# Patient Record
Sex: FEMALE | Race: WHITE | NOT HISPANIC OR LATINO | Employment: OTHER | URBAN - METROPOLITAN AREA
[De-identification: names, ages, dates, MRNs, and addresses within clinical notes are randomized per-mention and may not be internally consistent; named-entity substitution may affect disease eponyms.]

---

## 2017-01-10 ENCOUNTER — TELEPHONE (OUTPATIENT)
Dept: HEMATOLOGY/ONCOLOGY | Facility: CLINIC | Age: 72
End: 2017-01-10

## 2017-01-10 NOTE — TELEPHONE ENCOUNTER
----- Message from Salud Kruger sent at 1/9/2017 11:30 AM CST -----  Contact: self  Pt is calling to schedule an apt to be seen. She states that Dr. Aguilar is referring her to us. There is a referral in the system which states: closed fracture of sternum with routine healing, unspecified portion of sternum, subsequent encounter    Contact number is [phone]

## 2017-01-11 ENCOUNTER — TELEPHONE (OUTPATIENT)
Dept: HEMATOLOGY/ONCOLOGY | Facility: CLINIC | Age: 72
End: 2017-01-11

## 2017-01-17 ENCOUNTER — LAB VISIT (OUTPATIENT)
Dept: LAB | Facility: HOSPITAL | Age: 72
End: 2017-01-17
Attending: INTERNAL MEDICINE
Payer: MEDICARE

## 2017-01-17 ENCOUNTER — OFFICE VISIT (OUTPATIENT)
Dept: ENDOCRINOLOGY | Facility: CLINIC | Age: 72
End: 2017-01-17
Payer: MEDICARE

## 2017-01-17 VITALS
SYSTOLIC BLOOD PRESSURE: 110 MMHG | WEIGHT: 169 LBS | HEART RATE: 89 BPM | RESPIRATION RATE: 16 BRPM | BODY MASS INDEX: 26.53 KG/M2 | HEIGHT: 67 IN | DIASTOLIC BLOOD PRESSURE: 82 MMHG

## 2017-01-17 DIAGNOSIS — E78.5 HYPERLIPIDEMIA, UNSPECIFIED HYPERLIPIDEMIA TYPE: ICD-10-CM

## 2017-01-17 DIAGNOSIS — M81.0 OSTEOPOROSIS, POST-MENOPAUSAL: Primary | ICD-10-CM

## 2017-01-17 DIAGNOSIS — E03.9 HYPOTHYROIDISM (ACQUIRED): ICD-10-CM

## 2017-01-17 LAB
ALT SERPL W/O P-5'-P-CCNC: 20 U/L
AST SERPL-CCNC: 15 U/L
CHOLEST/HDLC SERPL: 2.7 {RATIO}
HDL/CHOLESTEROL RATIO: 37.2 %
HDLC SERPL-MCNC: 145 MG/DL
HDLC SERPL-MCNC: 54 MG/DL
LDLC SERPL CALC-MCNC: 73.8 MG/DL
NONHDLC SERPL-MCNC: 91 MG/DL
TRIGL SERPL-MCNC: 86 MG/DL

## 2017-01-17 PROCEDURE — 99999 PR PBB SHADOW E&M-EST. PATIENT-LVL III: CPT | Mod: PBBFAC,,, | Performed by: INTERNAL MEDICINE

## 2017-01-17 PROCEDURE — 99214 OFFICE O/P EST MOD 30 MIN: CPT | Mod: S$PBB,,, | Performed by: INTERNAL MEDICINE

## 2017-01-17 PROCEDURE — 99213 OFFICE O/P EST LOW 20 MIN: CPT | Mod: PBBFAC | Performed by: INTERNAL MEDICINE

## 2017-01-17 NOTE — PROGRESS NOTES
Subjective: Osteoporosis       Patient ID: Eugenia Diaz is a 71 y.o. female.    Chief Complaint:  Osteoporosis      History of Present Illness  Ms. Diaz presents to clinic today for further evaluation and management of Ostoeporosis   She is a prior patient of Dr. Vazquez with last documented office visit in 08/2015. This is the patient's initial visit with me today     Recently sustained a fall in 09/2016  Patient states she fell backward after losing her balance   She followed with primary care and initial X-ray of ribs did not show any acute fractures   CT of chest was ordered - and she was found to have a fracture at the superior aspect of the sternum     She did receive Prolia in 02/2016 and again in 08/2016     Noting prior use of Fosamax for 4-5 years and also Forteo x 2 years     BMD from 12/2015 showed a 7% decline in BMD at the lumbar spine   Total hip T score = -2.6   Femoral neck T score = -2.8   Lumbar spine T score = -1.9     Currently, taking calcium and vitamin D supplements daily     The patient has the following medical problems:   1. 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   3. Asthma requiring glucocorticoids intermittently.   4. Hypothyroidism, on treatment with Levothyroxine    5. High cholesterol, on a statin, which can improve the osteoporosis.   6. Depression, treated    7. Migraine headaches, infrequent   8 HTN on HCTZ which can be good for bone      Review of Dr. Vazquez's last clinic note:   Fosamax at 35 mg for 3  years; please see my detailed note and the thought   process. The patient is not having any major GI complaints from that. She  has not had any problem with her jaw. At one point, there had been a   question about avascular necrosis of the jaw a couple of years ago. We do   not think that was the diagnosis. The patient walks with a cane at home.   She uses a scooter  when going out. The reason for the scooter is the   avascular necrosis of the left hip. The patient takes care of her    who is disabled and getting a hip replacement at this time would be   impossible for her.   The patient in the past has been treated with Forteo and subsequently with   the bisphosphonates. She had an excellent response to the Forteo and it   seems in retrospect that the avascular necrosis stopped worsening with the   Forteo treatment and then has been maintained with the bisphosphonates.   The patient   has documented normal vitamin D levels. She was currently on steroids One month ago  because of a flare of the asthma.     Review of Systems   Constitutional: Positive for fatigue. Negative for unexpected weight change.   HENT: Negative for hearing loss.    Eyes: Negative for visual disturbance.   Respiratory: Negative for cough and shortness of breath.    Cardiovascular: Negative for chest pain and palpitations.   Gastrointestinal: Negative for blood in stool and constipation.   Musculoskeletal: Negative for arthralgias and back pain.   Neurological: Negative for headaches.       Objective:   Physical Exam   Constitutional: She is oriented to person, place, and time. She appears well-developed and well-nourished. No distress.   Neck: No thyromegaly present.   Cardiovascular: Normal rate, regular rhythm and normal heart sounds.    No murmur heard.  Musculoskeletal: She exhibits no edema.   Lymphadenopathy:     She has no cervical adenopathy.   Neurological: She is alert and oriented to person, place, and time.        Skin: Skin is warm and dry.   Psychiatric: She has a normal mood and affect. Her behavior is normal.   Nursing note and vitals reviewed.      Lab Review:   Results for orders placed or performed in visit on 01/17/17   Lipid panel   Result Value Ref Range    Cholesterol 145 120 - 199 mg/dL    Triglycerides 86 30 - 150 mg/dL    HDL 54 40 - 75 mg/dL    LDL Cholesterol 73.8 63.0 -  159.0 mg/dL    HDL/Chol Ratio 37.2 20.0 - 50.0 %    Total Cholesterol/HDL Ratio 2.7 2.0 - 5.0    Non-HDL Cholesterol 91 mg/dL   AST (SGOT)   Result Value Ref Range    AST 15 10 - 40 U/L   ALT (SGPT)   Result Value Ref Range    ALT 20 10 - 44 U/L       Chemistry        Component Value Date/Time     01/17/2017 1100    K 4.4 01/17/2017 1100     01/17/2017 1100    CO2 33 (H) 01/17/2017 1100    BUN 13 01/17/2017 1100    CREATININE 0.8 01/17/2017 1100    GLU 91 01/17/2017 1100        Component Value Date/Time    CALCIUM 9.3 01/17/2017 1100    ALKPHOS 73 06/20/2016 1518    AST 15 01/17/2017 1100    ALT 20 01/17/2017 1100    BILITOT 0.3 06/20/2016 1518            Assessment:     1. Osteoporosis and history of AVN of the hips and right hip replacement   - pt has multiple risk factors for osteoporosis with recent fracture of the superior aspect of the sternum   - recommendation is to continue Prolia   - discussed the importance of calcium and vitamin D on bone health   - calcium from food sources are preferred - yogurt, dark leafy vegetables , cheese , calcium fortified cereal   - fall safety and fall precautions   - repeat BMD due in 12/2017   - labs today     2. Hypothyroidism   - clinically euthyroid   - check TSH today   - avoid exogenous hyperthyroidism as this can accelerate bone loss and increase risk of CV complications     Plan:     Orders Placed This Encounter   Procedures    TSH     Standing Status:   Future     Number of Occurrences:   1     Standing Expiration Date:   1/12/2018    Vitamin D     Standing Status:   Future     Number of Occurrences:   1     Standing Expiration Date:   3/18/2018    Renal function panel     Standing Status:   Future     Number of Occurrences:   1     Standing Expiration Date:   3/18/2018    PTH, intact     Standing Status:   Future     Number of Occurrences:   1     Standing Expiration Date:   3/18/2018       Dr. Vazquez personally interviewed the patient and  discussed options in detail. His recommendations is to continue Prolia every 6 months   Patient will notify our office of her decision after she meets with Oncology

## 2017-01-17 NOTE — MR AVS SNAPSHOT
[facility] - Endo/Diab/Metab  [address]  [facility] [address]  Phone: [phone]  Fax: [phone]                  Eugenia Diaz   2017 9:30 AM   Office Visit    Description:  Female : 1945   Provider:  Salina Street NP   Department:  [facility] - Endo/Diab/Metab           Reason for Visit     Osteoporosis           Diagnoses this Visit        Comments    Osteoporosis, post-menopausal    -  Primary     Hypothyroidism (acquired)                To Do List           Future Appointments        Provider Department Dept Phone    2017 10:50 AM LAB, APPOINTMENT [place] [facility] [phone]    2017 2:00 PM Aparna Bernabe MD [facility] [phone]      Goals (5 Years of Data)     None      Follow-Up and Disposition     Return in about 10 months (around 2017).      [facility]     [facility] Nurse Care [facility] - 24/ Assistance  Registered nurses in the [facility] provide clinical advisement, health education, appointment booking, and other advisory services.  Call for this free service at [phone].             Medications           Message regarding Medications     Verify the changes and/or additions to your medication regime listed below are the same as discussed with your clinician today.  If any of these changes or additions are incorrect, please notify your healthcare provider.             Verify that the below list of medications is an accurate representation of the medications you are currently taking.  If none reported, the list may be blank. If incorrect, please contact your healthcare provider. Carry this list with you in case of emergency.           Current Medications     ADVAIR DISKUS 500-50 mcg/dose DsDv diskus inhaler USE ONE PUFF TWICE DAILY    albuterol 90 mcg/actuation inhaler Inhale 2 puffs into the lungs every 6 (six) hours as needed for Wheezing.    atorvastatin (LIPITOR) 80 MG  "tablet Take 1 tablet (80 mg total) by mouth once daily.    calcium-vitamin D 600 mg(1,500mg) -400 unit Tab Take 1 tablet by mouth Every morning.    cyclobenzaprine (FLEXERIL) 10 MG tablet TAKE ONE-HALF OR ONE TABLET BY MOUTH AT BEDTIME    diclofenac sodium 1 % Gel Apply 2 g topically 4 (four) times daily.    esomeprazole (NEXIUM) 40 MG capsule TAKE ONE CAPSULE BY MOUTH ONCE DAILY    fluticasone (FLONASE) 50 mcg/actuation nasal spray USE AS DIRECTED    hydrocodone-acetaminophen 5-325mg (NORCO) 5-325 mg per tablet Take 1 tablet by mouth every 6 (six) hours as needed for Pain.    levalbuterol (XOPENEX) 1.25 mg/3 mL nebulizer solution Inhale into the lungs. Up to 3 times daily prn    levothyroxine (SYNTHROID) 200 MCG tablet TAKE ONE TABLET BY MOUTH EVERY DAY    lidocaine (XYLOCAINE) 5 % Oint ointment Apply topically as needed.    loratadine (CLARITIN) 10 mg tablet Take 1 tablet by mouth Every PM.    montelukast (SINGULAIR) 10 mg tablet Take 1 tablet (10 mg total) by mouth nightly. at bedtime.    montelukast (SINGULAIR) 10 mg tablet TAKE ONE TABLET BY MOUTH EVERY NIGHT AT BEDTIME    OLIVE LEAF EXTRACT ORAL Take 150 mg by mouth Every PM.    predniSONE (DELTASONE) 20 MG tablet 3 tabs po q day for 3 days, then 2 tabs po for 3 days,then 1 tab po q day for 3 days    rizatriptan (MAXALT) 10 MG tablet Take 1 tablet by mouth Once daily as needed. If needed for migraines    sertraline (ZOLOFT) 100 MG tablet TAKE TWO TABLETS BY MOUTH EVERY DAY    triamterene-hydrochlorothiazide 37.5-25 mg (DYAZIDE) 37.5-25 mg per capsule TAKE ONE CAPSULE BY MOUTH EVERY MORNING           Clinical Reference Information           Vital Signs - Last Recorded  Most recent update: 1/17/2017  9:52 AM by Kezia Lange MA    BP Pulse Resp Ht Wt BMI    110/82 (BP Location: Right arm, Patient Position: Sitting, BP Method: Manual) 89 16 5' 7" (1.702 m) 76.7 kg (169 lb) 26.47 kg/m2      Blood Pressure          Most Recent Value    BP  110/82      Allergies " as of 1/17/2017     Sulfa (Sulfonamide Antibiotics)    Clindamycin    Erythromycin Base    Neomycin    Nitrofurantoin Macrocrystalline    Tetracycline    Azithromycin    Meperidine    Oxycodone Hcl-oxycodone-asa    Propoxyphene      Immunizations Administered on Date of Encounter - 1/17/2017     None      Orders Placed During Today's Visit     Future Labs/Procedures Expected by Expires    PTH, intact  1/17/2017 3/18/2018    Renal function panel  1/17/2017 3/18/2018    TSH  1/17/2017 (Approximate) 1/12/2018    Vitamin D  1/17/2017 3/18/2018

## 2017-01-18 ENCOUNTER — PATIENT MESSAGE (OUTPATIENT)
Dept: ENDOCRINOLOGY | Facility: CLINIC | Age: 72
End: 2017-01-18

## 2017-01-18 ENCOUNTER — PATIENT MESSAGE (OUTPATIENT)
Dept: INTERNAL MEDICINE | Facility: CLINIC | Age: 72
End: 2017-01-18

## 2017-01-18 DIAGNOSIS — E03.9 HYPOTHYROIDISM (ACQUIRED): Primary | ICD-10-CM

## 2017-01-25 ENCOUNTER — LAB VISIT (OUTPATIENT)
Dept: LAB | Facility: HOSPITAL | Age: 72
End: 2017-01-25
Attending: INTERNAL MEDICINE
Payer: MEDICARE

## 2017-01-25 ENCOUNTER — INITIAL CONSULT (OUTPATIENT)
Dept: HEMATOLOGY/ONCOLOGY | Facility: CLINIC | Age: 72
End: 2017-01-25
Payer: MEDICARE

## 2017-01-25 VITALS
DIASTOLIC BLOOD PRESSURE: 83 MMHG | WEIGHT: 167.13 LBS | HEIGHT: 67 IN | SYSTOLIC BLOOD PRESSURE: 146 MMHG | BODY MASS INDEX: 26.23 KG/M2 | HEART RATE: 73 BPM | TEMPERATURE: 97 F

## 2017-01-25 DIAGNOSIS — D47.2 MGUS (MONOCLONAL GAMMOPATHY OF UNKNOWN SIGNIFICANCE): Primary | ICD-10-CM

## 2017-01-25 DIAGNOSIS — D47.2 MGUS (MONOCLONAL GAMMOPATHY OF UNKNOWN SIGNIFICANCE): ICD-10-CM

## 2017-01-25 LAB
IGA SERPL-MCNC: 140 MG/DL
IGG SERPL-MCNC: 883 MG/DL
IGM SERPL-MCNC: 80 MG/DL

## 2017-01-25 PROCEDURE — 99999 PR PBB SHADOW E&M-EST. PATIENT-LVL IV: CPT | Mod: PBBFAC,,, | Performed by: INTERNAL MEDICINE

## 2017-01-25 PROCEDURE — 36415 COLL VENOUS BLD VENIPUNCTURE: CPT

## 2017-01-25 PROCEDURE — 86334 IMMUNOFIX E-PHORESIS SERUM: CPT | Mod: 26,,, | Performed by: PATHOLOGY

## 2017-01-25 PROCEDURE — 84165 PROTEIN E-PHORESIS SERUM: CPT

## 2017-01-25 PROCEDURE — 86334 IMMUNOFIX E-PHORESIS SERUM: CPT

## 2017-01-25 PROCEDURE — 84165 PROTEIN E-PHORESIS SERUM: CPT | Mod: 26,,, | Performed by: PATHOLOGY

## 2017-01-25 PROCEDURE — 83520 IMMUNOASSAY QUANT NOS NONAB: CPT

## 2017-01-25 PROCEDURE — 99203 OFFICE O/P NEW LOW 30 MIN: CPT | Mod: S$PBB,,, | Performed by: INTERNAL MEDICINE

## 2017-01-25 PROCEDURE — 82784 ASSAY IGA/IGD/IGG/IGM EACH: CPT | Mod: 59

## 2017-01-25 NOTE — MR AVS SNAPSHOT
[facility]  [address] [facility]  [facility] [address]  Phone: [phone]                  Eugenia Beob   2017 2:00 PM   Appointment    Description:  Female : 1945   Provider:  Aparna Bernabe MD   Department:  [facility]                To Do List           Future Appointments        Provider Department Dept Phone    3/6/2017 8:00 AM LAB, APPOINTMENT [place] [facility] [phone]      Goals (5 Years of Data)     None      [facility]     [facility] Nurse Care [facility] -  Assistance  Registered nurses in the [facility] provide clinical advisement, health education, appointment booking, and other advisory services.  Call for this free service at [phone].             Medications           Message regarding Medications     Verify the changes and/or additions to your medication regime listed below are the same as discussed with your clinician today.  If any of these changes or additions are incorrect, please notify your healthcare provider.             Verify that the below list of medications is an accurate representation of the medications you are currently taking.  If none reported, the list may be blank. If incorrect, please contact your healthcare provider. Carry this list with you in case of emergency.           Current Medications     ADVAIR DISKUS 500-50 mcg/dose DsDv diskus inhaler USE ONE PUFF TWICE DAILY    albuterol 90 mcg/actuation inhaler Inhale 2 puffs into the lungs every 6 (six) hours as needed for Wheezing.    atorvastatin (LIPITOR) 80 MG tablet Take 1 tablet (80 mg total) by mouth once daily.    calcium-vitamin D 600 mg(1,500mg) -400 unit Tab Take 1 tablet by mouth Every morning.    cyclobenzaprine (FLEXERIL) 10 MG tablet TAKE ONE-HALF OR ONE TABLET BY MOUTH AT BEDTIME    diclofenac sodium 1 % Gel Apply 2 g topically 4 (four) times daily.    esomeprazole (NEXIUM) 40 MG capsule TAKE ONE CAPSULE  BY MOUTH ONCE DAILY    fluticasone (FLONASE) 50 mcg/actuation nasal spray USE AS DIRECTED    hydrocodone-acetaminophen 5-325mg (NORCO) 5-325 mg per tablet Take 1 tablet by mouth every 6 (six) hours as needed for Pain.    levalbuterol (XOPENEX) 1.25 mg/3 mL nebulizer solution Inhale into the lungs. Up to 3 times daily prn    levothyroxine (SYNTHROID) 200 MCG tablet TAKE ONE TABLET BY MOUTH EVERY DAY    lidocaine (XYLOCAINE) 5 % Oint ointment Apply topically as needed.    loratadine (CLARITIN) 10 mg tablet Take 1 tablet by mouth Every PM.    montelukast (SINGULAIR) 10 mg tablet Take 1 tablet (10 mg total) by mouth nightly. at bedtime.    montelukast (SINGULAIR) 10 mg tablet TAKE ONE TABLET BY MOUTH EVERY NIGHT AT BEDTIME    OLIVE LEAF EXTRACT ORAL Take 150 mg by mouth Every PM.    predniSONE (DELTASONE) 20 MG tablet 3 tabs po q day for 3 days, then 2 tabs po for 3 days,then 1 tab po q day for 3 days    rizatriptan (MAXALT) 10 MG tablet Take 1 tablet by mouth Once daily as needed. If needed for migraines    sertraline (ZOLOFT) 100 MG tablet TAKE TWO TABLETS BY MOUTH EVERY DAY    triamterene-hydrochlorothiazide 37.5-25 mg (DYAZIDE) 37.5-25 mg per capsule TAKE ONE CAPSULE BY MOUTH EVERY MORNING           Clinical Reference Information           Allergies as of 1/25/2017     Sulfa (Sulfonamide Antibiotics)    Clindamycin    Erythromycin Base    Neomycin    Nitrofurantoin Macrocrystalline    Tetracycline    Azithromycin    Meperidine    Oxycodone Hcl-oxycodone-asa    Propoxyphene      Immunizations Administered on Date of Encounter - 1/25/2017     None

## 2017-01-25 NOTE — LETTER
January 26, 2017      Latia Aguilar MD  [address]  [place] [address]           [facility]  [address]  [place] [address]  Phone: [phone]          Patient: Eugenia Diaz   MR Number: [id number]   YOB: 1945   Date of Visit: 1/25/2017       Dear Dr. Latia Aguilar:    Thank you for referring Eugenia Diaz to me for evaluation. Attached you will find relevant portions of my assessment and plan of care.    If you have questions, please do not hesitate to call me. I look forward to following Eugenia Diaz along with you.    Sincerely,    Aparna Bernabe MD    Enclosure  CC:  No Recipients    If you would like to receive this communication electronically, please contact [email or web address] or [phone] to request more information on [facility] access.    For providers and/or their staff who would like to refer a patient to [facility], please contact us through our one-stop-shop provider referral line, [facility] , at [phone].    If you feel you have received this communication in error or would no longer like to receive these types of communications, please e-mail [email or web address]

## 2017-01-26 LAB
ALBUMIN SERPL ELPH-MCNC: 4.04 G/DL
ALPHA1 GLOB SERPL ELPH-MCNC: 0.29 G/DL
ALPHA2 GLOB SERPL ELPH-MCNC: 0.74 G/DL
B-GLOBULIN SERPL ELPH-MCNC: 0.75 G/DL
GAMMA GLOB SERPL ELPH-MCNC: 0.88 G/DL
INTERPRETATION SERPL IFE-IMP: NORMAL
KAPPA LC SER QL IA: 1.33 MG/DL
KAPPA LC/LAMBDA SER IA: 0.96
LAMBDA LC SER QL IA: 1.39 MG/DL
PATHOLOGIST INTERPRETATION IFE: NORMAL
PATHOLOGIST INTERPRETATION SPE: NORMAL
PROT SERPL-MCNC: 6.7 G/DL

## 2017-01-26 NOTE — PROGRESS NOTES
Subjective:       Patient ID: Eugenia Diaz is a 71 y.o. female.    Chief Complaint: No chief complaint on file.    Eugenia presents with her  for an evaluation of an abnormal CT scan after a fall at [facility] a few months ago. Her  who has MS was admitted to the hospital and she was staying as his guest. She was rising from a chair that was on rollers which slipped out from beneath her. As she fell she felt a painful tearing sensation in her chest. Chest x-ray soon after the fall was negative for fracture. She continued to have pain in the center of her chest, especially with coughing and follow-up CT was positive for a fracture of the sternum. Radiology report includes myeloma as a potential etiology leading to hematology referral.    The patient has a history of osteoporosis, avascular necrosis and chronic steroid therapy. She has a history of multiple bone fractures and tooth decay. She is currently on prolia, calcium, and vitamin D therapy. Of note she does not have any serologic CRAB criteria. She has not received a sternal biopsy due to the risk of the procedure.    HPI  Review of Systems   Constitutional: Negative.    HENT: Negative.    Eyes: Negative.    Respiratory: Negative.    Cardiovascular: Positive for chest pain (related to sternal fracture).   Gastrointestinal: Negative.    Endocrine: Negative.    Genitourinary: Negative.    Musculoskeletal: Negative.    Skin: Negative.    Allergic/Immunologic: Negative for environmental allergies, food allergies and immunocompromised state.   Neurological: Negative.    Hematological: Negative for adenopathy. Does not bruise/bleed easily.   Psychiatric/Behavioral: Negative.        Objective:      Physical Exam   Constitutional: She is oriented to person, place, and time. She appears well-developed and well-nourished.   HENT:   Head: Normocephalic and atraumatic.   Eyes: Conjunctivae are normal. Pupils are equal, round, and reactive to light.   Neck:  Normal range of motion. Neck supple.   Cardiovascular: Normal rate and intact distal pulses.    Pulmonary/Chest: Effort normal. No respiratory distress.   Abdominal: She exhibits no distension.   Musculoskeletal: Normal range of motion. She exhibits no edema.   Neurological: She is alert and oriented to person, place, and time. No cranial nerve deficit.   Skin: Skin is warm and dry.   Psychiatric: She has a normal mood and affect. Her behavior is normal.   Nursing note and vitals reviewed.      Assessment:       1. MGUS (monoclonal gammopathy of unknown significance)        Plan:       Sternal fracture likely related to patients history of poor bone health and trauma from fall.  Do not recommend biopsy of sternal bone due to risk of procedure.  Biochemical markers collected to test for plasma cell dyscrasia.    Follow-up laboratory results.    Visit 30 minutes, >50% counseling      Addenda 2/14/17  Lab evaluation negative for multiple myeloma  Discussed with patient  Will repeat CT of chest in 8 weeks to assess site on sternum.

## 2017-01-27 ENCOUNTER — PATIENT MESSAGE (OUTPATIENT)
Dept: OBSTETRICS AND GYNECOLOGY | Facility: CLINIC | Age: 72
End: 2017-01-27

## 2017-01-27 ENCOUNTER — PATIENT MESSAGE (OUTPATIENT)
Dept: ENDOCRINOLOGY | Facility: CLINIC | Age: 72
End: 2017-01-27

## 2017-01-30 ENCOUNTER — TELEPHONE (OUTPATIENT)
Dept: OBSTETRICS AND GYNECOLOGY | Facility: CLINIC | Age: 72
End: 2017-01-30

## 2017-01-30 DIAGNOSIS — Z12.31 VISIT FOR SCREENING MAMMOGRAM: Primary | ICD-10-CM

## 2017-01-30 NOTE — TELEPHONE ENCOUNTER
Orders placed!      Happy New Year, Doctor.  If you would, please [facility] to [facility] so I can set up an appointment for my mammogram after February 5th.  Thank you.  Eugenia Diaz

## 2017-02-09 ENCOUNTER — HOSPITAL ENCOUNTER (OUTPATIENT)
Dept: RADIOLOGY | Facility: HOSPITAL | Age: 72
Discharge: HOME | End: 2017-02-09
Attending: OBSTETRICS & GYNECOLOGY
Payer: MEDICARE

## 2017-02-09 DIAGNOSIS — Z12.31 VISIT FOR SCREENING MAMMOGRAM: ICD-10-CM

## 2017-02-09 PROCEDURE — 77067 SCR MAMMO BI INCL CAD: CPT | Mod: 26,,, | Performed by: RADIOLOGY

## 2017-02-09 PROCEDURE — 77067 SCR MAMMO BI INCL CAD: CPT | Mod: TC

## 2017-02-09 PROCEDURE — 77063 BREAST TOMOSYNTHESIS BI: CPT | Mod: 26,,, | Performed by: RADIOLOGY

## 2017-02-10 ENCOUNTER — PATIENT MESSAGE (OUTPATIENT)
Dept: OBSTETRICS AND GYNECOLOGY | Facility: CLINIC | Age: 72
End: 2017-02-10

## 2017-02-13 ENCOUNTER — TELEPHONE (OUTPATIENT)
Dept: OBSTETRICS AND GYNECOLOGY | Facility: CLINIC | Age: 72
End: 2017-02-13

## 2017-02-13 NOTE — TELEPHONE ENCOUNTER
please review and respond to the following [employer] message: Please release MMG results  Thank you!  Any results yet that can be sent to me via [facility]?  CIARA

## 2017-02-14 DIAGNOSIS — S22.20XG CLOSED FRACTURE OF STERNUM WITH DELAYED HEALING, UNSPECIFIED PORTION OF STERNUM, SUBSEQUENT ENCOUNTER: ICD-10-CM

## 2017-02-14 PROBLEM — S22.20XA CLOSED FRACTURE OF STERNUM: Status: ACTIVE | Noted: 2017-02-14

## 2017-02-14 NOTE — TELEPHONE ENCOUNTER
Her Mammogram results were sent via [email or web address].  It is normal. What results is she looking for?  Glory

## 2017-02-15 ENCOUNTER — PATIENT MESSAGE (OUTPATIENT)
Dept: OBSTETRICS AND GYNECOLOGY | Facility: CLINIC | Age: 72
End: 2017-02-15

## 2017-03-06 ENCOUNTER — LAB VISIT (OUTPATIENT)
Dept: LAB | Facility: HOSPITAL | Age: 72
End: 2017-03-06
Attending: INTERNAL MEDICINE
Payer: MEDICARE

## 2017-03-06 ENCOUNTER — PATIENT MESSAGE (OUTPATIENT)
Dept: ENDOCRINOLOGY | Facility: CLINIC | Age: 72
End: 2017-03-06

## 2017-03-06 DIAGNOSIS — M81.0 OSTEOPOROSIS, POST-MENOPAUSAL: Primary | ICD-10-CM

## 2017-03-06 DIAGNOSIS — E03.9 HYPOTHYROIDISM (ACQUIRED): ICD-10-CM

## 2017-03-06 LAB — TSH SERPL DL<=0.005 MIU/L-ACNC: 0.75 UIU/ML

## 2017-03-06 PROCEDURE — 36415 COLL VENOUS BLD VENIPUNCTURE: CPT | Mod: PO

## 2017-03-06 PROCEDURE — 84443 ASSAY THYROID STIM HORMONE: CPT

## 2017-04-11 ENCOUNTER — HOSPITAL ENCOUNTER (OUTPATIENT)
Dept: RADIOLOGY | Facility: HOSPITAL | Age: 72
Discharge: HOME | End: 2017-04-11
Attending: INTERNAL MEDICINE
Payer: MEDICARE

## 2017-04-11 DIAGNOSIS — S22.20XG CLOSED FRACTURE OF STERNUM WITH DELAYED HEALING, UNSPECIFIED PORTION OF STERNUM, SUBSEQUENT ENCOUNTER: ICD-10-CM

## 2017-04-11 PROCEDURE — 71250 CT THORAX DX C-: CPT | Mod: TC

## 2017-04-11 PROCEDURE — 71250 CT THORAX DX C-: CPT | Mod: 26,GC,, | Performed by: RADIOLOGY

## 2017-04-28 ENCOUNTER — PATIENT MESSAGE (OUTPATIENT)
Dept: INTERNAL MEDICINE | Facility: CLINIC | Age: 72
End: 2017-04-28

## 2017-04-28 DIAGNOSIS — R30.0 DYSURIA: Primary | ICD-10-CM

## 2017-05-01 ENCOUNTER — LAB VISIT (OUTPATIENT)
Dept: LAB | Facility: HOSPITAL | Age: 72
End: 2017-05-01
Attending: INTERNAL MEDICINE
Payer: MEDICARE

## 2017-05-01 DIAGNOSIS — R30.0 DYSURIA: ICD-10-CM

## 2017-05-01 LAB
BACTERIA #/AREA URNS AUTO: ABNORMAL /HPF
BILIRUB UR QL STRIP: NEGATIVE
CLARITY UR REFRACT.AUTO: CLEAR
COLOR UR AUTO: YELLOW
GLUCOSE UR QL STRIP: NEGATIVE
HGB UR QL STRIP: NEGATIVE
KETONES UR QL STRIP: NEGATIVE
LEUKOCYTE ESTERASE UR QL STRIP: ABNORMAL
MICROSCOPIC COMMENT: ABNORMAL
NITRITE UR QL STRIP: NEGATIVE
PH UR STRIP: 7 [PH] (ref 5–8)
PROT UR QL STRIP: NEGATIVE
RBC #/AREA URNS AUTO: 0 /HPF (ref 0–4)
SP GR UR STRIP: 1.01 (ref 1–1.03)
SQUAMOUS #/AREA URNS AUTO: 0 /HPF
URN SPEC COLLECT METH UR: ABNORMAL
UROBILINOGEN UR STRIP-ACNC: NEGATIVE EU/DL
WBC #/AREA URNS AUTO: 5 /HPF (ref 0–5)

## 2017-05-01 PROCEDURE — 81001 URINALYSIS AUTO W/SCOPE: CPT

## 2017-05-01 PROCEDURE — 87086 URINE CULTURE/COLONY COUNT: CPT

## 2017-05-01 PROCEDURE — 87088 URINE BACTERIA CULTURE: CPT

## 2017-05-01 PROCEDURE — 87186 SC STD MICRODIL/AGAR DIL: CPT

## 2017-05-01 PROCEDURE — 87077 CULTURE AEROBIC IDENTIFY: CPT

## 2017-05-03 ENCOUNTER — TELEPHONE (OUTPATIENT)
Dept: INTERNAL MEDICINE | Facility: CLINIC | Age: 72
End: 2017-05-03

## 2017-05-03 LAB — BACTERIA UR CULT: NORMAL

## 2017-05-03 RX ORDER — AMOXICILLIN AND CLAVULANATE POTASSIUM 875; 125 MG/1; MG/1
TABLET, FILM COATED ORAL
Qty: 20 TABLET | Refills: 0 | Status: SHIPPED | OUTPATIENT
Start: 2017-05-03 | End: 2017-05-03 | Stop reason: SDUPTHER

## 2017-05-03 RX ORDER — AMOXICILLIN AND CLAVULANATE POTASSIUM 875; 125 MG/1; MG/1
TABLET, FILM COATED ORAL
Qty: 20 TABLET | Refills: 0 | Status: SHIPPED | OUTPATIENT
Start: 2017-05-03 | End: 2017-07-19

## 2017-05-15 ENCOUNTER — PATIENT MESSAGE (OUTPATIENT)
Dept: HEMATOLOGY/ONCOLOGY | Facility: CLINIC | Age: 72
End: 2017-05-15

## 2017-05-24 RX ORDER — ESOMEPRAZOLE MAGNESIUM 40 MG/1
CAPSULE, DELAYED RELEASE ORAL
Qty: 30 CAPSULE | Refills: 0 | Status: SHIPPED | OUTPATIENT
Start: 2017-05-24 | End: 2017-07-05 | Stop reason: SDUPTHER

## 2017-06-15 ENCOUNTER — PATIENT MESSAGE (OUTPATIENT)
Dept: INTERNAL MEDICINE | Facility: CLINIC | Age: 72
End: 2017-06-15

## 2017-06-19 ENCOUNTER — LAB VISIT (OUTPATIENT)
Dept: LAB | Facility: HOSPITAL | Age: 72
End: 2017-06-19
Attending: INTERNAL MEDICINE
Payer: MEDICARE

## 2017-06-19 DIAGNOSIS — E03.9 HYPOTHYROIDISM (ACQUIRED): ICD-10-CM

## 2017-06-19 LAB — TSH SERPL DL<=0.005 MIU/L-ACNC: 0.98 UIU/ML

## 2017-06-19 PROCEDURE — 84443 ASSAY THYROID STIM HORMONE: CPT

## 2017-06-19 PROCEDURE — 36415 COLL VENOUS BLD VENIPUNCTURE: CPT | Mod: PO

## 2017-06-28 ENCOUNTER — OFFICE VISIT (OUTPATIENT)
Dept: INTERNAL MEDICINE | Facility: CLINIC | Age: 72
End: 2017-06-28
Payer: MEDICARE

## 2017-06-28 VITALS
DIASTOLIC BLOOD PRESSURE: 80 MMHG | OXYGEN SATURATION: 95 % | HEART RATE: 86 BPM | SYSTOLIC BLOOD PRESSURE: 118 MMHG | BODY MASS INDEX: 26.21 KG/M2 | HEIGHT: 67 IN | WEIGHT: 167 LBS

## 2017-06-28 DIAGNOSIS — Z00.00 ANNUAL PHYSICAL EXAM: ICD-10-CM

## 2017-06-28 DIAGNOSIS — H60.90 OTITIS EXTERNA, UNSPECIFIED CHRONICITY, UNSPECIFIED LATERALITY, UNSPECIFIED TYPE: ICD-10-CM

## 2017-06-28 DIAGNOSIS — E03.9 HYPOTHYROIDISM (ACQUIRED): ICD-10-CM

## 2017-06-28 DIAGNOSIS — D64.9 ANEMIA, UNSPECIFIED TYPE: Primary | ICD-10-CM

## 2017-06-28 DIAGNOSIS — I10 ESSENTIAL HYPERTENSION: ICD-10-CM

## 2017-06-28 DIAGNOSIS — J45.20 ASTHMA, WELL CONTROLLED, MILD INTERMITTENT: ICD-10-CM

## 2017-06-28 PROCEDURE — 99999 PR PBB SHADOW E&M-EST. PATIENT-LVL III: CPT | Mod: PBBFAC,,, | Performed by: INTERNAL MEDICINE

## 2017-06-28 PROCEDURE — 1159F MED LIST DOCD IN RCRD: CPT | Mod: ,,, | Performed by: INTERNAL MEDICINE

## 2017-06-28 PROCEDURE — 1157F ADVNC CARE PLAN IN RCRD: CPT | Mod: ,,, | Performed by: INTERNAL MEDICINE

## 2017-06-28 PROCEDURE — 99213 OFFICE O/P EST LOW 20 MIN: CPT | Mod: PBBFAC | Performed by: INTERNAL MEDICINE

## 2017-06-28 PROCEDURE — 1125F AMNT PAIN NOTED PAIN PRSNT: CPT | Mod: ,,, | Performed by: INTERNAL MEDICINE

## 2017-06-28 PROCEDURE — 99215 OFFICE O/P EST HI 40 MIN: CPT | Mod: S$PBB,,, | Performed by: INTERNAL MEDICINE

## 2017-06-28 RX ORDER — PREDNISONE 20 MG/1
TABLET ORAL
Qty: 18 TABLET | Refills: 0 | Status: SHIPPED | OUTPATIENT
Start: 2017-06-28 | End: 2018-10-17

## 2017-06-28 RX ORDER — NEOMYCIN SULFATE, POLYMYXIN B SULFATE AND HYDROCORTISONE 10; 3.5; 1 MG/ML; MG/ML; [USP'U]/ML
3 SUSPENSION/ DROPS AURICULAR (OTIC) 4 TIMES DAILY
Qty: 10 ML | Refills: 1 | Status: SHIPPED | OUTPATIENT
Start: 2017-06-28 | End: 2017-11-07 | Stop reason: SDUPTHER

## 2017-06-28 NOTE — Clinical Note
Jah - this pt would like an orthopedic opinion for fracture of sternum.  Heme-onc has been following.  Differential by last CT is AVN vs osteoporotic fracture.  Could you recommend someone for her to see?            Thanks so much                 Latia

## 2017-06-28 NOTE — PROGRESS NOTES
Subjective:       Patient ID: Eugenia Diaz is a 71 y.o. female.    Chief Complaint: Annual Exam (ear pain 8/10 x 5 days )    HPI   C/o 5 day h/o R ear pain, which awakened her.  Crackling and popping.  Tried otc ear drops, but not helpful.  mild green drainage.  Mild eye pain.  Some post nasal drip.  Fver.  apprehensive that asthma will be exacerbated soon.  Self tx cold sore .    Osteoporosis.  Prolia rec'd.      Had labs last week.  tsh normal. Taking synthroid.    Found to have avascular necrosis vs osteoporotic fractureof sternum.  Popping with coughing, deep breath.  She presses sternum before coughing.  Prolia has been held.  She has had to hire an aid to help with caring for .  She has osteoporosis with multiple fractures.    Would like prednisone to have as needed should prednisone flare   Review of Systems   Constitutional: Negative for fever and unexpected weight change.   HENT: Negative for congestion and postnasal drip.    Eyes: Positive for discharge.   Respiratory: Negative for chest tightness, shortness of breath and wheezing.    Cardiovascular: Negative for chest pain and leg swelling.   Gastrointestinal: Negative for abdominal pain, anal bleeding, constipation, diarrhea, nausea and vomiting.   Genitourinary: Negative for dysuria and urgency.   Skin: Negative for rash.   Neurological: Negative for headaches.   Psychiatric/Behavioral: Negative for dysphoric mood and sleep disturbance. The patient is not nervous/anxious.        Objective:      Physical Exam   Constitutional: She is oriented to person, place, and time. She appears well-developed and well-nourished. No distress.   HENT:   Head: Normocephalic and atraumatic.   Right Ear: External ear normal.   Left Ear: External ear normal.   Nose: Nose normal.   Mouth/Throat: Oropharynx is clear and moist.   Eyes: Conjunctivae and EOM are normal. Pupils are equal, round, and reactive to light. Right eye exhibits no discharge. Left eye exhibits no  discharge. No scleral icterus.   Neck: Normal range of motion. Neck supple. No JVD present. No thyromegaly present.   Cardiovascular: Normal rate, regular rhythm and normal heart sounds.  Exam reveals no gallop.    No murmur heard.  Tenderness of sternum.     Pulmonary/Chest: Effort normal and breath sounds normal. No respiratory distress. She has no wheezes. She has no rales.   Abdominal: Soft. Bowel sounds are normal. She exhibits no distension and no mass. There is no tenderness. There is no rebound and no guarding.   Musculoskeletal: Normal range of motion. She exhibits no edema or tenderness.   Lymphadenopathy:     She has no cervical adenopathy.   Neurological: She is alert and oriented to person, place, and time. No cranial nerve deficit. Coordination normal.   Skin: Skin is warm and dry. No rash noted.   Psychiatric: She has a normal mood and affect. Her behavior is normal. Judgment and thought content normal.       Assessment:       1. Anemia, unspecified type    2. Asthma, well controlled, mild intermittent    3. Essential hypertension    4. Hypothyroidism (acquired)    5. Annual physical exam    6. Otitis externa, unspecified chronicity, unspecified laterality, unspecified type        Plan:       Eugenia was seen today for annual exam.    Diagnoses and all orders for this visit:    Anemia, unspecified type  -     CBC auto differential; Future    Asthma, well controlled, mild intermittent    Essential hypertension  -     Comprehensive metabolic panel; Future    Hypothyroidism (acquired)    Annual physical exam    Otitis externa, unspecified chronicity, unspecified laterality, unspecified type    Other orders  -     predniSONE (DELTASONE) 20 MG tablet; 3 tabs po q day for 3 days, then 2 tabs po for 3 days,then 1 tab po q day for 3 days  -     neomycin-polymyxin-hydrocortisone (CORTISPORIN) 3.5-10,000-1 mg/mL-unit/mL-% otic suspension; Place 3 drops into the right ear 4 (four) times daily.             Lena:  Who should she see.

## 2017-07-05 DIAGNOSIS — F43.21 ADJUSTMENT DISORDER WITH DEPRESSED MOOD: ICD-10-CM

## 2017-07-05 RX ORDER — ESOMEPRAZOLE MAGNESIUM 40 MG/1
CAPSULE, DELAYED RELEASE ORAL
Qty: 30 CAPSULE | Refills: 11 | Status: SHIPPED | OUTPATIENT
Start: 2017-07-05 | End: 2018-07-23 | Stop reason: SDUPTHER

## 2017-07-05 RX ORDER — SERTRALINE HYDROCHLORIDE 100 MG/1
TABLET, FILM COATED ORAL
Qty: 60 TABLET | Refills: 11 | Status: SHIPPED | OUTPATIENT
Start: 2017-07-05 | End: 2018-08-18 | Stop reason: SDUPTHER

## 2017-07-06 ENCOUNTER — PATIENT MESSAGE (OUTPATIENT)
Dept: INTERNAL MEDICINE | Facility: CLINIC | Age: 72
End: 2017-07-06

## 2017-07-06 DIAGNOSIS — E03.4 HYPOTHYROIDISM DUE TO ACQUIRED ATROPHY OF THYROID: ICD-10-CM

## 2017-07-06 RX ORDER — LEVOTHYROXINE SODIUM 200 UG/1
TABLET ORAL
Qty: 30 TABLET | Refills: 11 | Status: SHIPPED | OUTPATIENT
Start: 2017-07-06 | End: 2018-07-23 | Stop reason: SDUPTHER

## 2017-07-12 ENCOUNTER — PATIENT MESSAGE (OUTPATIENT)
Dept: INTERNAL MEDICINE | Facility: CLINIC | Age: 72
End: 2017-07-12

## 2017-07-12 DIAGNOSIS — S22.20XA CLOSED FRACTURE OF STERNUM, UNSPECIFIED PORTION OF STERNUM, INITIAL ENCOUNTER: ICD-10-CM

## 2017-07-12 DIAGNOSIS — S22.20XG CLOSED FRACTURE OF STERNUM WITH DELAYED HEALING, UNSPECIFIED PORTION OF STERNUM, SUBSEQUENT ENCOUNTER: Primary | ICD-10-CM

## 2017-07-13 ENCOUNTER — TELEPHONE (OUTPATIENT)
Dept: CARDIOTHORACIC SURGERY | Facility: CLINIC | Age: 72
End: 2017-07-13

## 2017-07-13 ENCOUNTER — PATIENT MESSAGE (OUTPATIENT)
Dept: INTERNAL MEDICINE | Facility: CLINIC | Age: 72
End: 2017-07-13

## 2017-07-13 NOTE — TELEPHONE ENCOUNTER
"Received a referral from Dr. Aguilar to Dr. Garcia re: sternal fracture s/p fall in September 2016. H/O osteoporosis with multiple fractures. Pt had a CT chest on 11/14/16 revealing "Fracture at the superior aspect of the sternum with associated central lucency concerning for osseous lytic lesion with associated pathologic fracture. Consideration of biopsy is recommended prior to undergoing further evaluation for metastatic disease or multiple myeloma."   Repeat CT chest on 4/11/16 "Sternal fracture with development sclerotic margins and unchanged quantity of soft tissue, favoring this to represent a nonunion osteoporotic fracture or osteonecrotic fracture as opposed to a pathologic/malignant fracture."    Dr. Garcia agreed to see the pt with a CT chest w/o contrast (no 3D bone recon needed). Spoke with the pt, she will have the CT chest performed tomorrow at [facility] for 1215p. She is agreeable to meet with Dr. Garcia on 7/19 at 11am. Provided her with date/time/location information. Notified  of the scheduled appts.  "

## 2017-07-14 ENCOUNTER — HOSPITAL ENCOUNTER (OUTPATIENT)
Dept: RADIOLOGY | Facility: HOSPITAL | Age: 72
Discharge: HOME | End: 2017-07-14
Attending: THORACIC SURGERY (CARDIOTHORACIC VASCULAR SURGERY)
Payer: MEDICARE

## 2017-07-14 DIAGNOSIS — S22.20XG CLOSED FRACTURE OF STERNUM WITH DELAYED HEALING, UNSPECIFIED PORTION OF STERNUM, SUBSEQUENT ENCOUNTER: ICD-10-CM

## 2017-07-14 PROCEDURE — 71250 CT THORAX DX C-: CPT | Mod: 26,,, | Performed by: RADIOLOGY

## 2017-07-14 PROCEDURE — 71250 CT THORAX DX C-: CPT | Mod: TC

## 2017-07-19 ENCOUNTER — OFFICE VISIT (OUTPATIENT)
Dept: CARDIOTHORACIC SURGERY | Facility: CLINIC | Age: 72
End: 2017-07-19
Payer: MEDICARE

## 2017-07-19 VITALS
HEIGHT: 67 IN | DIASTOLIC BLOOD PRESSURE: 83 MMHG | OXYGEN SATURATION: 95 % | BODY MASS INDEX: 26.16 KG/M2 | SYSTOLIC BLOOD PRESSURE: 130 MMHG | HEART RATE: 83 BPM | WEIGHT: 166.69 LBS

## 2017-07-19 DIAGNOSIS — S22.20XA CLOSED FRACTURE OF STERNUM, UNSPECIFIED PORTION OF STERNUM, INITIAL ENCOUNTER: Primary | ICD-10-CM

## 2017-07-19 PROCEDURE — 99999 PR PBB SHADOW E&M-EST. PATIENT-LVL III: CPT | Mod: PBBFAC,,, | Performed by: THORACIC SURGERY (CARDIOTHORACIC VASCULAR SURGERY)

## 2017-07-19 PROCEDURE — 1126F AMNT PAIN NOTED NONE PRSNT: CPT | Mod: ,,, | Performed by: THORACIC SURGERY (CARDIOTHORACIC VASCULAR SURGERY)

## 2017-07-19 PROCEDURE — 99213 OFFICE O/P EST LOW 20 MIN: CPT | Mod: PBBFAC | Performed by: THORACIC SURGERY (CARDIOTHORACIC VASCULAR SURGERY)

## 2017-07-19 PROCEDURE — 1159F MED LIST DOCD IN RCRD: CPT | Mod: ,,, | Performed by: THORACIC SURGERY (CARDIOTHORACIC VASCULAR SURGERY)

## 2017-07-19 PROCEDURE — 99204 OFFICE O/P NEW MOD 45 MIN: CPT | Mod: S$PBB,,, | Performed by: THORACIC SURGERY (CARDIOTHORACIC VASCULAR SURGERY)

## 2017-07-19 PROCEDURE — 1157F ADVNC CARE PLAN IN RCRD: CPT | Mod: ,,, | Performed by: THORACIC SURGERY (CARDIOTHORACIC VASCULAR SURGERY)

## 2017-07-19 NOTE — PROGRESS NOTES
Distress Screening Results: Psychosocial Distress screening score of Distress Score: 5 not completed. Not appropriate for completion at this visit.

## 2017-07-19 NOTE — LETTER
[facility]  [address]  [place] [address]  Phone: [phone]  Fax: [phone] July 19, 2017      Latia Aguilar MD  [address]  [place] [address]    Patient: Eugenia Diaz   MR Number: [id number]   YOB: 1945   Date of Visit: 7/19/2017     Dear Dr. Aguilar:    Thank you for referring Eugenia Diaz to me for evaluation. She has nonunion of a sternal angle fracture that occurred several months ago following a fall.  She also has severe osteoporosis and a history of multiple fractures and orthopedic surgeries.     I recommend surgical application of a sternal plate to the fracture site. Success of the procedure will be primarily determined by the integrity of the anterior and posterior sternal tables. Mrs. Diaz informed me that she is the primary care giver for her .  She will have to make arrangements for his care before proceeding with surgery.     If you have questions, please do not hesitate to call me. I look forward to following Eugenia along with you.    Sincerely,      Ashish Garcia MD   Section of Thoracic Surgery  [facility] [place]    BLP/hcr

## 2017-07-19 NOTE — PROGRESS NOTES
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 Sternal fracture dates back to fall in September 2016. Initial CXR was benign but after persistent pain underwent Chest CT in Nov '16 which noted sternal fracture also noted swelling at that time. Following initial CT was referred to Dr. Bernabe for malignancy work-up. Work-up was negative and planned for repeat chest CT in 3 months to assess healing. CT 4/11/17 again displayed non union fracture sternal fracture. She was referred to our office for surgical evaluation. Reports popping sensation with cough/deep breathing which exacerbate pain. Repeat CT 4 days ago notes nonunion fracture with anterior displacement. Notes normally ambulates around home with occasional assistance of cane but uses power chair for longer distances secondary to hip pain. Otherwise, she denies fever, chills, baseline SOB, CP, weight loss, appetite or weight changes.     Of note, chronic steroid therapy for asthma which is presumed to be cause for significant osteoporosis. Chronic therapy for osteoporosis including fosamax, forteo, and most recently prolia but has been off all meds since September. She is the caregiver for her  who suffers from MS and suffers from triplegia. She notes no longer able to assist with lifting/assit with some ADLs secondary to pain.     Never smoker.   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     Chief Complaint   Patient presents with    Consult       Review of patient's allergies indicates:   Allergen Reactions    Sulfa (sulfonamide antibiotics) Swelling     Throat swelling      Clindamycin Hives    Erythromycin base Other (See  Comments)    Neomycin      Other reaction(s): Rash    Nitrofurantoin macrocrystalline Other (See Comments)     Macrobid.Throat Swelling    Tetracycline      Other reaction(s): Anaphylaxis    Azithromycin Swelling     Throat Swelling    Meperidine Rash     Demerol.     Oxycodone hcl-oxycodone-asa Rash    Propoxyphene Rash     Darvon.        Current Outpatient Prescriptions   Medication Sig Dispense Refill    ADVAIR DISKUS 500-50 mcg/dose DsDv diskus inhaler USE ONE PUFF TWICE DAILY 1 Inhaler 11    albuterol 90 mcg/actuation inhaler Inhale 2 puffs into the lungs every 6 (six) hours as needed for Wheezing. 1 each 11    atorvastatin (LIPITOR) 80 MG tablet Take 1 tablet (80 mg total) by mouth once daily. (Patient taking differently: Take 80 mg by mouth every evening. ) 30 tablet 11    calcium-vitamin D 600 mg(1,500mg) -400 unit Tab Take 1 tablet by mouth Every morning.      cyclobenzaprine (FLEXERIL) 10 MG tablet TAKE ONE-HALF OR ONE TABLET BY MOUTH AT BEDTIME 30 tablet 2    diclofenac sodium 1 % Gel Apply 2 g topically 4 (four) times daily. 1 Tube 2    esomeprazole (NEXIUM) 40 MG capsule TAKE ONE CAPSULE BY MOUTH ONCE DAILY 30 capsule 11    fluticasone (FLONASE) 50 mcg/actuation nasal spray USE AS DIRECTED 1 Bottle 11    hydrocodone-acetaminophen 5-325mg (NORCO) 5-325 mg per tablet Take 1 tablet by mouth every 6 (six) hours as needed for Pain. 30 tablet 0    levalbuterol (XOPENEX) 1.25 mg/3 mL nebulizer solution Inhale into the lungs. Up to 3 times daily prn      levothyroxine (SYNTHROID) 200 MCG tablet TAKE ONE TABLET BY MOUTH EVERY DAY (Patient taking differently: every moring) 30 tablet 11    lidocaine (XYLOCAINE) 5 % Oint ointment Apply topically as needed. 120 g 3    loratadine (CLARITIN) 10 mg tablet Take 1 tablet by mouth Every PM.      montelukast (SINGULAIR) 10 mg tablet TAKE ONE TABLET BY MOUTH EVERY NIGHT AT BEDTIME 30 tablet 12    neomycin-polymyxin-hydrocortisone (CORTISPORIN)  3.5-10,000-1 mg/mL-unit/mL-% otic suspension Place 3 drops into the right ear 4 (four) times daily. 10 mL 1    OLIVE LEAF EXTRACT ORAL Take 150 mg by mouth Every PM.      predniSONE (DELTASONE) 20 MG tablet 3 tabs po q day for 3 days, then 2 tabs po for 3 days,then 1 tab po q day for 3 days 18 tablet 0    rizatriptan (MAXALT) 10 MG tablet Take 1 tablet by mouth Once daily as needed. If needed for migraines      sertraline (ZOLOFT) 100 MG tablet TAKE TWO TABLETS BY MOUTH EVERY DAY (Patient taking differently: nightly) 60 tablet 11    triamterene-hydrochlorothiazide 37.5-25 mg (DYAZIDE) 37.5-25 mg per capsule TAKE ONE CAPSULE BY MOUTH EVERY MORNING 30 capsule 11     No current facility-administered medications for this visit.        Past Medical History:   Diagnosis Date    Allergy     Anemia     Asthma     Bronchitis     Depression     Generalized headaches     History of endometriosis     Hyperlipidemia     Hypertension     Hypothyroidism     Osteoporosis, unspecified     PCO (posterior capsular opacification), left     Recurrent upper respiratory infection (URI)     Thyroid disease     hypothyroidism    TMJ dysfunction      Past Surgical History:   Procedure Laterality Date    ADENOIDECTOMY      CATARACT EXTRACTION W/  INTRAOCULAR LENS IMPLANT  9/6/06    right eye - Dr Best    CATARACT EXTRACTION W/  INTRAOCULAR LENS IMPLANT  11/15/06    left eye - Dr Best    CHOLECYSTECTOMY  1994    COLONOSCOPY N/A 6/22/2016    Procedure: COLONOSCOPY;  Surgeon: Jae Hodges MD;  Location: [address]);  Service: Endoscopy;  Laterality: N/A;    KNEE SURGERY  12/21/12    OOPHORECTOMY      left ovary removed, secondary endometriosis    right hip replacement  2000    TEMPOROMANDIBULAR JOINT SURGERY  1988    TONSILLECTOMY      yag laser OD       Family History   Problem Relation Age of Onset    Hypertension Mother     Diabetes Mother     Heart failure Mother     Colon cancer  "Father       age 51    Cancer Father 48     Colon Cancer     Diabetes Brother     Cancer Brother      non hodgkins lymphoma, lung    Alzheimer's disease Brother     Stroke Maternal Grandfather     Breast cancer Cousin      paternal first cousin    Breast cancer Paternal Aunt     Ovarian cancer Paternal Aunt     Melanoma Neg Hx     Amblyopia Neg Hx     Blindness Neg Hx     Cataracts Neg Hx     Glaucoma Neg Hx     Macular degeneration Neg Hx     Retinal detachment Neg Hx     Strabismus Neg Hx     Thyroid disease Neg Hx      Social History   Substance Use Topics    Smoking status: Never Smoker    Smokeless tobacco: Not on file    Alcohol use 0.6 oz/week     1 Glasses of wine per week      Comment: rarely        Review of Systems:  Review of Systems   Constitutional: Negative for activity change, chills, fatigue and fever.   HENT: Negative for congestion.    Eyes: Negative for pain.   Respiratory: Negative for cough, shortness of breath and wheezing.    Cardiovascular: Negative for chest pain and palpitations.   Gastrointestinal: Negative for abdominal pain, nausea and vomiting.   Endocrine: Negative for cold intolerance and heat intolerance.   Genitourinary: Negative for difficulty urinating.   Musculoskeletal:        Severe osteoporosis   Skin: Negative for rash.   Neurological: Negative for syncope and headaches.   Hematological: Negative for adenopathy.   Psychiatric/Behavioral: Negative for confusion.       OBJECTIVE:     Vital Signs (Most Recent)  Pulse: 83 (17 1040)  BP: 130/83 (17 1040)  SpO2: 95 % (17 1040)  5' 7" (1.702 m)  75.6 kg (166 lb 10.7 oz)     Physical Exam:  Physical Exam   Constitutional: She is oriented to person, place, and time. She appears well-developed and well-nourished.   HENT:   Head: Normocephalic and atraumatic.   Eyes: EOM are normal.   Neck: Normal range of motion. Neck supple. No tracheal deviation present.   Cardiovascular: Normal rate, " regular rhythm, normal heart sounds and intact distal pulses.    Pulmonary/Chest: Effort normal. She has decreased breath sounds in the left lower field.       Abdominal: Soft. She exhibits no distension.   Lymphadenopathy:     She has no cervical adenopathy.   Neurological: She is alert and oriented to person, place, and time.   Skin: Skin is warm and dry.   Psychiatric: She has a normal mood and affect. Thought content normal.   Vitals reviewed.      Diagnostic Results:    Chest CT 4/11/17:   Sternal fracture with development sclerotic margins and unchanged quantity of soft tissue, favoring this to represent a nonunion osteoporotic fracture or osteonecrotic fracture as opposed to a pathologic/malignant fracture.    Large hernia of abdominal contents into the left lower thorax appears to be related to a hiatal hernia, less likely diaphragmatic hernia.    Chest CT 7/14/17:  1. There is large left-sided hiatal versus diaphragmatic hernia with herniation of stomach and long segment of colon within the left hemithorax.  This results in volume loss and compressive atelectatic change seen within the left lung base.  Lungs are otherwise clear.  No evidence of mass or effusion.  Airways remain patent.  2. The visualized abdominal structures are unremarkable.  Extrathoracic soft tissues are unremarkable.  3. There is redemonstration of stable nonunited displaced sternal fracture.  Distal sternal fragment is displaced approximately 1.0 cm anterior to the proximal fragment.  Small stable posterior fragment seen with focus of air in the region.  No evidence of new fracture.  No evidence of new soft tissue swelling or hematoma.  There is prominent dextroscoliosis of the thoracic spine with multilevel degenerative changes seen.    ASSESSMENT/PLAN:     Ms Diaz is a 72 yo female with osteoporosis with multiple fractures, HTN, HLD, asthma, migraines, bilateral hip AVN, hypothyroidism, and sternal fracture presenting today for  surgical evaluation.    PLAN:Plan     Offered sternal plating/fixation and had long disussion about sternal precautions following surgery. She is largely concerned about restrictions following surgery and being able to take care of her . Undecided at this time. Will call our office if she decides to undergo surgery. If she decides to undergo surgery will need DSE.      ATTENDING ATTESTATION:    I evaluated the patient and I agree with the assessment and plan.  I feel that the patient could benefit from sternal plate placement.  Procedure success will depend upon whether the posterior sternal table is intact.  I have discussed the technical aspects, risks and benefits of the procedure with the patient.  I did inform the patient that the risks are the most common risks and that there are other less likely risks that are too numerous to elaborate.  The patient is aware and has agreed to undergo the procedure as detailed on the consent form.

## 2017-07-31 RX ORDER — TRIAMTERENE AND HYDROCHLOROTHIAZIDE 37.5; 25 MG/1; MG/1
CAPSULE ORAL
Qty: 30 CAPSULE | Refills: 0 | Status: SHIPPED | OUTPATIENT
Start: 2017-07-31 | End: 2017-08-28 | Stop reason: SDUPTHER

## 2017-08-10 ENCOUNTER — TELEPHONE (OUTPATIENT)
Dept: CARDIOTHORACIC SURGERY | Facility: CLINIC | Age: 72
End: 2017-08-10

## 2017-08-14 DIAGNOSIS — J45.20 ASTHMA, WELL CONTROLLED, MILD INTERMITTENT: ICD-10-CM

## 2017-08-14 RX ORDER — ALBUTEROL SULFATE 90 UG/1
2 AEROSOL, METERED RESPIRATORY (INHALATION) EVERY 6 HOURS PRN
Qty: 18 G | Refills: 11 | Status: SHIPPED | OUTPATIENT
Start: 2017-08-14 | End: 2020-04-26 | Stop reason: SDUPTHER

## 2017-08-28 RX ORDER — TRIAMTERENE AND HYDROCHLOROTHIAZIDE 37.5; 25 MG/1; MG/1
CAPSULE ORAL
Qty: 30 CAPSULE | Refills: 11 | Status: SHIPPED | OUTPATIENT
Start: 2017-08-28 | End: 2018-10-15 | Stop reason: SDUPTHER

## 2017-08-29 ENCOUNTER — PATIENT MESSAGE (OUTPATIENT)
Dept: CARDIOTHORACIC SURGERY | Facility: CLINIC | Age: 72
End: 2017-08-29

## 2017-09-16 ENCOUNTER — PATIENT MESSAGE (OUTPATIENT)
Dept: INTERNAL MEDICINE | Facility: CLINIC | Age: 72
End: 2017-09-16

## 2017-09-18 ENCOUNTER — TELEPHONE (OUTPATIENT)
Dept: INTERNAL MEDICINE | Facility: CLINIC | Age: 72
End: 2017-09-18

## 2017-09-18 NOTE — TELEPHONE ENCOUNTER
Spoke with pt Per Michelle not sure where to go at the hospital, more that likely the pharmacy. I gave her the times for the walk in clinic here

## 2017-09-21 ENCOUNTER — CLINICAL SUPPORT (OUTPATIENT)
Dept: INFECTIOUS DISEASES | Facility: CLINIC | Age: 72
End: 2017-09-21
Payer: MEDICARE

## 2017-09-21 DIAGNOSIS — Z23 NEED FOR VACCINATION: Primary | ICD-10-CM

## 2017-09-21 PROCEDURE — 99213 OFFICE O/P EST LOW 20 MIN: CPT | Mod: PBBFAC

## 2017-09-21 PROCEDURE — G0008 ADMIN INFLUENZA VIRUS VAC: HCPCS | Mod: PBBFAC

## 2017-09-21 PROCEDURE — 99999 PR PBB SHADOW E&M-EST. PATIENT-LVL III: CPT | Mod: PBBFAC,,,

## 2017-10-11 DIAGNOSIS — E78.5 HYPERLIPIDEMIA, UNSPECIFIED HYPERLIPIDEMIA TYPE: ICD-10-CM

## 2017-10-11 RX ORDER — ATORVASTATIN CALCIUM 80 MG/1
80 TABLET, FILM COATED ORAL NIGHTLY
Qty: 30 TABLET | Refills: 11 | Status: SHIPPED | OUTPATIENT
Start: 2017-10-11 | End: 2018-02-22 | Stop reason: SDUPTHER

## 2017-11-07 RX ORDER — NEOMYCIN SULFATE, POLYMYXIN B SULFATE AND HYDROCORTISONE 10; 3.5; 1 MG/ML; MG/ML; [USP'U]/ML
SUSPENSION/ DROPS AURICULAR (OTIC)
Qty: 10 ML | Refills: 0 | Status: SHIPPED | OUTPATIENT
Start: 2017-11-07

## 2017-12-14 ENCOUNTER — HOSPITAL ENCOUNTER (OUTPATIENT)
Dept: RADIOLOGY | Facility: CLINIC | Age: 72
Discharge: HOME | End: 2017-12-14
Attending: INTERNAL MEDICINE
Payer: MEDICARE

## 2017-12-14 DIAGNOSIS — M81.0 OSTEOPOROSIS, POST-MENOPAUSAL: ICD-10-CM

## 2017-12-14 PROCEDURE — 77080 DXA BONE DENSITY AXIAL: CPT | Mod: 26,,, | Performed by: INTERNAL MEDICINE

## 2017-12-14 PROCEDURE — 77080 DXA BONE DENSITY AXIAL: CPT | Mod: TC

## 2017-12-18 RX ORDER — FLUTICASONE PROPIONATE 50 MCG
SPRAY, SUSPENSION (ML) NASAL
Qty: 16 G | Refills: 11 | Status: SHIPPED | OUTPATIENT
Start: 2017-12-18 | End: 2019-08-21 | Stop reason: SDUPTHER

## 2017-12-18 RX ORDER — MONTELUKAST SODIUM 10 MG/1
TABLET ORAL
Qty: 30 TABLET | Refills: 12 | Status: SHIPPED | OUTPATIENT
Start: 2017-12-18 | End: 2019-02-12 | Stop reason: SDUPTHER

## 2017-12-18 RX ORDER — FLUTICASONE PROPIONATE AND SALMETEROL 50; 500 UG/1; UG/1
POWDER RESPIRATORY (INHALATION)
Qty: 1 INHALER | Refills: 11 | Status: SHIPPED | OUTPATIENT
Start: 2017-12-18 | End: 2019-04-01 | Stop reason: SDUPTHER

## 2018-01-29 ENCOUNTER — PATIENT MESSAGE (OUTPATIENT)
Dept: OBSTETRICS AND GYNECOLOGY | Facility: CLINIC | Age: 73
End: 2018-01-29

## 2018-01-29 DIAGNOSIS — Z12.31 SCREENING MAMMOGRAM, ENCOUNTER FOR: Primary | ICD-10-CM

## 2018-02-16 ENCOUNTER — HOSPITAL ENCOUNTER (OUTPATIENT)
Dept: RADIOLOGY | Facility: HOSPITAL | Age: 73
Discharge: HOME | End: 2018-02-16
Attending: OBSTETRICS & GYNECOLOGY
Payer: MEDICARE

## 2018-02-16 VITALS — WEIGHT: 166 LBS | BODY MASS INDEX: 26.06 KG/M2 | HEIGHT: 67 IN

## 2018-02-16 DIAGNOSIS — Z12.31 SCREENING MAMMOGRAM, ENCOUNTER FOR: ICD-10-CM

## 2018-02-16 PROCEDURE — 77067 SCR MAMMO BI INCL CAD: CPT | Mod: TC,PO

## 2018-02-16 PROCEDURE — 77067 SCR MAMMO BI INCL CAD: CPT | Mod: 26,,, | Performed by: RADIOLOGY

## 2018-02-16 PROCEDURE — 77063 BREAST TOMOSYNTHESIS BI: CPT | Mod: 26,,, | Performed by: RADIOLOGY

## 2018-02-22 DIAGNOSIS — E78.5 HYPERLIPIDEMIA, UNSPECIFIED HYPERLIPIDEMIA TYPE: ICD-10-CM

## 2018-02-22 RX ORDER — ATORVASTATIN CALCIUM 80 MG/1
80 TABLET, FILM COATED ORAL NIGHTLY
Qty: 30 TABLET | Refills: 11 | Status: SHIPPED | OUTPATIENT
Start: 2018-02-22 | End: 2018-08-21 | Stop reason: SDUPTHER

## 2018-03-01 ENCOUNTER — OFFICE VISIT (OUTPATIENT)
Dept: OBSTETRICS AND GYNECOLOGY | Facility: CLINIC | Age: 73
End: 2018-03-01
Payer: MEDICARE

## 2018-03-01 VITALS
WEIGHT: 166.25 LBS | BODY MASS INDEX: 26.09 KG/M2 | SYSTOLIC BLOOD PRESSURE: 142 MMHG | HEIGHT: 67 IN | DIASTOLIC BLOOD PRESSURE: 86 MMHG

## 2018-03-01 DIAGNOSIS — Z01.419 ENCOUNTER FOR GYNECOLOGICAL EXAMINATION WITHOUT ABNORMAL FINDING: Primary | ICD-10-CM

## 2018-03-01 DIAGNOSIS — Z78.0 POSTMENOPAUSAL: ICD-10-CM

## 2018-03-01 DIAGNOSIS — R39.15 URINARY URGENCY: ICD-10-CM

## 2018-03-01 DIAGNOSIS — B37.9 CANDIDA INFECTION: ICD-10-CM

## 2018-03-01 PROCEDURE — G0101 CA SCREEN;PELVIC/BREAST EXAM: HCPCS | Mod: S$PBB,,, | Performed by: OBSTETRICS & GYNECOLOGY

## 2018-03-01 PROCEDURE — 87086 URINE CULTURE/COLONY COUNT: CPT

## 2018-03-01 PROCEDURE — 99999 PR PBB SHADOW E&M-EST. PATIENT-LVL III: CPT | Mod: PBBFAC,,, | Performed by: OBSTETRICS & GYNECOLOGY

## 2018-03-01 PROCEDURE — G0101 CA SCREEN;PELVIC/BREAST EXAM: HCPCS | Mod: PBBFAC,PN | Performed by: OBSTETRICS & GYNECOLOGY

## 2018-03-01 PROCEDURE — 99213 OFFICE O/P EST LOW 20 MIN: CPT | Mod: PBBFAC,PN,25 | Performed by: OBSTETRICS & GYNECOLOGY

## 2018-03-01 RX ORDER — CLOTRIMAZOLE AND BETAMETHASONE DIPROPIONATE 10; .64 MG/G; MG/G
CREAM TOPICAL
Qty: 15 G | Refills: 1 | Status: SHIPPED | OUTPATIENT
Start: 2018-03-01 | End: 2018-06-16 | Stop reason: SDUPTHER

## 2018-03-01 NOTE — PROGRESS NOTES
CC: Well woman exam    Eugenia Diaz is a 72 y.o. female  presents for a well woman exam.  LMP: No LMP recorded. Patient is postmenopausal..  She has urinary urgency and frequency.  She has vaginal discharge at times but not now.  Not sexually active.  Has fatigue and is the care giver for her .    Discussed her MMG results with her and her score    Past Medical History:   Diagnosis Date    Allergy     Anemia     Asthma     Bronchitis     Depression     Generalized headaches     History of endometriosis     Hyperlipidemia     Hypertension     Hypothyroidism     Osteoporosis, unspecified     PCO (posterior capsular opacification), left     Recurrent upper respiratory infection (URI)     Thyroid disease     hypothyroidism    TMJ dysfunction      Past Surgical History:   Procedure Laterality Date    ADENOIDECTOMY      CATARACT EXTRACTION W/  INTRAOCULAR LENS IMPLANT  06    right eye - Dr Best    CATARACT EXTRACTION W/  INTRAOCULAR LENS IMPLANT  11/15/06    left eye - Dr Best    CHOLECYSTECTOMY      COLONOSCOPY N/A 2016    Procedure: COLONOSCOPY;  Surgeon: Jae Hodges MD;  Location: [address];  Service: Endoscopy;  Laterality: N/A;    KNEE SURGERY  12    OOPHORECTOMY      left ovary removed, secondary endometriosis    right hip replacement      TEMPOROMANDIBULAR JOINT SURGERY      TONSILLECTOMY      yag laser OD       Social History     Social History    Marital status:      Spouse name: N/A    Number of children: N/A    Years of education: N/A     Social History Main Topics    Smoking status: Never Smoker    Smokeless tobacco: Never Used    Alcohol use 0.6 oz/week     1 Glasses of wine per week      Comment: rarely    Drug use: No    Sexual activity: Not Currently     Other Topics Concern    Are You Pregnant Or Think You May Be? 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     Family History   Problem Relation Age of Onset    Hypertension Mother     Diabetes Mother     Heart failure Mother     Colon cancer Father       age 51    Cancer Father 48     Colon Cancer     Diabetes Brother     Cancer Brother      non hodgkins lymphoma, lung    Alzheimer's disease Brother     Stroke Maternal Grandfather     Breast cancer Cousin      paternal first cousin    Breast cancer Paternal Aunt     Ovarian cancer Paternal Aunt     Melanoma Neg Hx     Amblyopia Neg Hx     Blindness Neg Hx     Cataracts Neg Hx     Glaucoma Neg Hx     Macular degeneration Neg Hx     Retinal detachment Neg Hx     Strabismus Neg Hx     Thyroid disease Neg Hx      OB History      Para Term  AB Living    0 0 0 0 0 0    SAB TAB Ectopic Multiple Live Births    0 0 0 0            BP (!) 142/86   Ht 5' 7" (1.702 m)   Wt 75.4 kg (166 lb 3.6 oz)   BMI 26.03 kg/m²       ROS:    ROS:  GENERAL: Denies weight gain or weight loss. Feeling well overall.   SKIN: Denies rash or lesions.   HEAD: Denies head injury or headache.   NODES: Denies enlarged lymph nodes.   CHEST: Denies chest pain or shortness of breath.   CARDIOVASCULAR: Denies palpitations or left sided chest pain.   ABDOMEN: No abdominal pain, constipation, diarrhea, nausea, vomiting or rectal bleeding.   URINARY: No frequency, dysuria, hematuria, or burning on urination.  REPRODUCTIVE: See HPI.   BREASTS: The patient performs breast self-examination and denies pain, lumps, or nipple discharge.   HEMATOLOGIC: No easy bruisability or excessive bleeding.   MUSCULOSKELETAL: Denies joint pain or swelling.   NEUROLOGIC: Denies syncope or weakness.   PSYCHIATRIC: Denies depression, anxiety or mood swings.    PHYSICAL EXAM:    APPEARANCE: Well nourished, well developed, in no acute distress.  AFFECT: WNL, alert and oriented x " 3  SKIN: No acne or hirsutism  NECK: Neck symmetric without masses or thyromegaly  NODES: No inguinal, cervical, axillary, or femoral lymph node enlargement  CHEST: Good respiratory effect  ABDOMEN: Soft.  No tenderness or masses.  No hepatosplenomegaly.  No hernias.  BREASTS: Symmetrical, no skin changes or visible lesions.  No palpable masses, nipple discharge bilaterally.  PELVIC: Normal external genitalia without lesions.  She has yeast along the perineum.   Normal hair distribution.  Adequate perineal body, normal urethral meatus.  Vagina moist and well rugated without lesions or discharge.  Cervix pink, without lesions, discharge or tenderness.  No significant cystocele or rectocele.  Bimanual exam shows uterus to be normal size, regular, mobile and nontender.  Adnexa without masses or tenderness.    RECTAL: Rectovaginal exam confirms above with normal sphincter tone, no masses.  EXTREMITIES: No edema.      ICD-10-CM ICD-9-CM    1. Encounter for gynecological examination without abnormal finding Z01.419 V72.31    2. Urinary urgency R39.15 788.63 Urine culture      clotrimazole-betamethasone 1-0.05% (LOTRISONE) cream   3. Postmenopausal Z78.0 V49.81    4. Candida infection B37.9 112.9 clotrimazole-betamethasone 1-0.05% (LOTRISONE) cream     F/U with PCP for medical health/ elevated BP\    Vaginitis prevention including :   a. avoiding feminine products such as deoderant soaps, body wash, bubble bath, douches, scented toilet paper, deoderant tampons or pads, baby or feminine wipes, chronic pad use, etc. and   b. avoiding other vulvovaginal irritants such as long hot baths, humidity, tight, synthetic clothing, chlorine and sitting around in wet bathing suits and   c. wearing cotton underwear, avoiding thong underwear and no underwear to bed and   d. taking showers instead of baths and use a hair dryer on cool setting afterwards to dry and   e.wearing cotton to exercise and shower immediately after exercise and  change clothes and   f. using polyurethane condoms without spermicide if sexually active and symptoms are triggered by intercourse;   Diflucan and Mycolog cream use and potential side effects;   -pelvic rest until symptoms resolve.             Patient was counseled today on A.C.S. Pap guidelines and recommendations for yearly pelvic exams, mammograms and monthly self breast exams; to see her PCP for other health maintenance.     F/U PRN

## 2018-03-03 LAB
BACTERIA UR CULT: NORMAL
BACTERIA UR CULT: NORMAL

## 2018-04-20 ENCOUNTER — OFFICE VISIT (OUTPATIENT)
Dept: ORTHOPEDICS | Facility: CLINIC | Age: 73
End: 2018-04-20
Payer: MEDICARE

## 2018-04-20 ENCOUNTER — HOSPITAL ENCOUNTER (OUTPATIENT)
Dept: RADIOLOGY | Facility: HOSPITAL | Age: 73
Discharge: HOME | End: 2018-04-20
Attending: PHYSICIAN ASSISTANT
Payer: MEDICARE

## 2018-04-20 DIAGNOSIS — M17.12 PRIMARY OSTEOARTHRITIS OF LEFT KNEE: Primary | ICD-10-CM

## 2018-04-20 DIAGNOSIS — M25.562 LEFT KNEE PAIN, UNSPECIFIED CHRONICITY: ICD-10-CM

## 2018-04-20 PROCEDURE — 99213 OFFICE O/P EST LOW 20 MIN: CPT | Mod: PBBFAC,25 | Performed by: PHYSICIAN ASSISTANT

## 2018-04-20 PROCEDURE — 99999 PR PBB SHADOW E&M-EST. PATIENT-LVL III: CPT | Mod: PBBFAC,,, | Performed by: PHYSICIAN ASSISTANT

## 2018-04-20 PROCEDURE — 73564 X-RAY EXAM KNEE 4 OR MORE: CPT | Mod: 26,LT,, | Performed by: RADIOLOGY

## 2018-04-20 PROCEDURE — 73562 X-RAY EXAM OF KNEE 3: CPT | Mod: 26,59,RT, | Performed by: RADIOLOGY

## 2018-04-20 PROCEDURE — 73562 X-RAY EXAM OF KNEE 3: CPT | Mod: TC,RT,59

## 2018-04-20 PROCEDURE — 99213 OFFICE O/P EST LOW 20 MIN: CPT | Mod: S$PBB,,, | Performed by: PHYSICIAN ASSISTANT

## 2018-04-20 RX ORDER — NAPROXEN 500 MG/1
500 TABLET ORAL 2 TIMES DAILY WITH MEALS
Qty: 60 TABLET | Refills: 1 | Status: SHIPPED | OUTPATIENT
Start: 2018-04-20 | End: 2018-05-20

## 2018-04-20 NOTE — PROGRESS NOTES
Subjective:      Patient ID: Eugenia Diaz is a 72 y.o. female.    Chief Complaint: No chief complaint on file.    HPI  72 year old female presents with chief complaint of left knee pain x couple of weeks. She denies trauma. Pain is lateral and posterior. She has pain with walking. She took norco which helped. She reports swelling and says her knee wants to give way. She denies popping, locking, and catching. She uses a scooter for long distances. Sometimes she uses a cane. She had ORIF for right patella fx in 2012 by Dr. Davalos.   Review of Systems   Constitution: Negative for chills, fever and night sweats.   Cardiovascular: Negative for chest pain.   Respiratory: Negative for cough and shortness of breath.    Hematologic/Lymphatic: Does not bruise/bleed easily.   Skin: Negative for color change.   Gastrointestinal: Negative for heartburn.   Genitourinary: Negative for dysuria.   Neurological: Negative for numbness and paresthesias.   Psychiatric/Behavioral: Negative for altered mental status.   Allergic/Immunologic: Negative for persistent infections.         Objective:            General    Vitals reviewed.  Constitutional: She is oriented to person, place, and time. She appears well-developed and well-nourished.   Cardiovascular: Normal rate.    Neurological: She is alert and oriented to person, place, and time.             Left Knee Exam:  ROM 0-130 degrees  +crepitus  No effusion  Mild TTP posterior knee      X-ray: ordered and reviewed by myself. Mild OA.       Assessment:       Encounter Diagnosis   Name Primary?    Primary osteoarthritis of left knee Yes          Plan:       Discussed treatment options with patient. HEP given. Ice and elevate knee. She will take naproxen BID x 2 weeks. RTC if symptoms worsen or do not improve and we will consider injection.

## 2018-06-09 ENCOUNTER — OFFICE VISIT (OUTPATIENT)
Dept: URGENT CARE | Facility: CLINIC | Age: 73
End: 2018-06-09
Payer: MEDICARE

## 2018-06-09 VITALS
HEIGHT: 67 IN | RESPIRATION RATE: 18 BRPM | WEIGHT: 167 LBS | BODY MASS INDEX: 26.21 KG/M2 | HEART RATE: 78 BPM | TEMPERATURE: 98 F | DIASTOLIC BLOOD PRESSURE: 80 MMHG | OXYGEN SATURATION: 95 % | SYSTOLIC BLOOD PRESSURE: 127 MMHG

## 2018-06-09 DIAGNOSIS — J45.901 ACUTE EXACERBATION OF EXTRINSIC ASTHMA: Primary | ICD-10-CM

## 2018-06-09 DIAGNOSIS — R05.9 COUGH: ICD-10-CM

## 2018-06-09 PROCEDURE — 96372 THER/PROPH/DIAG INJ SC/IM: CPT | Mod: 59,S$GLB,, | Performed by: FAMILY MEDICINE

## 2018-06-09 PROCEDURE — 94640 AIRWAY INHALATION TREATMENT: CPT | Mod: S$GLB,,, | Performed by: FAMILY MEDICINE

## 2018-06-09 PROCEDURE — 99214 OFFICE O/P EST MOD 30 MIN: CPT | Mod: 25,S$GLB,, | Performed by: FAMILY MEDICINE

## 2018-06-09 RX ORDER — ALBUTEROL SULFATE 0.83 MG/ML
2.5 SOLUTION RESPIRATORY (INHALATION) EVERY 6 HOURS PRN
Qty: 1 BOX | Refills: 0 | Status: SHIPPED | OUTPATIENT
Start: 2018-06-09 | End: 2021-12-09 | Stop reason: SDUPTHER

## 2018-06-09 RX ORDER — IPRATROPIUM BROMIDE 0.5 MG/2.5ML
0.5 SOLUTION RESPIRATORY (INHALATION)
Status: COMPLETED | OUTPATIENT
Start: 2018-06-09 | End: 2018-06-09

## 2018-06-09 RX ORDER — PREDNISONE 10 MG/1
10 TABLET ORAL DAILY
Qty: 32 TABLET | Refills: 1 | Status: SHIPPED | OUTPATIENT
Start: 2018-06-09 | End: 2018-10-17

## 2018-06-09 RX ORDER — BENZONATATE 100 MG/1
200 CAPSULE ORAL 3 TIMES DAILY PRN
Qty: 20 CAPSULE | Refills: 0 | Status: SHIPPED | OUTPATIENT
Start: 2018-06-09

## 2018-06-09 RX ORDER — BENZONATATE 100 MG/1
200 CAPSULE ORAL 3 TIMES DAILY PRN
Qty: 20 CAPSULE | Refills: 0 | Status: SHIPPED | OUTPATIENT
Start: 2018-06-09 | End: 2018-06-09 | Stop reason: SDUPTHER

## 2018-06-09 RX ORDER — IPRATROPIUM BROMIDE 0.5 MG/2.5ML
500 SOLUTION RESPIRATORY (INHALATION) 4 TIMES DAILY
Qty: 1 BOX | Refills: 0 | Status: SHIPPED | OUTPATIENT
Start: 2018-06-09 | End: 2018-06-09 | Stop reason: SDUPTHER

## 2018-06-09 RX ORDER — IPRATROPIUM BROMIDE 0.5 MG/2.5ML
500 SOLUTION RESPIRATORY (INHALATION) 4 TIMES DAILY
Qty: 1 BOX | Refills: 0 | Status: SHIPPED | OUTPATIENT
Start: 2018-06-09 | End: 2020-04-26 | Stop reason: SDUPTHER

## 2018-06-09 RX ORDER — ALBUTEROL SULFATE 0.83 MG/ML
2.5 SOLUTION RESPIRATORY (INHALATION)
Status: COMPLETED | OUTPATIENT
Start: 2018-06-09 | End: 2018-06-09

## 2018-06-09 RX ORDER — ALBUTEROL SULFATE 0.83 MG/ML
2.5 SOLUTION RESPIRATORY (INHALATION) EVERY 6 HOURS PRN
Qty: 1 BOX | Refills: 0 | Status: SHIPPED | OUTPATIENT
Start: 2018-06-09 | End: 2018-06-09 | Stop reason: SDUPTHER

## 2018-06-09 RX ADMIN — IPRATROPIUM BROMIDE 0.5 MG: 0.5 SOLUTION RESPIRATORY (INHALATION) at 01:06

## 2018-06-09 RX ADMIN — ALBUTEROL SULFATE 2.5 MG: 0.83 SOLUTION RESPIRATORY (INHALATION) at 01:06

## 2018-06-09 NOTE — PATIENT INSTRUCTIONS
Understanding Asthma    Asthma causes swelling and narrowing of the airways in your lungs. Medical experts are not exactly sure what causes asthma. It is believed to be caused by a mix of inherited and environmental factors.  Healthy lungs  Inside the lungs there are branching airways made of stretchy tissue. Each airway is wrapped with bands of muscle. The airways become more narrow as they go deeper into the lungs. The smallest airways end in clusters of tiny balloon-like air sacs (alveoli). These clusters are surrounded by blood vessels. When you breathe in (inhale), air enters the lungs. It travels down through the airways until it reaches the air sacs. When you breathe out (exhale), air travels up through the airways and out of the lungs. The airways produce mucus that traps particles you breathe in. Normally, the mucus is then swept out of the lungs by tiny hairs (cilia) that line the airways. The mucus is swallowed or coughed up.  What the lungs do  The air you inhale contains oxygen. When oxygen reaches the air sacs, it passes into the blood vessels surrounding the sacs. Your blood then delivers oxygen to all of your cells. As you exhale, carbon dioxide is removed in a similar way from the blood in the air sacs, and from the body.  When you have asthma  People with asthma have very sensitive airways. This means the airways react to certain things called triggers (such as pollen, dust, or smoke) and become swollen and narrowed. Inflammation makes the airways swollen and narrowed. This is a long-lasting (chronic) problem. The airways may not always be narrowed enough to notice breathing problems.  Symptoms of chronic inflammation:   · Coughing  · Chest tightness  · Shortness of breath  · Wheezing (a whistling noise, especially when breathing out)  · Low energy or feeling tired  In some people, over time chronic mild inflammation can lead to lasting (permanent) scarring of airways and loss of lung  function.  Moderate flare-ups  When sensitive airways are irritated by a trigger, the muscles around the airways tighten. The lining of the airways swells. Thick, sticky mucus increases and partly clogs the airways. All of this makes you work harder to keep breathing.  Symptoms of moderate flare-ups:  · Coughing, especially at night  · Getting tired or out of breath easily  · Wheezing  · Chest tightness  · Faster breathing when at rest  Severe flare-ups  Severe flare-ups are life-threatening. In a severe flare-up, the muscle tightening, swelling, and mucus production are even worse. Its very hard to breathe. Your body can't get enough oxygen and can't remove carbon dioxide. Waste gas is trapped in the alveoli, and gas exchange cant occur. The body is not getting enough oxygen. Without oxygen, body tissues, especially brain tissue, begin to get damaged. If this goes on for long, it can lead to severe brain damage or death.  Call 911 (or have someone call for you) if you have any of these symptoms and they are not relieved right away by taking your quick-relief medicine as prescribed:  · Severe trouble breathing  · Too short of breath to talk or walk  · Lips or fingers turning blue  · Feeling lightheaded or dizzy, as though you are about to pass out  · Peak flow less than 50% of your personal best, if you use peak flow monitoring  Asthma is a long-term condition. So its important to work with your healthcare provider to manage it. If you smoke, get help to quit. Know your triggers and figure out how to avoid them. Its also very important to take your medicines as directed. That means taking them even when you feel good.  Date Last Reviewed: 12/1/2016  © 8291-7061 [employer]. [address] [place] [address], [place] [address]. All rights reserved. This information is not intended as a substitute for professional medical care. Always follow your healthcare professional's instructions.      GO TO YOUR CLOSEST  ED OR CALL 911 IF YOUR SHORTNESS OF BREATH BECOMES MORE SEVERE.   USE NEBULIZER 4 TIMES DAILY WITH BOTH MEDICATIONS UNTIL THIS EPISODE IMPROVES AND FOLLOW UP WITH PCP AS SOON AS POSSIBLE.     Follow Up Comments   Make sure that you follow up with your primary care doctor in the next 2-5 days if needed .  Return to the Urgent Care if signs or symptoms change and certainly if you have worsening symptoms go to the nearest emergency department for further evaluation.

## 2018-06-09 NOTE — PROGRESS NOTES
"Subjective:       Patient ID: Eugenia Diaz is a 72 y.o. female.    Vitals:  height is 5' 7" (1.702 m) and weight is 75.8 kg (167 lb). Her tympanic temperature is 97.7 °F (36.5 °C). Her blood pressure is 127/80 and her pulse is 78. Her respiration is 18 and oxygen saturation is 95%.     Chief Complaint: Asthma    Asthma   She complains of chest tightness, cough, difficulty breathing, hoarse voice, shortness of breath and wheezing. There is no sputum production. This is a recurrent problem. The current episode started yesterday. The problem has been rapidly worsening. The cough is hoarse and barking. Pertinent negatives include no chest pain, dyspnea on exertion, ear pain, fever, headaches, malaise/fatigue, myalgias or sore throat. Her symptoms are aggravated by nothing. Her symptoms are alleviated by OTC cough suppressant, oral steroids, change in position and steroid inhaler. She reports minimal improvement on treatment. Her past medical history is significant for asthma and bronchitis. There is no history of bronchiectasis, COPD, emphysema or pneumonia.     Review of Systems   Constitution: Negative for chills, fever and malaise/fatigue.   HENT: Positive for hoarse voice. Negative for congestion, ear pain and sore throat.    Eyes: Negative for discharge and redness.   Cardiovascular: Negative for chest pain, dyspnea on exertion and leg swelling.   Respiratory: Positive for cough, shortness of breath and wheezing. Negative for sputum production.    Musculoskeletal: Negative for myalgias.   Gastrointestinal: Negative for abdominal pain and nausea.   Neurological: Negative for headaches.       Objective:      Physical Exam   Constitutional: She is oriented to person, place, and time. She appears well-developed and well-nourished. She is cooperative.  Non-toxic appearance. She does not appear ill. No distress.   HENT:   Head: Normocephalic and atraumatic.   Right Ear: Hearing, tympanic membrane, external ear and ear " canal normal.   Left Ear: Hearing, tympanic membrane, external ear and ear canal normal.   Nose: Nose normal. No mucosal edema, rhinorrhea or nasal deformity. No epistaxis. Right sinus exhibits no maxillary sinus tenderness and no frontal sinus tenderness. Left sinus exhibits no maxillary sinus tenderness and no frontal sinus tenderness.   Mouth/Throat: Uvula is midline and mucous membranes are normal. No trismus in the jaw. Normal dentition. No uvula swelling. Posterior oropharyngeal edema present. No posterior oropharyngeal erythema.   Eyes: Conjunctivae and lids are normal. No scleral icterus.   Sclera clear bilat   Neck: Trachea normal, full passive range of motion without pain and phonation normal. Neck supple.   Cardiovascular: Normal rate, regular rhythm, normal heart sounds, intact distal pulses and normal pulses.    Pulmonary/Chest: She is in respiratory distress. She has decreased breath sounds. She has no wheezes. She has no rhonchi. She has no rales.   iNITIALLY  This patient is working for her breath,  Her color is slightly pale.  Auscultation finds there to be more diminished breath sounds than anything else.  No obvious wheezes.     She is obviously using chest muscles to help with breathing.     Following breathing treatment,  Her color is slightly better,  She is breathing more easily and reports that she is more comfortable.  Her pulse ox is still around 95% following treatment.  She does have a nebulizer at home and reports that she responds positively to steroids and nebulized meds when she flares like this.    Abdominal: Soft. Normal appearance and bowel sounds are normal. She exhibits no distension. There is no tenderness.   Musculoskeletal: Normal range of motion. She exhibits no edema or deformity.   Neurological: She is alert and oriented to person, place, and time. She exhibits normal muscle tone. Coordination normal.   Skin: Skin is warm, dry and intact. She is not diaphoretic. No pallor.    Psychiatric: She has a normal mood and affect. Her speech is normal and behavior is normal. Judgment and thought content normal. Cognition and memory are normal.   This is a delightful older woman who has good insight into her disease process.    Nursing note and vitals reviewed.      Assessment:       1. Acute exacerbation of extrinsic asthma    2. Cough        Plan:         Acute exacerbation of extrinsic asthma  -     albuterol nebulizer solution 2.5 mg; Take 3 mLs (2.5 mg total) by nebulization one time.  -     ipratropium 0.02 % nebulizer solution 0.5 mg; Take 2.5 mLs (0.5 mg total) by nebulization one time.  -     methylPREDNISolone sod suc(PF) injection 125 mg; Inject 125 mg into the muscle once.  -     albuterol (PROVENTIL) 2.5 mg /3 mL (0.083 %) nebulizer solution; Take 3 mLs (2.5 mg total) by nebulization every 6 (six) hours as needed for Wheezing. Rescue  Dispense: 1 Box; Refill: 0  -     ipratropium (ATROVENT) 0.02 % nebulizer solution; Take 2.5 mLs (500 mcg total) by nebulization 4 (four) times daily. Rescue  Dispense: 1 Box; Refill: 0  -     predniSONE (DELTASONE) 10 MG tablet; Take 1 tablet (10 mg total) by mouth once daily. 4 TABS PER DAY X3DAYS, 3 TABS PER DAY X 3 DAYS, 2 TABS PER DAY X 3 DAYS 1 TAB PER DAY X 3 DAYS , 1/2 TAB PER DAY FOR 4 DAYS THEN STOP  Dispense: 32 tablet; Refill: 1    Cough  -     benzonatate (TESSALON PERLES) 100 MG capsule; Take 2 capsules (200 mg total) by mouth 3 (three) times daily as needed for Cough.  Dispense: 20 capsule; Refill: 0      Follow Up Comments   Make sure that you follow up with your primary care doctor in the next 2-5 days if needed .  Return to the Urgent Care if signs or symptoms change and certainly if you have worsening symptoms go to the nearest emergency department for further evaluation.

## 2018-06-14 ENCOUNTER — TELEPHONE (OUTPATIENT)
Dept: INTERNAL MEDICINE | Facility: CLINIC | Age: 73
End: 2018-06-14

## 2018-06-14 NOTE — TELEPHONE ENCOUNTER
----- Message from Irlanda Monson sent at 6/14/2018  2:30 PM CDT -----  Contact: Sun Bone [phone]  Alejandra would like a call back in regards to pt medication. Pt was recently prescribed nebulizer medication by Urgent Care, but is not covered under her insurance unless done by PCP. Alejandra would like a call regarding this matter.

## 2018-06-16 DIAGNOSIS — R39.15 URINARY URGENCY: ICD-10-CM

## 2018-06-16 DIAGNOSIS — B37.9 CANDIDA INFECTION: ICD-10-CM

## 2018-06-17 RX ORDER — CLOTRIMAZOLE AND BETAMETHASONE DIPROPIONATE 10; .64 MG/G; MG/G
CREAM TOPICAL
Qty: 15 G | Refills: 3 | Status: SHIPPED | OUTPATIENT
Start: 2018-06-17

## 2018-06-20 ENCOUNTER — HOSPITAL ENCOUNTER (OUTPATIENT)
Dept: RADIOLOGY | Facility: HOSPITAL | Age: 73
Discharge: HOME | End: 2018-06-20
Attending: INTERNAL MEDICINE
Payer: MEDICARE

## 2018-06-20 ENCOUNTER — OFFICE VISIT (OUTPATIENT)
Dept: INTERNAL MEDICINE | Facility: CLINIC | Age: 73
End: 2018-06-20
Payer: MEDICARE

## 2018-06-20 ENCOUNTER — PATIENT MESSAGE (OUTPATIENT)
Dept: INTERNAL MEDICINE | Facility: CLINIC | Age: 73
End: 2018-06-20

## 2018-06-20 VITALS
HEIGHT: 67 IN | OXYGEN SATURATION: 94 % | DIASTOLIC BLOOD PRESSURE: 82 MMHG | WEIGHT: 167 LBS | TEMPERATURE: 98 F | BODY MASS INDEX: 26.21 KG/M2 | SYSTOLIC BLOOD PRESSURE: 132 MMHG | HEART RATE: 92 BPM

## 2018-06-20 DIAGNOSIS — J45.901 ACUTE EXACERBATION OF EXTRINSIC ASTHMA: ICD-10-CM

## 2018-06-20 DIAGNOSIS — R05.9 COUGH: ICD-10-CM

## 2018-06-20 DIAGNOSIS — F32.A DEPRESSION, UNSPECIFIED DEPRESSION TYPE: ICD-10-CM

## 2018-06-20 DIAGNOSIS — E78.5 HYPERLIPIDEMIA, UNSPECIFIED HYPERLIPIDEMIA TYPE: ICD-10-CM

## 2018-06-20 DIAGNOSIS — E03.9 HYPOTHYROIDISM, UNSPECIFIED TYPE: ICD-10-CM

## 2018-06-20 DIAGNOSIS — I10 ESSENTIAL HYPERTENSION: Primary | ICD-10-CM

## 2018-06-20 DIAGNOSIS — M81.0 OSTEOPOROSIS, POST-MENOPAUSAL: ICD-10-CM

## 2018-06-20 PROCEDURE — 99215 OFFICE O/P EST HI 40 MIN: CPT | Mod: PBBFAC,25 | Performed by: INTERNAL MEDICINE

## 2018-06-20 PROCEDURE — 99999 PR PBB SHADOW E&M-EST. PATIENT-LVL V: CPT | Mod: PBBFAC,,, | Performed by: INTERNAL MEDICINE

## 2018-06-20 PROCEDURE — 99215 OFFICE O/P EST HI 40 MIN: CPT | Mod: S$PBB,,, | Performed by: INTERNAL MEDICINE

## 2018-06-20 PROCEDURE — 71046 X-RAY EXAM CHEST 2 VIEWS: CPT | Mod: TC

## 2018-06-20 PROCEDURE — 71046 X-RAY EXAM CHEST 2 VIEWS: CPT | Mod: 26,,, | Performed by: RADIOLOGY

## 2018-06-20 NOTE — PROGRESS NOTES
Subjective:       Patient ID: Eugenia Diaz is a 72 y.o. female.    Chief Complaint: Annual Exam; Chest Pain (top left side); Shortness of Breath; and Back Pain (lower area)    HPI   Began with cough, asthma exacerbation about 3 weeks ago.  June 9 went to  and was tx with methylprednisolone and prednisone.  Sputum turned yellow so she took rx of amoxil.  Last week 's HH nurse heard a rub.   Then hoarseness.  C/o choking cough. Taking tussin.  Fatigue with exertion.  She is down to 10 mg from high of 40 mg x 3 days.  She has cut back nebulizer to 1 a day, as seemed to worsen coughing.  Sweats a lot.  Temp ok.       ill with MS.    Stress with that.      recent ortho visit for knee pain.  Better with naprosyn  and brace.    Pain from sternum fracture has resolved.    Osteoporosis with multiple fractures.  Dentist won't pull tooth b/c of prolia use.  Last proia 8/2016.          Review of Systems   Constitutional: Negative for chills and fever.   HENT: Positive for postnasal drip and rhinorrhea. Negative for ear pain and sore throat.    Respiratory: Positive for cough, shortness of breath and wheezing.    Cardiovascular: Positive for chest pain.   Musculoskeletal: Positive for myalgias.   Skin: Negative for rash.   Allergic/Immunologic: Positive for environmental allergies.   Neurological: Positive for headaches.       Objective:      Physical Exam   Constitutional: She is oriented to person, place, and time. She appears well-developed and well-nourished. No distress.   HENT:   Head: Normocephalic and atraumatic.   Mouth/Throat: Oropharynx is clear and moist. No oropharyngeal exudate.   Tm's clear   Neck: Neck supple.   Cardiovascular: Normal rate and regular rhythm.    Pulmonary/Chest: Effort normal and breath sounds normal. No respiratory distress. She has no wheezes. She has no rales.   Leathery BS posteriorly   Lymphadenopathy:     She has no cervical adenopathy.   Neurological: She is alert and  oriented to person, place, and time.   Psychiatric: She has a normal mood and affect. Her behavior is normal.       Assessment:       1. Essential hypertension    2. Hyperlipidemia, unspecified hyperlipidemia type    3. Depression, unspecified depression type    4. Acute exacerbation of extrinsic asthma    5. Hypothyroidism, unspecified type    6. BMI 26.0-26.9,adult    7. Osteoporosis, post-menopausal    8. Cough        Plan:       Eugenia was seen today for annual exam, chest pain, shortness of breath and back pain.    Diagnoses and all orders for this visit:    Essential hypertension  -     Comprehensive metabolic panel; Future    Hyperlipidemia, unspecified hyperlipidemia type  -     Lipid panel; Future    Depression, unspecified depression type    Acute exacerbation of extrinsic asthma  -     X-Ray Chest PA And Lateral; Future    Hypothyroidism, unspecified type  -     TSH; Future    BMI 26.0-26.9,adult  -     CBC auto differential; Future    Osteoporosis, post-menopausal  -     Vitamin D; Future    Cough  -     X-Ray Chest PA And Lateral; Future       Prolia rec'd - she'll consider.  nebuliczer tid    mucinex.

## 2018-06-20 NOTE — PATIENT INSTRUCTIONS
Guaifenesin twice a day (mucinex, Liquibid)    Delsym for cough suppressant.  If it has guaifenesin, take less mucinex.  Tessalon is ok for cough suppressant.

## 2018-06-21 ENCOUNTER — PATIENT MESSAGE (OUTPATIENT)
Dept: INTERNAL MEDICINE | Facility: CLINIC | Age: 73
End: 2018-06-21

## 2018-06-21 DIAGNOSIS — E87.6 HYPOKALEMIA: Primary | ICD-10-CM

## 2018-06-21 RX ORDER — POTASSIUM CHLORIDE 750 MG/1
10 TABLET, EXTENDED RELEASE ORAL DAILY
Qty: 90 TABLET | Refills: 3 | Status: SHIPPED | OUTPATIENT
Start: 2018-06-21 | End: 2019-03-15 | Stop reason: SDUPTHER

## 2018-06-21 NOTE — TELEPHONE ENCOUNTER
Spoke with Alejandra, insurance is wanting RX to through Medicare part B. Needing a signature from a MD. States she has been trying to reach to pt but pt has not called her back and has not asked for rx. Original fill date was 06/11.  Pharmacist just wants to be proactive if pt decides to go and for rx. needing clinic notes from urgent care visit on 06/09. Alejandra will fax over the paperwork needed to complete authorization.

## 2018-06-22 ENCOUNTER — TELEPHONE (OUTPATIENT)
Dept: INTERNAL MEDICINE | Facility: CLINIC | Age: 73
End: 2018-06-22

## 2018-06-22 NOTE — TELEPHONE ENCOUNTER
----- Message from Latia Aguilar MD sent at 6/21/2018  4:57 PM CDT -----  pls schedule potassium level 1 mo or so- she knows

## 2018-07-23 ENCOUNTER — LAB VISIT (OUTPATIENT)
Dept: LAB | Facility: HOSPITAL | Age: 73
End: 2018-07-23
Attending: INTERNAL MEDICINE
Payer: MEDICARE

## 2018-07-23 DIAGNOSIS — E87.6 HYPOKALEMIA: ICD-10-CM

## 2018-07-23 DIAGNOSIS — E03.4 HYPOTHYROIDISM DUE TO ACQUIRED ATROPHY OF THYROID: ICD-10-CM

## 2018-07-23 LAB — POTASSIUM SERPL-SCNC: 4.2 MMOL/L

## 2018-07-23 PROCEDURE — 36415 COLL VENOUS BLD VENIPUNCTURE: CPT | Mod: PO

## 2018-07-23 PROCEDURE — 84132 ASSAY OF SERUM POTASSIUM: CPT

## 2018-07-23 RX ORDER — ESOMEPRAZOLE MAGNESIUM 40 MG/1
CAPSULE, DELAYED RELEASE ORAL
Qty: 30 CAPSULE | Refills: 11 | Status: SHIPPED | OUTPATIENT
Start: 2018-07-23 | End: 2019-08-13

## 2018-07-23 RX ORDER — LEVOTHYROXINE SODIUM 200 UG/1
TABLET ORAL
Qty: 30 TABLET | Refills: 11 | Status: SHIPPED | OUTPATIENT
Start: 2018-07-23 | End: 2019-06-12

## 2018-08-18 DIAGNOSIS — F43.21 ADJUSTMENT DISORDER WITH DEPRESSED MOOD: ICD-10-CM

## 2018-08-19 RX ORDER — SERTRALINE HYDROCHLORIDE 100 MG/1
TABLET, FILM COATED ORAL
Qty: 60 TABLET | Refills: 11 | Status: SHIPPED | OUTPATIENT
Start: 2018-08-19 | End: 2019-08-30 | Stop reason: SDUPTHER

## 2018-08-21 DIAGNOSIS — E78.5 HYPERLIPIDEMIA, UNSPECIFIED HYPERLIPIDEMIA TYPE: ICD-10-CM

## 2018-08-21 RX ORDER — ATORVASTATIN CALCIUM 80 MG/1
80 TABLET, FILM COATED ORAL NIGHTLY
Qty: 90 TABLET | Refills: 3 | Status: SHIPPED | OUTPATIENT
Start: 2018-08-21 | End: 2019-10-01 | Stop reason: SDUPTHER

## 2018-08-26 ENCOUNTER — PATIENT MESSAGE (OUTPATIENT)
Dept: INTERNAL MEDICINE | Facility: CLINIC | Age: 73
End: 2018-08-26

## 2018-08-27 ENCOUNTER — TELEPHONE (OUTPATIENT)
Dept: INTERNAL MEDICINE | Facility: CLINIC | Age: 73
End: 2018-08-27

## 2018-08-27 RX ORDER — NAPROXEN 500 MG/1
500 TABLET ORAL 2 TIMES DAILY
Qty: 60 TABLET | Refills: 1 | Status: SHIPPED | OUTPATIENT
Start: 2018-08-27 | End: 2018-08-28 | Stop reason: SDUPTHER

## 2018-08-27 NOTE — TELEPHONE ENCOUNTER
I have not received anything. Have you received anything from pt that I am missing? If not, I'll ask pt to resend.

## 2018-08-28 RX ORDER — NAPROXEN 500 MG/1
500 TABLET ORAL 2 TIMES DAILY
Qty: 60 TABLET | Refills: 1 | Status: SHIPPED | OUTPATIENT
Start: 2018-08-28 | End: 2018-12-17 | Stop reason: SDUPTHER

## 2018-08-30 ENCOUNTER — DOCUMENTATION ONLY (OUTPATIENT)
Dept: INTERNAL MEDICINE | Facility: CLINIC | Age: 73
End: 2018-08-30

## 2018-09-28 ENCOUNTER — CLINICAL SUPPORT (OUTPATIENT)
Dept: INFECTIOUS DISEASES | Facility: CLINIC | Age: 73
End: 2018-09-28
Payer: MEDICARE

## 2018-09-28 PROCEDURE — 90662 IIV NO PRSV INCREASED AG IM: CPT | Mod: PBBFAC

## 2018-09-28 PROCEDURE — 99213 OFFICE O/P EST LOW 20 MIN: CPT | Mod: PBBFAC

## 2018-09-28 PROCEDURE — 99999 PR PBB SHADOW E&M-EST. PATIENT-LVL III: CPT | Mod: PBBFAC,,,

## 2018-10-10 ENCOUNTER — PATIENT MESSAGE (OUTPATIENT)
Dept: INTERNAL MEDICINE | Facility: CLINIC | Age: 73
End: 2018-10-10

## 2018-10-11 ENCOUNTER — PATIENT MESSAGE (OUTPATIENT)
Dept: INTERNAL MEDICINE | Facility: CLINIC | Age: 73
End: 2018-10-11

## 2018-10-11 ENCOUNTER — OFFICE VISIT (OUTPATIENT)
Dept: INTERNAL MEDICINE | Facility: CLINIC | Age: 73
End: 2018-10-11
Payer: MEDICARE

## 2018-10-11 VITALS
OXYGEN SATURATION: 96 % | SYSTOLIC BLOOD PRESSURE: 132 MMHG | WEIGHT: 161.88 LBS | HEIGHT: 67 IN | DIASTOLIC BLOOD PRESSURE: 78 MMHG | HEART RATE: 77 BPM | BODY MASS INDEX: 25.41 KG/M2

## 2018-10-11 DIAGNOSIS — R21 RASH: Primary | ICD-10-CM

## 2018-10-11 DIAGNOSIS — M87.052 AVASCULAR NECROSIS OF BONE OF LEFT HIP: ICD-10-CM

## 2018-10-11 DIAGNOSIS — R58 ECCHYMOSIS: ICD-10-CM

## 2018-10-11 DIAGNOSIS — R25.2 LEG CRAMPS: ICD-10-CM

## 2018-10-11 PROCEDURE — 99215 OFFICE O/P EST HI 40 MIN: CPT | Mod: PBBFAC | Performed by: INTERNAL MEDICINE

## 2018-10-11 PROCEDURE — 99999 PR PBB SHADOW E&M-EST. PATIENT-LVL V: CPT | Mod: PBBFAC,,, | Performed by: INTERNAL MEDICINE

## 2018-10-11 PROCEDURE — 99214 OFFICE O/P EST MOD 30 MIN: CPT | Mod: S$PBB,,, | Performed by: INTERNAL MEDICINE

## 2018-10-11 RX ORDER — TRIAMCINOLONE ACETONIDE 1 MG/G
CREAM TOPICAL 2 TIMES DAILY
Qty: 80 G | Refills: 1 | Status: SHIPPED | OUTPATIENT
Start: 2018-10-11 | End: 2023-07-18

## 2018-10-11 NOTE — PROGRESS NOTES
Subjective:       Patient ID: Eugenia Diaz is a 73 y.o. female.    Chief Complaint: Leg Pain (left leg)    HPI   C/o rash l lower leg 7 days ago.  She started keflex sat.  Redness is less intense.  SKin itches.  Are regression of rash, however.   No fever.  occas has a chill.  Similar lesion, much smaller, R calf.  She has varicose vv and statis changes of L leg also. No edema.  Dr Al looked at it and didn't think it was cellulitis.  Also with bruise L upper arm, where she had flu fax, and also lower abdomen.  L Leg itches and she notes new onset leg cramps.      She has avasc necrosis of hips and uses scooter.      Review of Systems   Constitutional: Negative for fever and unexpected weight change.   HENT: Negative for congestion and postnasal drip.    Eyes: Negative for pain, discharge and visual disturbance.   Respiratory: Negative for cough, chest tightness, shortness of breath and wheezing.    Cardiovascular: Negative for chest pain and leg swelling.   Gastrointestinal: Negative for abdominal pain, constipation, diarrhea and nausea.   Genitourinary: Negative for difficulty urinating, dysuria and hematuria.   Skin: Positive for rash.   Neurological: Negative for headaches.   Psychiatric/Behavioral: Negative for dysphoric mood and sleep disturbance. The patient is not nervous/anxious.        Objective:      Physical Exam   Constitutional: She appears well-developed and well-nourished. No distress.   Skin:   Ecchymosis L upper arm, lower abd.             33.5 r  34 L   Assessment:       1. Rash    2. Avascular necrosis of bone of left hip    3. Leg cramps    4. Ecchymosis        Plan:       Eugenia was seen today for leg pain.    Diagnoses and all orders for this visit:    Rash  -     Sedimentation rate; Future  -     C-reactive protein; Future  -     Ambulatory consult to Dermatology  -     Comprehensive metabolic panel; Future  -     CBC auto differential; Future    Avascular necrosis of bone of left hip    Leg  cramps    Ecchymosis    Other orders  -     triamcinolone acetonide 0.1% (KENALOG) 0.1 % cream; Apply topically 2 (two) times daily. for 10 days       see pt instructions

## 2018-10-15 RX ORDER — TRIAMTERENE AND HYDROCHLOROTHIAZIDE 37.5; 25 MG/1; MG/1
CAPSULE ORAL
Qty: 30 CAPSULE | Refills: 11 | Status: SHIPPED | OUTPATIENT
Start: 2018-10-15 | End: 2019-11-14 | Stop reason: SDUPTHER

## 2018-10-17 ENCOUNTER — OFFICE VISIT (OUTPATIENT)
Dept: DERMATOLOGY | Facility: CLINIC | Age: 73
End: 2018-10-17
Payer: MEDICARE

## 2018-10-17 VITALS — WEIGHT: 161 LBS | BODY MASS INDEX: 25.22 KG/M2

## 2018-10-17 DIAGNOSIS — L30.0 NUMMULAR DERMATITIS: Primary | ICD-10-CM

## 2018-10-17 DIAGNOSIS — L81.7 SCHAMBERG'S CAPILLARITIS: ICD-10-CM

## 2018-10-17 PROCEDURE — 99202 OFFICE O/P NEW SF 15 MIN: CPT | Mod: S$PBB,,, | Performed by: DERMATOLOGY

## 2018-10-17 PROCEDURE — 99213 OFFICE O/P EST LOW 20 MIN: CPT | Mod: PBBFAC,PO | Performed by: DERMATOLOGY

## 2018-10-17 PROCEDURE — 99999 PR PBB SHADOW E&M-EST. PATIENT-LVL III: CPT | Mod: PBBFAC,,, | Performed by: DERMATOLOGY

## 2018-10-17 RX ORDER — PREDNISONE 20 MG/1
20 TABLET ORAL DAILY
Qty: 7 TABLET | Refills: 0 | Status: SHIPPED | OUTPATIENT
Start: 2018-10-17 | End: 2018-11-01

## 2018-10-17 RX ORDER — BETAMETHASONE DIPROPIONATE 0.5 MG/G
CREAM TOPICAL 2 TIMES DAILY
Qty: 45 G | Refills: 3 | Status: SHIPPED | OUTPATIENT
Start: 2018-10-17 | End: 2023-07-18

## 2018-10-18 NOTE — PROGRESS NOTES
Subjective:       Patient ID:  Eugenia Diaz is a 73 y.o. female who presents for   Chief Complaint   Patient presents with    Rash     bilateral legs      History of Present Illness: The patient presents with chief complaint of rash.  Location: legs  Duration: many years for legs, worse recently following  in hospital on feet going to see him also had flu shot  Signs/Symptoms: improving    Prior treatments: tac cream  Also had some tenderness and erythema at injection site of flu shot no problems with breathing and has some mild erythema of her abdomen.        Review of Systems   Constitutional: Negative for fever.   Skin: Positive for rash. Negative for itching.   Hematologic/Lymphatic: Does not bruise/bleed easily.        Objective:    Physical Exam   Constitutional: She appears well-developed and well-nourished. No distress.   Neurological: She is alert and oriented to person, place, and time. She is not disoriented.   Psychiatric: She has a normal mood and affect.   Skin:   Areas Examined (abnormalities noted in diagram):   Head / Face Inspection Performed  Neck Inspection Performed  Chest / Axilla Inspection Performed  Abdomen Inspection Performed  Back Inspection Performed  RUE Inspected  LUE Inspection Performed  RLE Inspected  LLE Inspection Performed              Diagram Legend     Erythematous scaling macule/papule c/w actinic keratosis       Vascular papule c/w angioma      Pigmented verrucoid papule/plaque c/w seborrheic keratosis      Yellow umbilicated papule c/w sebaceous hyperplasia      Irregularly shaped tan macule c/w lentigo     1-2 mm smooth white papules consistent with Milia      Movable subcutaneous cyst with punctum c/w epidermal inclusion cyst      Subcutaneous movable cyst c/w pilar cyst      Firm pink to brown papule c/w dermatofibroma      Pedunculated fleshy papule(s) c/w skin tag(s)      Evenly pigmented macule c/w junctional nevus     Mildly variegated pigmented, slightly  irregular-bordered macule c/w mildly atypical nevus      Flesh colored to evenly pigmented papule c/w intradermal nevus       Pink pearly papule/plaque c/w basal cell carcinoma      Erythematous hyperkeratotic cursted plaque c/w SCC      Surgical scar with no sign of skin cancer recurrence      Open and closed comedones      Inflammatory papules and pustules      Verrucoid papule consistent consistent with wart     Erythematous eczematous patches and plaques     Dystrophic onycholytic nail with subungual debris c/w onychomycosis     Umbilicated papule    Erythematous-base heme-crusted tan verrucoid plaque consistent with inflamed seborrheic keratosis     Erythematous Silvery Scaling Plaque c/w Psoriasis     See annotation      Assessment / Plan:        Nummular dermatitis thighs, irritant dermatitis lower abdomen  -     predniSONE (DELTASONE) 20 MG tablet; Take 1 tablet (20 mg total) by mouth once daily. for 15 days  Dispense: 7 tablet; Refill: 0  -     betamethasone dipropionate (DIPROLENE) 0.05 % cream; Apply topically 2 (two) times daily. 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

## 2018-12-17 RX ORDER — NAPROXEN 500 MG/1
500 TABLET ORAL 2 TIMES DAILY
Qty: 60 TABLET | Refills: 1 | Status: SHIPPED | OUTPATIENT
Start: 2018-12-17 | End: 2019-03-18 | Stop reason: SDUPTHER

## 2019-01-19 ENCOUNTER — OFFICE VISIT (OUTPATIENT)
Dept: URGENT CARE | Facility: CLINIC | Age: 74
End: 2019-01-19
Payer: MEDICARE

## 2019-01-19 VITALS
BODY MASS INDEX: 25.27 KG/M2 | HEART RATE: 83 BPM | SYSTOLIC BLOOD PRESSURE: 115 MMHG | OXYGEN SATURATION: 97 % | HEIGHT: 67 IN | WEIGHT: 161 LBS | DIASTOLIC BLOOD PRESSURE: 78 MMHG | RESPIRATION RATE: 20 BRPM | TEMPERATURE: 99 F

## 2019-01-19 DIAGNOSIS — T14.8XXA ABRASION: Primary | ICD-10-CM

## 2019-01-19 PROCEDURE — 99214 PR OFFICE/OUTPT VISIT, EST, LEVL IV, 30-39 MIN: ICD-10-PCS | Mod: S$GLB,,, | Performed by: PHYSICIAN ASSISTANT

## 2019-01-19 PROCEDURE — 99214 OFFICE O/P EST MOD 30 MIN: CPT | Mod: S$GLB,,, | Performed by: PHYSICIAN ASSISTANT

## 2019-01-19 NOTE — PATIENT INSTRUCTIONS
Leave pressure dressing on leg until tomorrow.   Return to clinic for any active bleeding that does not resolve.    Please follow up with your primary care provider within 2-5 days if your signs and symptoms have not resolved or worsen.     If your condition worsens or fails to improve we recommend that you receive another evaluation at the emergency room immediately or contact your primary medical clinic to discuss your concerns.   You must understand that you have received an Urgent Care treatment only and that you may be released before all of your medical problems are known or treated. You, the patient, will arrange for follow up care as instructed.

## 2019-01-19 NOTE — PROGRESS NOTES
"Subjective:       Patient ID: Eugenia Diaz is a 73 y.o. female.    Vitals:  height is 5' 7" (1.702 m) and weight is 73 kg (161 lb). Her tympanic temperature is 98.9 °F (37.2 °C). Her blood pressure is 115/78 and her pulse is 83. Her respiration is 20 and oxygen saturation is 97%.     Chief Complaint: Leg Injury (left leg)    Patient states she woke up this morning and noticed her leg was bleeding.  Her  called 911 and the paramedics applied a pressure dressing to her leg.  They advised her to take the dressing off this afternoon and to report to the Urgent Care if the bleeding was still active.  Patient states she tried to take the pressure dressing off and it was still actively bleeding so she came to the urgent care.      Leg Pain    The incident occurred 6 to 12 hours ago. The incident occurred at home. There was no injury mechanism. The pain is present in the left leg. The pain is at a severity of 0/10. The patient is experiencing no pain. She reports no foreign bodies present. She has tried nothing for the symptoms.       Constitution: Negative for chills, fatigue and fever.   HENT: Negative for congestion and sore throat.    Neck: Negative for painful lymph nodes.   Cardiovascular: Negative for chest pain and leg swelling.   Eyes: Negative for double vision and blurred vision.   Respiratory: Negative for cough and shortness of breath.    Gastrointestinal: Negative for nausea, vomiting and diarrhea.   Genitourinary: Negative for dysuria, frequency, urgency and history of kidney stones.   Musculoskeletal: Negative for joint pain, joint swelling, muscle cramps and muscle ache.   Skin: Positive for wound. Negative for color change, pale, rash, erythema and bruising.   Allergic/Immunologic: Negative for seasonal allergies.   Neurological: Negative for dizziness, history of vertigo, light-headedness, passing out and headaches.   Hematologic/Lymphatic: Negative for swollen lymph nodes. "   Psychiatric/Behavioral: Negative for nervous/anxious, sleep disturbance and depression. The patient is not nervous/anxious.        Objective:      Physical Exam   Constitutional: She is oriented to person, place, and time. Vital signs are normal. She appears well-developed and well-nourished. She does not appear ill. No distress.   HENT:   Head: Normocephalic and atraumatic.   Right Ear: External ear normal.   Left Ear: External ear normal.   Nose: Nose normal.   Eyes: Conjunctivae, EOM and lids are normal. Right eye exhibits no discharge. Left eye exhibits no discharge.   Neck: Normal range of motion. Neck supple.   Cardiovascular: Normal rate, regular rhythm and normal heart sounds. Exam reveals no gallop and no friction rub.   No murmur heard.  Pulmonary/Chest: Effort normal and breath sounds normal. No stridor. No respiratory distress. She has no decreased breath sounds. She has no wheezes. She has no rhonchi. She has no rales.   Musculoskeletal: Normal range of motion.   Neurological: She is alert and oriented to person, place, and time.   Skin: Skin is warm and dry. Abrasion noted. No rash noted. She is not diaphoretic. No erythema. No pallor.        Psychiatric: She has a normal mood and affect. Her behavior is normal.   Nursing note and vitals reviewed.      Assessment:       1. Abrasion        Plan:         Abrasion      Patient Instructions   Leave pressure dressing on leg until tomorrow.   Return to clinic for any active bleeding that does not resolve.    Please follow up with your primary care provider within 2-5 days if your signs and symptoms have not resolved or worsen.     If your condition worsens or fails to improve we recommend that you receive another evaluation at the emergency room immediately or contact your primary medical clinic to discuss your concerns.   You must understand that you have received an Urgent Care treatment only and that you may be released before all of your medical  problems are known or treated. You, the patient, will arrange for follow up care as instructed.

## 2019-01-20 ENCOUNTER — PATIENT MESSAGE (OUTPATIENT)
Dept: INTERNAL MEDICINE | Facility: CLINIC | Age: 74
End: 2019-01-20

## 2019-01-20 DIAGNOSIS — R07.89 STERNUM PAIN: ICD-10-CM

## 2019-01-20 DIAGNOSIS — I83.899 RUPTURED VARICOSE VEIN: Primary | ICD-10-CM

## 2019-01-21 ENCOUNTER — PATIENT MESSAGE (OUTPATIENT)
Dept: INTERNAL MEDICINE | Facility: CLINIC | Age: 74
End: 2019-01-21

## 2019-01-21 ENCOUNTER — PATIENT MESSAGE (OUTPATIENT)
Dept: DERMATOLOGY | Facility: CLINIC | Age: 74
End: 2019-01-21

## 2019-01-21 ENCOUNTER — TELEPHONE (OUTPATIENT)
Dept: INTERNAL MEDICINE | Facility: CLINIC | Age: 74
End: 2019-01-21

## 2019-01-21 ENCOUNTER — HOSPITAL ENCOUNTER (OUTPATIENT)
Dept: RADIOLOGY | Facility: HOSPITAL | Age: 74
Discharge: HOME | End: 2019-01-21
Attending: INTERNAL MEDICINE
Payer: MEDICARE

## 2019-01-21 ENCOUNTER — INITIAL CONSULT (OUTPATIENT)
Dept: VASCULAR SURGERY | Facility: CLINIC | Age: 74
End: 2019-01-21
Payer: MEDICARE

## 2019-01-21 VITALS
WEIGHT: 156.5 LBS | TEMPERATURE: 98 F | HEART RATE: 76 BPM | BODY MASS INDEX: 24.56 KG/M2 | DIASTOLIC BLOOD PRESSURE: 81 MMHG | SYSTOLIC BLOOD PRESSURE: 135 MMHG | HEIGHT: 67 IN

## 2019-01-21 DIAGNOSIS — I83.12 VARICOSE VEINS OF LEFT LOWER EXTREMITY WITH INFLAMMATION: ICD-10-CM

## 2019-01-21 DIAGNOSIS — R07.89 STERNUM PAIN: ICD-10-CM

## 2019-01-21 DIAGNOSIS — S22.20XA CLOSED FRACTURE OF STERNUM, UNSPECIFIED PORTION OF STERNUM, INITIAL ENCOUNTER: Primary | ICD-10-CM

## 2019-01-21 PROCEDURE — 99999 PR PBB SHADOW E&M-EST. PATIENT-LVL III: ICD-10-PCS | Mod: PBBFAC,,, | Performed by: SURGERY

## 2019-01-21 PROCEDURE — 99999 PR PBB SHADOW E&M-EST. PATIENT-LVL III: CPT | Mod: PBBFAC,,, | Performed by: SURGERY

## 2019-01-21 PROCEDURE — 99203 OFFICE O/P NEW LOW 30 MIN: CPT | Mod: S$PBB,,, | Performed by: SURGERY

## 2019-01-21 PROCEDURE — 71120 X-RAY EXAM BREASTBONE 2/>VWS: CPT | Mod: TC,FY

## 2019-01-21 PROCEDURE — 99213 OFFICE O/P EST LOW 20 MIN: CPT | Mod: PBBFAC,25 | Performed by: SURGERY

## 2019-01-21 PROCEDURE — 99203 PR OFFICE/OUTPT VISIT, NEW, LEVL III, 30-44 MIN: ICD-10-PCS | Mod: S$PBB,,, | Performed by: SURGERY

## 2019-01-21 PROCEDURE — 71120 XR STERNUM PA AND LATERAL: ICD-10-PCS | Mod: 26,,, | Performed by: RADIOLOGY

## 2019-01-21 PROCEDURE — 71120 X-RAY EXAM BREASTBONE 2/>VWS: CPT | Mod: 26,,, | Performed by: RADIOLOGY

## 2019-01-21 RX ORDER — PANTOPRAZOLE SODIUM 40 MG/1
40 TABLET, DELAYED RELEASE ORAL DAILY
Qty: 30 TABLET | Refills: 11 | Status: SHIPPED | OUTPATIENT
Start: 2019-01-21 | End: 2020-04-21

## 2019-01-21 RX ORDER — ESOMEPRAZOLE MAGNESIUM 40 MG/1
40 CAPSULE, DELAYED RELEASE ORAL DAILY
Qty: 30 CAPSULE | Refills: 11 | Status: CANCELLED | OUTPATIENT
Start: 2019-01-21

## 2019-01-21 NOTE — TELEPHONE ENCOUNTER
----- Message from Lorelei Toribio sent at 1/21/2019 12:14 PM CST -----  Contact: Dora/[facility]/[phone]  [facility] called and report that they can do the chest x-ray.  The patient would have to [facility].    Please advise, thank you

## 2019-01-21 NOTE — TELEPHONE ENCOUNTER
Pls see msg below and advise on an replacement:      esomeprazole (NEXIUM) 40 MG capsule [Latia Aguilar MD]       Patient Comment: Ins. plan won't pay for this anymore.  Not in their formulary. Said I need substitute.  Your advice?

## 2019-01-21 NOTE — TELEPHONE ENCOUNTER
Called and spoke to Dora/[facility]/[phone] and she stated they usually onlt do chest and pelvic x-rays at her location but they will do pt's x-rays. Pt is getting x-rays done now.

## 2019-01-21 NOTE — PROGRESS NOTES
History & Physical    SUBJECTIVE:     History of Present Illness:  Eugenia Diaz is a 73 y.o. female with h/o asthma, HTN, hypothyroidism, and long stand varicose veins who presents for evaluation of bleeding varicosity. Reports waking up on 1/19 with significant bleeding from her left lower leg varicose vein. A pressure dressing was applied and she reported to Urgent Care when the bleeding continued. She has had varicose veins for years, symptoms include itching, but no pain or swelling. She does not wear compression stockings and she has not had intervention on her varicosities. She uses a motorized scooter due to avascular necrosis of bilateral hips.  Since have a pressure dressing on the area, the bleeding has stopped. She does not take blood thinners.    Chief Complaint   Patient presents with    Consult       Review of patient's allergies indicates:   Allergen Reactions    Sulfa (sulfonamide antibiotics) Swelling     Throat swelling      Clindamycin Hives    Erythromycin base Other (See Comments)    Neomycin      Other reaction(s): Rash    Nitrofurantoin macrocrystalline Other (See Comments)     Macrobid.Throat Swelling    Tetracycline      Other reaction(s): Anaphylaxis    Azithromycin Swelling     Throat Swelling    Meperidine Rash     Demerol.     Oxycodone hcl-oxycodone-asa Rash    Propoxyphene Rash     Darvon.        Current Outpatient Medications   Medication Sig Dispense Refill    ADVAIR DISKUS 500-50 mcg/dose DsDv diskus inhaler INHALE ONE PUFF BY MOUTH TWICE DAILY 1 Inhaler 11    albuterol (PROVENTIL) 2.5 mg /3 mL (0.083 %) nebulizer solution Take 3 mLs (2.5 mg total) by nebulization every 6 (six) hours as needed for Wheezing. Rescue 1 Box 0    albuterol 90 mcg/actuation inhaler Inhale 2 puffs into the lungs every 6 (six) hours as needed for Wheezing. 18 g 11    atorvastatin (LIPITOR) 80 MG tablet Take 1 tablet (80 mg total) by mouth every evening. 90 tablet 3    benzonatate (TESSALON  PERLES) 100 MG capsule Take 2 capsules (200 mg total) by mouth 3 (three) times daily as needed for Cough. 20 capsule 0    calcium-vitamin D 600 mg(1,500mg) -400 unit Tab Take 1 tablet by mouth Every morning.      clotrimazole-betamethasone 1-0.05% (LOTRISONE) cream APPLY TO THE AFFECTED AREA TWICE DAILY 15 g 3    cyclobenzaprine (FLEXERIL) 10 MG tablet TAKE ONE-HALF OR ONE TABLET BY MOUTH AT BEDTIME 30 tablet 2    diclofenac sodium 1 % Gel Apply 2 g topically 4 (four) times daily. 1 Tube 2    esomeprazole (NEXIUM) 40 MG capsule TAKE ONE CAPSULE BY MOUTH ONCE DAILY 30 capsule 11    fluticasone (FLONASE) 50 mcg/actuation nasal spray USE AS DIRECTED 16 g 11    hydrocodone-acetaminophen 5-325mg (NORCO) 5-325 mg per tablet Take 1 tablet by mouth every 6 (six) hours as needed for Pain. 30 tablet 0    ipratropium (ATROVENT) 0.02 % nebulizer solution Take 2.5 mLs (500 mcg total) by nebulization 4 (four) times daily. Rescue 1 Box 0    levalbuterol (XOPENEX) 1.25 mg/3 mL nebulizer solution Inhale into the lungs. Up to 3 times daily prn      levothyroxine (SYNTHROID) 200 MCG tablet TAKE ONE TABLET BY MOUTH EVERY DAY 30 tablet 11    lidocaine (XYLOCAINE) 5 % Oint ointment Apply topically as needed. 120 g 3    loratadine (CLARITIN) 10 mg tablet Take 1 tablet by mouth Every PM.      montelukast (SINGULAIR) 10 mg tablet TAKE ONE TABLET BY MOUTH EVERY NIGHT AT BEDTIME 30 tablet 12    naproxen (NAPROSYN) 500 MG tablet TAKE 1 TABLET BY MOUTH TWO TIMES DAILY WITH MEALS 60 tablet 1    neomycin-polymyxin-hydrocortisone (CORTISPORIN) 3.5-10,000-1 mg/mL-unit/mL-% otic suspension instill 3 DROPS into THE right ear FOUR TIMES DAILY 10 mL 0    OLIVE LEAF EXTRACT ORAL Take 150 mg by mouth Every PM.      pantoprazole (PROTONIX) 40 MG tablet Take 1 tablet (40 mg total) by mouth once daily. 30 tablet 11    potassium chloride (KLOR-CON) 10 MEQ TbSR Take 1 tablet (10 mEq total) by mouth once daily. 90 tablet 3    rizatriptan  (MAXALT) 10 MG tablet Take 1 tablet by mouth Once daily as needed. If needed for migraines      sertraline (ZOLOFT) 100 MG tablet TAKE TWO TABLETS BY MOUTH EVERY DAY 60 tablet 11    triamterene-hydrochlorothiazide 37.5-25 mg (DYAZIDE) 37.5-25 mg per capsule TAKE ONE CAPSULE BY MOUTH EVERY MORNING 30 capsule 11    betamethasone dipropionate (DIPROLENE) 0.05 % cream Apply topically 2 (two) times daily. for 10 days 45 g 3    triamcinolone acetonide 0.1% (KENALOG) 0.1 % cream Apply topically 2 (two) times daily. for 10 days 80 g 1     No current facility-administered medications for this visit.        Past Medical History:   Diagnosis Date    Allergy     Anemia     Asthma     Bronchitis     Depression     Generalized headaches     History of endometriosis     Hyperlipidemia     Hypertension     Hypothyroidism     Osteoporosis, unspecified     PCO (posterior capsular opacification), left     Recurrent upper respiratory infection (URI)     Thyroid disease     hypothyroidism    TMJ dysfunction      Past Surgical History:   Procedure Laterality Date    ADENOIDECTOMY      CATARACT EXTRACTION W/  INTRAOCULAR LENS IMPLANT  06    right eye - Dr Best    CATARACT EXTRACTION W/  INTRAOCULAR LENS IMPLANT  11/15/06    left eye - Dr Best    CHOLECYSTECTOMY      COLONOSCOPY N/A 2016    Performed by Jae Hodges MD at [facility] ENDO (4TH FLR)    KNEE SURGERY  12    OOPHORECTOMY      left ovary removed, secondary endometriosis    ORIF, FRACTURE, PATELLA Right 2012    Performed by Eric Davalos MD at [facility] OR 2ND FLR    right hip replacement      TEMPOROMANDIBULAR JOINT SURGERY      TONSILLECTOMY      yag laser OD       Family History   Problem Relation Age of Onset    Hypertension Mother     Diabetes Mother     Heart failure Mother     Colon cancer Father          age 51    Cancer Father 48        Colon Cancer     Diabetes Brother     Cancer  "Brother         non hodgkins lymphoma, lung    Alzheimer's disease Brother     Stroke Maternal Grandfather     Breast cancer Cousin         paternal first cousin    Breast cancer Paternal Aunt     Ovarian cancer Paternal Aunt     Melanoma Neg Hx     Amblyopia Neg Hx     Blindness Neg Hx     Cataracts Neg Hx     Glaucoma Neg Hx     Macular degeneration Neg Hx     Retinal detachment Neg Hx     Strabismus Neg Hx     Thyroid disease Neg Hx      Social History     Tobacco Use    Smoking status: Never Smoker    Smokeless tobacco: Never Used   Substance Use Topics    Alcohol use: Yes     Alcohol/week: 0.6 oz     Types: 1 Glasses of wine per week     Comment: rarely    Drug use: No        Review of Systems:  Review of Systems   Constitutional: Negative for activity change, chills and fever.   Respiratory: Negative for cough and shortness of breath.    Cardiovascular: Negative for chest pain and palpitations.   Gastrointestinal: Negative for abdominal distention, abdominal pain, constipation, diarrhea, nausea and vomiting.   Musculoskeletal: Negative for arthralgias, back pain and myalgias.   Neurological: Negative for dizziness and headaches.   Psychiatric/Behavioral: Negative for agitation and confusion.       OBJECTIVE:     Vital Signs (Most Recent)  Temp: 98.1 °F (36.7 °C) (01/21/19 1339)  Pulse: 76 (01/21/19 1339)  BP: 135/81 (01/21/19 1339)  5' 7" (1.702 m)  71 kg (156 lb 8.4 oz)     Physical Exam:  Physical Exam   Constitutional: She is oriented to person, place, and time. She appears well-developed and well-nourished. No distress.   Cardiovascular: Normal rate and regular rhythm.   Pulmonary/Chest: Effort normal. No respiratory distress.   Abdominal: Soft. She exhibits no distension.   Musculoskeletal: Normal range of motion.   Bilateral palpable femoral and DP/PT pulses  Discoloration of lower extremity consistent with chronic venous stasis. Small superficial varicose veins bilaterally.   Small " scab overlying left varicose vein at site of bleeding.   Neurological: She is alert and oriented to person, place, and time.   Skin: Skin is warm and dry.   Psychiatric: She has a normal mood and affect. Her behavior is normal.         ASSESSMENT/PLAN:     Eugenia Diaz is a 73 y.o. female with bleeding varicose vein on left lower extremity.     PLAN:  Steristrip and pressure dressing applied  Recommend not soaking leg in water such as a bath  If bleeding recurs, elevate leg and reapply pressure dressing  Refer to Dermatology for sclerotherapy of varicose veins\    Luz Maria Reyna MD, PGY-3  General Surgery  [phone]      Vascular Surgery Staff  I have seen and examined the patient and reviewed the residents note. I agree with their assessment and plan.    Derm referral for sclerotherapy for bleeding varicose vein.  Appears to have sternal fracture by exam and x-ray. Will check with thoracic surgery regarding mgmt.  See me back as needed      Dora Adam MD FACS [facility]  Vascular & Endovascular Surgery

## 2019-01-21 NOTE — LETTER
January 21, 2019      Latia Aguilar MD  [address]  [place] [address]           [facility] - Vascular Surgery  [address]  [place] [address]  Phone: [phone]  Fax: [phone]          Patient: Eugenia Diaz   MR Number: [id number]   YOB: 1945   Date of Visit: 1/21/2019       Dear Dr. Latia Aguilar:    Thank you for referring Eugenia Diaz to me for evaluation. Attached you will find relevant portions of my assessment and plan of care.    If you have questions, please do not hesitate to call me. I look forward to following Eugenia Diaz along with you.    Sincerely,    Dora Adam MD    Enclosure  CC:  No Recipients    If you would like to receive this communication electronically, please contact [email or web address] or [phone] to request more information on [employer] access.    For providers and/or their staff who would like to refer a patient to [facility], please contact us through our one-stop-shop provider referral line, [facility], at [phone].    If you feel you have received this communication in error or would no longer like to receive these types of communications, please e-mail [email or web address]

## 2019-01-22 ENCOUNTER — PATIENT MESSAGE (OUTPATIENT)
Dept: INTERNAL MEDICINE | Facility: CLINIC | Age: 74
End: 2019-01-22

## 2019-01-22 DIAGNOSIS — M81.0 OSTEOPOROSIS, POST-MENOPAUSAL: Primary | ICD-10-CM

## 2019-01-23 ENCOUNTER — PATIENT MESSAGE (OUTPATIENT)
Dept: INTERNAL MEDICINE | Facility: CLINIC | Age: 74
End: 2019-01-23

## 2019-01-23 ENCOUNTER — TELEPHONE (OUTPATIENT)
Dept: INTERNAL MEDICINE | Facility: CLINIC | Age: 74
End: 2019-01-23

## 2019-01-23 RX ORDER — HYDROCODONE BITARTRATE AND ACETAMINOPHEN 5; 325 MG/1; MG/1
1 TABLET ORAL EVERY 6 HOURS PRN
Qty: 30 TABLET | Refills: 0 | Status: SHIPPED | OUTPATIENT
Start: 2019-01-23 | End: 2020-09-28

## 2019-01-24 ENCOUNTER — PATIENT MESSAGE (OUTPATIENT)
Dept: INTERNAL MEDICINE | Facility: CLINIC | Age: 74
End: 2019-01-24

## 2019-01-25 ENCOUNTER — PATIENT MESSAGE (OUTPATIENT)
Dept: INTERNAL MEDICINE | Facility: CLINIC | Age: 74
End: 2019-01-25

## 2019-01-25 ENCOUNTER — TELEPHONE (OUTPATIENT)
Dept: ENDOCRINOLOGY | Facility: CLINIC | Age: 74
End: 2019-01-25

## 2019-01-25 NOTE — TELEPHONE ENCOUNTER
----- Message from Dixie Nascimento sent at 1/24/2019  3:40 PM CST -----  Contact: [employer]  Message from [facility], System Message sent at 1/24/2019  1:25 PM CST -----    Appointment Request From: Eugenia Diaz    With Provider: Dr. Pierson    Preferred Date Range: 1/25/2019 - 2/28/2019    Preferred Times: Any time    Reason for visit: Bone fracture Osteoporosis    Comments:  Sternum fracture due to osteoporosis.  Bone medicine needed

## 2019-01-31 ENCOUNTER — PATIENT MESSAGE (OUTPATIENT)
Dept: ORTHOPEDICS | Facility: CLINIC | Age: 74
End: 2019-01-31

## 2019-01-31 ENCOUNTER — PATIENT MESSAGE (OUTPATIENT)
Dept: ENDOCRINOLOGY | Facility: CLINIC | Age: 74
End: 2019-01-31

## 2019-02-11 ENCOUNTER — OFFICE VISIT (OUTPATIENT)
Dept: ORTHOPEDICS | Facility: CLINIC | Age: 74
End: 2019-02-11
Payer: MEDICARE

## 2019-02-11 VITALS — DIASTOLIC BLOOD PRESSURE: 79 MMHG | SYSTOLIC BLOOD PRESSURE: 132 MMHG | HEART RATE: 73 BPM

## 2019-02-11 DIAGNOSIS — M80.80XA PATHOLOGICAL FRACTURE DUE TO OSTEOPOROSIS, UNSPECIFIED FRACTURE SITE, UNSPECIFIED OSTEOPOROSIS TYPE, INITIAL ENCOUNTER: Primary | ICD-10-CM

## 2019-02-11 DIAGNOSIS — M81.0 OSTEOPOROSIS, UNSPECIFIED OSTEOPOROSIS TYPE, UNSPECIFIED PATHOLOGICAL FRACTURE PRESENCE: ICD-10-CM

## 2019-02-11 DIAGNOSIS — M81.6 LOCALIZED OSTEOPOROSIS OF LEQUESNE: ICD-10-CM

## 2019-02-11 PROCEDURE — 99999 PR PBB SHADOW E&M-EST. PATIENT-LVL III: ICD-10-PCS | Mod: PBBFAC,,, | Performed by: PHYSICIAN ASSISTANT

## 2019-02-11 PROCEDURE — 99999 PR PBB SHADOW E&M-EST. PATIENT-LVL III: CPT | Mod: PBBFAC,,, | Performed by: PHYSICIAN ASSISTANT

## 2019-02-11 PROCEDURE — 99214 OFFICE O/P EST MOD 30 MIN: CPT | Mod: S$PBB,,, | Performed by: PHYSICIAN ASSISTANT

## 2019-02-11 PROCEDURE — 99214 PR OFFICE/OUTPT VISIT, EST, LEVL IV, 30-39 MIN: ICD-10-PCS | Mod: S$PBB,,, | Performed by: PHYSICIAN ASSISTANT

## 2019-02-11 PROCEDURE — 99213 OFFICE O/P EST LOW 20 MIN: CPT | Mod: PBBFAC | Performed by: PHYSICIAN ASSISTANT

## 2019-02-11 NOTE — PROGRESS NOTES
SUBJECTIVE:     Chief Complaint & History of Present Illness:  Eugenia Diaz is a new patient 73 y.o. female who is seen here today for a bone health evaluation for osteoporosis, fracture prevention, and risk evaluation for future fractures.     she was appropriately identified and referred by No ref. provider found due to concerns for compromised bone quality, and risk of future fractures.  This visit is medically necessary to identify risk, investigate and initiative treatment as appropriate to improve bone quality and strength for the reduction of secondary fractures.     her medical history, medications and fracture history will be reviewed and summarized      Review of patient's allergies indicates:   Allergen Reactions    Sulfa (sulfonamide antibiotics) Swelling     Throat swelling      Clindamycin Hives    Erythromycin base Other (See Comments)    Neomycin      Other reaction(s): Rash    Nitrofurantoin macrocrystalline Other (See Comments)     Macrobid.Throat Swelling    Tetracycline      Other reaction(s): Anaphylaxis    Azithromycin Swelling     Throat Swelling    Meperidine Rash     Demerol.     Oxycodone hcl-oxycodone-asa Rash    Propoxyphene Rash     Darvon.          Current Outpatient Medications   Medication Sig Dispense Refill    ADVAIR DISKUS 500-50 mcg/dose DsDv diskus inhaler INHALE ONE PUFF BY MOUTH TWICE DAILY 1 Inhaler 11    albuterol (PROVENTIL) 2.5 mg /3 mL (0.083 %) nebulizer solution Take 3 mLs (2.5 mg total) by nebulization every 6 (six) hours as needed for Wheezing. Rescue 1 Box 0    albuterol 90 mcg/actuation inhaler Inhale 2 puffs into the lungs every 6 (six) hours as needed for Wheezing. 18 g 11    atorvastatin (LIPITOR) 80 MG tablet Take 1 tablet (80 mg total) by mouth every evening. 90 tablet 3    benzonatate (TESSALON PERLES) 100 MG capsule Take 2 capsules (200 mg total) by mouth 3 (three) times daily as needed for Cough. 20 capsule 0    calcium-vitamin D 600  mg(1,500mg) -400 unit Tab Take 1 tablet by mouth Every morning.      clotrimazole-betamethasone 1-0.05% (LOTRISONE) cream APPLY TO THE AFFECTED AREA TWICE DAILY 15 g 3    cyclobenzaprine (FLEXERIL) 10 MG tablet TAKE ONE-HALF OR ONE TABLET BY MOUTH AT BEDTIME 30 tablet 2    diclofenac sodium 1 % Gel Apply 2 g topically 4 (four) times daily. 1 Tube 2    esomeprazole (NEXIUM) 40 MG capsule TAKE ONE CAPSULE BY MOUTH ONCE DAILY 30 capsule 11    fluticasone (FLONASE) 50 mcg/actuation nasal spray USE AS DIRECTED 16 g 11    HYDROcodone-acetaminophen (NORCO) 5-325 mg per tablet Take 1 tablet by mouth every 6 (six) hours as needed for Pain. 30 tablet 0    ipratropium (ATROVENT) 0.02 % nebulizer solution Take 2.5 mLs (500 mcg total) by nebulization 4 (four) times daily. Rescue 1 Box 0    levalbuterol (XOPENEX) 1.25 mg/3 mL nebulizer solution Inhale into the lungs. Up to 3 times daily prn      levothyroxine (SYNTHROID) 200 MCG tablet TAKE ONE TABLET BY MOUTH EVERY DAY 30 tablet 11    lidocaine (XYLOCAINE) 5 % Oint ointment Apply topically as needed. 120 g 3    loratadine (CLARITIN) 10 mg tablet Take 1 tablet by mouth Every PM.      montelukast (SINGULAIR) 10 mg tablet TAKE ONE TABLET BY MOUTH EVERY NIGHT AT BEDTIME 30 tablet 12    naproxen (NAPROSYN) 500 MG tablet TAKE 1 TABLET BY MOUTH TWO TIMES DAILY WITH MEALS 60 tablet 1    neomycin-polymyxin-hydrocortisone (CORTISPORIN) 3.5-10,000-1 mg/mL-unit/mL-% otic suspension instill 3 DROPS into THE right ear FOUR TIMES DAILY 10 mL 0    OLIVE LEAF EXTRACT ORAL Take 150 mg by mouth Every PM.      pantoprazole (PROTONIX) 40 MG tablet Take 1 tablet (40 mg total) by mouth once daily. 30 tablet 11    potassium chloride (KLOR-CON) 10 MEQ TbSR Take 1 tablet (10 mEq total) by mouth once daily. 90 tablet 3    rizatriptan (MAXALT) 10 MG tablet Take 1 tablet by mouth Once daily as needed. If needed for migraines      sertraline (ZOLOFT) 100 MG tablet TAKE TWO TABLETS BY MOUTH  EVERY DAY 60 tablet 11    triamterene-hydrochlorothiazide 37.5-25 mg (DYAZIDE) 37.5-25 mg per capsule TAKE ONE CAPSULE BY MOUTH EVERY MORNING 30 capsule 11    betamethasone dipropionate (DIPROLENE) 0.05 % cream Apply topically 2 (two) times daily. for 10 days 45 g 3    triamcinolone acetonide 0.1% (KENALOG) 0.1 % cream Apply topically 2 (two) times daily. for 10 days 80 g 1     No current facility-administered medications for this visit.        Past Medical History:   Diagnosis Date    Allergy     Anemia     Asthma     Bronchitis     Depression     Generalized headaches     History of endometriosis     Hyperlipidemia     Hypertension     Hypothyroidism     Osteoporosis, unspecified     PCO (posterior capsular opacification), left     Recurrent upper respiratory infection (URI)     Thyroid disease     hypothyroidism    TMJ dysfunction        Past Surgical History:   Procedure Laterality Date    ADENOIDECTOMY      CATARACT EXTRACTION W/  INTRAOCULAR LENS IMPLANT  9/6/06    right eye - Dr Best    CATARACT EXTRACTION W/  INTRAOCULAR LENS IMPLANT  11/15/06    left eye - Dr Best    CHOLECYSTECTOMY  1994    COLONOSCOPY N/A 6/22/2016    [facility] ENDO (4TH FLR)    KNEE SURGERY  12/21/12    OOPHORECTOMY      left ovary removed, secondary endometriosis    ORIF, FRACTURE, PATELLA Right 12/21/2012    Performed by Eric Davalos MD at [facility] OR 2ND FLR    right hip replacement  2000    TEMPOROMANDIBULAR JOINT SURGERY  1988    TONSILLECTOMY      yag laser OD         Lab Results   Component Value Date     10/11/2018    K 4.2 10/11/2018     10/11/2018    CO2 30 (H) 10/11/2018    GLU 94 10/11/2018    BUN 13 10/11/2018    CREATININE 0.7 10/11/2018    CALCIUM 9.7 10/11/2018    PROT 6.7 10/11/2018    ALBUMIN 4.1 10/11/2018    BILITOT 0.4 10/11/2018    ALKPHOS 77 10/11/2018    AST 14 10/11/2018    ALT 17 10/11/2018    ANIONGAP 9 10/11/2018     "ESTGFRAFRICA >60 10/11/2018    EGFRNONAA >60 10/11/2018      Lab Results   Component Value Date    WBC 10.64 10/11/2018    RBC 4.57 10/11/2018    HGB 14.1 10/11/2018    HCT 42.0 10/11/2018    MCV 92 10/11/2018    MCH 30.9 10/11/2018    MCHC 33.6 10/11/2018    RDW 12.7 10/11/2018     10/11/2018    MPV 8.6 (L) 10/11/2018    GRAN 7.3 10/11/2018    GRAN 68.9 10/11/2018    LYMPH 1.9 10/11/2018    LYMPH 17.4 (L) 10/11/2018    MONO 1.0 10/11/2018    MONO 9.0 10/11/2018    EOS 0.4 10/11/2018    BASO 0.03 10/11/2018    EOSINOPHIL 3.9 10/11/2018    BASOPHIL 0.3 10/11/2018    DIFFMETHOD Automated 10/11/2018      Lab Results   Component Value Date    MG 2.1 01/25/2007      Lab Results   Component Value Date    PHOS 3.0 01/17/2017      Lab Results   Component Value Date    EZUWXYEF00HX 36 06/20/2018      Lab Results   Component Value Date    .0 (H) 01/17/2017      Lab Results   Component Value Date    TSH 2.342 06/20/2018      Lab Results   Component Value Date    FREET4 0.84 01/17/2017      Lab Results   Component Value Date    HGBA1C 6.2 06/18/2010    ESTIMATEDAVG 131 06/18/2010      No results found for: FREETESTOSTE         Vital Signs (Most Recent)  Vitals:    02/11/19 1501   BP: 132/79   Pulse: 73         Today's Visit and Bone Health History   she reports a height loss of 1".    she was menopausal at the age of 32 by surgical    never took,Hormone replacement therapy   she has no hx of low testosterone levels.    she is not a smoker. she  never been  a smoker in the past   she consumes  1 alcoholic drinks per week.    she consumes 2 servings of caffeine per day.   she has suffered 2 falls in the past year.     she states that she will ambulate within the house only     There is not  Parental history of hip fracture over the age of 50.  Mother and aunt both diagnosed with early onset osteoporosis     her current fracture occurred from fall from standing height.    Previous fractures over 50 include right " ankle fracture, rib fractures, right foot fracture, left foot fracture right knee fracture, 2 sternal fractures .     she has had a bone density test in the past.    she has been has been  treated  for her bones in the past.    Treated with Fosamax, Actonel, Forteo, and Prolia  she does take Calcium supplments  she  does Vit D supplments    she denies exposure to high beam radiation, or radioactive implants, no history of elevated calcium levels of Paget's disease.     she has medical history and medication use known to be associated with bone loss and osteoporosis to include previous diagnosis of osteoporosis, multiple pathological fractures, scoliosis, GERD, hypothyroidism, asthma, inhaled steroids, BP ice, opioid pain medications.     The last DXA was performed in 03/08/2017.          T-Score Hip: -2.8     T-Score Spine: -1.3      FRAX:      Major Fx.: 16%         Hip Fx.: 4.3%      Review of Systems:  ROS:  Constitutional: no fever or chills  Eyes: no visual changes  ENT: no nasal congestion or sore throat, Positive TMJ dysfunction  Respiratory: no respiratory symptoms and Positive bronchitis, asthma  Cardiovascular: no chest pain or palpitations  Gastrointestinal: no nausea or vomiting, tolerating diet, Positive GERD  Genitourinary: no hematuria or dysuria  Integument/Breast: no rash or pruritis  Hematologic/Lymphatic: no easy bruising or lymphadenopathy, Positive for anemia  Musculoskeletal: no arthralgias or myalgias, Positive osteoporosis with multiple pathological fractures, avascular necrosis of the hip,  Neurological: no seizures or tremors, Generalized headaches,  Behavioral/Psych: Positive for depression  Endocrine: no heat or cold intolerance, Thyroid disease, hypothyroidism      OBJECTIVE:     PHYSICAL EXAM:    Weight: (no wt. pt. is on a scooter hurt to stand),                  General: no acute distress and well developed/well nourished  Behavioral/Psych: normal judgment and insight and normal  mood/affect  Skin: no rash  Head/Neck: atraumatic, normocephalic, without obvious abnormality  Respiratory: normal respiratory effort  Cardiac: regular rate and rhythm  Vascular: pulses present  Abdomen: soft, non-tender  Musculoskeletal: no joint tenderness, deformity or swelling               ASSESSMENT/PLAN:     Assessment:    Osteoporosis, at high risk for future fractures, history of multiple pathological fracture.    Plan:   30-45 minutes spent in face-to-face consultation with patient and her family members today, discussing the disease management of osteoporosis, for the reduction of future fractures.  I have explained that bone strength is equal to bone quality. A bone density / DEXA scan is important to complement her care since her fracture was by definition a fragility fracture/traumatic fracture.  Over half of the encounter was spent (50%) counseling the patient on the disease of osteoporosis evidence-based and best practice treatment options available as well as recommendations for improvement of bone quality, the importance of nutritional supplements to include:  Calcium 2739-3488 mg daily in divided doses   Vitamin D3  3561-6164 units daily in divided doses.   Fall prevention and personal safety for the reduction of future fractures.    Clinicians Guidelines for the treatment of Osteoporosis 2017 as part of the National Osteoporosis Foundation were utilized as the evidence-based information provided.    Discussed pharmaceutical treatment options to include Biphosphatases, Denosumab, Abaloparatide and Teriparatide. Information to include indications for therapy, risks and benefits to treatment, and important safety information related to these treatments was provided and discussed.  Handouts were given to the patient for her review on osteoporosis and other pharmacological treatment information related to other available treatment options.    The importance of diet, impact exercise, and core  strengthening with gait and balance exercise, through  both formal physical therapy programs and home exercise programs was discussed.     Bone density test recommended and ordered    Will see the patient back following DEXA scan will consider Reclast is a treatment option

## 2019-02-12 ENCOUNTER — PATIENT MESSAGE (OUTPATIENT)
Dept: OBSTETRICS AND GYNECOLOGY | Facility: CLINIC | Age: 74
End: 2019-02-12

## 2019-02-12 RX ORDER — MONTELUKAST SODIUM 10 MG/1
TABLET ORAL
Qty: 30 TABLET | Refills: 12 | Status: SHIPPED | OUTPATIENT
Start: 2019-02-12 | End: 2020-04-16

## 2019-02-13 ENCOUNTER — TELEPHONE (OUTPATIENT)
Dept: OBSTETRICS AND GYNECOLOGY | Facility: CLINIC | Age: 74
End: 2019-02-13

## 2019-02-13 DIAGNOSIS — Z12.31 SCREENING MAMMOGRAM, ENCOUNTER FOR: Primary | ICD-10-CM

## 2019-02-14 ENCOUNTER — PATIENT MESSAGE (OUTPATIENT)
Dept: ORTHOPEDICS | Facility: CLINIC | Age: 74
End: 2019-02-14

## 2019-02-18 ENCOUNTER — PATIENT MESSAGE (OUTPATIENT)
Dept: OBSTETRICS AND GYNECOLOGY | Facility: CLINIC | Age: 74
End: 2019-02-18

## 2019-02-19 ENCOUNTER — HOSPITAL ENCOUNTER (OUTPATIENT)
Dept: RADIOLOGY | Facility: HOSPITAL | Age: 74
Discharge: HOME | End: 2019-02-19
Attending: OBSTETRICS & GYNECOLOGY
Payer: MEDICARE

## 2019-02-19 ENCOUNTER — HOSPITAL ENCOUNTER (OUTPATIENT)
Dept: RADIOLOGY | Facility: CLINIC | Age: 74
Discharge: HOME | End: 2019-02-19
Attending: PHYSICIAN ASSISTANT
Payer: MEDICARE

## 2019-02-19 DIAGNOSIS — M80.80XA PATHOLOGICAL FRACTURE DUE TO OSTEOPOROSIS, UNSPECIFIED FRACTURE SITE, UNSPECIFIED OSTEOPOROSIS TYPE, INITIAL ENCOUNTER: ICD-10-CM

## 2019-02-19 DIAGNOSIS — M81.6 LOCALIZED OSTEOPOROSIS OF LEQUESNE: ICD-10-CM

## 2019-02-19 DIAGNOSIS — Z12.31 SCREENING MAMMOGRAM, ENCOUNTER FOR: ICD-10-CM

## 2019-02-19 DIAGNOSIS — M81.0 OSTEOPOROSIS, UNSPECIFIED OSTEOPOROSIS TYPE, UNSPECIFIED PATHOLOGICAL FRACTURE PRESENCE: ICD-10-CM

## 2019-02-19 PROCEDURE — 77067 SCR MAMMO BI INCL CAD: CPT | Mod: 26,,, | Performed by: RADIOLOGY

## 2019-02-19 PROCEDURE — 77063 MAMMO DIGITAL SCREENING BILAT WITH TOMOSYNTHESIS_CAD: ICD-10-PCS | Mod: 26,,, | Performed by: RADIOLOGY

## 2019-02-19 PROCEDURE — 77063 BREAST TOMOSYNTHESIS BI: CPT | Mod: 26,,, | Performed by: RADIOLOGY

## 2019-02-19 PROCEDURE — 77067 SCR MAMMO BI INCL CAD: CPT | Mod: TC

## 2019-02-19 PROCEDURE — 77067 MAMMO DIGITAL SCREENING BILAT WITH TOMOSYNTHESIS_CAD: ICD-10-PCS | Mod: 26,,, | Performed by: RADIOLOGY

## 2019-02-19 PROCEDURE — 77080 DXA BONE DENSITY AXIAL: CPT | Mod: TC

## 2019-02-19 PROCEDURE — 77080 DXA BONE DENSITY AXIAL: CPT | Mod: 26,,, | Performed by: INTERNAL MEDICINE

## 2019-02-19 PROCEDURE — 77080 DEXA BONE DENSITY SPINE HIP: ICD-10-PCS | Mod: 26,,, | Performed by: INTERNAL MEDICINE

## 2019-02-20 ENCOUNTER — PATIENT MESSAGE (OUTPATIENT)
Dept: ENDOCRINOLOGY | Facility: CLINIC | Age: 74
End: 2019-02-20

## 2019-03-15 DIAGNOSIS — E87.6 HYPOKALEMIA: ICD-10-CM

## 2019-03-15 RX ORDER — POTASSIUM CHLORIDE 750 MG/1
TABLET, EXTENDED RELEASE ORAL
Qty: 90 TABLET | Refills: 3 | Status: SHIPPED | OUTPATIENT
Start: 2019-03-15 | End: 2020-09-28 | Stop reason: SDUPTHER

## 2019-03-18 RX ORDER — NAPROXEN 500 MG/1
500 TABLET ORAL 2 TIMES DAILY
Qty: 60 TABLET | Refills: 1 | Status: SHIPPED | OUTPATIENT
Start: 2019-03-18 | End: 2019-08-22 | Stop reason: SDUPTHER

## 2019-04-01 RX ORDER — FLUTICASONE PROPIONATE AND SALMETEROL 50; 500 UG/1; UG/1
POWDER RESPIRATORY (INHALATION)
Qty: 1 EACH | Refills: 11 | Status: SHIPPED | OUTPATIENT
Start: 2019-04-01 | End: 2020-08-13

## 2019-06-08 ENCOUNTER — PATIENT MESSAGE (OUTPATIENT)
Dept: INTERNAL MEDICINE | Facility: CLINIC | Age: 74
End: 2019-06-08

## 2019-06-08 DIAGNOSIS — E03.9 HYPOTHYROIDISM, UNSPECIFIED TYPE: Primary | ICD-10-CM

## 2019-06-08 DIAGNOSIS — E78.5 HYPERLIPIDEMIA, UNSPECIFIED HYPERLIPIDEMIA TYPE: ICD-10-CM

## 2019-06-11 ENCOUNTER — LAB VISIT (OUTPATIENT)
Dept: LAB | Facility: HOSPITAL | Age: 74
End: 2019-06-11
Payer: MEDICARE

## 2019-06-11 DIAGNOSIS — E78.5 HYPERLIPIDEMIA, UNSPECIFIED HYPERLIPIDEMIA TYPE: ICD-10-CM

## 2019-06-11 DIAGNOSIS — E03.9 HYPOTHYROIDISM, UNSPECIFIED TYPE: ICD-10-CM

## 2019-06-11 LAB
CHOLEST SERPL-MCNC: 144 MG/DL (ref 120–199)
CHOLEST/HDLC SERPL: 2.7 {RATIO} (ref 2–5)
HDLC SERPL-MCNC: 53 MG/DL (ref 40–75)
HDLC SERPL: 36.8 % (ref 20–50)
LDLC SERPL CALC-MCNC: 77.2 MG/DL (ref 63–159)
NONHDLC SERPL-MCNC: 91 MG/DL
T4 FREE SERPL-MCNC: 1.55 NG/DL (ref 0.71–1.51)
TRIGL SERPL-MCNC: 69 MG/DL (ref 30–150)
TSH SERPL DL<=0.005 MIU/L-ACNC: 0.1 UIU/ML (ref 0.4–4)

## 2019-06-11 PROCEDURE — 84439 ASSAY OF FREE THYROXINE: CPT

## 2019-06-11 PROCEDURE — 36415 COLL VENOUS BLD VENIPUNCTURE: CPT

## 2019-06-11 PROCEDURE — 84443 ASSAY THYROID STIM HORMONE: CPT

## 2019-06-11 PROCEDURE — 80061 LIPID PANEL: CPT

## 2019-06-12 ENCOUNTER — TELEPHONE (OUTPATIENT)
Dept: VASCULAR SURGERY | Facility: CLINIC | Age: 74
End: 2019-06-12

## 2019-06-12 ENCOUNTER — PROCEDURE VISIT (OUTPATIENT)
Dept: DERMATOLOGY | Facility: CLINIC | Age: 74
End: 2019-06-12
Payer: MEDICARE

## 2019-06-12 DIAGNOSIS — I83.028 VARICOSE VEINS OF LEFT LOWER EXTREMITY WITH ULCER OF OTHER PART OF LOWER LEG LIMITED TO BREAKDOWN OF SKIN: Primary | ICD-10-CM

## 2019-06-12 DIAGNOSIS — I83.12 VARICOSE VEINS OF LEFT LOWER EXTREMITY WITH INFLAMMATION: Primary | ICD-10-CM

## 2019-06-12 DIAGNOSIS — E03.4 HYPOTHYROIDISM DUE TO ACQUIRED ATROPHY OF THYROID: ICD-10-CM

## 2019-06-12 DIAGNOSIS — R58 BLEEDING: ICD-10-CM

## 2019-06-12 DIAGNOSIS — L97.821 VARICOSE VEINS OF LEFT LOWER EXTREMITY WITH ULCER OF OTHER PART OF LOWER LEG LIMITED TO BREAKDOWN OF SKIN: Primary | ICD-10-CM

## 2019-06-12 PROCEDURE — 99213 OFFICE O/P EST LOW 20 MIN: CPT | Mod: S$GLB,,, | Performed by: DERMATOLOGY

## 2019-06-12 PROCEDURE — 99213 PR OFFICE/OUTPT VISIT, EST, LEVL III, 20-29 MIN: ICD-10-PCS | Mod: S$GLB,,, | Performed by: DERMATOLOGY

## 2019-06-12 RX ORDER — LEVOTHYROXINE SODIUM 175 UG/1
200 TABLET ORAL DAILY
Qty: 90 TABLET | Refills: 0 | Status: SHIPPED | OUTPATIENT
Start: 2019-06-12 | End: 2019-07-22 | Stop reason: SDUPTHER

## 2019-06-12 NOTE — Clinical Note
Hi Dr. Coleman,This patient's varicose veins are too large/extensive to be treated with our lasers. (need to be less than 3 mm to work) I referred her to vascular.Thanks,Ramila

## 2019-06-12 NOTE — PROGRESS NOTES
Subjective:       Patient ID:  Eugenia Diaz is a 73 y.o. female who presents for   Chief Complaint   Patient presents with    Bleeding/Bruising     from leg vein     Lesion  - Initial  Affected locations: left lower leg  Duration: 5 months  Signs / symptoms: bleeding, pain and swelling  Severity: moderate  Timing: recurrent and constant  Aggravated by: nothing  Treatments tried: compression and wound care.  Improvement on treatment: no relief      Review of Systems   Skin: Negative for itching and rash.   Hematologic/Lymphatic: Bruises/bleeds easily.        Objective:    Physical Exam   Constitutional: She appears well-developed and well-nourished. No distress.   HENT:   Mouth/Throat: Lips normal.    Eyes: Lids are normal.  No conjunctival no injection.   Cardiovascular: There is dependent edema.     Neurological: She is alert and oriented to person, place, and time. She is not disoriented.   Psychiatric: She has a normal mood and affect.   Skin:   Areas Examined (abnormalities noted in diagram):   Head / Face Inspection Performed  Neck Inspection Performed  RLE Inspected  LLE Inspection Performed              Diagram Legend     Erythematous scaling macule/papule c/w actinic keratosis       Vascular papule c/w angioma      Pigmented verrucoid papule/plaque c/w seborrheic keratosis      Yellow umbilicated papule c/w sebaceous hyperplasia      Irregularly shaped tan macule c/w lentigo     1-2 mm smooth white papules consistent with Milia      Movable subcutaneous cyst with punctum c/w epidermal inclusion cyst      Subcutaneous movable cyst c/w pilar cyst      Firm pink to brown papule c/w dermatofibroma      Pedunculated fleshy papule(s) c/w skin tag(s)      Evenly pigmented macule c/w junctional nevus     Mildly variegated pigmented, slightly irregular-bordered macule c/w mildly atypical nevus      Flesh colored to evenly pigmented papule c/w intradermal nevus       Pink pearly papule/plaque c/w basal cell  carcinoma      Erythematous hyperkeratotic cursted plaque c/w SCC      Surgical scar with no sign of skin cancer recurrence      Open and closed comedones      Inflammatory papules and pustules      Verrucoid papule consistent consistent with wart     Erythematous eczematous patches and plaques     Dystrophic onycholytic nail with subungual debris c/w onychomycosis     Umbilicated papule    Erythematous-base heme-crusted tan verrucoid plaque consistent with inflamed seborrheic keratosis     Erythematous Silvery Scaling Plaque c/w Psoriasis     See annotation      Assessment / Plan:        Varicose veins of left lower extremity with ulcer of other part of lower leg limited to breakdown of skin  Bleeding  - Explained to patient that her varicosities are too large and too extensive to be treated with the Gentle YAG laser that we have in clinic, and that there would be an increased risk of further skin breakdown. I feel that she would be best served by seeing a vascular specialist.  -     Ambulatory Referral to Vascular Surgery  - My nurse Noelle spoke with Dr. Monson's nurse who will do her best to expedite an appointment for the patient.    Follow up if symptoms worsen or fail to improve.

## 2019-06-12 NOTE — LETTER
June 12, 2019        Mercedes Coleman MD  [address] [facility] [id number]             [facility] - Dermatology  [address] [place] [id number]  Phone: [phone]  Fax: [phone]   Patient: Eugenia Diaz   MR Number: [id number]   YOB: 1945   Date of Visit: 6/12/2019       Dear Dr. Coleman:    Thank you for referring Eugenia Diaz to me for evaluation. Below are the relevant portions of my assessment and plan of care.            If you have questions, please do not hesitate to call me. I look forward to following Eugenia along with you.    Sincerely,      Hetal Ferguson MD           CC  No Recipients

## 2019-06-13 ENCOUNTER — TELEPHONE (OUTPATIENT)
Dept: INTERNAL MEDICINE | Facility: CLINIC | Age: 74
End: 2019-06-13

## 2019-06-19 ENCOUNTER — HOSPITAL ENCOUNTER (OUTPATIENT)
Dept: RADIOLOGY | Facility: OTHER | Age: 74
Discharge: HOME | End: 2019-06-19
Attending: SURGERY
Payer: MEDICARE

## 2019-06-19 DIAGNOSIS — I83.12 VARICOSE VEINS OF LEFT LOWER EXTREMITY WITH INFLAMMATION: ICD-10-CM

## 2019-06-19 PROCEDURE — 93970 EXTREMITY STUDY: CPT | Mod: TC

## 2019-06-19 PROCEDURE — 93970 US LOWER EXTREMITY VEINS BILATERAL INSUFFICIENCY: ICD-10-PCS | Mod: 26,,, | Performed by: RADIOLOGY

## 2019-06-19 PROCEDURE — 93970 EXTREMITY STUDY: CPT | Mod: 26,,, | Performed by: RADIOLOGY

## 2019-06-26 ENCOUNTER — PATIENT OUTREACH (OUTPATIENT)
Dept: ADMINISTRATIVE | Facility: HOSPITAL | Age: 74
End: 2019-06-26

## 2019-07-22 DIAGNOSIS — E03.4 HYPOTHYROIDISM DUE TO ACQUIRED ATROPHY OF THYROID: ICD-10-CM

## 2019-07-22 RX ORDER — LEVOTHYROXINE SODIUM 175 UG/1
TABLET ORAL
Qty: 90 TABLET | Refills: 0 | Status: SHIPPED | OUTPATIENT
Start: 2019-07-22 | End: 2019-08-13 | Stop reason: SDUPTHER

## 2019-08-13 ENCOUNTER — OFFICE VISIT (OUTPATIENT)
Dept: INTERNAL MEDICINE | Facility: CLINIC | Age: 74
End: 2019-08-13
Payer: MEDICARE

## 2019-08-13 ENCOUNTER — LAB VISIT (OUTPATIENT)
Dept: LAB | Facility: HOSPITAL | Age: 74
End: 2019-08-13
Attending: INTERNAL MEDICINE
Payer: MEDICARE

## 2019-08-13 VITALS
BODY MASS INDEX: 24.17 KG/M2 | OXYGEN SATURATION: 98 % | HEIGHT: 67 IN | SYSTOLIC BLOOD PRESSURE: 128 MMHG | WEIGHT: 154 LBS | HEART RATE: 72 BPM | DIASTOLIC BLOOD PRESSURE: 84 MMHG

## 2019-08-13 DIAGNOSIS — E03.4 HYPOTHYROIDISM DUE TO ACQUIRED ATROPHY OF THYROID: ICD-10-CM

## 2019-08-13 DIAGNOSIS — I10 ESSENTIAL HYPERTENSION: Primary | ICD-10-CM

## 2019-08-13 DIAGNOSIS — E03.9 HYPOTHYROIDISM (ACQUIRED): ICD-10-CM

## 2019-08-13 DIAGNOSIS — T78.40XS ALLERGIC STATE, SEQUELA: ICD-10-CM

## 2019-08-13 DIAGNOSIS — J45.909 ASTHMA, WELL CONTROLLED, UNSPECIFIED ASTHMA SEVERITY, UNSPECIFIED WHETHER PERSISTENT: ICD-10-CM

## 2019-08-13 DIAGNOSIS — I83.12 VARICOSE VEINS OF LEFT LOWER EXTREMITY WITH INFLAMMATION: ICD-10-CM

## 2019-08-13 DIAGNOSIS — M87.052 AVASCULAR NECROSIS OF BONE OF LEFT HIP: ICD-10-CM

## 2019-08-13 DIAGNOSIS — F43.9 SITUATIONAL STRESS: ICD-10-CM

## 2019-08-13 DIAGNOSIS — K21.9 GASTROESOPHAGEAL REFLUX DISEASE WITHOUT ESOPHAGITIS: ICD-10-CM

## 2019-08-13 PROBLEM — J45.901 ACUTE EXACERBATION OF EXTRINSIC ASTHMA: Status: RESOLVED | Noted: 2018-06-09 | Resolved: 2019-08-13

## 2019-08-13 LAB — TSH SERPL DL<=0.005 MIU/L-ACNC: 2.14 UIU/ML (ref 0.4–4)

## 2019-08-13 PROCEDURE — 99999 PR PBB SHADOW E&M-EST. PATIENT-LVL IV: CPT | Mod: PBBFAC,,, | Performed by: INTERNAL MEDICINE

## 2019-08-13 PROCEDURE — 99214 OFFICE O/P EST MOD 30 MIN: CPT | Mod: PBBFAC | Performed by: INTERNAL MEDICINE

## 2019-08-13 PROCEDURE — 36415 COLL VENOUS BLD VENIPUNCTURE: CPT

## 2019-08-13 PROCEDURE — 99215 OFFICE O/P EST HI 40 MIN: CPT | Mod: S$PBB,,, | Performed by: INTERNAL MEDICINE

## 2019-08-13 PROCEDURE — 84443 ASSAY THYROID STIM HORMONE: CPT

## 2019-08-13 PROCEDURE — 99215 PR OFFICE/OUTPT VISIT, EST, LEVL V, 40-54 MIN: ICD-10-PCS | Mod: S$PBB,,, | Performed by: INTERNAL MEDICINE

## 2019-08-13 PROCEDURE — 99999 PR PBB SHADOW E&M-EST. PATIENT-LVL IV: ICD-10-PCS | Mod: PBBFAC,,, | Performed by: INTERNAL MEDICINE

## 2019-08-13 RX ORDER — PREDNISONE 20 MG/1
TABLET ORAL
Qty: 18 TABLET | Refills: 0 | Status: SHIPPED | OUTPATIENT
Start: 2019-08-13 | End: 2020-04-26 | Stop reason: SDUPTHER

## 2019-08-13 RX ORDER — LEVOTHYROXINE SODIUM 175 UG/1
175 TABLET ORAL DAILY
Qty: 90 TABLET | Refills: 3 | Status: SHIPPED | OUTPATIENT
Start: 2019-08-13 | End: 2020-09-17

## 2019-08-13 NOTE — PROGRESS NOTES
Subjective:       Patient ID: Eugenia Diaz is a 73 y.o. female.    Chief Complaint: f/u htn, other concerns  HPI    with MS, hospitalized 5 times in last 4 months.     24 hour caregiver to .       Iatrogenic hyperthyroidism by [facility] labs.    She has lost weight while caring for , eating less.  Hyperthyroidism may have been contributing.    Asthma worse with recent hot, humid weather.  Coughing  A lot. Uses albuterol about every other week.  Using advair diskus daily.  Would like prednisone available should she evacuate.    She was told by derm that she had a allergic rxn to flu vax last year.      Had bleeding due to varicose vein.  To see a vascular surgeon soon.  She saw Derm in interim.  Recent venous u/s confirmed reflux.  She wears compression stockings when she can.  Leg aches.      She has osteoporosis.  Stopped prolia as she had to have dental work.  Her dentist advised against taking prolia.  She took Forteo in past.  No tx in last 18 months.    Taking protonix for gerd.      Continue with sertraline for depression.  Migraines have imrpoved.  Last was 2 years ago.  Atorvastatin for hyperlipidemia.      Review of Systems   Constitutional: Negative for activity change and unexpected weight change.   HENT: Negative for hearing loss, rhinorrhea and trouble swallowing.    Eyes: Negative for discharge and visual disturbance.   Respiratory: Positive for chest tightness and wheezing.    Cardiovascular: Positive for chest pain. Negative for palpitations.   Gastrointestinal: Negative for blood in stool, constipation, diarrhea and vomiting.   Endocrine: Negative for polydipsia and polyuria.   Genitourinary: Positive for frequency. Negative for difficulty urinating, dysuria, hematuria and menstrual problem.   Musculoskeletal: Negative for arthralgias, joint swelling and neck pain.   Neurological: Negative for weakness and headaches.   Psychiatric/Behavioral: Negative for confusion and dysphoric  mood.       Objective:      Physical Exam   Constitutional: She is oriented to person, place, and time. She appears well-developed and well-nourished.   Eyes: No scleral icterus.   Neck: No JVD present. No thyromegaly present.   Cardiovascular: Normal rate, regular rhythm and normal heart sounds.   Pulmonary/Chest: Effort normal and breath sounds normal. No respiratory distress. She has no wheezes. She has no rales.   Abdominal: Soft. She exhibits no mass. There is no tenderness.   Musculoskeletal: She exhibits no edema.   Neurological: She is alert and oriented to person, place, and time.   Psychiatric: She has a normal mood and affect. Her behavior is normal.       Results for orders placed or performed in visit on 08/13/19   TSH   Result Value Ref Range    TSH 2.141 0.400 - 4.000 uIU/mL       Assessment:       1. Essential hypertension    2. Allergic state, sequela    3. Asthma, well controlled, unspecified asthma severity, unspecified whether persistent    4. Gastroesophageal reflux disease without esophagitis    5. Hypothyroidism (acquired)    6. Varicose veins of left lower extremity with inflammation    7. Avascular necrosis of bone of left hip                Scooter bound  Plan:       Eugenia was seen today for annual exam.    Diagnoses and all orders for this visit:    Essential hypertension    Allergic state, sequela  -     Ambulatory consult to Allergy    Asthma, well controlled, unspecified asthma severity, unspecified whether persistent    Gastroesophageal reflux disease without esophagitis    Hypothyroidism (acquired)    Varicose veins of left lower extremity with inflammation    Avascular necrosis of bone of left hip    Other orders  -     predniSONE (DELTASONE) 20 MG tablet; 3 tabs po q day for 3 days, then 2 tabs po daily for 3 days, then 1 tab daily for 3 days.       cc Dr Christianson.  We need to get osteoporosis tx clarified.    Review tsh.- at goal.  Continue present synthroid dose

## 2019-08-13 NOTE — Clinical Note
Ruma - could you make time to see this pt?She has terrible osteoporosis and has had many fractures.  Her dentist told her not to take Prolia - Failed fosamax.  I think Chris Farfan I past.Its a therapeutic dilemmaShyla

## 2019-08-15 ENCOUNTER — PATIENT MESSAGE (OUTPATIENT)
Dept: ENDOCRINOLOGY | Facility: CLINIC | Age: 74
End: 2019-08-15

## 2019-08-16 ENCOUNTER — INITIAL CONSULT (OUTPATIENT)
Dept: VASCULAR SURGERY | Facility: CLINIC | Age: 74
End: 2019-08-16
Payer: MEDICARE

## 2019-08-16 VITALS
RESPIRATION RATE: 18 BRPM | BODY MASS INDEX: 24.22 KG/M2 | HEIGHT: 67 IN | HEART RATE: 79 BPM | DIASTOLIC BLOOD PRESSURE: 71 MMHG | WEIGHT: 154.31 LBS | TEMPERATURE: 99 F | SYSTOLIC BLOOD PRESSURE: 121 MMHG

## 2019-08-16 DIAGNOSIS — I87.2 VENOUS INSUFFICIENCY OF BOTH LOWER EXTREMITIES: Primary | ICD-10-CM

## 2019-08-16 DIAGNOSIS — I83.892 BLEEDING FROM VARICOSE VEINS OF LEFT LOWER EXTREMITY: ICD-10-CM

## 2019-08-16 PROCEDURE — 99214 OFFICE O/P EST MOD 30 MIN: CPT | Mod: S$GLB,,, | Performed by: SURGERY

## 2019-08-16 PROCEDURE — 99214 PR OFFICE/OUTPT VISIT, EST, LEVL IV, 30-39 MIN: ICD-10-PCS | Mod: S$GLB,,, | Performed by: SURGERY

## 2019-08-16 NOTE — PATIENT INSTRUCTIONS
Putting on Compression Stockings     Turn the stocking inside-out, then fit it over your toes and heel.          Roll the stocking up your leg.            Once stockings are on, make sure the top of the stocking is about two fingers width below the crease of the knee (or the groin if you wear thigh-high stockings).          Use equipment, such as a stocking dixon, or wear rubber gloves to make it easier to put on compression stockings.         Elastic compression stockings are prescribed to treat many vein problems. Wearing them may be the most important thing you do to manage your symptoms. The stockings fit tightly around your ankle, gradually reducing in pressure as they go up your legs. This helps keep blood flowing to your heart. As a result, swelling is reduced. Your healthcare provider will prescribe stockings at a safe pressure for you. He or she will also tell you how often to wear and remove the stockings. Follow these instructions closely. Also, do not buy or wear compression stockings without first seeing your healthcare provider.  Tips for wear and care  To wear stockings safely and to get the most benefit:  · Wear the length prescribed by your healthcare provider.  · Pull them to the designated height and no farther. Dont let them bunch at the top. This can restrict blood flow and increase swelling.  · Wear the stockings for the amount of time your healthcare provider recommends. Replace them when they start to feel loose. This will likely be every 3 to 6 months.  · Remove them as your healthcare provider directs. When removed, wash your legs. Then check your legs and feet for sores. Call your healthcare provider if you find a sore. Dont put the stockings back on unless your healthcare provider directs.   · Wash the stockings as instructed. They may need to be hand-washed.  Date Last Reviewed: 5/1/2016  © 1730-5269 [employer]. [address] [place] [address], [place] [address]. All rights  reserved. This information is not intended as a substitute for professional medical care. Always follow your healthcare professional's instructions.        Understanding Chronic Venous Insufficiency  Problems with the veins in the legs may lead to chronic venous insufficiency (CVI). CVI means that there is a long-term problem with the veins not being able to pump blood back to your heart. When this happens, blood stays in the legs and causes swelling and aching.   Two problems that may lead to chronic venous insufficiency are:  · Damaged valves. Valves keep blood flowing from the legs through the blood vessels and back to the heart. When the valves are damaged, blood does not flow as well.   · Deep vein thrombosis (DVT). Blood clots may form in the deep veins of the legs. This may cause pain, redness, and swelling in the legs. It may also block the flow of blood back to the heart. Seek immediate medical care if you have these symptoms.  · A blood clot in the leg can also break off and travel to the lungs. This is called pulmonary embolism (PE). In the lungs, the clot can cut off the flow of blood. This may cause chest pain, trouble breathing, sweating, a fast heartbeat, coughing (may cough up blood), and fainting. It is a medical emergency and may cause death. Call 911 if you have these symptoms.  · Healthcare providers call the two conditions, DVT and PE, venous thromboembolism (VTE).  CVI cant be cured, but you can control leg swelling to reduce the likelihood of ulcers (sores).  Recognizing the symptoms  Be aware of the following:  · If you stand or sit with your feet down for long periods, your legs may ache or feel heavy.  · Swollen ankles are possibly the most common symptom of CVI.  · As swelling increases, the skin over your ankles may show red spots or a brownish tinge. The skin may feel leathery or scaly, and may start to itch.  · If swelling is not controlled, an ulcer (open wound) may form.  What you  can do  Reduce your risk of developing ulcers by doing the following:  · Increase blood flow back to your heart by elevating your legs, exercising daily, and wearing elastic stockings.  · Boost blood flow in your legs by losing excess weight.  · If you must stand or sit in one place for a period of time, keep your blood moving by wiggling your toes, shifting your body position, and rising up on the balls of your feet.    Date Last Reviewed: 5/1/2016 © 2000-2017 [employer]. [address] [place] [address], [place] [address]. All rights reserved. This information is not intended as a substitute for professional medical care. Always follow your healthcare professional's instructions.        Tips for Using Less Salt    Most people with heart problems need to eat less salt (sodium). Reducing the amount of salt you eat may help control your blood pressure. The higher your blood pressure, the greater your risk for heart disease, stroke, blindness, and kidney problems.  At the store  · Make low-salt choices by reading labels carefully. Look for the total amount of sodium per serving.  · Use more fresh food. Buy more fruits and vegetables. Select lean meats, fish, and poultry.  · Use fewer frozen, canned, and packaged foods which often contain a lot of sodium.  · Use plain frozen vegetables without sauces or toppings. These products are often low- or no-sodium.  · Opt for reduced-sodium or no-salt-added versions of canned vegetables and soups.  In the kitchen  · Don't add salt to food when you're cooking. Season with flavorings such as onion, garlic, pepper, salt-free herbal blends, and lemon or lime juice.  · Use a cookbook containing low-salt recipes. It can give you ideas for tasty meals that are healthy for your heart.  · Sprinkle salt-free herbal blends on vegetables and meat.  · Drain and rinse canned foods, such as canned beans and vegetables, before cooking or eating.  Eating out  · Tell the  you're on a  low-salt diet. Ask questions about the menu.  · Order fish, chicken, and meat broiled, baked, poached, or grilled without salt, butter, or breading.  · Use lemon, pepper, and salt-free herb mixes to add flavor.  · Choose plain steamed rice, boiled noodles, and baked or boiled potatoes. Top potatoes with chives and a little sour cream.     Beware! Salt goes by many other names. Limit foods with these words listed as ingredients: salt, sodium, soy sauce, baking soda, baking powder, MSG, monosodium, Na (the chemical symbol for sodium). Some antacids are also high in salt.   Date Last Reviewed: 6/19/2015 © 2000-2017 [employer]. [address] [place] [address]. All rights reserved. This information is not intended as a substitute for professional medical care. Always follow your healthcare professional's instructions.        Low-Salt Diet  This diet removes foods that are high in salt. It also limits the amount of salt you use when cooking. It is most often used for people with high blood pressure, edema (fluid retention), and kidney, liver, or heart disease.  Table salt contains the mineral sodium. Your body needs sodium to work normally. But too much sodium can make your health problems worse. Your healthcare provider is recommending a low-salt (also called low-sodium) diet for you. Your total daily allowance of salt is 1,500 to 2,300 milligrams (mg). It is less than 1 teaspoon of table salt. This means you can have only about 500 to 700 mg of sodium at each meal. People with certain health problems should limit salt intake to the lower end of the recommended range.    When you cook, dont add much salt. If you can cook without using salt, even better. Dont add salt to your food at the table.  When shopping, read food labels. Salt is often called sodium on the label. Choose foods that are salt-free, low salt, or very low salt. Note that foods with reduced salt may not lower your salt intake  enough.    Beans, potatoes, and pasta  Ok: Dry beans, split peas, lentils, potatoes, rice, macaroni, pasta, spaghetti without added salt  Avoid: Potato chips, tortilla chips, and similar products  Breads and cereals  Ok: Low-sodium breads, rolls, cereals, and cakes; low-salt crackers, matzo crackers  Avoid: Salted crackers, pretzels, popcorn, Armenian toast, pancakes, muffins  Dairy  Ok: Milk, chocolate milk, hot chocolate mix, low-salt cheeses, and yogurt  Avoid: Processed cheese and cheese spreads; Roquefort, Camembert, and cottage cheese; buttermilk, instant breakfast drink  Desserts  Ok: Ice cream, frozen yogurt, juice bars, gelatin, cookies and pies, sugar, honey, jelly, hard candy  Avoid: Most pies, cakes and cookies prepared or processed with salt; instant pudding  Drinks  Ok: Tea, coffee, fizzy (carbonated) drinks, juices  Avoid: Flavored coffees, electrolyte replacement drinks, sports drinks  Meats  Ok: All fresh meat, fish, poultry, low-salt tuna, eggs, egg substitute  Avoid: Smoked, pickled, brine-cured, or salted meats and fish. This includes rodriguez, chipped beef, corned beef, hot dogs, deli meats, ham, kosher meats, salt pork, sausage, canned tuna, salted codfish, smoked salmon, herring, sardines, or anchovies.  Seasonings and spices  Ok: Most seasonings are okay. Good substitutes for salt include: fresh herb blends, hot sauce, lemon, garlic, bello, vinegar, dry mustard, parsley, cilantro, horseradish, tomato paste, regular margarine, mayonnaise, unsalted butter, cream cheese, vegetable oil, cream, low-salt salad dressing and gravy.  Avoid: Regular ketchup, relishes, pickles, soy sauce, teriyaki sauce, Worcestershire sauce, BBQ sauce, tartar sauce, meat tenderizer, chili sauce, regular gravy, regular salad dressing, salted butter  Soups  Ok: Low-salt soups and broths made with allowed foods  Avoid: Bouillon cubes, soups with smoked or salted meats, regular soup and broth  Vegetables  Ok: Most vegetables  are okay; also low-salt tomato and vegetable juices  Avoid: Sauerkraut and other brine-soaked vegetables; pickles and other pickled vegetables; tomato juice, olives  Date Last Reviewed: 8/1/2016 © 2000-2017 [employer]. [address] [place] [address]. All rights reserved. This information is not intended as a substitute for professional medical care. Always follow your healthcare professional's instructions.        Low-Salt Choices  Eating salt (sodium) can make your body retain too much water. Excess water makes your heart work harder. Canned, packaged, and frozen foods are easy to prepare, but they are often high in sodium. Here are some ideas for low-salt foods you can easily prepare yourself.    For breakfast  · Fruit or 100% fruit juice  · Whole-wheat bread or an English muffin. Compare sodium content on labels.  · Low-fat milk or yogurt  · Unsalted eggs  · Shredded wheat  · Corn tortillas  · Unsalted steamed rice  · Regular (not instant) hot cereal, made without salt  Stay away from:  · Sausage, rodriguez, and ham  · Flour tortillas  · Packaged muffins, pancakes, and biscuits  · Instant hot cereals  · Cottage cheese  For lunch and dinner  · Fresh fish, chicken, turkey, or meat--baked, broiled, or roasted without salt  · Dry beans, cooked without salt  · Tofu, stir-fried without salt  · Unsalted fresh fruit and vegetables, or frozen or canned fruit and vegetables with no added salt  Stay away from:  · Lunch or deli meat that is cured or smoked  · Cheese  · Tomato juice and catsup  · Canned vegetables, soups, and fish not labeled as no-salt-added or reduced sodium  · Packaged gravies and sauces  · Olives, pickles, and relish  · Bottled salad dressings  For snacks and desserts  · Yogurt  · Unsalted, air popped popcorn  · Unsalted nuts or seeds  Stay away from:  · Pies and cakes  · Packaged dessert mixes  · Pizza  · Canned and packaged puddings  · Pretzels, chips, crackers, and nuts--unless  the label says unsalted  Date Last Reviewed: 6/17/2015  © 5324-9048 [email or web address]. [address] [place] [address], [place] [address]. All rights reserved. This information is not intended as a substitute for professional medical care. Always follow your healthcare professional's instructions.

## 2019-08-16 NOTE — PROGRESS NOTES
Ector Monson MD, RPVI                                 [facility]                           [facility]                             08/16/2019    HPI:  Eugenia Diaz is a 73 y.o. female with   Patient Active Problem List   Diagnosis    Asthma, well controlled    Anemia    Muscle cramps    Depression    Hyperlipidemia    Osteoporosis, post-menopausal    Avascular necrosis of bone of hip    Hypertension    Closed fracture of right patella    Patella fracture    Posterior vitreous detachment of right eye    Pseudophakia of both eyes    Myopia of both eyes    Retinal hole or tear, right    PCO (posterior capsular opacification) - Both Eyes    Gastroesophageal reflux disease without esophagitis    Essential hypertension    Neck pain    Hypothyroidism (acquired)    Closed fracture of sternum    Varicose veins of left lower extremity with inflammation    being managed by PCP and specialists who is here today for evaluation of LLE bleeding varicose vein.  Patient states location is L lower leg occurring for 6 mo ago.  Associated signs and symptoms include discoloration.  C/o LLE pain.  Quality is aching and severity is 8/10.  Symptoms began 6 mo ago.  Treating bleeding vein with compression.  Alleviating factors include pressure.  Worsening factors include dependency.  Patient is not wearing compression stockings daily.  No FH of venous disease.  Symptoms do limit patient's functional status and daily activities.  No DVT history.  No venous interventions.  + low sodium diet.  No excessive water intake.    Migraine with aura: yes  PFO/ASD/right to left shunt: no  Pregnant: no  Breastfeeding: no    no MI  no Stroke  Tobacco use: no    Past Medical History:   Diagnosis Date    Allergy     Anemia     Asthma     Bronchitis     Depression     Generalized headaches     History of endometriosis     Hyperlipidemia     Hypertension     Hypothyroidism      Osteoporosis, unspecified     PCO (posterior capsular opacification), left     Recurrent upper respiratory infection (URI)     Thyroid disease     hypothyroidism    TMJ dysfunction      Past Surgical History:   Procedure Laterality Date    ADENOIDECTOMY      CATARACT EXTRACTION W/  INTRAOCULAR LENS IMPLANT  06    right eye - Dr Best    CATARACT EXTRACTION W/  INTRAOCULAR LENS IMPLANT  11/15/06    left eye - Dr Best    CHOLECYSTECTOMY  1994    COLONOSCOPY N/A 2016    Performed by Jae Hodges MD at [facility] (4TH FLR)    KNEE SURGERY  12    OOPHORECTOMY      left ovary removed, secondary endometriosis    ORIF, FRACTURE, PATELLA Right 2012    Performed by Eric Davalos MD at [facility] OR 2ND FLR    right hip replacement      TEMPOROMANDIBULAR JOINT SURGERY      TONSILLECTOMY      yag laser OD       Family History   Problem Relation Age of Onset    Hypertension Mother     Diabetes Mother     Heart failure Mother     Colon cancer Father          age 51    Cancer Father 48        Colon Cancer     Diabetes Brother     Cancer Brother         non hodgkins lymphoma, lung    Alzheimer's disease Brother     Stroke Maternal Grandfather     Breast cancer Cousin         paternal first cousin    Breast cancer Paternal Aunt     Ovarian cancer Paternal Aunt     Melanoma Neg Hx     Amblyopia Neg Hx     Blindness Neg Hx     Cataracts Neg Hx     Glaucoma Neg Hx     Macular degeneration Neg Hx     Retinal detachment Neg Hx     Strabismus Neg Hx     Thyroid disease Neg Hx      Social History     Socioeconomic History    Marital status:      Spouse name: Not on file    Number of children: Not on file    Years of education: Not on file    Highest education level: Not on file   Occupational History    Not on file   Social Needs    Financial resource strain: Not very hard    Food insecurity:     Worry: Never true     Inability: Never true     Transportation needs:     Medical: Yes     Non-medical: No   Tobacco Use    Smoking status: Never Smoker    Smokeless tobacco: Never Used   Substance and Sexual Activity    Alcohol use: Yes     Alcohol/week: 0.6 oz     Types: 1 Glasses of wine per week     Frequency: Monthly or less     Drinks per session: 1 or 2     Binge frequency: Never     Comment: rarely    Drug use: No    Sexual activity: Not Currently   Lifestyle    Physical activity:     Days per week: 0 days     Minutes per session: Not on file    Stress: To some extent   Relationships    Social connections:     Talks on phone: Twice a week     Gets together: Once a week     Attends Baptism service: Not on file     Active member of club or organization: Yes     Attends meetings of clubs or organizations: Never     Relationship status:    Other Topics Concern    Are you pregnant or think you may be? 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       Current Outpatient Medications:     ADVAIR DISKUS 500-50 mcg/dose DsDv diskus inhaler, INHALE ONE PUFF BY MOUTH TWICE DAILY, Disp: 1 each, Rfl: 11    albuterol (PROVENTIL) 2.5 mg /3 mL (0.083 %) nebulizer solution, Take 3 mLs (2.5 mg total) by nebulization every 6 (six) hours as needed for Wheezing. Rescue, Disp: 1 Box, Rfl: 0    albuterol 90 mcg/actuation inhaler, Inhale 2 puffs into the lungs every 6 (six) hours as needed for Wheezing., Disp: 18 g, Rfl: 11    atorvastatin (LIPITOR) 80 MG tablet, Take 1 tablet (80 mg total) by mouth every evening., Disp: 90 tablet, Rfl: 3    benzonatate (TESSALON PERLES) 100 MG capsule, Take 2 capsules (200 mg total) by mouth 3 (three) times daily as needed for Cough., Disp: 20 capsule, Rfl: 0    calcium-vitamin D 600 mg(1,500mg) -400 unit Tab, Take 1 tablet by mouth Every morning., Disp: , Rfl:      clotrimazole-betamethasone 1-0.05% (LOTRISONE) cream, APPLY TO THE AFFECTED AREA TWICE DAILY, Disp: 15 g, Rfl: 3    cyclobenzaprine (FLEXERIL) 10 MG tablet, TAKE ONE-HALF OR ONE TABLET BY MOUTH AT BEDTIME, Disp: 30 tablet, Rfl: 2    diclofenac sodium 1 % Gel, Apply 2 g topically 4 (four) times daily., Disp: 1 Tube, Rfl: 2    fluticasone (FLONASE) 50 mcg/actuation nasal spray, USE AS DIRECTED, Disp: 16 g, Rfl: 11    HYDROcodone-acetaminophen (NORCO) 5-325 mg per tablet, Take 1 tablet by mouth every 6 (six) hours as needed for Pain., Disp: 30 tablet, Rfl: 0    levalbuterol (XOPENEX) 1.25 mg/3 mL nebulizer solution, Inhale into the lungs. Up to 3 times daily prn, Disp: , Rfl:     levothyroxine (SYNTHROID, LEVOTHROID) 175 MCG tablet, Take 1 tablet (175 mcg total) by mouth once daily., Disp: 90 tablet, Rfl: 3    lidocaine (XYLOCAINE) 5 % Oint ointment, Apply topically as needed., Disp: 120 g, Rfl: 3    loratadine (CLARITIN) 10 mg tablet, Take 1 tablet by mouth Every PM., Disp: , Rfl:     montelukast (SINGULAIR) 10 mg tablet, TAKE 1 TABLET BY MOUTH EVERY NIGHT AT BEDTIME, Disp: 30 tablet, Rfl: 12    naproxen (NAPROSYN) 500 MG tablet, TAKE 1 TABLET BY MOUTH TWO TIMES DAILY WITH MEALS, Disp: 60 tablet, Rfl: 1    neomycin-polymyxin-hydrocortisone (CORTISPORIN) 3.5-10,000-1 mg/mL-unit/mL-% otic suspension, instill 3 DROPS into THE right ear FOUR TIMES DAILY, Disp: 10 mL, Rfl: 0    OLIVE LEAF EXTRACT ORAL, Take 150 mg by mouth Every PM., Disp: , Rfl:     pantoprazole (PROTONIX) 40 MG tablet, Take 1 tablet (40 mg total) by mouth once daily., Disp: 30 tablet, Rfl: 11    potassium chloride (KLOR-CON) 10 MEQ TbSR, TAKE 1 TABLET BY MOUTH EVERY DAY, Disp: 90 tablet, Rfl: 3    predniSONE (DELTASONE) 20 MG tablet, 3 tabs po q day for 3 days, then 2 tabs po daily for 3 days, then 1 tab daily for 3 days., Disp: 18 tablet, Rfl: 0    rizatriptan (MAXALT) 10 MG tablet, Take 1 tablet by mouth Once daily as needed. If needed  for migraines, Disp: , Rfl:     sertraline (ZOLOFT) 100 MG tablet, TAKE TWO TABLETS BY MOUTH EVERY DAY, Disp: 60 tablet, Rfl: 11    triamterene-hydrochlorothiazide 37.5-25 mg (DYAZIDE) 37.5-25 mg per capsule, TAKE ONE CAPSULE BY MOUTH EVERY MORNING, Disp: 30 capsule, Rfl: 11    betamethasone dipropionate (DIPROLENE) 0.05 % cream, Apply topically 2 (two) times daily. for 10 days, Disp: 45 g, Rfl: 3    ipratropium (ATROVENT) 0.02 % nebulizer solution, Take 2.5 mLs (500 mcg total) by nebulization 4 (four) times daily. Rescue, Disp: 1 Box, Rfl: 0    triamcinolone acetonide 0.1% (KENALOG) 0.1 % cream, Apply topically 2 (two) times daily. for 10 days, Disp: 80 g, Rfl: 1    REVIEW OF SYSTEMS:  General: No fevers or chills; ENT: No sore throat; Allergy and Immunology: no persistent infections; Hematological and Lymphatic: No history of bleeding or easy bruising; Endocrine: negative; Respiratory: no cough, shortness of breath, or wheezing; Cardiovascular: no chest pain or dyspnea on exertion; Gastrointestinal: no abdominal pain/back, change in bowel habits, or bloody stools; Genito-Urinary: no dysuria, trouble voiding, or hematuria; Musculoskeletal: pain; Neurological: no TIA or stroke symptoms; Psychiatric: no nervousness, anxiety or depression.    PHYSICAL EXAM:      Pulse: 79  Temp: 99.1 °F (37.3 °C)(drank coffee)      General appearance:  Alert, well-appearing, and in no distress.  Oriented to person, place, and time                    Neurological: Normal speech, no focal findings noted; CN II - XII grossly intact. RLE with sensation to light touch, LLE with sensation to light touch.            Musculoskeletal: Digits/nail without cyanosis/clubbing.  Strength 5/5 BLE.                    Neck: Supple, no significant adenopathy                  Chest:  No wheezes, symmetric air entry. No use of accessory muscles               Cardiac: Normal rate and regular rhythm            Abdomen: Soft, nontender, nondistended       Extremities:   2+ R DP pulse, 2+ L DP pulse      no+ RLE edema, no+ LLE edema    Skin:  RLE no ulcer; LLE no ulcer      RLE + spider veins, LLE + spider veins      RLE no varicose veins, LLE no varicose veins    CEAP 1/1    LAB RESULTS:  No results found for: CBC  No results found for: LABPROT, INR  Lab Results   Component Value Date     10/11/2018    K 4.2 10/11/2018     10/11/2018    CO2 30 (H) 10/11/2018    GLU 94 10/11/2018    BUN 13 10/11/2018    CREATININE 0.7 10/11/2018    CALCIUM 9.7 10/11/2018    ANIONGAP 9 10/11/2018    EGFRNONAA >60 10/11/2018     Lab Results   Component Value Date    WBC 10.64 10/11/2018    RBC 4.57 10/11/2018    HGB 14.1 10/11/2018    HCT 42.0 10/11/2018    MCV 92 10/11/2018    MCH 30.9 10/11/2018    MCHC 33.6 10/11/2018    RDW 12.7 10/11/2018     10/11/2018    MPV 8.6 (L) 10/11/2018    GRAN 7.3 10/11/2018    GRAN 68.9 10/11/2018    LYMPH 1.9 10/11/2018    LYMPH 17.4 (L) 10/11/2018    MONO 1.0 10/11/2018    MONO 9.0 10/11/2018    EOS 0.4 10/11/2018    BASO 0.03 10/11/2018    EOSINOPHIL 3.9 10/11/2018    BASOPHIL 0.3 10/11/2018    DIFFMETHOD Automated 10/11/2018     .  Lab Results   Component Value Date    HGBA1C 6.2 06/18/2010       IMAGING:  All pertinent imaging has been reviewed and interpreted independently.    Venous US 6/19/19 Impression:  No evidence of deep venous thrombosis in either lower extremity.  There is hemodynamically significant reflux noted within the right greater saphenous vein at the knee and left greater saphenous vein at the distal thigh and knee.    IMP/PLAN:  73 y.o. female with   Patient Active Problem List   Diagnosis    Asthma, well controlled    Anemia    Muscle cramps    Depression    Hyperlipidemia    Osteoporosis, post-menopausal    Avascular necrosis of bone of hip    Hypertension    Closed fracture of right patella    Patella fracture    Posterior vitreous detachment of right eye    Pseudophakia of both eyes    Myopia  of both eyes    Retinal hole or tear, right    PCO (posterior capsular opacification) - Both Eyes    Gastroesophageal reflux disease without esophagitis    Essential hypertension    Neck pain    Hypothyroidism (acquired)    Closed fracture of sternum    Varicose veins of left lower extremity with inflammation    being managed by PCP and specialists who is here today for evaluation of LLE bleeding varicose vein and venous insufficiency.    -recommend compression with Rx stockings, elevation, dietary changes associated with water and sodium intake discussed at length with patient  -Exercise   -If symptoms persist or worsen with medical therapy - would recommend L GSV EVLT and sclerotherapy   -Cont to keep area of spider veins clean/dry and protected from trauma  -RTC 3 months for further evaluation    I spent 20 minutes evaluating this patient and greater than 50% of the time was spent counseling, coordinator care and discussing the plan of care.  All questions were answered and patient stated understanding with agreement with the above treatment plan.    Ector Monson MD [facility]  Vascular and Endovascular Surgery

## 2019-08-16 NOTE — LETTER
August 16, 2019      Hetal Ferguson MD  [address] [facility] [place] [address]           [facility]  [address] [address]  Phone: [phone]  Fax: [phone]          Patient: Eugenia Diaz   MR Number: [id number]   YOB: 1945   Date of Visit: 8/16/2019       Dear Dr. Hetal Ferguson:    Thank you for referring Eugenia Diaz to me for evaluation. Attached you will find relevant portions of my assessment and plan of care.    If you have questions, please do not hesitate to call me. I look forward to following Eugenia Diaz along with you.    Sincerely,    Ector Monson MD    Enclosure  CC:  No Recipients    If you would like to receive this communication electronically, please contact [email or web address] or [phone] to request more information on [facility] access.    For providers and/or their staff who would like to refer a patient to [facility], please contact us through our one-stop-shop provider referral line, [facility], at [phone].    If you feel you have received this communication in error or would no longer like to receive these types of communications, please e-mail [email or web address]

## 2019-08-20 ENCOUNTER — OFFICE VISIT (OUTPATIENT)
Dept: ENDOCRINOLOGY | Facility: CLINIC | Age: 74
End: 2019-08-20
Payer: MEDICARE

## 2019-08-20 VITALS
WEIGHT: 154.88 LBS | HEART RATE: 72 BPM | SYSTOLIC BLOOD PRESSURE: 118 MMHG | DIASTOLIC BLOOD PRESSURE: 64 MMHG | BODY MASS INDEX: 24.31 KG/M2 | HEIGHT: 67 IN

## 2019-08-20 DIAGNOSIS — M81.0 OSTEOPOROSIS, POST-MENOPAUSAL: ICD-10-CM

## 2019-08-20 DIAGNOSIS — E03.9 HYPOTHYROIDISM (ACQUIRED): ICD-10-CM

## 2019-08-20 DIAGNOSIS — M87.059 AVASCULAR NECROSIS OF BONE OF HIP, UNSPECIFIED LATERALITY: ICD-10-CM

## 2019-08-20 PROCEDURE — 99214 OFFICE O/P EST MOD 30 MIN: CPT | Mod: S$PBB,,, | Performed by: INTERNAL MEDICINE

## 2019-08-20 PROCEDURE — 99214 PR OFFICE/OUTPT VISIT, EST, LEVL IV, 30-39 MIN: ICD-10-PCS | Mod: S$PBB,,, | Performed by: INTERNAL MEDICINE

## 2019-08-20 PROCEDURE — 99999 PR PBB SHADOW E&M-EST. PATIENT-LVL III: ICD-10-PCS | Mod: PBBFAC,,, | Performed by: INTERNAL MEDICINE

## 2019-08-20 PROCEDURE — 99213 OFFICE O/P EST LOW 20 MIN: CPT | Mod: PBBFAC | Performed by: INTERNAL MEDICINE

## 2019-08-20 PROCEDURE — 99999 PR PBB SHADOW E&M-EST. PATIENT-LVL III: CPT | Mod: PBBFAC,,, | Performed by: INTERNAL MEDICINE

## 2019-08-20 NOTE — ASSESSMENT & PLAN NOTE
Fall risk is high   Will refer to PT for balance and strength  Risk factors for ONJ: treatment with BP for > 4 years, steroids (for her asthma she uses between 3 - 4 courses: 60 mg X 3, 40 mg X 3, 20 mg X 3 and 10 mg X 3 days is a usual course)  Normal vitamin D and calcium -- taking supplements regularly.   Discussed options, very hesistant to take prolia  reclast may be a better options, because of ability to use intermittently    Check marker of bone resorptions ? Unclear if helpful    Oral care is up to date

## 2019-08-20 NOTE — PROGRESS NOTES
Subjective:      Patient ID: Eugenia Diaz is a 74 y.o. female.    Chief Complaint:  Osteoporosis      History of Present Illness\  Ms. Diaz is a 74 year old woman with medical history significant for IKE with avascular necrosis of hips, asthma requiring steroids intermittently who is here for management of osteoporosis, T score -3.1 at the TH and -2.9 at the FN. She was previously seen by Dr. Vazquez and Ms. Zachary Bustillo.   She is here today to discuss medication options.     Second sternal fracture occurred this year. She woke up in the middle of the night and had a varicose vein that began bleeding. She slipped on the blood and fell backwards.       Has had two dental extractions five to seven years ago without any complications. Although she does not have any extractions planned she is concerned in the future she may need extractions but won't be able to get these due to prolia infusions. She was seen at [facility] dental school in the past who advised against use of prolia.     Medications:  Prolia for one year (two doses) 2016 -- after second injection she fell and broke her sternum. She also fractured a tooth at the time and needed an extraction on implant. But she did not undergo procedure.     Previous medications:  Forteo for two years   Fosamax/actonel for 4 - 5 years   Has been on a drug holiday for the past three years     Has hypertension currently on HCTZ  Has hypothyroidism on levothyroxine, taking appropriately with recent normal TSH.     Her  Balance is not great.     Review of Systems   Constitutional: Negative for chills and fever.   HENT: Negative for congestion and sinus pressure.    Eyes: Negative for visual disturbance.   Respiratory: Negative for chest tightness and shortness of breath.    Cardiovascular: Negative for chest pain, palpitations and leg swelling.   Gastrointestinal: Negative for abdominal pain and vomiting.   Genitourinary: Negative for dysuria.   Musculoskeletal: Negative for  "arthralgias.   Skin: Negative for rash.   Neurological: Negative for weakness.   Hematological: Does not bruise/bleed easily.   Psychiatric/Behavioral: Negative for sleep disturbance.       Objective:   Physical Exam  Vitals:    08/20/19 1049   BP: 118/64   Pulse: 72   Weight: 70.2 kg (154 lb 14 oz)   Height: 5' 7" (1.702 m)       BP Readings from Last 3 Encounters:   08/20/19 118/64   08/16/19 121/71   08/13/19 128/84     Wt Readings from Last 1 Encounters:   08/20/19 1049 70.2 kg (154 lb 14 oz)         Body mass index is 24.26 kg/m².    Lab Review:   Lab Results   Component Value Date    HGBA1C 6.2 06/18/2010     Lab Results   Component Value Date    CHOL 144 06/11/2019    HDL 53 06/11/2019    LDLCALC 77.2 06/11/2019    TRIG 69 06/11/2019    CHOLHDL 36.8 06/11/2019     Lab Results   Component Value Date     10/11/2018    K 4.2 10/11/2018     10/11/2018    CO2 30 (H) 10/11/2018    GLU 94 10/11/2018    BUN 13 10/11/2018    CREATININE 0.7 10/11/2018    CALCIUM 9.7 10/11/2018    PROT 6.7 10/11/2018    ALBUMIN 4.1 10/11/2018    BILITOT 0.4 10/11/2018    ALKPHOS 77 10/11/2018    AST 14 10/11/2018    ALT 17 10/11/2018    ANIONGAP 9 10/11/2018    ESTGFRAFRICA >60 10/11/2018    EGFRNONAA >60 10/11/2018    TSH 2.141 08/13/2019 2019 DXA  Lumbar Spine: Lumbar bone mineral density L1-L4 is 0.905g/cm2, which is a T-score of -1.3. The Z-score is 0.9.  Total Hip: Total hip bone mineral density is 0.564g/cm2.  The T-score is -3.1, and the Z-score is -1.5.  Femoral neck: Bone mineral density is 0.527g/cm2 and the T-score is -2.9 and the Z-score is -1.0 g/cm2.    There is a 28.1% risk of a major osteoporotic fracture and a 9.2% risk of hip fracture in the next 10 years (FRAX) adjusted by trabecular bone score.    Compared with previous DXA, BMD at the lumbar spine has remained stable, and the BMD at the total hip has decreased by -6.5%.    Assessment and Plan     Osteoporosis, post-menopausal  Fall risk is high "   Will refer to PT for balance and strength  Risk factors for ONJ: treatment with BP for > 4 years, steroids (for her asthma she uses between 3 - 4 courses: 60 mg X 3, 40 mg X 3, 20 mg X 3 and 10 mg X 3 days is a usual course)  Normal vitamin D and calcium -- taking supplements regularly.   Discussed options, very hesistant to take prolia  reclast may be a better options, because of ability to use intermittently    Check marker of bone resorptions ? Unclear if helpful    Oral care is up to date    Avascular necrosis of bone of hip  Bilateral hips    Hypothyroidism (acquired)  Clinically and biochemically euthyroid

## 2019-08-22 ENCOUNTER — PATIENT MESSAGE (OUTPATIENT)
Dept: ENDOCRINOLOGY | Facility: CLINIC | Age: 74
End: 2019-08-22

## 2019-08-22 RX ORDER — NAPROXEN 500 MG/1
500 TABLET ORAL 2 TIMES DAILY
Qty: 60 TABLET | Refills: 1 | Status: SHIPPED | OUTPATIENT
Start: 2019-08-22 | End: 2019-11-14 | Stop reason: SDUPTHER

## 2019-08-22 RX ORDER — SODIUM CHLORIDE 0.9 % (FLUSH) 0.9 %
10 SYRINGE (ML) INJECTION
Status: CANCELLED | OUTPATIENT
Start: 2019-08-22

## 2019-08-22 RX ORDER — HEPARIN 100 UNIT/ML
500 SYRINGE INTRAVENOUS
Status: CANCELLED | OUTPATIENT
Start: 2019-08-22

## 2019-08-22 RX ORDER — ZOLEDRONIC ACID 5 MG/100ML
5 INJECTION, SOLUTION INTRAVENOUS
Status: CANCELLED | OUTPATIENT
Start: 2019-08-22

## 2019-08-22 RX ORDER — FLUTICASONE PROPIONATE 50 MCG
SPRAY, SUSPENSION (ML) NASAL
Qty: 16 G | Refills: 11 | Status: SHIPPED | OUTPATIENT
Start: 2019-08-22 | End: 2021-05-17

## 2019-08-22 NOTE — TELEPHONE ENCOUNTER
Discussed using reclast at office visit  Will go ahead and order    Results for orders placed or performed in visit on 08/20/19   C Telopeptide (CTX), Serum   Result Value Ref Range    C Telopeptide (CTX), Serum 285 pg/mL   Procollagen Type 1 Propeptide   Result Value Ref Range    Procollagen Type 1 Propeptide 40 mcg/L   Basic metabolic panel   Result Value Ref Range    Sodium 137 136 - 145 mmol/L    Potassium 3.8 3.5 - 5.1 mmol/L    Chloride 97 95 - 110 mmol/L    CO2 31 (H) 23 - 29 mmol/L    Glucose 93 70 - 110 mg/dL    BUN, Bld 12 8 - 23 mg/dL    Creatinine 0.7 0.5 - 1.4 mg/dL    Calcium 9.9 8.7 - 10.5 mg/dL    Anion Gap 9 8 - 16 mmol/L    eGFR if African American >60.0 >60 mL/min/1.73 m^2    eGFR if non African American >60.0 >60 mL/min/1.73 m^2   Vitamin D   Result Value Ref Range    Vit D, 25-Hydroxy 37 30 - 96 ng/mL

## 2019-08-23 ENCOUNTER — PATIENT MESSAGE (OUTPATIENT)
Dept: ENDOCRINOLOGY | Facility: CLINIC | Age: 74
End: 2019-08-23

## 2019-08-26 ENCOUNTER — INFUSION (OUTPATIENT)
Dept: INFECTIOUS DISEASES | Facility: HOSPITAL | Age: 74
End: 2019-08-26
Attending: INTERNAL MEDICINE
Payer: MEDICARE

## 2019-08-26 VITALS
HEART RATE: 65 BPM | SYSTOLIC BLOOD PRESSURE: 135 MMHG | WEIGHT: 157.19 LBS | DIASTOLIC BLOOD PRESSURE: 65 MMHG | TEMPERATURE: 98 F | OXYGEN SATURATION: 95 % | BODY MASS INDEX: 24.62 KG/M2 | RESPIRATION RATE: 16 BRPM

## 2019-08-26 DIAGNOSIS — M81.0 OSTEOPOROSIS, POST-MENOPAUSAL: Primary | ICD-10-CM

## 2019-08-26 PROCEDURE — 96365 THER/PROPH/DIAG IV INF INIT: CPT

## 2019-08-26 PROCEDURE — 63600175 PHARM REV CODE 636 W HCPCS: Performed by: INTERNAL MEDICINE

## 2019-08-26 RX ORDER — ZOLEDRONIC ACID 5 MG/100ML
5 INJECTION, SOLUTION INTRAVENOUS
Status: COMPLETED | OUTPATIENT
Start: 2019-08-26 | End: 2019-08-26

## 2019-08-26 RX ORDER — HEPARIN 100 UNIT/ML
500 SYRINGE INTRAVENOUS
Status: CANCELLED | OUTPATIENT
Start: 2019-08-26

## 2019-08-26 RX ORDER — SODIUM CHLORIDE 0.9 % (FLUSH) 0.9 %
10 SYRINGE (ML) INJECTION
Status: CANCELLED | OUTPATIENT
Start: 2019-08-26

## 2019-08-26 RX ORDER — ZOLEDRONIC ACID 5 MG/100ML
5 INJECTION, SOLUTION INTRAVENOUS
Status: CANCELLED | OUTPATIENT
Start: 2019-08-26

## 2019-08-26 RX ADMIN — ZOLEDRONIC ACID 5 MG: 5 INJECTION, SOLUTION INTRAVENOUS at 11:08

## 2019-08-28 ENCOUNTER — IMMUNIZATION (OUTPATIENT)
Dept: PHARMACY | Facility: CLINIC | Age: 74
End: 2019-08-28
Payer: MEDICARE

## 2019-08-30 DIAGNOSIS — F43.21 ADJUSTMENT DISORDER WITH DEPRESSED MOOD: ICD-10-CM

## 2019-08-30 RX ORDER — SERTRALINE HYDROCHLORIDE 100 MG/1
TABLET, FILM COATED ORAL
Qty: 60 TABLET | Refills: 11 | Status: SHIPPED | OUTPATIENT
Start: 2019-08-30 | End: 2020-09-28 | Stop reason: SDUPTHER

## 2019-09-25 ENCOUNTER — TELEPHONE (OUTPATIENT)
Dept: INTERNAL MEDICINE | Facility: CLINIC | Age: 74
End: 2019-09-25

## 2019-09-25 NOTE — TELEPHONE ENCOUNTER
----- Message from Nel Hernández sent at 9/25/2019 11:59 AM CDT -----  Contact: [facility] [phone]  They need to speak with someone to verify Patient's current medications. Please call them and advise    thanks

## 2019-10-01 DIAGNOSIS — E78.5 HYPERLIPIDEMIA, UNSPECIFIED HYPERLIPIDEMIA TYPE: ICD-10-CM

## 2019-10-01 RX ORDER — ATORVASTATIN CALCIUM 80 MG/1
TABLET, FILM COATED ORAL
Qty: 90 TABLET | Refills: 3 | Status: SHIPPED | OUTPATIENT
Start: 2019-10-01 | End: 2020-06-17

## 2019-10-02 ENCOUNTER — PATIENT MESSAGE (OUTPATIENT)
Dept: INTERNAL MEDICINE | Facility: CLINIC | Age: 74
End: 2019-10-02

## 2019-10-03 ENCOUNTER — PATIENT MESSAGE (OUTPATIENT)
Dept: INTERNAL MEDICINE | Facility: CLINIC | Age: 74
End: 2019-10-03

## 2019-11-01 ENCOUNTER — PATIENT MESSAGE (OUTPATIENT)
Dept: PHARMACY | Facility: CLINIC | Age: 74
End: 2019-11-01

## 2019-11-08 ENCOUNTER — OFFICE VISIT (OUTPATIENT)
Dept: VASCULAR SURGERY | Facility: CLINIC | Age: 74
End: 2019-11-08
Payer: MEDICARE

## 2019-11-08 VITALS
SYSTOLIC BLOOD PRESSURE: 132 MMHG | BODY MASS INDEX: 24.48 KG/M2 | OXYGEN SATURATION: 94 % | RESPIRATION RATE: 18 BRPM | WEIGHT: 156 LBS | HEART RATE: 98 BPM | HEIGHT: 67 IN | TEMPERATURE: 98 F | DIASTOLIC BLOOD PRESSURE: 76 MMHG

## 2019-11-08 DIAGNOSIS — I83.892 BLEEDING FROM VARICOSE VEINS OF LEFT LOWER EXTREMITY: ICD-10-CM

## 2019-11-08 DIAGNOSIS — I87.2 VENOUS INSUFFICIENCY OF BOTH LOWER EXTREMITIES: Primary | ICD-10-CM

## 2019-11-08 PROCEDURE — 99214 OFFICE O/P EST MOD 30 MIN: CPT | Mod: S$GLB,,, | Performed by: SURGERY

## 2019-11-08 PROCEDURE — 99214 PR OFFICE/OUTPT VISIT, EST, LEVL IV, 30-39 MIN: ICD-10-PCS | Mod: S$GLB,,, | Performed by: SURGERY

## 2019-11-08 NOTE — PATIENT INSTRUCTIONS
Putting on Compression Stockings     Turn the stocking inside-out, then fit it over your toes and heel.          Roll the stocking up your leg.            Once stockings are on, make sure the top of the stocking is about two fingers width below the crease of the knee (or the groin if you wear thigh-high stockings).          Use equipment, such as a stocking dixon, or wear rubber gloves to make it easier to put on compression stockings.         Elastic compression stockings are prescribed to treat many vein problems. Wearing them may be the most important thing you do to manage your symptoms. The stockings fit tightly around your ankle, gradually reducing in pressure as they go up your legs. This helps keep blood flowing to your heart. As a result, swelling is reduced. Your healthcare provider will prescribe stockings at a safe pressure for you. He or she will also tell you how often to wear and remove the stockings. Follow these instructions closely. Also, do not buy or wear compression stockings without first seeing your healthcare provider.  Tips for wear and care  To wear stockings safely and to get the most benefit:  · Wear the length prescribed by your healthcare provider.  · Pull them to the designated height and no farther. Dont let them bunch at the top. This can restrict blood flow and increase swelling.  · Wear the stockings for the amount of time your healthcare provider recommends. Replace them when they start to feel loose. This will likely be every 3 to 6 months.  · Remove them as your healthcare provider directs. When removed, wash your legs. Then check your legs and feet for sores. Call your healthcare provider if you find a sore. Dont put the stockings back on unless your healthcare provider directs.   · Wash the stockings as instructed. They may need to be hand-washed.  Date Last Reviewed: 5/1/2016  © 5975-8872 [employer]. [address] [place] [address], [place] [address]. All rights  reserved. This information is not intended as a substitute for professional medical care. Always follow your healthcare professional's instructions.        Understanding Chronic Venous Insufficiency  Problems with the veins in the legs may lead to chronic venous insufficiency (CVI). CVI means that there is a long-term problem with the veins not being able to pump blood back to your heart. When this happens, blood stays in the legs and causes swelling and aching.   Two problems that may lead to chronic venous insufficiency are:  · Damaged valves. Valves keep blood flowing from the legs through the blood vessels and back to the heart. When the valves are damaged, blood does not flow as well.   · Deep vein thrombosis (DVT). Blood clots may form in the deep veins of the legs. This may cause pain, redness, and swelling in the legs. It may also block the flow of blood back to the heart. Seek immediate medical care if you have these symptoms.  · A blood clot in the leg can also break off and travel to the lungs. This is called pulmonary embolism (PE). In the lungs, the clot can cut off the flow of blood. This may cause chest pain, trouble breathing, sweating, a fast heartbeat, coughing (may cough up blood), and fainting. It is a medical emergency and may cause death. Call 911 if you have these symptoms.  · Healthcare providers call the two conditions, DVT and PE, venous thromboembolism (VTE).  CVI cant be cured, but you can control leg swelling to reduce the likelihood of ulcers (sores).  Recognizing the symptoms  Be aware of the following:  · If you stand or sit with your feet down for long periods, your legs may ache or feel heavy.  · Swollen ankles are possibly the most common symptom of CVI.  · As swelling increases, the skin over your ankles may show red spots or a brownish tinge. The skin may feel leathery or scaly, and may start to itch.  · If swelling is not controlled, an ulcer (open wound) may form.  What you  can do  Reduce your risk of developing ulcers by doing the following:  · Increase blood flow back to your heart by elevating your legs, exercising daily, and wearing elastic stockings.  · Boost blood flow in your legs by losing excess weight.  · If you must stand or sit in one place for a period of time, keep your blood moving by wiggling your toes, shifting your body position, and rising up on the balls of your feet.    Date Last Reviewed: 5/1/2016 © 2000-2017 [employer]. [address] [place] [address], [place] [address]. All rights reserved. This information is not intended as a substitute for professional medical care. Always follow your healthcare professional's instructions.        Tips for Using Less Salt    Most people with heart problems need to eat less salt (sodium). Reducing the amount of salt you eat may help control your blood pressure. The higher your blood pressure, the greater your risk for heart disease, stroke, blindness, and kidney problems.  At the store  · Make low-salt choices by reading labels carefully. Look for the total amount of sodium per serving.  · Use more fresh food. Buy more fruits and vegetables. Select lean meats, fish, and poultry.  · Use fewer frozen, canned, and packaged foods which often contain a lot of sodium.  · Use plain frozen vegetables without sauces or toppings. These products are often low- or no-sodium.  · Opt for reduced-sodium or no-salt-added versions of canned vegetables and soups.  In the kitchen  · Don't add salt to food when you're cooking. Season with flavorings such as onion, garlic, pepper, salt-free herbal blends, and lemon or lime juice.  · Use a cookbook containing low-salt recipes. It can give you ideas for tasty meals that are healthy for your heart.  · Sprinkle salt-free herbal blends on vegetables and meat.  · Drain and rinse canned foods, such as canned beans and vegetables, before cooking or eating.  Eating out  · Tell the  you're on a  low-salt diet. Ask questions about the menu.  · Order fish, chicken, and meat broiled, baked, poached, or grilled without salt, butter, or breading.  · Use lemon, pepper, and salt-free herb mixes to add flavor.  · Choose plain steamed rice, boiled noodles, and baked or boiled potatoes. Top potatoes with chives and a little sour cream.     Beware! Salt goes by many other names. Limit foods with these words listed as ingredients: salt, sodium, soy sauce, baking soda, baking powder, MSG, monosodium, Na (the chemical symbol for sodium). Some antacids are also high in salt.   Date Last Reviewed: 6/19/2015 © 2000-2017 [employer]. [address] [place] [address]. All rights reserved. This information is not intended as a substitute for professional medical care. Always follow your healthcare professional's instructions.        Low-Salt Diet  This diet removes foods that are high in salt. It also limits the amount of salt you use when cooking. It is most often used for people with high blood pressure, edema (fluid retention), and kidney, liver, or heart disease.  Table salt contains the mineral sodium. Your body needs sodium to work normally. But too much sodium can make your health problems worse. Your healthcare provider is recommending a low-salt (also called low-sodium) diet for you. Your total daily allowance of salt is 1,500 to 2,300 milligrams (mg). It is less than 1 teaspoon of table salt. This means you can have only about 500 to 700 mg of sodium at each meal. People with certain health problems should limit salt intake to the lower end of the recommended range.    When you cook, dont add much salt. If you can cook without using salt, even better. Dont add salt to your food at the table.  When shopping, read food labels. Salt is often called sodium on the label. Choose foods that are salt-free, low salt, or very low salt. Note that foods with reduced salt may not lower your salt intake  enough.    Beans, potatoes, and pasta  Ok: Dry beans, split peas, lentils, potatoes, rice, macaroni, pasta, spaghetti without added salt  Avoid: Potato chips, tortilla chips, and similar products  Breads and cereals  Ok: Low-sodium breads, rolls, cereals, and cakes; low-salt crackers, matzo crackers  Avoid: Salted crackers, pretzels, popcorn, German toast, pancakes, muffins  Dairy  Ok: Milk, chocolate milk, hot chocolate mix, low-salt cheeses, and yogurt  Avoid: Processed cheese and cheese spreads; Roquefort, Camembert, and cottage cheese; buttermilk, instant breakfast drink  Desserts  Ok: Ice cream, frozen yogurt, juice bars, gelatin, cookies and pies, sugar, honey, jelly, hard candy  Avoid: Most pies, cakes and cookies prepared or processed with salt; instant pudding  Drinks  Ok: Tea, coffee, fizzy (carbonated) drinks, juices  Avoid: Flavored coffees, electrolyte replacement drinks, sports drinks  Meats  Ok: All fresh meat, fish, poultry, low-salt tuna, eggs, egg substitute  Avoid: Smoked, pickled, brine-cured, or salted meats and fish. This includes rodriguez, chipped beef, corned beef, hot dogs, deli meats, ham, kosher meats, salt pork, sausage, canned tuna, salted codfish, smoked salmon, herring, sardines, or anchovies.  Seasonings and spices  Ok: Most seasonings are okay. Good substitutes for salt include: fresh herb blends, hot sauce, lemon, garlic, bello, vinegar, dry mustard, parsley, cilantro, horseradish, tomato paste, regular margarine, mayonnaise, unsalted butter, cream cheese, vegetable oil, cream, low-salt salad dressing and gravy.  Avoid: Regular ketchup, relishes, pickles, soy sauce, teriyaki sauce, Worcestershire sauce, BBQ sauce, tartar sauce, meat tenderizer, chili sauce, regular gravy, regular salad dressing, salted butter  Soups  Ok: Low-salt soups and broths made with allowed foods  Avoid: Bouillon cubes, soups with smoked or salted meats, regular soup and broth  Vegetables  Ok: Most vegetables  are okay; also low-salt tomato and vegetable juices  Avoid: Sauerkraut and other brine-soaked vegetables; pickles and other pickled vegetables; tomato juice, olives  Date Last Reviewed: 8/1/2016 © 2000-2017 [employer]. [address] [place] [address]. All rights reserved. This information is not intended as a substitute for professional medical care. Always follow your healthcare professional's instructions.        Low-Salt Choices  Eating salt (sodium) can make your body retain too much water. Excess water makes your heart work harder. Canned, packaged, and frozen foods are easy to prepare, but they are often high in sodium. Here are some ideas for low-salt foods you can easily prepare yourself.    For breakfast  · Fruit or 100% fruit juice  · Whole-wheat bread or an English muffin. Compare sodium content on labels.  · Low-fat milk or yogurt  · Unsalted eggs  · Shredded wheat  · Corn tortillas  · Unsalted steamed rice  · Regular (not instant) hot cereal, made without salt  Stay away from:  · Sausage, rodriguez, and ham  · Flour tortillas  · Packaged muffins, pancakes, and biscuits  · Instant hot cereals  · Cottage cheese  For lunch and dinner  · Fresh fish, chicken, turkey, or meat--baked, broiled, or roasted without salt  · Dry beans, cooked without salt  · Tofu, stir-fried without salt  · Unsalted fresh fruit and vegetables, or frozen or canned fruit and vegetables with no added salt  Stay away from:  · Lunch or deli meat that is cured or smoked  · Cheese  · Tomato juice and catsup  · Canned vegetables, soups, and fish not labeled as no-salt-added or reduced sodium  · Packaged gravies and sauces  · Olives, pickles, and relish  · Bottled salad dressings  For snacks and desserts  · Yogurt  · Unsalted, air popped popcorn  · Unsalted nuts or seeds  Stay away from:  · Pies and cakes  · Packaged dessert mixes  · Pizza  · Canned and packaged puddings  · Pretzels, chips, crackers, and nuts--unless  the label says unsalted  Date Last Reviewed: 6/17/2015  © 0320-4692 [employer]. [address] [place] [address], [place] [address]. All rights reserved. This information is not intended as a substitute for professional medical care. Always follow your healthcare professional's instructions.

## 2019-11-08 NOTE — PROGRESS NOTES
Ector Monson MD, RPVI                                 [facility]                           [facility]                             11/08/2019    HPI:  Eugenia Diaz is a 74 y.o. female with   Patient Active Problem List   Diagnosis    Asthma, well controlled    Anemia    Muscle cramps    Depression    Hyperlipidemia    Osteoporosis, post-menopausal    Avascular necrosis of bone of hip    Hypertension    Closed fracture of right patella    Patella fracture    Posterior vitreous detachment of right eye    Pseudophakia of both eyes    Myopia of both eyes    Retinal hole or tear, right    PCO (posterior capsular opacification) - Both Eyes    Gastroesophageal reflux disease without esophagitis    Essential hypertension    Neck pain    Hypothyroidism (acquired)    Closed fracture of sternum    Varicose veins of left lower extremity with inflammation    being managed by PCP and specialists who is here today for evaluation of LLE bleeding varicose vein.  Patient states location is L lower leg occurring for 6 mo ago.  Associated signs and symptoms include discoloration.  C/o LLE pain.  Quality is aching and severity is 8/10.  Symptoms began 6 mo ago.  Treating bleeding vein with compression.  Alleviating factors include pressure.  Worsening factors include dependency.  Patient is not wearing compression stockings daily.  No FH of venous disease.  Symptoms do limit patient's functional status and daily activities.  No DVT history.  No venous interventions.  + low sodium diet.  No excessive water intake.    Migraine with aura: yes  PFO/ASD/right to left shunt: no  Pregnant: no  Breastfeeding: no    no MI  no Stroke  Tobacco use: no    Interval history:  C/o RLE pain when at rest with legs elevated from foot to hip.  Denies edema.  +compression, elevation at night when sleeping, low Na diet and no excess water.  No significant issues with LLE varicose veins since  treatment initiated.    Past Medical History:   Diagnosis Date    Allergy     Anemia     Asthma     Bronchitis     Depression     Generalized headaches     History of endometriosis     Hyperlipidemia     Hypertension     Hypothyroidism     Osteoporosis, unspecified     PCO (posterior capsular opacification), left     Recurrent upper respiratory infection (URI)     Thyroid disease     hypothyroidism    TMJ dysfunction      Past Surgical History:   Procedure Laterality Date    ADENOIDECTOMY      CATARACT EXTRACTION W/  INTRAOCULAR LENS IMPLANT  06    right eye - Dr Best    CATARACT EXTRACTION W/  INTRAOCULAR LENS IMPLANT  11/15/06    left eye - Dr Best    CHOLECYSTECTOMY      COLONOSCOPY N/A 2016    Procedure: COLONOSCOPY;  Surgeon: Jae Hodges MD;  Location: [facility] ([address] [place] [address]);  Service: Endoscopy;  Laterality: N/A;    KNEE SURGERY  12    OOPHORECTOMY      left ovary removed, secondary endometriosis    right hip replacement      TEMPOROMANDIBULAR JOINT SURGERY      TONSILLECTOMY      yag laser OD       Family History   Problem Relation Age of Onset    Hypertension Mother     Diabetes Mother     Heart failure Mother     Colon cancer Father          age 51    Cancer Father 48        Colon Cancer     Diabetes Brother     Cancer Brother         non hodgkins lymphoma, lung    Alzheimer's disease Brother     Stroke Maternal Grandfather     Breast cancer Cousin         paternal first cousin    Breast cancer Paternal Aunt     Ovarian cancer Paternal Aunt     Melanoma Neg Hx     Amblyopia Neg Hx     Blindness Neg Hx     Cataracts Neg Hx     Glaucoma Neg Hx     Macular degeneration Neg Hx     Retinal detachment Neg Hx     Strabismus Neg Hx     Thyroid disease Neg Hx      Social History     Socioeconomic History    Marital status:      Spouse name: Not on file    Number of children: Not on file    Years of education: Not  on file    Highest education level: Not on file   Occupational History    Not on file   Social Needs    Financial resource strain: Not very hard    Food insecurity:     Worry: Never true     Inability: Never true    Transportation needs:     Medical: Yes     Non-medical: No   Tobacco Use    Smoking status: Never Smoker    Smokeless tobacco: Never Used   Substance and Sexual Activity    Alcohol use: Yes     Alcohol/week: 1.0 standard drinks     Types: 1 Glasses of wine per week     Frequency: Monthly or less     Drinks per session: 1 or 2     Binge frequency: Never     Comment: rarely    Drug use: No    Sexual activity: Not Currently   Lifestyle    Physical activity:     Days per week: 0 days     Minutes per session: Not on file    Stress: To some extent   Relationships    Social connections:     Talks on phone: Twice a week     Gets together: Once a week     Attends Jainism service: Not on file     Active member of club or organization: Yes     Attends meetings of clubs or organizations: Never     Relationship status:    Other Topics Concern    Are you pregnant or think you may be? 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       Current Outpatient Medications:     ADVAIR DISKUS 500-50 mcg/dose DsDv diskus inhaler, INHALE ONE PUFF BY MOUTH TWICE DAILY, Disp: 1 each, Rfl: 11    albuterol (PROVENTIL) 2.5 mg /3 mL (0.083 %) nebulizer solution, Take 3 mLs (2.5 mg total) by nebulization every 6 (six) hours as needed for Wheezing. Rescue, Disp: 1 Box, Rfl: 0    albuterol 90 mcg/actuation inhaler, Inhale 2 puffs into the lungs every 6 (six) hours as needed for Wheezing., Disp: 18 g, Rfl: 11    atorvastatin (LIPITOR) 80 MG tablet, TAKE 1 TABLET BY MOUTH EVERY EVENING, Disp: 90 tablet, Rfl: 3    benzonatate (TESSALON PERLES) 100 MG capsule, Take 2  capsules (200 mg total) by mouth 3 (three) times daily as needed for Cough., Disp: 20 capsule, Rfl: 0    calcium-vitamin D 600 mg(1,500mg) -400 unit Tab, Take 1 tablet by mouth Every morning., Disp: , Rfl:     clotrimazole-betamethasone 1-0.05% (LOTRISONE) cream, APPLY TO THE AFFECTED AREA TWICE DAILY, Disp: 15 g, Rfl: 3    cyclobenzaprine (FLEXERIL) 10 MG tablet, TAKE ONE-HALF OR ONE TABLET BY MOUTH AT BEDTIME, Disp: 30 tablet, Rfl: 2    diclofenac sodium 1 % Gel, Apply 2 g topically 4 (four) times daily., Disp: 1 Tube, Rfl: 2    fluticasone propionate (FLONASE) 50 mcg/actuation nasal spray, USE AS DIRECTED, Disp: 16 g, Rfl: 11    HYDROcodone-acetaminophen (NORCO) 5-325 mg per tablet, Take 1 tablet by mouth every 6 (six) hours as needed for Pain., Disp: 30 tablet, Rfl: 0    levalbuterol (XOPENEX) 1.25 mg/3 mL nebulizer solution, Inhale into the lungs. Up to 3 times daily prn, Disp: , Rfl:     levothyroxine (SYNTHROID, LEVOTHROID) 175 MCG tablet, Take 1 tablet (175 mcg total) by mouth once daily., Disp: 90 tablet, Rfl: 3    lidocaine (XYLOCAINE) 5 % Oint ointment, Apply topically as needed., Disp: 120 g, Rfl: 3    loratadine (CLARITIN) 10 mg tablet, Take 1 tablet by mouth Every PM., Disp: , Rfl:     montelukast (SINGULAIR) 10 mg tablet, TAKE 1 TABLET BY MOUTH EVERY NIGHT AT BEDTIME, Disp: 30 tablet, Rfl: 12    naproxen (NAPROSYN) 500 MG tablet, TAKE 1 TABLET BY MOUTH TWO TIMES DAILY WITH MEALS, Disp: 60 tablet, Rfl: 1    neomycin-polymyxin-hydrocortisone (CORTISPORIN) 3.5-10,000-1 mg/mL-unit/mL-% otic suspension, instill 3 DROPS into THE right ear FOUR TIMES DAILY, Disp: 10 mL, Rfl: 0    OLIVE LEAF EXTRACT ORAL, Take 150 mg by mouth Every PM., Disp: , Rfl:     pantoprazole (PROTONIX) 40 MG tablet, Take 1 tablet (40 mg total) by mouth once daily., Disp: 30 tablet, Rfl: 11    potassium chloride (KLOR-CON) 10 MEQ TbSR, TAKE 1 TABLET BY MOUTH EVERY DAY, Disp: 90 tablet, Rfl: 3    predniSONE (DELTASONE)  20 MG tablet, 3 tabs po q day for 3 days, then 2 tabs po daily for 3 days, then 1 tab daily for 3 days., Disp: 18 tablet, Rfl: 0    rizatriptan (MAXALT) 10 MG tablet, Take 1 tablet by mouth Once daily as needed. If needed for migraines, Disp: , Rfl:     sertraline (ZOLOFT) 100 MG tablet, TAKE 2 TABLETS BY MOUTH EVERY DAY, Disp: 60 tablet, Rfl: 11    triamterene-hydrochlorothiazide 37.5-25 mg (DYAZIDE) 37.5-25 mg per capsule, TAKE ONE CAPSULE BY MOUTH EVERY MORNING, Disp: 30 capsule, Rfl: 11    betamethasone dipropionate (DIPROLENE) 0.05 % cream, Apply topically 2 (two) times daily. for 10 days, Disp: 45 g, Rfl: 3    ipratropium (ATROVENT) 0.02 % nebulizer solution, Take 2.5 mLs (500 mcg total) by nebulization 4 (four) times daily. Rescue, Disp: 1 Box, Rfl: 0    triamcinolone acetonide 0.1% (KENALOG) 0.1 % cream, Apply topically 2 (two) times daily. for 10 days, Disp: 80 g, Rfl: 1    REVIEW OF SYSTEMS:  General: No fevers or chills; ENT: No sore throat; Allergy and Immunology: no persistent infections; Hematological and Lymphatic: No history of bleeding or easy bruising; Endocrine: negative; Respiratory: no cough, shortness of breath, or wheezing; Cardiovascular: no chest pain or dyspnea on exertion; Gastrointestinal: no abdominal pain/back, change in bowel habits, or bloody stools; Genito-Urinary: no dysuria, trouble voiding, or hematuria; Musculoskeletal: pain; Neurological: no TIA or stroke symptoms; Psychiatric: no nervousness, anxiety or depression.    PHYSICAL EXAM:                General appearance:  Alert, well-appearing, and in no distress.  Oriented to person, place, and time                    Neurological: Normal speech, no focal findings noted; CN II - XII grossly intact. RLE with sensation to light touch, LLE with sensation to light touch.            Musculoskeletal: Digits/nail without cyanosis/clubbing.  Strength 5/5 BLE.                    Neck: Supple, no significant adenopathy                   Chest:  No wheezes, symmetric air entry. No use of accessory muscles               Cardiac: Normal rate and regular rhythm            Abdomen: Soft, nontender, nondistended      Extremities:   2+ R DP pulse, 2+ L DP pulse      no+ RLE edema, no+ LLE edema    Skin:  RLE no ulcer; LLE no ulcer      RLE + spider veins, LLE + spider veins      RLE no varicose veins, LLE no varicose veins    CEAP 1/1    LAB RESULTS:  No results found for: CBC  No results found for: LABPROT, INR  Lab Results   Component Value Date     08/20/2019    K 3.8 08/20/2019    CL 97 08/20/2019    CO2 31 (H) 08/20/2019    GLU 93 08/20/2019    BUN 12 08/20/2019    CREATININE 0.7 08/20/2019    CALCIUM 9.9 08/20/2019    ANIONGAP 9 08/20/2019    EGFRNONAA >60.0 08/20/2019     Lab Results   Component Value Date    WBC 10.64 10/11/2018    RBC 4.57 10/11/2018    HGB 14.1 10/11/2018    HCT 42.0 10/11/2018    MCV 92 10/11/2018    MCH 30.9 10/11/2018    MCHC 33.6 10/11/2018    RDW 12.7 10/11/2018     10/11/2018    MPV 8.6 (L) 10/11/2018    GRAN 7.3 10/11/2018    GRAN 68.9 10/11/2018    LYMPH 1.9 10/11/2018    LYMPH 17.4 (L) 10/11/2018    MONO 1.0 10/11/2018    MONO 9.0 10/11/2018    EOS 0.4 10/11/2018    BASO 0.03 10/11/2018    EOSINOPHIL 3.9 10/11/2018    BASOPHIL 0.3 10/11/2018    DIFFMETHOD Automated 10/11/2018     .  Lab Results   Component Value Date    HGBA1C 6.2 06/18/2010       IMAGING:  All pertinent imaging has been reviewed and interpreted independently.    Venous US 6/19/19 Impression:  No evidence of deep venous thrombosis in either lower extremity.  There is hemodynamically significant reflux noted within the right greater saphenous vein at the knee and left greater saphenous vein at the distal thigh and knee.    IMP/PLAN:  74 y.o. female with   Patient Active Problem List   Diagnosis    Asthma, well controlled    Anemia    Muscle cramps    Depression    Hyperlipidemia    Osteoporosis, post-menopausal    Avascular necrosis  of bone of hip    Hypertension    Closed fracture of right patella    Patella fracture    Posterior vitreous detachment of right eye    Pseudophakia of both eyes    Myopia of both eyes    Retinal hole or tear, right    PCO (posterior capsular opacification) - Both Eyes    Gastroesophageal reflux disease without esophagitis    Essential hypertension    Neck pain    Hypothyroidism (acquired)    Closed fracture of sternum    Varicose veins of left lower extremity with inflammation    being managed by PCP and specialists who is here today for f/u evaluation of LLE bleeding varicose vein and venous insufficiency.    -recommend continued compression with Rx stockings, elevation, dietary changes associated with water and sodium intake discussed at length with patient  -Exercise   -rec continued evaluation for RLE pain possibly due to MSK vs neuro etiology  -Cont to keep area of spider veins clean/dry and protected from trauma  -RTC 6 months for further evaluation    I spent 20 minutes evaluating this patient and greater than 50% of the time was spent counseling, coordinator care and discussing the plan of care.  All questions were answered and patient stated understanding with agreement with the above treatment plan.    Ector Monson MD VI  Vascular and Endovascular Surgery

## 2019-11-08 NOTE — LETTER
November 8, 2019        Latia Aguilar MD  [address]  [facility] [address]             [facility]  [address] [address]  Phone: [phone]  Fax: [phone]   Patient: Eugenia Diaz   MR Number: [id number]   YOB: 1945   Date of Visit: 11/8/2019       Dear Dr. Aguilar:    Thank you for referring Eugenia Diaz to me for evaluation. Below are the relevant portions of my assessment and plan of care.            If you have questions, please do not hesitate to call me. I look forward to following Eugenia along with you.    Sincerely,      Ector Monson MD           CC  No Recipients

## 2019-11-12 ENCOUNTER — IMMUNIZATION (OUTPATIENT)
Dept: PHARMACY | Facility: CLINIC | Age: 74
End: 2019-11-12
Payer: MEDICARE

## 2019-11-14 RX ORDER — TRIAMTERENE AND HYDROCHLOROTHIAZIDE 37.5; 25 MG/1; MG/1
CAPSULE ORAL
Qty: 30 CAPSULE | Refills: 11 | Status: SHIPPED | OUTPATIENT
Start: 2019-11-14 | End: 2020-09-28 | Stop reason: SDUPTHER

## 2019-11-14 RX ORDER — NAPROXEN 500 MG/1
500 TABLET ORAL 2 TIMES DAILY
Qty: 60 TABLET | Refills: 1 | Status: SHIPPED | OUTPATIENT
Start: 2019-11-14 | End: 2020-11-13

## 2020-01-27 ENCOUNTER — PATIENT MESSAGE (OUTPATIENT)
Dept: OBSTETRICS AND GYNECOLOGY | Facility: CLINIC | Age: 75
End: 2020-01-27

## 2020-01-27 DIAGNOSIS — Z12.31 SCREENING MAMMOGRAM, ENCOUNTER FOR: Primary | ICD-10-CM

## 2020-02-13 ENCOUNTER — HOSPITAL ENCOUNTER (EMERGENCY)
Facility: HOSPITAL | Age: 75
Discharge: HOME | End: 2020-02-13
Attending: EMERGENCY MEDICINE
Payer: MEDICARE

## 2020-02-13 VITALS
SYSTOLIC BLOOD PRESSURE: 155 MMHG | BODY MASS INDEX: 24.17 KG/M2 | OXYGEN SATURATION: 98 % | HEART RATE: 65 BPM | TEMPERATURE: 98 F | RESPIRATION RATE: 16 BRPM | HEIGHT: 67 IN | DIASTOLIC BLOOD PRESSURE: 82 MMHG | WEIGHT: 154 LBS
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
## 2020-02-13 DIAGNOSIS — S09.90XA INJURY OF HEAD, INITIAL ENCOUNTER: ICD-10-CM

## 2020-02-13 DIAGNOSIS — S09.90XA CLOSED HEAD INJURY, INITIAL ENCOUNTER: ICD-10-CM

## 2020-02-13 PROCEDURE — 99284 EMERGENCY DEPT VISIT MOD MDM: CPT | Mod: 25

## 2020-02-13 PROCEDURE — 99284 EMERGENCY DEPT VISIT MOD MDM: CPT | Mod: ,,, | Performed by: EMERGENCY MEDICINE

## 2020-02-13 PROCEDURE — 25000003 PHARM REV CODE 250: Performed by: STUDENT IN AN ORGANIZED HEALTH CARE EDUCATION/TRAINING PROGRAM

## 2020-02-13 PROCEDURE — 99284 PR EMERGENCY DEPT VISIT,LEVEL IV: ICD-10-PCS | Mod: ,,, | Performed by: EMERGENCY MEDICINE

## 2020-02-13 RX ORDER — ACETAMINOPHEN 325 MG/1
650 TABLET ORAL ONCE
Status: COMPLETED | OUTPATIENT
Start: 2020-02-13 | End: 2020-02-13

## 2020-02-13 RX ADMIN — ACETAMINOPHEN 650 MG: 325 TABLET ORAL at 01:02

## 2020-02-13 NOTE — ED NOTES
Pt visiting  in ICU, was sleeping on couch and fell off onto ground. Pt landed onto left side, denies head injury but c/o right sided head pain. Pt c/o chronic right hip pain. -blood thinners. Pt in personal motorized wheelchair.

## 2020-02-13 NOTE — ED PROVIDER NOTES
Encounter Date: 2/13/2020       History     Chief Complaint   Patient presents with    Fall     pt upstairs with  when she rolled off sleeper sofa in her sleep. -blood thinner use     Ms. Diaz 73 y/o Female with past medical history of HTN. GERD, Hypothyroidism, Asthma, B/L hip Avascular necrosis S/P TKR on the Right. Presents to the ED after sustaining a fall while visiting her  in the ICU. The patient reports that she was laying down on the side bed when she turned over and rolled off of the bed and fell onto her left side and hit her head on the right side. Patient reports that she was able to stand and walk per usual after the fall. There was LOC. She is denies light headedness, vision changes, paraesthesias, numbness and tingling in her extremities, spine pain. On exam her motor function is intact throughout without unilateral deficits, sensation is present throughout, neurological exam is grossly intact. The patient does report right sided headache at this time. She is A&O X4 and hemodynamically stable        Review of patient's allergies indicates:   Allergen Reactions    Sulfa (sulfonamide antibiotics) Swelling     Throat swelling      Clindamycin Hives    Erythromycin base Other (See Comments)    Neomycin      Other reaction(s): Rash    Nitrofurantoin macrocrystalline Other (See Comments)     Macrobid.Throat Swelling    Tetracycline      Other reaction(s): Anaphylaxis    Azithromycin Swelling     Throat Swelling    Meperidine Rash     Demerol.     Oxycodone hcl-oxycodone-asa Rash    Propoxyphene Rash     Darvon.      Past Medical History:   Diagnosis Date    Allergy     Anemia     Asthma     Bronchitis     Depression     Generalized headaches     History of endometriosis     Hyperlipidemia     Hypertension     Hypothyroidism     Osteoporosis, unspecified     PCO (posterior capsular opacification), left     Recurrent upper respiratory infection (URI)     Thyroid  disease     hypothyroidism    TMJ dysfunction      Past Surgical History:   Procedure Laterality Date    ADENOIDECTOMY      CATARACT EXTRACTION W/  INTRAOCULAR LENS IMPLANT  06    right eye - Dr Best    CATARACT EXTRACTION W/  INTRAOCULAR LENS IMPLANT  11/15/06    left eye - Dr Best    CHOLECYSTECTOMY  1994    COLONOSCOPY N/A 2016    Procedure: COLONOSCOPY;  Surgeon: Jae Hodges MD;  Location: [facility] ([address] [place] [address]);  Service: Endoscopy;  Laterality: N/A;    KNEE SURGERY  12    OOPHORECTOMY      left ovary removed, secondary endometriosis    right hip replacement      TEMPOROMANDIBULAR JOINT SURGERY      TONSILLECTOMY      yag laser OD       Family History   Problem Relation Age of Onset    Hypertension Mother     Diabetes Mother     Heart failure Mother     Colon cancer Father          age 51    Cancer Father 48        Colon Cancer     Diabetes Brother     Cancer Brother         non hodgkins lymphoma, lung    Alzheimer's disease Brother     Stroke Maternal Grandfather     Breast cancer Cousin         paternal first cousin    Breast cancer Paternal Aunt     Ovarian cancer Paternal Aunt     Melanoma Neg Hx     Amblyopia Neg Hx     Blindness Neg Hx     Cataracts Neg Hx     Glaucoma Neg Hx     Macular degeneration Neg Hx     Retinal detachment Neg Hx     Strabismus Neg Hx     Thyroid disease Neg Hx      Social History     Tobacco Use    Smoking status: Never Smoker    Smokeless tobacco: Never Used   Substance Use Topics    Alcohol use: Yes     Alcohol/week: 1.0 standard drinks     Types: 1 Glasses of wine per week     Frequency: Monthly or less     Drinks per session: 1 or 2     Binge frequency: Never     Comment: rarely    Drug use: No     Review of Systems   Constitutional: Negative for activity change, chills, fatigue and fever.   HENT: Negative for trouble swallowing and voice change.    Eyes: Negative for photophobia and visual  disturbance.   Respiratory: Negative for chest tightness, shortness of breath and wheezing.    Cardiovascular: Negative for chest pain and palpitations.   Gastrointestinal: Negative for abdominal distention and abdominal pain.   Genitourinary: Negative for difficulty urinating and dysuria.   Musculoskeletal: Positive for arthralgias. Negative for back pain, gait problem and joint swelling.   Skin: Negative for color change.   Neurological: Positive for headaches. Negative for tremors and light-headedness.   Psychiatric/Behavioral: Negative for agitation and confusion.       Physical Exam     Initial Vitals [02/13/20 0038]   BP Pulse Resp Temp SpO2   (!) 155/82 65 16 98.4 °F (36.9 °C) 98 %      MAP       --         Physical Exam    Constitutional: She appears well-developed and well-nourished. She is not diaphoretic. No distress.   HENT:   Head: Normocephalic and atraumatic.   Eyes: EOM are normal. No scleral icterus.   Neck: Normal range of motion. Neck supple. No JVD present.   Musculoskeletal: Normal range of motion. She exhibits tenderness (R hip to palpation ). She exhibits no edema.   Neurological: She is alert and oriented to person, place, and time. GCS score is 15. GCS eye subscore is 4. GCS verbal subscore is 5. GCS motor subscore is 6.   Skin: Skin is warm and dry. No rash noted. No erythema.   Psychiatric: She has a normal mood and affect. Her behavior is normal. Judgment and thought content normal.         ED Course   Procedures  Labs Reviewed - No data to display       Imaging Results          X-Ray Hips Bilateral 2 View Incl AP Pelvis (Final result)  Result time 02/13/20 02:00:20    Final result by West Saavedra MD (02/13/20 02:00:20)                 Impression:      No radiographic evidence of acute fracture or dislocation.      Electronically signed by: West Saavedra MD  Date:    02/13/2020  Time:    02:00             Narrative:    EXAMINATION:  XR HIPS BILATERAL 2 VIEW INCL AP  PELVIS    CLINICAL HISTORY:  Unspecified fall, initial encounter    TECHNIQUE:  AP view of the pelvis and frogleg lateral views of both hips were performed.    COMPARISON:  04/12/2013    FINDINGS:  There is postoperative change of prior right hip arthroplasty.  Hardware appears intact without definite periprosthetic fracture.  The remaining visualized osseous structures appear intact.  Left femoral head appears appropriately seated within the acetabula with associated osteoarthrosis.  The ilioischial and iliopectineal lines appear maintained.  There is no pubic symphyseal or sacroiliac joint diastasis.  There are partially visualized degenerative change of the lower lumbar spine.                                 Medical Decision Making:   Initial Assessment:   73 y/o F presenting after sustaining a fall from rolling off the visitor bed in the ICU  Differential Diagnosis:   DDX: fracture hip, fracture pelvis, Intracranial hemorrhage  Clinical Tests:   Radiological Study: Ordered  ED Management:  1:29 AM: patient was ordered a B/L Hip X-ray to rule out displacement of the hardware from the R THR due to the patient reporting pain/ soreness in that area after sustaining the fall. Also since the patient fell on the L side rule out fracture of L Hip and pelvis. Patient is complaining of a Right sided headache after hitting her head with obtain a CT head to rule out intracranial hemorrhage    2:11 AM: X-ray of B/L hips shows no dislocation or fractures. Waiting on CT head result before further decisions can be made towards her disposition. I signed over the remainder of this patient's care to Dr. Sandhu at this time.                                 Clinical Impression:       ICD-10-CM ICD-9-CM   1. Fall W19.XXXA E888.9   2. Injury of head, initial encounter S09.90XA 959.01         Disposition:   Condition: Stable                     Shawna Syed DO  Resident  02/13/20 0214

## 2020-02-13 NOTE — DISCHARGE INSTRUCTIONS
Thank you for coming to our Emergency Department today. You were evaluated for a fall. Your x-rays showed no broken bones, no fractures and no dislocations. Your CT scan showed no brain injury. Please call your primary care provider for follow up.      It is important to remember that some problems are difficult to diagnose and may not be found during your first visit. Be sure to follow up with your primary care doctor and review any labs/imaging that was performed with them. If you do not have a primary care doctor, you may contact the one listed on your discharge paperwork or you may also call the [facility] Appointment Desk at [phone] to schedule an appointment with one.     All medications may potentially have side effects and it is impossible to predict which medications may give you side effects. If you feel that you are having a negative effect of any medication you should immediately stop taking them and seek medical attention.    Return to the ER with any questions/concerns, new/concerning symptoms, worsening or failure to improve. Do not drive or make any important decisions for 24 hours if you have received any pain medications, sedatives or mood altering drugs during your ER visit.

## 2020-02-28 ENCOUNTER — PATIENT MESSAGE (OUTPATIENT)
Dept: OBSTETRICS AND GYNECOLOGY | Facility: CLINIC | Age: 75
End: 2020-02-28

## 2020-04-16 RX ORDER — MONTELUKAST SODIUM 10 MG/1
TABLET ORAL
Qty: 30 TABLET | Refills: 12 | Status: SHIPPED | OUTPATIENT
Start: 2020-04-16 | End: 2021-05-16

## 2020-04-21 NOTE — TELEPHONE ENCOUNTER
Refill Routing Note   Medication(s) are not appropriate for processing by [facility]:       Patient has been seen in the Emergency Room/Department since the last PCP visit            ED visit 2/13/20 Detailed Report       Medication reconciliation completed: No        Appointments [id number] or future 3m with PCP    Date Provider   Last Visit   8/13/2019 Latia Aguilar MD   Next Visit   4/20/2020 Latia Aguilar MD     Automatic Epic Protocol Generated Data:    Requested Prescriptions   Pending Prescriptions Disp Refills    pantoprazole (PROTONIX) 40 MG tablet [Pharmacy Med Name: pantoprazole 40 mg tablet,delayed release] 30 tablet 11     Sig: Take 1 tablet (40 mg total) by mouth once daily.       Gastroenterology: Proton Pump Inhibitors 2 Failed - 4/16/2020 11:30 AM        Failed - Osteoporosis is not on problem list        Failed - Office visit in past 6 months or future 90 days.     Recent Outpatient Visits            5 months ago Venous insufficiency of both lower extremities    [facility] [address] Ector Monson MD    8 months ago Osteoporosis, post-menopausal    [facility] Endocr[facility] 6[address] [place] Ruma Christianson MD    8 months ago Essential hypertension    [facility] Internal Medicine Latia Aguilar MD    1 year ago Pathological fracture due to osteoporosis, unspecified fracture site, unspecified osteoporosis type, initial encounter    [facility] - Orthopedics Derrick Bruno PA-C    1 year ago Abrasion    [facility] - Kike Borja PA-C          Future Appointments              In 1 month Ector Monson MD [facility] [address], [facility]    In 2 months [facility] [id number] SCREEN [facility]                Passed - Patient is at least 18 years old        Passed - An appropriate indication is on the problem list           Note composed:4:40 PM 04/21/2020

## 2020-04-22 RX ORDER — PANTOPRAZOLE SODIUM 40 MG/1
40 TABLET, DELAYED RELEASE ORAL DAILY
Qty: 90 TABLET | Refills: 0 | Status: SHIPPED | OUTPATIENT
Start: 2020-04-22 | End: 2020-09-28 | Stop reason: SDUPTHER

## 2020-04-24 RX ORDER — PANTOPRAZOLE SODIUM 40 MG/1
40 TABLET, DELAYED RELEASE ORAL DAILY
Qty: 30 TABLET | Refills: 11 | OUTPATIENT
Start: 2020-04-24 | End: 2021-04-24

## 2020-04-24 NOTE — PROGRESS NOTES
Quick DC. Duplicate Request   Refill Authorization Note    is requesting a refill authorization.    Brief assessment and rationale for refill: QUICK DC: Duplicate               Medication reconciliation completed: No                          Comments:   Pended Medication(s)   Requested Prescriptions     Pending Prescriptions Disp Refills    pantoprazole (PROTONIX) 40 MG tablet 30 tablet 11     Sig: Take 1 tablet (40 mg total) by mouth once daily.        Duplicate Pended Encounter(s)/ Last Prescribed Details:    Ordering User:  Latia Aguilar MD               Original Order:  pantoprazole (PROTONIX) 40 MG tablet [[id number]]      Pharmacy:  [facility] [place], [place]  [address]. VIRA #:  --     Pharmacy Comments:  --          Fill quantity remaining:  -- Fill quantity used:  -- Next fill due: --       Outpatient Medication Detail      Disp Refills Start End    pantoprazole (PROTONIX) 40 MG tablet 90 tablet 0 4/22/2020 4/22/2021    Sig - Route: Take 1 tablet (40 mg total) by mouth once daily. - Oral    Sent to pharmacy as: pantoprazole (PROTONIX) 40 MG tablet    E-Prescribing Status: Receipt confirmed by pharmacy (4/22/2020  9:43 AM CDT)    Ordering Encounter Report     Associated Reports   View Encounter        Note composed:1:27 PM 04/24/2020

## 2020-04-26 ENCOUNTER — PATIENT MESSAGE (OUTPATIENT)
Dept: INTERNAL MEDICINE | Facility: CLINIC | Age: 75
End: 2020-04-26

## 2020-04-26 DIAGNOSIS — J45.20 ASTHMA, WELL CONTROLLED, MILD INTERMITTENT: ICD-10-CM

## 2020-04-26 DIAGNOSIS — J45.901 ACUTE EXACERBATION OF EXTRINSIC ASTHMA: ICD-10-CM

## 2020-04-26 RX ORDER — CYCLOBENZAPRINE HCL 10 MG
10 TABLET ORAL NIGHTLY
Qty: 30 TABLET | Refills: 2 | Status: SHIPPED | OUTPATIENT
Start: 2020-04-26 | End: 2020-09-28

## 2020-04-26 RX ORDER — PREDNISONE 20 MG/1
TABLET ORAL
Qty: 18 TABLET | Refills: 0 | Status: SHIPPED | OUTPATIENT
Start: 2020-04-26 | End: 2021-05-19 | Stop reason: SDUPTHER

## 2020-04-26 RX ORDER — IPRATROPIUM BROMIDE 0.5 MG/2.5ML
500 SOLUTION RESPIRATORY (INHALATION) 4 TIMES DAILY
Qty: 1 BOX | Refills: 11 | Status: SHIPPED | OUTPATIENT
Start: 2020-04-26 | End: 2020-05-01 | Stop reason: SDUPTHER

## 2020-04-26 RX ORDER — ALBUTEROL SULFATE 90 UG/1
2 AEROSOL, METERED RESPIRATORY (INHALATION) EVERY 6 HOURS PRN
Qty: 18 G | Refills: 11 | Status: SHIPPED | OUTPATIENT
Start: 2020-04-26

## 2020-04-26 RX ORDER — LEVALBUTEROL INHALATION SOLUTION 1.25 MG/3ML
1 SOLUTION RESPIRATORY (INHALATION) 4 TIMES DAILY
Qty: 1 BOX | Refills: 11 | Status: SHIPPED | OUTPATIENT
Start: 2020-04-26 | End: 2020-09-28

## 2020-04-28 ENCOUNTER — TELEPHONE (OUTPATIENT)
Dept: INTERNAL MEDICINE | Facility: CLINIC | Age: 75
End: 2020-04-28

## 2020-04-28 NOTE — TELEPHONE ENCOUNTER
----- Message from Oksana Vines sent at 4/28/2020  2:22 PM CDT -----  Contact: Ms. Echevarria @ [employer] # [phone], fax# [phone]  Ms. Echevarria is calling in regards to her wanting to let you know that she will be faxing a detailed written order from [employer] to you on today for the Patient to be able to get her ipratropium (ATROVENT) 0.02 % nebulizer solution. She said that all she need you to do is sign the form and fax it back to her please.

## 2020-04-29 ENCOUNTER — PATIENT MESSAGE (OUTPATIENT)
Dept: INTERNAL MEDICINE | Facility: CLINIC | Age: 75
End: 2020-04-29

## 2020-04-29 NOTE — TELEPHONE ENCOUNTER
Pharmacy Call Documentation    Pharmacy Called:  [facility] Call Time: 13:00   Spoke with: Mary 04/29/2020       Spoke with Mary at [employer]. Xopenex and Atrovent not covered by Part B. May require prior authorization if patient does not have insurance coverage outside of Part A/B. Unable to reach pt to confirm if there is supplemental coverage.       Note composed: 04/29/2020 1:00 PM

## 2020-04-29 NOTE — TELEPHONE ENCOUNTER
Could you pls help?-  does she need PA for nebulizer vials for xopenex and ipratropium.  [facility] has not filled  Thanks so much

## 2020-04-30 ENCOUNTER — TELEPHONE (OUTPATIENT)
Dept: INTERNAL MEDICINE | Facility: CLINIC | Age: 75
End: 2020-04-30

## 2020-05-01 DIAGNOSIS — J45.901 ACUTE EXACERBATION OF EXTRINSIC ASTHMA: ICD-10-CM

## 2020-05-01 RX ORDER — IPRATROPIUM BROMIDE 0.5 MG/2.5ML
500 SOLUTION RESPIRATORY (INHALATION) 4 TIMES DAILY
Qty: 1 BOX | Refills: 11 | Status: SHIPPED | OUTPATIENT
Start: 2020-05-01 | End: 2021-12-15 | Stop reason: SDUPTHER

## 2020-05-01 NOTE — TELEPHONE ENCOUNTER
----- Message from Sheridan Flores MA sent at 5/1/2020  1:07 PM CDT -----  Contact: Ms. Echevarria Pharmacy Tech @ [facility] # [phone], fax# [phone]  I have not come across a fax for this and I do not know if this has been handled.  Mark  ----- Message -----  From: Aakash Lacy MA  Sent: 5/1/2020  10:53 AM CDT  To: Sheridan Flores MA    Have you received a fax from [facility] today? Has this been resolved already by chance?    Aakash Lacy  ----- Message -----  From: Oksana Vines  Sent: 5/1/2020  10:07 AM CDT  To: Jeff WHITE Staff    Ms. Echevarria is calling in regards to her saying that she would like for you to sign the order that she fax over to you on today for the patient's ipratropium (ATROVENT) 0.02 % nebulizer solution. Ms. Echevarria would like for you to sign these orders and fax it back over to her please.

## 2020-05-06 ENCOUNTER — PATIENT MESSAGE (OUTPATIENT)
Dept: INTERNAL MEDICINE | Facility: CLINIC | Age: 75
End: 2020-05-06

## 2020-05-27 ENCOUNTER — PATIENT MESSAGE (OUTPATIENT)
Dept: VASCULAR SURGERY | Facility: CLINIC | Age: 75
End: 2020-05-27

## 2020-05-27 ENCOUNTER — PATIENT MESSAGE (OUTPATIENT)
Dept: OBSTETRICS AND GYNECOLOGY | Facility: CLINIC | Age: 75
End: 2020-05-27

## 2020-06-04 ENCOUNTER — PATIENT OUTREACH (OUTPATIENT)
Dept: ADMINISTRATIVE | Facility: OTHER | Age: 75
End: 2020-06-04

## 2020-06-04 ENCOUNTER — TELEPHONE (OUTPATIENT)
Dept: VASCULAR SURGERY | Facility: CLINIC | Age: 75
End: 2020-06-04

## 2020-06-04 NOTE — PROGRESS NOTES
Chart reviewed.   Immunizations: Triggered Imm Registry     Orders placed: n/a  Upcoming appts to satisfy JERRY topics: mammo 6/29/2020

## 2020-06-05 ENCOUNTER — OFFICE VISIT (OUTPATIENT)
Dept: VASCULAR SURGERY | Facility: CLINIC | Age: 75
End: 2020-06-05
Payer: MEDICARE

## 2020-06-05 VITALS
HEART RATE: 72 BPM | SYSTOLIC BLOOD PRESSURE: 109 MMHG | HEIGHT: 67 IN | BODY MASS INDEX: 24.17 KG/M2 | RESPIRATION RATE: 18 BRPM | OXYGEN SATURATION: 100 % | TEMPERATURE: 98 F | WEIGHT: 154 LBS | DIASTOLIC BLOOD PRESSURE: 70 MMHG

## 2020-06-05 DIAGNOSIS — I87.2 VENOUS INSUFFICIENCY OF BOTH LOWER EXTREMITIES: Primary | ICD-10-CM

## 2020-06-05 DIAGNOSIS — I83.892 BLEEDING FROM VARICOSE VEINS OF LEFT LOWER EXTREMITY: ICD-10-CM

## 2020-06-05 PROCEDURE — 99214 PR OFFICE/OUTPT VISIT, EST, LEVL IV, 30-39 MIN: ICD-10-PCS | Mod: S$GLB,,, | Performed by: SURGERY

## 2020-06-05 PROCEDURE — 99214 OFFICE O/P EST MOD 30 MIN: CPT | Mod: S$GLB,,, | Performed by: SURGERY

## 2020-06-05 NOTE — PROGRESS NOTES
Ector Monson MD, RPVI                                 [facility]                           [facility]                             06/05/2020    HPI:  Eugenia Diaz is a 74 y.o. female with   Patient Active Problem List   Diagnosis    Asthma, well controlled    Anemia    Muscle cramps    Depression    Hyperlipidemia    Osteoporosis, post-menopausal    Avascular necrosis of bone of hip    Hypertension    Closed fracture of right patella    Patella fracture    Posterior vitreous detachment of right eye    Pseudophakia of both eyes    Myopia of both eyes    Retinal hole or tear, right    PCO (posterior capsular opacification) - Both Eyes    Gastroesophageal reflux disease without esophagitis    Essential hypertension    Neck pain    Hypothyroidism (acquired)    Closed fracture of sternum    Varicose veins of left lower extremity with inflammation    being managed by PCP and specialists who is here today for evaluation of LLE bleeding varicose vein.  Patient states location is L lower leg occurring for 6 mo ago.  Associated signs and symptoms include discoloration.  C/o LLE pain.  Quality is aching and severity is 8/10.  Symptoms began 6 mo ago.  Treating bleeding vein with compression.  Alleviating factors include pressure.  Worsening factors include dependency.  Patient is not wearing compression stockings daily.  No FH of venous disease.  Symptoms do limit patient's functional status and daily activities.  No DVT history.  No venous interventions.  + low sodium diet.  No excessive water intake.    Migraine with aura: yes  PFO/ASD/right to left shunt: no  Pregnant: no  Breastfeeding: no    no MI  no Stroke  Tobacco use: no    11/2019: C/o RLE pain when at rest with legs elevated from foot to hip.  Denies edema.  +compression, elevation at night when sleeping, low Na diet and no excess water.  No significant issues with LLE varicose veins since treatment  initiated.    2020:  Patient states that she has continued to have bleeding events left lower extremity lateral varicose vein.  She continues to have heaviness, aching and swelling despite compression for greater than 3 months.  She unfortunately has been dealing with the aftermath of the death of her  due to Coronavirus for weeks ago.    Past Medical History:   Diagnosis Date    Allergy     Anemia     Asthma     Bronchitis     Depression     Generalized headaches     History of endometriosis     Hyperlipidemia     Hypertension     Hypothyroidism     Osteoporosis, unspecified     PCO (posterior capsular opacification), left     Recurrent upper respiratory infection (URI)     Thyroid disease     hypothyroidism    TMJ dysfunction      Past Surgical History:   Procedure Laterality Date    ADENOIDECTOMY      CATARACT EXTRACTION W/  INTRAOCULAR LENS IMPLANT  06    right eye - Dr Best    CATARACT EXTRACTION W/  INTRAOCULAR LENS IMPLANT  11/15/06    left eye - Dr Best    CHOLECYSTECTOMY      COLONOSCOPY N/A 2016    Procedure: COLONOSCOPY;  Surgeon: Jae Hodges MD;  Location: [facility] ([address] [place] [address]);  Service: Endoscopy;  Laterality: N/A;    KNEE SURGERY  12    OOPHORECTOMY      left ovary removed, secondary endometriosis    right hip replacement      TEMPOROMANDIBULAR JOINT SURGERY      TONSILLECTOMY      yag laser OD       Family History   Problem Relation Age of Onset    Hypertension Mother     Diabetes Mother     Heart failure Mother     Colon cancer Father          age 51    Cancer Father 48        Colon Cancer     Diabetes Brother     Cancer Brother         non hodgkins lymphoma, lung    Alzheimer's disease Brother     Stroke Maternal Grandfather     Breast cancer Cousin         paternal first cousin    Breast cancer Paternal Aunt     Ovarian cancer Paternal Aunt     Melanoma Neg Hx     Amblyopia Neg Hx     Blindness  Neg Hx     Cataracts Neg Hx     Glaucoma Neg Hx     Macular degeneration Neg Hx     Retinal detachment Neg Hx     Strabismus Neg Hx     Thyroid disease Neg Hx      Social History     Socioeconomic History    Marital status:      Spouse name: Not on file    Number of children: Not on file    Years of education: Not on file    Highest education level: Not on file   Occupational History    Not on file   Social Needs    Financial resource strain: Not very hard    Food insecurity:     Worry: Never true     Inability: Never true    Transportation needs:     Medical: Yes     Non-medical: No   Tobacco Use    Smoking status: Never Smoker    Smokeless tobacco: Never Used   Substance and Sexual Activity    Alcohol use: Yes     Alcohol/week: 1.0 standard drinks     Types: 1 Glasses of wine per week     Frequency: Monthly or less     Drinks per session: 1 or 2     Binge frequency: Never     Comment: rarely    Drug use: No    Sexual activity: Not Currently   Lifestyle    Physical activity:     Days per week: 0 days     Minutes per session: Not on file    Stress: To some extent   Relationships    Social connections:     Talks on phone: Twice a week     Gets together: Once a week     Attends Restoration[employer] service: Not on file     Active member of club or organization: Yes     Attends meetings of clubs or organizations: Never     Relationship status:    Other Topics Concern    Are you pregnant or think you may be? 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       Current Outpatient Medications:     ADVAIR DISKUS 500-50 mcg/dose DsDv diskus inhaler, INHALE ONE PUFF BY MOUTH TWICE DAILY, Disp: 1 each, Rfl: 11    albuterol (PROVENTIL) 2.5 mg /3 mL (0.083 %) nebulizer solution, Take 3 mLs (2.5 mg total) by nebulization every 6 (six) hours as needed for  Wheezing. Rescue, Disp: 1 Box, Rfl: 0    albuterol (PROVENTIL/VENTOLIN HFA) 90 mcg/actuation inhaler, Inhale 2 puffs into the lungs every 6 (six) hours as needed for Wheezing., Disp: 18 g, Rfl: 11    atorvastatin (LIPITOR) 80 MG tablet, TAKE 1 TABLET BY MOUTH EVERY EVENING, Disp: 90 tablet, Rfl: 3    calcium-vitamin D 600 mg(1,500mg) -400 unit Tab, Take 1 tablet by mouth Every morning., Disp: , Rfl:     clotrimazole-betamethasone 1-0.05% (LOTRISONE) cream, APPLY TO THE AFFECTED AREA TWICE DAILY, Disp: 15 g, Rfl: 3    diclofenac sodium 1 % Gel, Apply 2 g topically 4 (four) times daily., Disp: 1 Tube, Rfl: 2    fluticasone propionate (FLONASE) 50 mcg/actuation nasal spray, USE AS DIRECTED, Disp: 16 g, Rfl: 11    HYDROcodone-acetaminophen (NORCO) 5-325 mg per tablet, Take 1 tablet by mouth every 6 (six) hours as needed for Pain., Disp: 30 tablet, Rfl: 0    ipratropium (ATROVENT) 0.02 % nebulizer solution, Take 2.5 mLs (500 mcg total) by nebulization 4 (four) times daily. Rescue, Disp: 1 Box, Rfl: 11    levalbuterol (XOPENEX) 1.25 mg/3 mL nebulizer solution, Take 3 mLs (1.25 mg total) by nebulization 4 (four) times daily. Up to 3 times daily prn, Disp: 1 Box, Rfl: 11    levothyroxine (SYNTHROID, LEVOTHROID) 175 MCG tablet, Take 1 tablet (175 mcg total) by mouth once daily., Disp: 90 tablet, Rfl: 3    lidocaine (XYLOCAINE) 5 % Oint ointment, Apply topically as needed., Disp: 120 g, Rfl: 3    loratadine (CLARITIN) 10 mg tablet, Take 1 tablet by mouth Every PM., Disp: , Rfl:     montelukast (SINGULAIR) 10 mg tablet, TAKE 1 TABLET BY MOUTH EVERY NIGHT AT BEDTIME, Disp: 30 tablet, Rfl: 12    naproxen (NAPROSYN) 500 MG tablet, TAKE 1 TABLET BY MOUTH TWO TIMES DAILY WITH MEALS, Disp: 60 tablet, Rfl: 1    neomycin-polymyxin-hydrocortisone (CORTISPORIN) 3.5-10,000-1 mg/mL-unit/mL-% otic suspension, instill 3 DROPS into THE right ear FOUR TIMES DAILY, Disp: 10 mL, Rfl: 0    OLIVE LEAF EXTRACT ORAL, Take 150 mg by  mouth Every PM., Disp: , Rfl:     pantoprazole (PROTONIX) 40 MG tablet, Take 1 tablet (40 mg total) by mouth once daily., Disp: 90 tablet, Rfl: 0    potassium chloride (KLOR-CON) 10 MEQ TbSR, TAKE 1 TABLET BY MOUTH EVERY DAY, Disp: 90 tablet, Rfl: 3    sertraline (ZOLOFT) 100 MG tablet, TAKE 2 TABLETS BY MOUTH EVERY DAY, Disp: 60 tablet, Rfl: 11    triamterene-hydrochlorothiazide 37.5-25 mg (DYAZIDE) 37.5-25 mg per capsule, TAKE 1 CAPSULE BY MOUTH EVERY MORNING, Disp: 30 capsule, Rfl: 11    varicella-zoster gE-AS01B, PF, (SHINGRIX, PF,) 50 mcg/0.5 mL injection, Inject into the muscle., Disp: 0.5 mL, Rfl: 0    benzonatate (TESSALON PERLES) 100 MG capsule, Take 2 capsules (200 mg total) by mouth 3 (three) times daily as needed for Cough. (Patient not taking: Reported on 6/5/2020), Disp: 20 capsule, Rfl: 0    betamethasone dipropionate (DIPROLENE) 0.05 % cream, Apply topically 2 (two) times daily. for 10 days, Disp: 45 g, Rfl: 3    cyclobenzaprine (FLEXERIL) 10 MG tablet, Take 1 tablet (10 mg total) by mouth every evening. (Patient not taking: Reported on 6/5/2020), Disp: 30 tablet, Rfl: 2    predniSONE (DELTASONE) 20 MG tablet, 3 tabs po q day for 3 days, then 2 tabs po daily for 3 days, then 1 tab daily for 3 days. (Patient not taking: Reported on 6/5/2020), Disp: 18 tablet, Rfl: 0    rizatriptan (MAXALT) 10 MG tablet, Take 1 tablet by mouth Once daily as needed. If needed for migraines, Disp: , Rfl:     triamcinolone acetonide 0.1% (KENALOG) 0.1 % cream, Apply topically 2 (two) times daily. for 10 days, Disp: 80 g, Rfl: 1    REVIEW OF SYSTEMS:  General: No fevers or chills; ENT: No sore throat; Allergy and Immunology: no persistent infections; Hematological and Lymphatic: No history of bleeding or easy bruising; Endocrine: negative; Respiratory: no cough, shortness of breath, or wheezing; Cardiovascular: no chest pain or dyspnea on exertion; Gastrointestinal: no abdominal pain/back, change in bowel  habits, or bloody stools; Genito-Urinary: no dysuria, trouble voiding, or hematuria; Musculoskeletal: pain; Neurological: no TIA or stroke symptoms; Psychiatric: no nervousness, anxiety or depression.    PHYSICAL EXAM:      Pulse: 72  Temp: 98 °F (36.7 °C)      General appearance:  Alert, well-appearing, and in no distress.  Oriented to person, place, and time                    Neurological: Normal speech, no focal findings noted; CN II - XII grossly intact. RLE with sensation to light touch, LLE with sensation to light touch.            Musculoskeletal: Digits/nail without cyanosis/clubbing.  Strength 5/5 BLE.                    Neck: Supple, no significant adenopathy                  Chest:  No wheezes, symmetric air entry. No use of accessory muscles               Cardiac: Normal rate and regular rhythm            Abdomen: Soft, nontender, nondistended      Extremities:   2+ R DP pulse, 2+ L DP pulse      no+ RLE edema, no+ LLE edema    Skin:  RLE no ulcer; LLE no ulcer      RLE + spider veins, LLE + spider veins      RLE no varicose veins, LLE no varicose veins    CEAP 1/1    LAB RESULTS:  No results found for: CBC  No results found for: LABPROT, INR  Lab Results   Component Value Date     08/20/2019    K 3.8 08/20/2019    CL 97 08/20/2019    CO2 31 (H) 08/20/2019    GLU 93 08/20/2019    BUN 12 08/20/2019    CREATININE 0.7 08/20/2019    CALCIUM 9.9 08/20/2019    ANIONGAP 9 08/20/2019    EGFRNONAA >60.0 08/20/2019     Lab Results   Component Value Date    WBC 10.64 10/11/2018    RBC 4.57 10/11/2018    HGB 14.1 10/11/2018    HCT 42.0 10/11/2018    MCV 92 10/11/2018    MCH 30.9 10/11/2018    MCHC 33.6 10/11/2018    RDW 12.7 10/11/2018     10/11/2018    MPV 8.6 (L) 10/11/2018    GRAN 7.3 10/11/2018    GRAN 68.9 10/11/2018    LYMPH 1.9 10/11/2018    LYMPH 17.4 (L) 10/11/2018    MONO 1.0 10/11/2018    MONO 9.0 10/11/2018    EOS 0.4 10/11/2018    BASO 0.03 10/11/2018    EOSINOPHIL 3.9 10/11/2018     BASOPHIL 0.3 10/11/2018    DIFFMETHOD Automated 10/11/2018     .  Lab Results   Component Value Date    HGBA1C 6.2 06/18/2010       IMAGING:  All pertinent imaging has been reviewed and interpreted independently.    Venous US 6/19/19 Impression:  No evidence of deep venous thrombosis in either lower extremity.  There is hemodynamically significant reflux noted within the right greater saphenous vein at the knee and left greater saphenous vein at the distal thigh and knee.    IMP/PLAN:  74 y.o. female with   Patient Active Problem List   Diagnosis    Asthma, well controlled    Anemia    Muscle cramps    Depression    Hyperlipidemia    Osteoporosis, post-menopausal    Avascular necrosis of bone of hip    Hypertension    Closed fracture of right patella    Patella fracture    Posterior vitreous detachment of right eye    Pseudophakia of both eyes    Myopia of both eyes    Retinal hole or tear, right    PCO (posterior capsular opacification) - Both Eyes    Gastroesophageal reflux disease without esophagitis    Essential hypertension    Neck pain    Hypothyroidism (acquired)    Closed fracture of sternum    Varicose veins of left lower extremity with inflammation    being managed by PCP and specialists who is here today for f/u evaluation of LLE bleeding varicose vein and venous insufficiency.    -LLE edema, pain and heaviness with continued bleeding from varicose and spider veins despite compression, elevation and diet changes > 3 months - rec L GSV EVLT  -recommend continued compression with Rx stockings, elevation, dietary changes associated with water and sodium intake discussed at length with patient  -Exercise   -If LLE varicose veins or spider veins remain symptomatic after EVLT - rec sclerotherapy vs stab phlebectomy    I spent 11 minutes evaluating this patient and greater than 50% of the time was spent counseling, coordinator care and discussing the plan of care.  All questions were  answered and patient stated understanding with agreement with the above treatment plan.    Ector Monson MD RPVI  Vascular and Endovascular Surgery

## 2020-06-05 NOTE — PATIENT INSTRUCTIONS
Putting on Compression Stockings     Turn the stocking inside-out, then fit it over your toes and heel.          Roll the stocking up your leg.            Once stockings are on, make sure the top of the stocking is about two fingers width below the crease of the knee (or the groin if you wear thigh-high stockings).          Use equipment, such as a stocking dixon, or wear rubber gloves to make it easier to put on compression stockings.         Elastic compression stockings are prescribed to treat many vein problems. Wearing them may be the most important thing you do to manage your symptoms. The stockings fit tightly around your ankle, gradually reducing in pressure as they go up your legs. This helps keep blood flowing to your heart. As a result, swelling is reduced. Your healthcare provider will prescribe stockings at a safe pressure for you. He or she will also tell you how often to wear and remove the stockings. Follow these instructions closely. Also, do not buy or wear compression stockings without first seeing your healthcare provider.  Tips for wear and care  To wear stockings safely and to get the most benefit:  · Wear the length prescribed by your healthcare provider.  · Pull them to the designated height and no farther. Dont let them bunch at the top. This can restrict blood flow and increase swelling.  · Wear the stockings for the amount of time your healthcare provider recommends. Replace them when they start to feel loose. This will likely be every 3 to 6 months.  · Remove them as your healthcare provider directs. When removed, wash your legs. Then check your legs and feet for sores. Call your healthcare provider if you find a sore. Dont put the stockings back on unless your healthcare provider directs.   · Wash the stockings as instructed. They may need to be hand-washed.  Date Last Reviewed: 5/1/2016  © 4961-1315 [employer]. [address] [place] [address], [place] [address]. All rights  reserved. This information is not intended as a substitute for professional medical care. Always follow your healthcare professional's instructions.        Understanding Chronic Venous Insufficiency  Problems with the veins in the legs may lead to chronic venous insufficiency (CVI). CVI means that there is a long-term problem with the veins not being able to pump blood back to your heart. When this happens, blood stays in the legs and causes swelling and aching.   Two problems that may lead to chronic venous insufficiency are:  · Damaged valves. Valves keep blood flowing from the legs through the blood vessels and back to the heart. When the valves are damaged, blood does not flow as well.   · Deep vein thrombosis (DVT). Blood clots may form in the deep veins of the legs. This may cause pain, redness, and swelling in the legs. It may also block the flow of blood back to the heart. Seek immediate medical care if you have these symptoms.  · A blood clot in the leg can also break off and travel to the lungs. This is called pulmonary embolism (PE). In the lungs, the clot can cut off the flow of blood. This may cause chest pain, trouble breathing, sweating, a fast heartbeat, coughing (may cough up blood), and fainting. It is a medical emergency and may cause death. Call 911 if you have these symptoms.  · Healthcare providers call the two conditions, DVT and PE, venous thromboembolism (VTE).  CVI cant be cured, but you can control leg swelling to reduce the likelihood of ulcers (sores).  Recognizing the symptoms  Be aware of the following:  · If you stand or sit with your feet down for long periods, your legs may ache or feel heavy.  · Swollen ankles are possibly the most common symptom of CVI.  · As swelling increases, the skin over your ankles may show red spots or a brownish tinge. The skin may feel leathery or scaly, and may start to itch.  · If swelling is not controlled, an ulcer (open wound) may form.  What you  can do  Reduce your risk of developing ulcers by doing the following:  · Increase blood flow back to your heart by elevating your legs, exercising daily, and wearing elastic stockings.  · Boost blood flow in your legs by losing excess weight.  · If you must stand or sit in one place for a period of time, keep your blood moving by wiggling your toes, shifting your body position, and rising up on the balls of your feet.    Date Last Reviewed: 5/1/2016 © 2000-2017 [employer]. [address] [place] [address], [place] [address]. All rights reserved. This information is not intended as a substitute for professional medical care. Always follow your healthcare professional's instructions.        Tips for Using Less Salt    Most people with heart problems need to eat less salt (sodium). Reducing the amount of salt you eat may help control your blood pressure. The higher your blood pressure, the greater your risk for heart disease, stroke, blindness, and kidney problems.  At the store  · Make low-salt choices by reading labels carefully. Look for the total amount of sodium per serving.  · Use more fresh food. Buy more fruits and vegetables. Select lean meats, fish, and poultry.  · Use fewer frozen, canned, and packaged foods which often contain a lot of sodium.  · Use plain frozen vegetables without sauces or toppings. These products are often low- or no-sodium.  · Opt for reduced-sodium or no-salt-added versions of canned vegetables and soups.  In the kitchen  · Don't add salt to food when you're cooking. Season with flavorings such as onion, garlic, pepper, salt-free herbal blends, and lemon or lime juice.  · Use a cookbook containing low-salt recipes. It can give you ideas for tasty meals that are healthy for your heart.  · Sprinkle salt-free herbal blends on vegetables and meat.  · Drain and rinse canned foods, such as canned beans and vegetables, before cooking or eating.  Eating out  · Tell the  you're on a  low-salt diet. Ask questions about the menu.  · Order fish, chicken, and meat broiled, baked, poached, or grilled without salt, butter, or breading.  · Use lemon, pepper, and salt-free herb mixes to add flavor.  · Choose plain steamed rice, boiled noodles, and baked or boiled potatoes. Top potatoes with chives and a little sour cream.     Beware! Salt goes by many other names. Limit foods with these words listed as ingredients: salt, sodium, soy sauce, baking soda, baking powder, MSG, monosodium, Na (the chemical symbol for sodium). Some antacids are also high in salt.   Date Last Reviewed: 6/19/2015 © 2000-2017 [employer]. [address] [place] [address]. All rights reserved. This information is not intended as a substitute for professional medical care. Always follow your healthcare professional's instructions.        Low-Salt Diet  This diet removes foods that are high in salt. It also limits the amount of salt you use when cooking. It is most often used for people with high blood pressure, edema (fluid retention), and kidney, liver, or heart disease.  Table salt contains the mineral sodium. Your body needs sodium to work normally. But too much sodium can make your health problems worse. Your healthcare provider is recommending a low-salt (also called low-sodium) diet for you. Your total daily allowance of salt is 1,500 to 2,300 milligrams (mg). It is less than 1 teaspoon of table salt. This means you can have only about 500 to 700 mg of sodium at each meal. People with certain health problems should limit salt intake to the lower end of the recommended range.    When you cook, dont add much salt. If you can cook without using salt, even better. Dont add salt to your food at the table.  When shopping, read food labels. Salt is often called sodium on the label. Choose foods that are salt-free, low salt, or very low salt. Note that foods with reduced salt may not lower your salt intake  enough.    Beans, potatoes, and pasta  Ok: Dry beans, split peas, lentils, potatoes, rice, macaroni, pasta, spaghetti without added salt  Avoid: Potato chips, tortilla chips, and similar products  Breads and cereals  Ok: Low-sodium breads, rolls, cereals, and cakes; low-salt crackers, matzo crackers  Avoid: Salted crackers, pretzels, popcorn, Maltese toast, pancakes, muffins  Dairy  Ok: Milk, chocolate milk, hot chocolate mix, low-salt cheeses, and yogurt  Avoid: Processed cheese and cheese spreads; Roquefort, Camembert, and cottage cheese; buttermilk, instant breakfast drink  Desserts  Ok: Ice cream, frozen yogurt, juice bars, gelatin, cookies and pies, sugar, honey, jelly, hard candy  Avoid: Most pies, cakes and cookies prepared or processed with salt; instant pudding  Drinks  Ok: Tea, coffee, fizzy (carbonated) drinks, juices  Avoid: Flavored coffees, electrolyte replacement drinks, sports drinks  Meats  Ok: All fresh meat, fish, poultry, low-salt tuna, eggs, egg substitute  Avoid: Smoked, pickled, brine-cured, or salted meats and fish. This includes rodriguez, chipped beef, corned beef, hot dogs, deli meats, ham, kosher meats, salt pork, sausage, canned tuna, salted codfish, smoked salmon, herring, sardines, or anchovies.  Seasonings and spices  Ok: Most seasonings are okay. Good substitutes for salt include: fresh herb blends, hot sauce, lemon, garlic, bello, vinegar, dry mustard, parsley, cilantro, horseradish, tomato paste, regular margarine, mayonnaise, unsalted butter, cream cheese, vegetable oil, cream, low-salt salad dressing and gravy.  Avoid: Regular ketchup, relishes, pickles, soy sauce, teriyaki sauce, Worcestershire sauce, BBQ sauce, tartar sauce, meat tenderizer, chili sauce, regular gravy, regular salad dressing, salted butter  Soups  Ok: Low-salt soups and broths made with allowed foods  Avoid: Bouillon cubes, soups with smoked or salted meats, regular soup and broth  Vegetables  Ok: Most vegetables  are okay; also low-salt tomato and vegetable juices  Avoid: Sauerkraut and other brine-soaked vegetables; pickles and other pickled vegetables; tomato juice, olives  Date Last Reviewed: 8/1/2016 © 2000-2017 [employer]. [address] [place] [address]. All rights reserved. This information is not intended as a substitute for professional medical care. Always follow your healthcare professional's instructions.        Low-Salt Choices  Eating salt (sodium) can make your body retain too much water. Excess water makes your heart work harder. Canned, packaged, and frozen foods are easy to prepare, but they are often high in sodium. Here are some ideas for low-salt foods you can easily prepare yourself.    For breakfast  · Fruit or 100% fruit juice  · Whole-wheat bread or an English muffin. Compare sodium content on labels.  · Low-fat milk or yogurt  · Unsalted eggs  · Shredded wheat  · Corn tortillas  · Unsalted steamed rice  · Regular (not instant) hot cereal, made without salt  Stay away from:  · Sausage, rodriguez, and ham  · Flour tortillas  · Packaged muffins, pancakes, and biscuits  · Instant hot cereals  · Cottage cheese  For lunch and dinner  · Fresh fish, chicken, turkey, or meat--baked, broiled, or roasted without salt  · Dry beans, cooked without salt  · Tofu, stir-fried without salt  · Unsalted fresh fruit and vegetables, or frozen or canned fruit and vegetables with no added salt  Stay away from:  · Lunch or deli meat that is cured or smoked  · Cheese  · Tomato juice and catsup  · Canned vegetables, soups, and fish not labeled as no-salt-added or reduced sodium  · Packaged gravies and sauces  · Olives, pickles, and relish  · Bottled salad dressings  For snacks and desserts  · Yogurt  · Unsalted, air popped popcorn  · Unsalted nuts or seeds  Stay away from:  · Pies and cakes  · Packaged dessert mixes  · Pizza  · Canned and packaged puddings  · Pretzels, chips, crackers, and nuts--unless  the label says unsalted  Date Last Reviewed: 6/17/2015  © 9686-9287 [employer]. [address] [place] [address], [place] [address]. All rights reserved. This information is not intended as a substitute for professional medical care. Always follow your healthcare professional's instructions.

## 2020-06-05 NOTE — LETTER
June 5, 2020        Latia Aguilar MD  [address]  [facility] [address]             [facility] [address]  [address] [address]  Phone: [phone]  Fax: [phone]   Patient: Eugenia Diaz   MR Number: [id number]   YOB: 1945   Date of Visit: 6/5/2020       Dear Dr. Aguilar:    Thank you for referring Eugenia Diaz to me for evaluation. Below are the relevant portions of my assessment and plan of care.            If you have questions, please do not hesitate to call me. I look forward to following Eugenia along with you.    Sincerely,      Ector Monson MD           CC  No Recipients

## 2020-06-12 ENCOUNTER — TELEPHONE (OUTPATIENT)
Dept: VASCULAR SURGERY | Facility: CLINIC | Age: 75
End: 2020-06-12

## 2020-06-12 DIAGNOSIS — I87.2 VENOUS INSUFFICIENCY: Primary | ICD-10-CM

## 2020-06-12 NOTE — TELEPHONE ENCOUNTER
Contacted and spoke pt about upcoming procedure, mentioned the office will call closer to appt to give pre-procedure instructions.

## 2020-06-15 ENCOUNTER — PATIENT MESSAGE (OUTPATIENT)
Dept: ENDOCRINOLOGY | Facility: CLINIC | Age: 75
End: 2020-06-15

## 2020-06-24 ENCOUNTER — PATIENT OUTREACH (OUTPATIENT)
Dept: ADMINISTRATIVE | Facility: OTHER | Age: 75
End: 2020-06-24

## 2020-06-24 NOTE — PROGRESS NOTES
Care Everywhere: updated  Immunization:   Health Maintenance:   Media Review:   Legacy Review:   Order placed:   Upcoming appts:mammogram 6.29.2020

## 2020-06-25 ENCOUNTER — OFFICE VISIT (OUTPATIENT)
Dept: OPTOMETRY | Facility: CLINIC | Age: 75
End: 2020-06-25
Payer: MEDICARE

## 2020-06-25 DIAGNOSIS — H52.4 PRESBYOPIA: ICD-10-CM

## 2020-06-25 DIAGNOSIS — Z13.5 GLAUCOMA SCREENING: ICD-10-CM

## 2020-06-25 DIAGNOSIS — H50.15 ALTERNATING EXOTROPIA: Primary | ICD-10-CM

## 2020-06-25 PROCEDURE — 92015 DETERMINE REFRACTIVE STATE: CPT | Mod: ,,, | Performed by: OPTOMETRIST

## 2020-06-25 PROCEDURE — 99999 PR PBB SHADOW E&M-EST. PATIENT-LVL II: CPT | Mod: PBBFAC,,, | Performed by: OPTOMETRIST

## 2020-06-25 PROCEDURE — 99999 PR PBB SHADOW E&M-EST. PATIENT-LVL II: ICD-10-PCS | Mod: PBBFAC,,, | Performed by: OPTOMETRIST

## 2020-06-25 PROCEDURE — 92015 PR REFRACTION: ICD-10-PCS | Mod: ,,, | Performed by: OPTOMETRIST

## 2020-06-25 PROCEDURE — 92004 COMPRE OPH EXAM NEW PT 1/>: CPT | Mod: S$PBB,,, | Performed by: OPTOMETRIST

## 2020-06-25 PROCEDURE — 92004 PR EYE EXAM, NEW PATIENT,COMPREHESV: ICD-10-PCS | Mod: S$PBB,,, | Performed by: OPTOMETRIST

## 2020-06-25 PROCEDURE — 99212 OFFICE O/P EST SF 10 MIN: CPT | Mod: PBBFAC,PO | Performed by: OPTOMETRIST

## 2020-06-25 NOTE — PROGRESS NOTES
LUCIAN WIGGINS 07/2015 with Dr. Michaels.  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  Glasses about 5 yr. Old and   near and distance blurred off and on.   Not using any drops. Patient has   prism in her glasses but when wearing she has no diplopia.    Last edited by Gina Angelo on 6/25/2020  1:15 PM. (History)        ROS     Positive for: Eyes (cat surgery/YAG)    Negative for: Constitutional, Gastrointestinal, Neurological, Skin,   Genitourinary, Musculoskeletal, HENT, Endocrine, Cardiovascular,   Respiratory, Psychiatric, Allergic/Imm, Heme/Lymph    Last edited by Ashish Kumar, OD on 6/25/2020  1:26 PM. (History)        Assessment /Plan     For exam results, see Encounter Report.    Alternating exotropia    Glaucoma screening    Presbyopia      1. 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 Sp pciol/YAG OU--pt wishes new Rx  3. 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

## 2020-06-29 ENCOUNTER — PATIENT MESSAGE (OUTPATIENT)
Dept: INTERNAL MEDICINE | Facility: CLINIC | Age: 75
End: 2020-06-29

## 2020-06-29 ENCOUNTER — HOSPITAL ENCOUNTER (OUTPATIENT)
Dept: RADIOLOGY | Facility: HOSPITAL | Age: 75
Discharge: HOME | End: 2020-06-29
Attending: OBSTETRICS & GYNECOLOGY
Payer: MEDICARE

## 2020-06-29 DIAGNOSIS — Z12.31 SCREENING MAMMOGRAM, ENCOUNTER FOR: ICD-10-CM

## 2020-06-29 PROCEDURE — 77063 MAMMO DIGITAL SCREENING BILAT WITH TOMOSYNTHESIS_CAD: ICD-10-PCS | Mod: 26,,, | Performed by: RADIOLOGY

## 2020-06-29 PROCEDURE — 77063 BREAST TOMOSYNTHESIS BI: CPT | Mod: 26,,, | Performed by: RADIOLOGY

## 2020-06-29 PROCEDURE — 77067 MAMMO DIGITAL SCREENING BILAT WITH TOMOSYNTHESIS_CAD: ICD-10-PCS | Mod: 26,,, | Performed by: RADIOLOGY

## 2020-06-29 PROCEDURE — 77067 SCR MAMMO BI INCL CAD: CPT | Mod: TC,PO

## 2020-06-29 PROCEDURE — 77067 SCR MAMMO BI INCL CAD: CPT | Mod: 26,,, | Performed by: RADIOLOGY

## 2020-07-01 ENCOUNTER — PATIENT MESSAGE (OUTPATIENT)
Dept: INTERNAL MEDICINE | Facility: CLINIC | Age: 75
End: 2020-07-01

## 2020-07-02 ENCOUNTER — PATIENT MESSAGE (OUTPATIENT)
Dept: INTERNAL MEDICINE | Facility: CLINIC | Age: 75
End: 2020-07-02

## 2020-07-02 DIAGNOSIS — E03.9 PRIMARY HYPOTHYROIDISM: Primary | ICD-10-CM

## 2020-07-02 DIAGNOSIS — M81.0 OSTEOPOROSIS, POST-MENOPAUSAL: ICD-10-CM

## 2020-07-06 ENCOUNTER — LAB VISIT (OUTPATIENT)
Dept: LAB | Facility: HOSPITAL | Age: 75
End: 2020-07-06
Attending: INTERNAL MEDICINE
Payer: MEDICARE

## 2020-07-06 DIAGNOSIS — M81.0 OSTEOPOROSIS, POST-MENOPAUSAL: ICD-10-CM

## 2020-07-06 DIAGNOSIS — E03.9 PRIMARY HYPOTHYROIDISM: ICD-10-CM

## 2020-07-06 DIAGNOSIS — E78.5 HYPERLIPIDEMIA, UNSPECIFIED HYPERLIPIDEMIA TYPE: ICD-10-CM

## 2020-07-06 DIAGNOSIS — E03.9 HYPOTHYROIDISM (ACQUIRED): ICD-10-CM

## 2020-07-06 LAB
25(OH)D3+25(OH)D2 SERPL-MCNC: 56 NG/ML (ref 30–96)
ALBUMIN SERPL BCP-MCNC: 3.9 G/DL (ref 3.5–5.2)
ALP SERPL-CCNC: 78 U/L (ref 55–135)
ALT SERPL W/O P-5'-P-CCNC: 16 U/L (ref 10–44)
ANION GAP SERPL CALC-SCNC: 7 MMOL/L (ref 8–16)
ANION GAP SERPL CALC-SCNC: 7 MMOL/L (ref 8–16)
AST SERPL-CCNC: 17 U/L (ref 10–40)
BILIRUB SERPL-MCNC: 0.6 MG/DL (ref 0.1–1)
BUN SERPL-MCNC: 11 MG/DL (ref 8–23)
BUN SERPL-MCNC: 11 MG/DL (ref 8–23)
CALCIUM SERPL-MCNC: 9.4 MG/DL (ref 8.7–10.5)
CALCIUM SERPL-MCNC: 9.4 MG/DL (ref 8.7–10.5)
CHLORIDE SERPL-SCNC: 102 MMOL/L (ref 95–110)
CHLORIDE SERPL-SCNC: 102 MMOL/L (ref 95–110)
CHOLEST SERPL-MCNC: 145 MG/DL (ref 120–199)
CHOLEST/HDLC SERPL: 2.2 {RATIO} (ref 2–5)
CO2 SERPL-SCNC: 31 MMOL/L (ref 23–29)
CO2 SERPL-SCNC: 31 MMOL/L (ref 23–29)
CREAT SERPL-MCNC: 0.7 MG/DL (ref 0.5–1.4)
CREAT SERPL-MCNC: 0.7 MG/DL (ref 0.5–1.4)
EST. GFR  (AFRICAN AMERICAN): >60 ML/MIN/1.73 M^2
EST. GFR  (AFRICAN AMERICAN): >60 ML/MIN/1.73 M^2
EST. GFR  (NON AFRICAN AMERICAN): >60 ML/MIN/1.73 M^2
EST. GFR  (NON AFRICAN AMERICAN): >60 ML/MIN/1.73 M^2
GLUCOSE SERPL-MCNC: 91 MG/DL (ref 70–110)
GLUCOSE SERPL-MCNC: 91 MG/DL (ref 70–110)
HDLC SERPL-MCNC: 66 MG/DL (ref 40–75)
HDLC SERPL: 45.5 % (ref 20–50)
LDLC SERPL CALC-MCNC: 65.4 MG/DL (ref 63–159)
NONHDLC SERPL-MCNC: 79 MG/DL
POTASSIUM SERPL-SCNC: 4.2 MMOL/L (ref 3.5–5.1)
POTASSIUM SERPL-SCNC: 4.2 MMOL/L (ref 3.5–5.1)
PROT SERPL-MCNC: 6.7 G/DL (ref 6–8.4)
SODIUM SERPL-SCNC: 140 MMOL/L (ref 136–145)
SODIUM SERPL-SCNC: 140 MMOL/L (ref 136–145)
TRIGL SERPL-MCNC: 68 MG/DL (ref 30–150)
TSH SERPL DL<=0.005 MIU/L-ACNC: 1.14 UIU/ML (ref 0.4–4)
TSH SERPL DL<=0.005 MIU/L-ACNC: 1.14 UIU/ML (ref 0.4–4)

## 2020-07-06 PROCEDURE — 80061 LIPID PANEL: CPT

## 2020-07-06 PROCEDURE — 80053 COMPREHEN METABOLIC PANEL: CPT

## 2020-07-06 PROCEDURE — 36415 COLL VENOUS BLD VENIPUNCTURE: CPT | Mod: PO

## 2020-07-06 PROCEDURE — 84443 ASSAY THYROID STIM HORMONE: CPT

## 2020-07-06 PROCEDURE — 82306 VITAMIN D 25 HYDROXY: CPT

## 2020-07-07 ENCOUNTER — PATIENT MESSAGE (OUTPATIENT)
Dept: INTERNAL MEDICINE | Facility: CLINIC | Age: 75
End: 2020-07-07

## 2020-07-07 DIAGNOSIS — L65.9 ALOPECIA: Primary | ICD-10-CM

## 2020-07-07 RX ORDER — SODIUM CHLORIDE 0.9 % (FLUSH) 0.9 %
10 SYRINGE (ML) INJECTION
Status: CANCELLED | OUTPATIENT
Start: 2020-07-07

## 2020-07-07 RX ORDER — ZOLEDRONIC ACID 5 MG/100ML
5 INJECTION, SOLUTION INTRAVENOUS
Status: CANCELLED | OUTPATIENT
Start: 2020-07-07

## 2020-07-09 RX ORDER — ZOLEDRONIC ACID 5 MG/100ML
5 INJECTION, SOLUTION INTRAVENOUS
Status: CANCELLED | OUTPATIENT
Start: 2020-07-09

## 2020-07-15 DIAGNOSIS — Z71.89 COMPLEX CARE COORDINATION: ICD-10-CM

## 2020-08-18 RX ORDER — FLUTICASONE PROPIONATE AND SALMETEROL 500; 50 UG/1; UG/1
1 POWDER RESPIRATORY (INHALATION) 2 TIMES DAILY
Qty: 3 INHALER | Refills: 3 | Status: SHIPPED | OUTPATIENT
Start: 2020-08-18 | End: 2020-08-21 | Stop reason: SDUPTHER

## 2020-08-18 NOTE — TELEPHONE ENCOUNTER
No new care gaps identified.  Powered by [employer]. Reference number: [id number]. 8/18/2020 11:27:10 AM   KAYT

## 2020-08-21 ENCOUNTER — TELEPHONE (OUTPATIENT)
Dept: INTERNAL MEDICINE | Facility: CLINIC | Age: 75
End: 2020-08-21

## 2020-08-21 RX ORDER — FLUTICASONE PROPIONATE AND SALMETEROL 500; 50 UG/1; UG/1
1 POWDER RESPIRATORY (INHALATION) 2 TIMES DAILY
Qty: 3 INHALER | Refills: 3 | Status: SHIPPED | OUTPATIENT
Start: 2020-08-21 | End: 2020-09-28 | Stop reason: SDUPTHER

## 2020-08-21 NOTE — TELEPHONE ENCOUNTER
pls send letter dated today to   : [facility]  [facility],[address], [place]  [address].    And fax to pt:  [phone].

## 2020-08-21 NOTE — LETTER
August 21, 2020    Eugenia Diaz  [address]  [facility] [address]             [facility]  [address]  [place] [address]  Phone: [phone]  Fax: [phone] To whom it may concern:    Eugenia Diaz is under my care. I certify  that she medically  qualifies for  a 4.45% mobility tax exemption  for her new wheelchair accessible van.      Sincerely,        Latia Aguilar MD

## 2020-08-25 ENCOUNTER — TELEPHONE (OUTPATIENT)
Dept: VASCULAR SURGERY | Facility: CLINIC | Age: 75
End: 2020-08-25

## 2020-08-27 ENCOUNTER — TELEPHONE (OUTPATIENT)
Dept: INFECTIOUS DISEASES | Facility: HOSPITAL | Age: 75
End: 2020-08-27

## 2020-08-28 ENCOUNTER — INFUSION (OUTPATIENT)
Dept: INFECTIOUS DISEASES | Facility: HOSPITAL | Age: 75
End: 2020-08-28
Attending: INTERNAL MEDICINE
Payer: MEDICARE

## 2020-08-28 VITALS
DIASTOLIC BLOOD PRESSURE: 71 MMHG | TEMPERATURE: 99 F | WEIGHT: 153.44 LBS | BODY MASS INDEX: 24.08 KG/M2 | HEIGHT: 67 IN | SYSTOLIC BLOOD PRESSURE: 141 MMHG | HEART RATE: 75 BPM | OXYGEN SATURATION: 94 % | RESPIRATION RATE: 16 BRPM

## 2020-08-28 DIAGNOSIS — M81.0 OSTEOPOROSIS, POST-MENOPAUSAL: Primary | ICD-10-CM

## 2020-08-28 DIAGNOSIS — Z98.890 STATUS POST ABLATION OF INCOMPETENT VEIN USING LASER: Primary | ICD-10-CM

## 2020-08-28 DIAGNOSIS — M87.059 AVASCULAR NECROSIS OF BONE OF HIP, UNSPECIFIED LATERALITY: ICD-10-CM

## 2020-08-28 DIAGNOSIS — I80.292 PHLEBITIS AND THROMBOPHLEBITIS OF OTHER DEEP VESSELS OF LEFT LOWER EXTREMITY: ICD-10-CM

## 2020-08-28 PROCEDURE — 63600175 PHARM REV CODE 636 W HCPCS: Performed by: INTERNAL MEDICINE

## 2020-08-28 PROCEDURE — 96365 THER/PROPH/DIAG IV INF INIT: CPT

## 2020-08-28 RX ORDER — ZOLEDRONIC ACID 5 MG/100ML
5 INJECTION, SOLUTION INTRAVENOUS
Status: CANCELLED | OUTPATIENT
Start: 2020-08-28

## 2020-08-28 RX ORDER — SODIUM CHLORIDE 0.9 % (FLUSH) 0.9 %
10 SYRINGE (ML) INJECTION
Status: CANCELLED | OUTPATIENT
Start: 2020-08-28

## 2020-08-28 RX ORDER — ZOLEDRONIC ACID 5 MG/100ML
5 INJECTION, SOLUTION INTRAVENOUS
Status: COMPLETED | OUTPATIENT
Start: 2020-08-28 | End: 2020-08-28

## 2020-08-28 RX ORDER — SODIUM CHLORIDE 0.9 % (FLUSH) 0.9 %
10 SYRINGE (ML) INJECTION
Status: DISCONTINUED | OUTPATIENT
Start: 2020-08-28 | End: 2020-08-28 | Stop reason: HOSPADM

## 2020-08-28 RX ADMIN — ZOLEDRONIC ACID 5 MG: 5 INJECTION, SOLUTION INTRAVENOUS at 10:08

## 2020-08-28 NOTE — PLAN OF CARE
Patient resting in [facility], [facility].  Education provided on handwashing and infection control.

## 2020-09-03 DIAGNOSIS — Z91.89 AT HIGH RISK FOR PAIN FROM PROCEDURE: Primary | ICD-10-CM

## 2020-09-03 DIAGNOSIS — F41.9 ANXIETY DUE TO INVASIVE PROCEDURE: ICD-10-CM

## 2020-09-04 RX ORDER — ALPRAZOLAM 0.5 MG/1
TABLET ORAL
Qty: 2 TABLET | Refills: 0 | Status: SHIPPED | OUTPATIENT
Start: 2020-09-04 | End: 2020-09-28

## 2020-09-04 RX ORDER — MELOXICAM 7.5 MG/1
7.5 TABLET ORAL 2 TIMES DAILY
Qty: 14 TABLET | Refills: 0 | Status: SHIPPED | OUTPATIENT
Start: 2020-09-04 | End: 2020-09-11

## 2020-09-09 ENCOUNTER — OFFICE VISIT (OUTPATIENT)
Dept: OPTOMETRY | Facility: CLINIC | Age: 75
End: 2020-09-09
Payer: MEDICARE

## 2020-09-09 DIAGNOSIS — H53.15 VISUAL DISTORTIONS OF SHAPE AND SIZE: Primary | ICD-10-CM

## 2020-09-09 PROCEDURE — 99999 PR PBB SHADOW E&M-EST. PATIENT-LVL II: ICD-10-PCS | Mod: PBBFAC,,, | Performed by: OPTOMETRIST

## 2020-09-09 PROCEDURE — 92014 COMPRE OPH EXAM EST PT 1/>: CPT | Mod: S$PBB,,, | Performed by: OPTOMETRIST

## 2020-09-09 PROCEDURE — 92134 OCT, RETINA - OU - BOTH EYES: ICD-10-PCS | Mod: 26,S$PBB,, | Performed by: OPTOMETRIST

## 2020-09-09 PROCEDURE — 92134 CPTRZ OPH DX IMG PST SGM RTA: CPT | Mod: PBBFAC,PO | Performed by: OPTOMETRIST

## 2020-09-09 PROCEDURE — 99212 OFFICE O/P EST SF 10 MIN: CPT | Mod: PBBFAC,PO | Performed by: OPTOMETRIST

## 2020-09-09 PROCEDURE — 92014 PR EYE EXAM, EST PATIENT,COMPREHESV: ICD-10-PCS | Mod: S$PBB,,, | Performed by: OPTOMETRIST

## 2020-09-09 PROCEDURE — 99999 PR PBB SHADOW E&M-EST. PATIENT-LVL II: CPT | Mod: PBBFAC,,, | Performed by: OPTOMETRIST

## 2020-09-09 NOTE — PROGRESS NOTES
HPI     Pt feels OD became blurry 4 days ago.  Does not use any drops.  Last exam   with me just 2 mos ago    Last edited by Ashish Kumar, OD on 9/9/2020  1:52 PM. (History)        ROS     Positive for: Eyes (cat surgery/YAG)    Negative for: Constitutional, Gastrointestinal, Neurological, Skin,   Genitourinary, Musculoskeletal, HENT, Endocrine, Cardiovascular,   Respiratory, Psychiatric, Allergic/Imm, Heme/Lymph    Last edited by Ashish Kumar, OD on 9/9/2020  1:52 PM. (History)        Assessment /Plan     For exam results, see Encounter Report.    Visual distortions of shape and size  -     OCT, Retina - OU - Both Eyes      See notes from 2 mos ago:  1. 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 Sp pciol/YAG OU--pt wishes new Rx  3. 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  Retina clear/mac OCT wnl.  Suspect dry eye problems    PLAN:    1. SOOTHE XP ATs QID  2. Pt will call if VA problems persist w ATs, and I will get VF to ro other causes of blurry VA.  Otherwise rtc next summer as sched

## 2020-09-11 ENCOUNTER — PROCEDURE VISIT (OUTPATIENT)
Dept: VASCULAR SURGERY | Facility: CLINIC | Age: 75
End: 2020-09-11
Payer: MEDICARE

## 2020-09-11 VITALS
DIASTOLIC BLOOD PRESSURE: 73 MMHG | WEIGHT: 148 LBS | HEIGHT: 67 IN | BODY MASS INDEX: 23.23 KG/M2 | HEART RATE: 77 BPM | SYSTOLIC BLOOD PRESSURE: 113 MMHG

## 2020-09-11 DIAGNOSIS — Z91.89 AT HIGH RISK FOR PAIN FROM PROCEDURE: Primary | ICD-10-CM

## 2020-09-11 DIAGNOSIS — Z98.890 STATUS POST ABLATION OF INCOMPETENT VEIN USING LASER: Primary | ICD-10-CM

## 2020-09-11 DIAGNOSIS — I80.292 PHLEBITIS AND THROMBOPHLEBITIS OF OTHER DEEP VESSELS OF LEFT LOWER EXTREMITY: ICD-10-CM

## 2020-09-11 DIAGNOSIS — F41.9 ANXIETY DUE TO INVASIVE PROCEDURE: ICD-10-CM

## 2020-09-11 DIAGNOSIS — I87.2 VENOUS INSUFFICIENCY: ICD-10-CM

## 2020-09-11 PROCEDURE — 36478 ENDOVENOUS LASER 1ST VEIN: CPT | Mod: LT,S$GLB,, | Performed by: SURGERY

## 2020-09-11 PROCEDURE — 36478 PR ENDOVENOUS LASER, 1ST VEIN: ICD-10-PCS | Mod: LT,S$GLB,, | Performed by: SURGERY

## 2020-09-11 NOTE — PATIENT INSTRUCTIONS
Putting on Compression Stockings     Turn the stocking inside-out, then fit it over your toes and heel.          Roll the stocking up your leg.            Once stockings are on, make sure the top of the stocking is about two fingers width below the crease of the knee (or the groin if you wear thigh-high stockings).          Use equipment, such as a stocking dixon, or wear rubber gloves to make it easier to put on compression stockings.         Elastic compression stockings are prescribed to treat many vein problems. Wearing them may be the most important thing you do to manage your symptoms. The stockings fit tightly around your ankle, gradually reducing in pressure as they go up your legs. This helps keep blood flowing to your heart. As a result, swelling is reduced. Your healthcare provider will prescribe stockings at a safe pressure for you. He or she will also tell you how often to wear and remove the stockings. Follow these instructions closely. Also, do not buy or wear compression stockings without first seeing your healthcare provider.  Tips for wear and care  To wear stockings safely and to get the most benefit:  · Wear the length prescribed by your healthcare provider.  · Pull them to the designated height and no farther. Dont let them bunch at the top. This can restrict blood flow and increase swelling.  · Wear the stockings for the amount of time your healthcare provider recommends. Replace them when they start to feel loose. This will likely be every 3 to 6 months.  · Remove them as your healthcare provider directs. When removed, wash your legs. Then check your legs and feet for sores. Call your healthcare provider if you find a sore. Dont put the stockings back on unless your healthcare provider directs.   · Wash the stockings as instructed. They may need to be hand-washed.  Date Last Reviewed: 5/1/2016  © 8409-2445 [employer] [address] [place] [address], [place] [address]. All rights  reserved. This information is not intended as a substitute for professional medical care. Always follow your healthcare professional's instructions.        Understanding Chronic Venous Insufficiency  Problems with the veins in the legs may lead to chronic venous insufficiency (CVI). CVI means that there is a long-term problem with the veins not being able to pump blood back to your heart. When this happens, blood stays in the legs and causes swelling and aching.   Two problems that may lead to chronic venous insufficiency are:  · Damaged valves. Valves keep blood flowing from the legs through the blood vessels and back to the heart. When the valves are damaged, blood does not flow as well.   · Deep vein thrombosis (DVT). Blood clots may form in the deep veins of the legs. This may cause pain, redness, and swelling in the legs. It may also block the flow of blood back to the heart. Seek immediate medical care if you have these symptoms.  · A blood clot in the leg can also break off and travel to the lungs. This is called pulmonary embolism (PE). In the lungs, the clot can cut off the flow of blood. This may cause chest pain, trouble breathing, sweating, a fast heartbeat, coughing (may cough up blood), and fainting. It is a medical emergency and may cause death. Call 911 if you have these symptoms.  · Healthcare providers call the two conditions, DVT and PE, venous thromboembolism (VTE).  CVI cant be cured, but you can control leg swelling to reduce the likelihood of ulcers (sores).  Recognizing the symptoms  Be aware of the following:  · If you stand or sit with your feet down for long periods, your legs may ache or feel heavy.  · Swollen ankles are possibly the most common symptom of CVI.  · As swelling increases, the skin over your ankles may show red spots or a brownish tinge. The skin may feel leathery or scaly, and may start to itch.  · If swelling is not controlled, an ulcer (open wound) may form.  What you  can do  Reduce your risk of developing ulcers by doing the following:  · Increase blood flow back to your heart by elevating your legs, exercising daily, and wearing elastic stockings.  · Boost blood flow in your legs by losing excess weight.  · If you must stand or sit in one place for a period of time, keep your blood moving by wiggling your toes, shifting your body position, and rising up on the balls of your feet.    Date Last Reviewed: 5/1/2016 © 2000-2017 [employer]. [address] [place] [address], [place] [address]. All rights reserved. This information is not intended as a substitute for professional medical care. Always follow your healthcare professional's instructions.        Tips for Using Less Salt    Most people with heart problems need to eat less salt (sodium). Reducing the amount of salt you eat may help control your blood pressure. The higher your blood pressure, the greater your risk for heart disease, stroke, blindness, and kidney problems.  At the store  · Make low-salt choices by reading labels carefully. Look for the total amount of sodium per serving.  · Use more fresh food. Buy more fruits and vegetables. Select lean meats, fish, and poultry.  · Use fewer frozen, canned, and packaged foods which often contain a lot of sodium.  · Use plain frozen vegetables without sauces or toppings. These products are often low- or no-sodium.  · Opt for reduced-sodium or no-salt-added versions of canned vegetables and soups.  In the kitchen  · Don't add salt to food when you're cooking. Season with flavorings such as onion, garlic, pepper, salt-free herbal blends, and lemon or lime juice.  · Use a cookbook containing low-salt recipes. It can give you ideas for tasty meals that are healthy for your heart.  · Sprinkle salt-free herbal blends on vegetables and meat.  · Drain and rinse canned foods, such as canned beans and vegetables, before cooking or eating.  Eating out  · Tell the  you're on a  low-salt diet. Ask questions about the menu.  · Order fish, chicken, and meat broiled, baked, poached, or grilled without salt, butter, or breading.  · Use lemon, pepper, and salt-free herb mixes to add flavor.  · Choose plain steamed rice, boiled noodles, and baked or boiled potatoes. Top potatoes with chives and a little sour cream.     Beware! Salt goes by many other names. Limit foods with these words listed as ingredients: salt, sodium, soy sauce, baking soda, baking powder, MSG, monosodium, Na (the chemical symbol for sodium). Some antacids are also high in salt.   Date Last Reviewed: 6/19/2015 © 2000-2017 [employer]. [address] [place] [address]. All rights reserved. This information is not intended as a substitute for professional medical care. Always follow your healthcare professional's instructions.        Low-Salt Diet  This diet removes foods that are high in salt. It also limits the amount of salt you use when cooking. It is most often used for people with high blood pressure, edema (fluid retention), and kidney, liver, or heart disease.  Table salt contains the mineral sodium. Your body needs sodium to work normally. But too much sodium can make your health problems worse. Your healthcare provider is recommending a low-salt (also called low-sodium) diet for you. Your total daily allowance of salt is 1,500 to 2,300 milligrams (mg). It is less than 1 teaspoon of table salt. This means you can have only about 500 to 700 mg of sodium at each meal. People with certain health problems should limit salt intake to the lower end of the recommended range.    When you cook, dont add much salt. If you can cook without using salt, even better. Dont add salt to your food at the table.  When shopping, read food labels. Salt is often called sodium on the label. Choose foods that are salt-free, low salt, or very low salt. Note that foods with reduced salt may not lower your salt intake  enough.    Beans, potatoes, and pasta  Ok: Dry beans, split peas, lentils, potatoes, rice, macaroni, pasta, spaghetti without added salt  Avoid: Potato chips, tortilla chips, and similar products  Breads and cereals  Ok: Low-sodium breads, rolls, cereals, and cakes; low-salt crackers, matzo crackers  Avoid: Salted crackers, pretzels, popcorn, Korean toast, pancakes, muffins  Dairy  Ok: Milk, chocolate milk, hot chocolate mix, low-salt cheeses, and yogurt  Avoid: Processed cheese and cheese spreads; Roquefort, Camembert, and cottage cheese; buttermilk, instant breakfast drink  Desserts  Ok: Ice cream, frozen yogurt, juice bars, gelatin, cookies and pies, sugar, honey, jelly, hard candy  Avoid: Most pies, cakes and cookies prepared or processed with salt; instant pudding  Drinks  Ok: Tea, coffee, fizzy (carbonated) drinks, juices  Avoid: Flavored coffees, electrolyte replacement drinks, sports drinks  Meats  Ok: All fresh meat, fish, poultry, low-salt tuna, eggs, egg substitute  Avoid: Smoked, pickled, brine-cured, or salted meats and fish. This includes rodriguez, chipped beef, corned beef, hot dogs, deli meats, ham, kosher meats, salt pork, sausage, canned tuna, salted codfish, smoked salmon, herring, sardines, or anchovies.  Seasonings and spices  Ok: Most seasonings are okay. Good substitutes for salt include: fresh herb blends, hot sauce, lemon, garlic, bello, vinegar, dry mustard, parsley, cilantro, horseradish, tomato paste, regular margarine, mayonnaise, unsalted butter, cream cheese, vegetable oil, cream, low-salt salad dressing and gravy.  Avoid: Regular ketchup, relishes, pickles, soy sauce, teriyaki sauce, Worcestershire sauce, BBQ sauce, tartar sauce, meat tenderizer, chili sauce, regular gravy, regular salad dressing, salted butter  Soups  Ok: Low-salt soups and broths made with allowed foods  Avoid: Bouillon cubes, soups with smoked or salted meats, regular soup and broth  Vegetables  Ok: Most vegetables  are okay; also low-salt tomato and vegetable juices  Avoid: Sauerkraut and other brine-soaked vegetables; pickles and other pickled vegetables; tomato juice, olives  Date Last Reviewed: 8/1/2016 © 2000-2017 [employer]. [address] [place] [address]. All rights reserved. This information is not intended as a substitute for professional medical care. Always follow your healthcare professional's instructions.        Low-Salt Choices  Eating salt (sodium) can make your body retain too much water. Excess water makes your heart work harder. Canned, packaged, and frozen foods are easy to prepare, but they are often high in sodium. Here are some ideas for low-salt foods you can easily prepare yourself.    For breakfast  · Fruit or 100% fruit juice  · Whole-wheat bread or an English muffin. Compare sodium content on labels.  · Low-fat milk or yogurt  · Unsalted eggs  · Shredded wheat  · Corn tortillas  · Unsalted steamed rice  · Regular (not instant) hot cereal, made without salt  Stay away from:  · Sausage, rodriguez, and ham  · Flour tortillas  · Packaged muffins, pancakes, and biscuits  · Instant hot cereals  · Cottage cheese  For lunch and dinner  · Fresh fish, chicken, turkey, or meat--baked, broiled, or roasted without salt  · Dry beans, cooked without salt  · Tofu, stir-fried without salt  · Unsalted fresh fruit and vegetables, or frozen or canned fruit and vegetables with no added salt  Stay away from:  · Lunch or deli meat that is cured or smoked  · Cheese  · Tomato juice and catsup  · Canned vegetables, soups, and fish not labeled as no-salt-added or reduced sodium  · Packaged gravies and sauces  · Olives, pickles, and relish  · Bottled salad dressings  For snacks and desserts  · Yogurt  · Unsalted, air popped popcorn  · Unsalted nuts or seeds  Stay away from:  · Pies and cakes  · Packaged dessert mixes  · Pizza  · Canned and packaged puddings  · Pretzels, chips, crackers, and nuts--unless  the label says unsalted  Date Last Reviewed: 6/17/2015  © 1148-0450 [employer]. [address] [place] [address], [place] [address]. All rights reserved. This information is not intended as a substitute for professional medical care. Always follow your healthcare professional's instructions.

## 2020-09-12 RX ORDER — LIDOCAINE HYDROCHLORIDE 10 MG/ML
1 INJECTION INFILTRATION; PERINEURAL
Status: DISCONTINUED | OUTPATIENT
Start: 2020-09-12 | End: 2022-11-01

## 2020-09-12 NOTE — PROCEDURES
"Eugenia Diaz is a 75 y.o. female patient.    Pulse: 77 (09/11/20 0833)  BP: 113/73 (09/11/20 0833)  Weight: 67.1 kg (148 lb) (09/11/20 0833)  Height: 5' 7" (170.2 cm) (09/11/20 0833)       Lissett Diaz    09/12/2020    Pre-Procedure Diagnosis: Left greater saphenous vein reflux; significant superficial venous insufficiency    Post-Procedure Diagnsosis: Same    Procedure:  Laser endovenous ablation of the left greater saphenous vein    Surgeon: Ector Monson MD    Anesthesia: Local    The skin of the leg was prepped and draped in sterile fashion.  Ultrasound-guidance was used throughout the procedure with a portable duplex ultrasound machine.  The GSV was cannulated below the knee using a micro-puncture system.  An 0.035 wire J-tip followed by a 4-Fr Angiodynamics sheath was placed throughout the left GSV.   Using tumescent anesthesia, the entire sheathed portion of the GSV was anesthesized keeping the vein at least one cm from the skin; Klein pump was used.  Position of the tip of the laser was then reconfirmed to be approximately 2 cm from the saphenofemoral junction.  The 1470 nm laser was then activated and the entire length of the vein was treated at ~ 49 J/cm.  The fiber and sheath were then removed intact.  Duplex confirmed occlusion of the GSV with continued patency of the common femoral vein.   The incision was closed with Steri-strips.   Sterile compression dressings and a compression stocking were applied and the patient was discharged to home in a satisfactory condition.    Cannulation site: Below-the-knee    Sheath length: 50 cm    Cooper of power: 7 W    Laser time: 362 sec    Joules: 2540.5 J                     Ector Monson  9/12/2020    "

## 2020-09-14 ENCOUNTER — HOSPITAL ENCOUNTER (OUTPATIENT)
Dept: RADIOLOGY | Facility: HOSPITAL | Age: 75
Discharge: HOME | End: 2020-09-14
Attending: SURGERY
Payer: MEDICARE

## 2020-09-14 ENCOUNTER — PATIENT OUTREACH (OUTPATIENT)
Dept: ADMINISTRATIVE | Facility: HOSPITAL | Age: 75
End: 2020-09-14

## 2020-09-14 ENCOUNTER — TELEPHONE (OUTPATIENT)
Dept: VASCULAR SURGERY | Facility: CLINIC | Age: 75
End: 2020-09-14

## 2020-09-14 DIAGNOSIS — Z98.890 STATUS POST ABLATION OF INCOMPETENT VEIN USING LASER: ICD-10-CM

## 2020-09-14 DIAGNOSIS — I80.292 PHLEBITIS AND THROMBOPHLEBITIS OF OTHER DEEP VESSELS OF LEFT LOWER EXTREMITY: ICD-10-CM

## 2020-09-14 PROCEDURE — 93971 EXTREMITY STUDY: CPT | Mod: TC,LT

## 2020-09-14 PROCEDURE — 93971 US LOWER EXTREMITY VEINS LEFT: ICD-10-PCS | Mod: 26,LT,, | Performed by: RADIOLOGY

## 2020-09-14 PROCEDURE — 93971 EXTREMITY STUDY: CPT | Mod: 26,LT,, | Performed by: RADIOLOGY

## 2020-09-14 NOTE — TELEPHONE ENCOUNTER
Informed patient that U/S results looked good, no DVT. Instructed to continue to take Meloxicam. Pt. States she had to stop because if gave her diarrhea. Instructed patient to take Ibuprofen 800mg TID for pain, if able to tolerate.Recommended enteric coated OTC. Dr. Monson notified.

## 2020-09-16 DIAGNOSIS — E03.4 HYPOTHYROIDISM DUE TO ACQUIRED ATROPHY OF THYROID: ICD-10-CM

## 2020-09-16 NOTE — TELEPHONE ENCOUNTER
No new care gaps identified.  Powered by [employer]. Reference number: [id number]. 9/16/2020 1:50:17 PM CDT

## 2020-09-16 NOTE — TELEPHONE ENCOUNTER
Encounter details (provider/department) have been updated by [facility] staff.   Of note, HF details may not display.     As of this time CDM: Does not populate or display     Updated: Department    Of note. CDM should display. medication Is delegated and encounter details have been updated    Will resend refill request encounter to P Centralized Refill Staff Pool.     [facility]     Note composed:1:49 PM 09/16/2020

## 2020-09-17 RX ORDER — LEVOTHYROXINE SODIUM 175 UG/1
TABLET ORAL
Qty: 90 TABLET | Refills: 0 | Status: SHIPPED | OUTPATIENT
Start: 2020-09-17 | End: 2020-09-28 | Stop reason: SDUPTHER

## 2020-09-17 NOTE — PROGRESS NOTES
Refill Authorization Note   Eugenia Diaz is requesting a refill authorization.     Brief assessment and rationale for refill: APPROVE: prr  Name and strength of medication: levothyroxine (SYNTHROID, LEVOTHROID) 175 MCG tablet       Medication Therapy Plan: CDMR. FOVS; approve     Medication reconciliation completed: No                    Comments:      Orders Placed This Encounter    levothyroxine (SYNTHROID, LEVOTHROID) 175 MCG tablet      Requested Prescriptions   Signed Prescriptions Disp Refills    levothyroxine (SYNTHROID, LEVOTHROID) 175 MCG tablet 90 tablet 0     Sig: TAKE 1 TABLET BY MOUTH DAILY       Endocrinology:  Hypothyroid Agents Failed - 9/17/2020 12:58 PM        Failed - Manual Review: FT4 is not required if last TSH is WNL.        Passed - Patient is at least 18 years old        Passed - Office visit in past 12 months or future 90 days.     Recent Outpatient Visits            1 week ago Visual distortions of shape and size    [place] - Optometry Ashish Kumar, OD    2 months ago Alternating exotropia    [place] - Optometry Ashish Kumar, OD    3 months ago Venous insufficiency of both lower extremities    [facility] [facility] [address] Ector Monson MD    10 months ago Venous insufficiency of both lower extremities    [facility] [address] Ector Monson MD    1 year ago Osteoporosis, post-menopausal    [facility] Ruma Christianson MD          Future Appointments              In 1 week FLU, INTERNAL MEDICINE Jamison letha [facility], [facility]    In 1 week MD Jamison Leonardo [facility], [facility]    In 3 weeks [facility]    In 3 weeks Ector Monson MD [facility] [place] [id number], [facility]                Passed - TSH in normal range and within 360 days     TSH   Date Value Ref Range Status   07/06/2020 1.142 0.400 - 4.000 uIU/mL Final   07/06/2020  1.142 0.400 - 4.000 uIU/mL Final   08/13/2019 2.141 0.400 - 4.000 uIU/mL Final              Passed - T4 free within 1080 days     Free T4   Date Value Ref Range Status   06/11/2019 1.55 (H) 0.71 - 1.51 ng/dL Final   01/17/2017 0.84 0.71 - 1.51 ng/dL Final   04/28/2015 0.86 0.71 - 1.51 ng/dL Final                  Appointments  past 12m or future 3m with PCP    Date Provider   Last Visit   8/13/2019 Latia Aguilar MD   Next Visit   9/28/2020 Latia Aguilar MD   ED visits in past 90 days: 0     Note composed:1:14 PM 09/17/2020

## 2020-09-18 ENCOUNTER — PATIENT MESSAGE (OUTPATIENT)
Dept: INTERNAL MEDICINE | Facility: CLINIC | Age: 75
End: 2020-09-18

## 2020-09-18 ENCOUNTER — PATIENT MESSAGE (OUTPATIENT)
Dept: OPTOMETRY | Facility: CLINIC | Age: 75
End: 2020-09-18

## 2020-09-23 ENCOUNTER — PATIENT MESSAGE (OUTPATIENT)
Dept: INTERNAL MEDICINE | Facility: CLINIC | Age: 75
End: 2020-09-23

## 2020-09-24 ENCOUNTER — PATIENT OUTREACH (OUTPATIENT)
Dept: ADMINISTRATIVE | Facility: OTHER | Age: 75
End: 2020-09-24

## 2020-09-25 ENCOUNTER — OFFICE VISIT (OUTPATIENT)
Dept: OPHTHALMOLOGY | Facility: CLINIC | Age: 75
End: 2020-09-25
Payer: MEDICARE

## 2020-09-25 DIAGNOSIS — H16.223 KERATOCONJUNCTIVITIS SICCA NOT SPECIFIED AS SJOGREN'S, BILATERAL: Primary | ICD-10-CM

## 2020-09-25 DIAGNOSIS — H04.123 DRY EYE SYNDROME, BILATERAL: ICD-10-CM

## 2020-09-25 PROCEDURE — 99999 PR PBB SHADOW E&M-EST. PATIENT-LVL III: ICD-10-PCS | Mod: PBBFAC,,, | Performed by: OPHTHALMOLOGY

## 2020-09-25 PROCEDURE — 92014 PR EYE EXAM, EST PATIENT,COMPREHESV: ICD-10-PCS | Mod: S$PBB,,, | Performed by: OPHTHALMOLOGY

## 2020-09-25 PROCEDURE — 99999 PR PBB SHADOW E&M-EST. PATIENT-LVL III: CPT | Mod: PBBFAC,,, | Performed by: OPHTHALMOLOGY

## 2020-09-25 PROCEDURE — 99213 OFFICE O/P EST LOW 20 MIN: CPT | Mod: PBBFAC | Performed by: OPHTHALMOLOGY

## 2020-09-25 PROCEDURE — 92014 COMPRE OPH EXAM EST PT 1/>: CPT | Mod: S$PBB,,, | Performed by: OPHTHALMOLOGY

## 2020-09-25 NOTE — PROGRESS NOTES
"HPI     Refer by Dr. Kumar    ELAYNE ou    Soothe ou prn  Breanna ou prn- pt states improves symptoms    Patient her today with c/o blurry VA and mucus ou. C/o "shooting" pain OD.      Last edited by Lacey Yanez on 9/25/2020  2:57 PM. (History)            Assessment /Plan     For exam results, see Encounter Report.    Keratoconjunctivitis sicca not specified as Sjogren's, bilateral    Dry eye syndrome, bilateral      Keratitis sicca OD>OS  - pt says NI with soothe gtts, but noted some improvement with breanna  - samples of systane complete given -- if NI, can try xiidra vs. Restasis.    F/up tod 3 mo                  "

## 2020-09-28 ENCOUNTER — IMMUNIZATION (OUTPATIENT)
Dept: INTERNAL MEDICINE | Facility: CLINIC | Age: 75
End: 2020-09-28
Payer: MEDICARE

## 2020-09-28 ENCOUNTER — OFFICE VISIT (OUTPATIENT)
Dept: INTERNAL MEDICINE | Facility: CLINIC | Age: 75
End: 2020-09-28
Payer: MEDICARE

## 2020-09-28 VITALS
SYSTOLIC BLOOD PRESSURE: 122 MMHG | DIASTOLIC BLOOD PRESSURE: 78 MMHG | WEIGHT: 153.13 LBS | HEIGHT: 67 IN | OXYGEN SATURATION: 95 % | HEART RATE: 76 BPM | BODY MASS INDEX: 24.03 KG/M2

## 2020-09-28 DIAGNOSIS — E03.4 HYPOTHYROIDISM DUE TO ACQUIRED ATROPHY OF THYROID: ICD-10-CM

## 2020-09-28 DIAGNOSIS — E87.6 HYPOKALEMIA: ICD-10-CM

## 2020-09-28 DIAGNOSIS — J45.909 ASTHMA, WELL CONTROLLED, UNSPECIFIED ASTHMA SEVERITY, UNSPECIFIED WHETHER PERSISTENT: ICD-10-CM

## 2020-09-28 DIAGNOSIS — E03.9 HYPOTHYROIDISM (ACQUIRED): ICD-10-CM

## 2020-09-28 DIAGNOSIS — F43.21 ADJUSTMENT DISORDER WITH DEPRESSED MOOD: ICD-10-CM

## 2020-09-28 DIAGNOSIS — I10 ESSENTIAL HYPERTENSION: ICD-10-CM

## 2020-09-28 DIAGNOSIS — M81.0 OSTEOPOROSIS, POST-MENOPAUSAL: Primary | ICD-10-CM

## 2020-09-28 DIAGNOSIS — M87.059 AVASCULAR NECROSIS OF BONE OF HIP, UNSPECIFIED LATERALITY: ICD-10-CM

## 2020-09-28 DIAGNOSIS — E78.5 HYPERLIPIDEMIA, UNSPECIFIED HYPERLIPIDEMIA TYPE: ICD-10-CM

## 2020-09-28 PROBLEM — I83.12 VARICOSE VEINS OF LEFT LOWER EXTREMITY WITH INFLAMMATION: Status: RESOLVED | Noted: 2019-01-21 | Resolved: 2020-09-28

## 2020-09-28 PROCEDURE — 99213 OFFICE O/P EST LOW 20 MIN: CPT | Mod: PBBFAC | Performed by: INTERNAL MEDICINE

## 2020-09-28 PROCEDURE — 99214 OFFICE O/P EST MOD 30 MIN: CPT | Mod: S$PBB,,, | Performed by: INTERNAL MEDICINE

## 2020-09-28 PROCEDURE — 99999 PR PBB SHADOW E&M-EST. PATIENT-LVL III: ICD-10-PCS | Mod: PBBFAC,,, | Performed by: INTERNAL MEDICINE

## 2020-09-28 PROCEDURE — 99214 PR OFFICE/OUTPT VISIT, EST, LEVL IV, 30-39 MIN: ICD-10-PCS | Mod: S$PBB,,, | Performed by: INTERNAL MEDICINE

## 2020-09-28 PROCEDURE — 99999 PR PBB SHADOW E&M-EST. PATIENT-LVL III: CPT | Mod: PBBFAC,,, | Performed by: INTERNAL MEDICINE

## 2020-09-28 PROCEDURE — 90686 IIV4 VACC NO PRSV 0.5 ML IM: CPT | Mod: PBBFAC

## 2020-09-28 RX ORDER — ATORVASTATIN CALCIUM 80 MG/1
80 TABLET, FILM COATED ORAL NIGHTLY
Qty: 90 TABLET | Refills: 3 | Status: SHIPPED | OUTPATIENT
Start: 2020-09-28 | End: 2021-06-29

## 2020-09-28 RX ORDER — FLUTICASONE PROPIONATE AND SALMETEROL 500; 50 UG/1; UG/1
1 POWDER RESPIRATORY (INHALATION) 2 TIMES DAILY
Qty: 3 INHALER | Refills: 3 | Status: SHIPPED | OUTPATIENT
Start: 2020-09-28 | End: 2021-10-05 | Stop reason: SDUPTHER

## 2020-09-28 RX ORDER — PANTOPRAZOLE SODIUM 40 MG/1
40 TABLET, DELAYED RELEASE ORAL DAILY
Qty: 90 TABLET | Refills: 3 | Status: SHIPPED | OUTPATIENT
Start: 2020-09-28 | End: 2021-06-29

## 2020-09-28 RX ORDER — LEVOTHYROXINE SODIUM 175 UG/1
175 TABLET ORAL DAILY
Qty: 90 TABLET | Refills: 3 | Status: SHIPPED | OUTPATIENT
Start: 2020-09-28 | End: 2021-07-07 | Stop reason: SDUPTHER

## 2020-09-28 RX ORDER — POTASSIUM CHLORIDE 750 MG/1
10 TABLET, EXTENDED RELEASE ORAL DAILY
Qty: 90 TABLET | Refills: 3 | Status: SHIPPED | OUTPATIENT
Start: 2020-09-28 | End: 2021-06-29

## 2020-09-28 RX ORDER — SERTRALINE HYDROCHLORIDE 100 MG/1
200 TABLET, FILM COATED ORAL DAILY
Qty: 180 TABLET | Refills: 3 | Status: SHIPPED | OUTPATIENT
Start: 2020-09-28 | End: 2021-06-29

## 2020-09-28 RX ORDER — TRIAMTERENE AND HYDROCHLOROTHIAZIDE 37.5; 25 MG/1; MG/1
1 CAPSULE ORAL EVERY MORNING
Qty: 90 CAPSULE | Refills: 3 | Status: SHIPPED | OUTPATIENT
Start: 2020-09-28 | End: 2021-06-29

## 2020-09-28 NOTE — PROGRESS NOTES
Subjective:       Patient ID: Eugenia Diaz is a 75 y.o. female.    Chief Complaint: Annual Exam    HPI    with MS  of Covid.  Pt herself was never ill.  She has taken zoloft 200 mg daily for some time, and she'd like to continue.    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  She had a venous procedure and is wearing support stockings.  She is much more comfortable.     Dx with extreme dry eye.      Osteoporosis, s/p multiple fractures.  Reclast in August.    C/o small hernia L lower abdomen.  Reducible.  occurred when propping up disabled .    BMI stable at 24..    Asthma stable.  Insurance wouldn't cover xopenex, but she is doing ok without it.  atrovent was approved.  Review of Systems   Constitutional: Negative for fever and unexpected weight change.   HENT: Negative for nasal congestion and postnasal drip.    Respiratory: Negative for chest tightness, shortness of breath and wheezing.    Cardiovascular: Negative for chest pain and leg swelling.   Gastrointestinal: Negative for abdominal pain, anal bleeding, constipation, diarrhea, nausea and vomiting.   Genitourinary: Negative for dysuria and urgency.   Integumentary:  Negative for rash.   Neurological: Negative for headaches.   Psychiatric/Behavioral: Negative for dysphoric mood and sleep disturbance. The patient is not nervous/anxious.          Objective:      Physical Exam  Constitutional:       Appearance: She is well-developed.   Eyes:      General: No scleral icterus.  Neck:      Thyroid: No thyromegaly.      Vascular: No JVD.   Cardiovascular:      Rate and Rhythm: Normal rate and regular rhythm.      Heart sounds: Normal heart sounds.   Pulmonary:      Effort: Pulmonary effort is normal. No respiratory distress.      Breath sounds: Normal breath sounds. No wheezing or rales.   Abdominal:      Palpations: Abdomen is soft. There is no mass.      Tenderness: There is no abdominal tenderness.      Comments: Reducible small hernia L lower quadrant.    Skin:     Comments: Cracked dry skin forehead above nose.     Neurological:      Mental Status: She is alert and oriented to person, place, and time.   Psychiatric:         Behavior: Behavior normal.         Results for orders placed or performed in visit on 07/06/20   Comprehensive metabolic panel   Result Value Ref Range    Sodium 140 136 - 145 mmol/L    Potassium 4.2 3.5 - 5.1 mmol/L    Chloride 102 95 - 110 mmol/L    CO2 31 (H) 23 - 29 mmol/L    Glucose 91 70 - 110 mg/dL    BUN, Bld 11 8 - 23 mg/dL    Creatinine 0.7 0.5 - 1.4 mg/dL    Calcium 9.4 8.7 - 10.5 mg/dL    Total Protein 6.7 6.0 - 8.4 g/dL    Albumin 3.9 3.5 - 5.2 g/dL    Total Bilirubin 0.6 0.1 - 1.0 mg/dL    Alkaline Phosphatase 78 55 - 135 U/L    AST 17 10 - 40 U/L    ALT 16 10 - 44 U/L    Anion Gap 7 (L) 8 - 16 mmol/L    eGFR if African American >60.0 >60 mL/min/1.73 m^2    eGFR if non African American >60.0 >60 mL/min/1.73 m^2   Lipid Panel   Result Value Ref Range    Cholesterol 145 120 - 199 mg/dL    Triglycerides 68 30 - 150 mg/dL    HDL 66 40 - 75 mg/dL    LDL Cholesterol 65.4 63.0 - 159.0 mg/dL    Hdl/Cholesterol Ratio 45.5 20.0 - 50.0 %    Total Cholesterol/HDL Ratio 2.2 2.0 - 5.0    Non-HDL Cholesterol 79 mg/dL   TSH   Result Value Ref Range    TSH 1.142 0.400 - 4.000 uIU/mL   TSH   Result Value Ref Range    TSH 1.142 0.400 - 4.000 uIU/mL   Vitamin D   Result Value Ref Range    Vit D, 25-Hydroxy 56 30 - 96 ng/mL   Basic metabolic panel   Result Value Ref Range    Sodium 140 136 - 145 mmol/L    Potassium 4.2 3.5 - 5.1 mmol/L    Chloride 102 95 - 110 mmol/L    CO2 31 (H) 23 - 29 mmol/L    Glucose 91 70 - 110 mg/dL    BUN, Bld 11 8 - 23 mg/dL    Creatinine 0.7 0.5 - 1.4 mg/dL    Calcium 9.4 8.7 - 10.5 mg/dL    Anion Gap 7 (L) 8 - 16 mmol/L    eGFR if African American >60.0 >60 mL/min/1.73 m^2    eGFR if non African American >60.0 >60 mL/min/1.73 m^2     Assessment:       1. Osteoporosis, post-menopausal    2. Hypothyroidism (acquired)    3.  Essential hypertension    4. Hyperlipidemia, unspecified hyperlipidemia type    5. Asthma, well controlled, unspecified asthma severity, unspecified whether persistent    6. Avascular necrosis of bone of hip, unspecified laterality        Plan:       Eugenia was seen today for annual exam.    Diagnoses and all orders for this visit:    Osteoporosis, post-menopausal    Hypothyroidism (acquired)    Essential hypertension    Hyperlipidemia, unspecified hyperlipidemia type  -     atorvastatin (LIPITOR) 80 MG tablet; Take 1 tablet (80 mg total) by mouth every evening.    Asthma, well controlled, unspecified asthma severity, unspecified whether persistent    Avascular necrosis of bone of hip, unspecified laterality    Adjustment disorder with depressed mood  -     sertraline (ZOLOFT) 100 MG tablet; Take 2 tablets (200 mg total) by mouth once daily.    Hypokalemia  -     potassium chloride (KLOR-CON) 10 MEQ TbSR; Take 1 tablet (10 mEq total) by mouth once daily.    Hypothyroidism due to acquired atrophy of thyroid  -     levothyroxine (SYNTHROID, LEVOTHROID) 175 MCG tablet; Take 1 tablet (175 mcg total) by mouth once daily.    Other orders  -     triamterene-hydrochlorothiazide 37.5-25 mg (DYAZIDE) 37.5-25 mg per capsule; Take 1 capsule by mouth every morning.  -     pantoprazole (PROTONIX) 40 MG tablet; Take 1 tablet (40 mg total) by mouth once daily.  -     fluticasone-salmeterol diskus inhaler 500-50 mcg; Inhale 1 puff into the lungs 2 (two) times daily. Controller       Letter written atttesting to medical condition and necessity for w/c accessible van.

## 2020-09-28 NOTE — LETTER
September 28, 2020    Eugenia Diaz  [address]  [place] [address]             Jamison Mason [facility]  [address]  [facility] [address]  Phone: [phone]  Fax: [phone]    To whom it may concern:       This is to verify that my patient Eugenia Diaz has a permanent disability and is in need of a wheelchair accessible van. Ms. 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 The disease required a right hip replacement in August of that year. To protect the other hip and because of an additional diagnosis of osteoporosis, she was prescribed a scooter, which she obtained that year also.   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    Sincerely,        Latia Aguilar MD   [place] License#  [id number]

## 2020-09-28 NOTE — LETTER
September 28, 2020    Eugenia Diaz  [address] [place] [address]             Jamison Cuevas [facility]  [address]  [facility] [address]  Phone: [phone]  Fax: [phone]       To whom it may concern:       This is to verify that my patient Eugenia Diaz has a permanent disability and is in need of a wheelchair accessible van. Ms. 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 The disease required a right hip replacement in August of that year. To protect the other hip and because of an additional diagnoses of osteoporosis and fall risk, she was prescribed a scooter, which she obtained that year also.   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    Sincerely,        MD Kathie Leonardo License#  [id number]  Expiration Date:  Feb 28/2021.

## 2020-10-08 ENCOUNTER — HOSPITAL ENCOUNTER (OUTPATIENT)
Dept: RADIOLOGY | Facility: HOSPITAL | Age: 75
Discharge: HOME | End: 2020-10-08
Attending: SURGERY
Payer: MEDICARE

## 2020-10-08 DIAGNOSIS — Z98.890 STATUS POST ABLATION OF INCOMPETENT VEIN USING LASER: ICD-10-CM

## 2020-10-08 DIAGNOSIS — I80.292 PHLEBITIS AND THROMBOPHLEBITIS OF OTHER DEEP VESSELS OF LEFT LOWER EXTREMITY: ICD-10-CM

## 2020-10-08 PROCEDURE — 93971 EXTREMITY STUDY: CPT | Mod: TC,LT

## 2020-10-08 PROCEDURE — 93971 EXTREMITY STUDY: CPT | Mod: 26,LT,, | Performed by: RADIOLOGY

## 2020-10-08 PROCEDURE — 93971 US LOWER EXTREMITY VEINS LEFT: ICD-10-PCS | Mod: 26,LT,, | Performed by: RADIOLOGY

## 2020-10-09 ENCOUNTER — OFFICE VISIT (OUTPATIENT)
Dept: VASCULAR SURGERY | Facility: CLINIC | Age: 75
End: 2020-10-09
Payer: MEDICARE

## 2020-10-09 VITALS
HEIGHT: 67 IN | BODY MASS INDEX: 24.22 KG/M2 | DIASTOLIC BLOOD PRESSURE: 65 MMHG | WEIGHT: 154.31 LBS | HEART RATE: 74 BPM | SYSTOLIC BLOOD PRESSURE: 125 MMHG

## 2020-10-09 DIAGNOSIS — I87.2 VENOUS INSUFFICIENCY OF RIGHT LOWER EXTREMITY: ICD-10-CM

## 2020-10-09 DIAGNOSIS — Z98.890 STATUS POST ABLATION OF INCOMPETENT VEIN USING LASER: Primary | ICD-10-CM

## 2020-10-09 PROCEDURE — 99214 OFFICE O/P EST MOD 30 MIN: CPT | Mod: S$GLB,,, | Performed by: SURGERY

## 2020-10-09 PROCEDURE — 99214 PR OFFICE/OUTPT VISIT, EST, LEVL IV, 30-39 MIN: ICD-10-PCS | Mod: S$GLB,,, | Performed by: SURGERY

## 2020-10-09 NOTE — LETTER
October 9, 2020        Latia Aguilar MD  [address]  [facility] [address]             [facility] [address]  [address] [address]  Phone: [phone]  Fax: [phone]   Patient: Eugenia Diaz   MR Number: [id number]   YOB: 1945   Date of Visit: 10/9/2020       Dear Dr. Aguilar:    Thank you for referring Eugenia Diaz to me for evaluation. Below are the relevant portions of my assessment and plan of care.            If you have questions, please do not hesitate to call me. I look forward to following Eugenia along with you.    Sincerely,      Ector Monson MD           CC  No Recipients

## 2020-10-09 NOTE — PATIENT INSTRUCTIONS
Putting on Compression Stockings     Turn the stocking inside-out, then fit it over your toes and heel.          Roll the stocking up your leg.            Once stockings are on, make sure the top of the stocking is about two fingers width below the crease of the knee (or the groin if you wear thigh-high stockings).          Use equipment, such as a stocking dixon, or wear rubber gloves to make it easier to put on compression stockings.         Elastic compression stockings are prescribed to treat many vein problems. Wearing them may be the most important thing you do to manage your symptoms. The stockings fit tightly around your ankle, gradually reducing in pressure as they go up your legs. This helps keep blood flowing to your heart. As a result, swelling is reduced. Your healthcare provider will prescribe stockings at a safe pressure for you. He or she will also tell you how often to wear and remove the stockings. Follow these instructions closely. Also, do not buy or wear compression stockings without first seeing your healthcare provider.  Tips for wear and care  To wear stockings safely and to get the most benefit:  · Wear the length prescribed by your healthcare provider.  · Pull them to the designated height and no farther. Dont let them bunch at the top. This can restrict blood flow and increase swelling.  · Wear the stockings for the amount of time your healthcare provider recommends. Replace them when they start to feel loose. This will likely be every 3 to 6 months.  · Remove them as your healthcare provider directs. When removed, wash your legs. Then check your legs and feet for sores. Call your healthcare provider if you find a sore. Dont put the stockings back on unless your healthcare provider directs.   · Wash the stockings as instructed. They may need to be hand-washed.  Date Last Reviewed: 5/1/2016  © 9147-5097 [employer]. [address] [place] [address], [place] [address]. All rights  reserved. This information is not intended as a substitute for professional medical care. Always follow your healthcare professional's instructions.        Understanding Chronic Venous Insufficiency  Problems with the veins in the legs may lead to chronic venous insufficiency (CVI). CVI means that there is a long-term problem with the veins not being able to pump blood back to your heart. When this happens, blood stays in the legs and causes swelling and aching.   Two problems that may lead to chronic venous insufficiency are:  · Damaged valves. Valves keep blood flowing from the legs through the blood vessels and back to the heart. When the valves are damaged, blood does not flow as well.   · Deep vein thrombosis (DVT). Blood clots may form in the deep veins of the legs. This may cause pain, redness, and swelling in the legs. It may also block the flow of blood back to the heart. Seek immediate medical care if you have these symptoms.  · A blood clot in the leg can also break off and travel to the lungs. This is called pulmonary embolism (PE). In the lungs, the clot can cut off the flow of blood. This may cause chest pain, trouble breathing, sweating, a fast heartbeat, coughing (may cough up blood), and fainting. It is a medical emergency and may cause death. Call 911 if you have these symptoms.  · Healthcare providers call the two conditions, DVT and PE, venous thromboembolism (VTE).  CVI cant be cured, but you can control leg swelling to reduce the likelihood of ulcers (sores).  Recognizing the symptoms  Be aware of the following:  · If you stand or sit with your feet down for long periods, your legs may ache or feel heavy.  · Swollen ankles are possibly the most common symptom of CVI.  · As swelling increases, the skin over your ankles may show red spots or a brownish tinge. The skin may feel leathery or scaly, and may start to itch.  · If swelling is not controlled, an ulcer (open wound) may form.  What you  can do  Reduce your risk of developing ulcers by doing the following:  · Increase blood flow back to your heart by elevating your legs, exercising daily, and wearing elastic stockings.  · Boost blood flow in your legs by losing excess weight.  · If you must stand or sit in one place for a period of time, keep your blood moving by wiggling your toes, shifting your body position, and rising up on the balls of your feet.    Date Last Reviewed: 5/1/2016 © 2000-2017 [employer]. [address] [place] [address], [place] [address]. All rights reserved. This information is not intended as a substitute for professional medical care. Always follow your healthcare professional's instructions.        Tips for Using Less Salt    Most people with heart problems need to eat less salt (sodium). Reducing the amount of salt you eat may help control your blood pressure. The higher your blood pressure, the greater your risk for heart disease, stroke, blindness, and kidney problems.  At the store  · Make low-salt choices by reading labels carefully. Look for the total amount of sodium per serving.  · Use more fresh food. Buy more fruits and vegetables. Select lean meats, fish, and poultry.  · Use fewer frozen, canned, and packaged foods which often contain a lot of sodium.  · Use plain frozen vegetables without sauces or toppings. These products are often low- or no-sodium.  · Opt for reduced-sodium or no-salt-added versions of canned vegetables and soups.  In the kitchen  · Don't add salt to food when you're cooking. Season with flavorings such as onion, garlic, pepper, salt-free herbal blends, and lemon or lime juice.  · Use a cookbook containing low-salt recipes. It can give you ideas for tasty meals that are healthy for your heart.  · Sprinkle salt-free herbal blends on vegetables and meat.  · Drain and rinse canned foods, such as canned beans and vegetables, before cooking or eating.  Eating out  · Tell the  you're on a  low-salt diet. Ask questions about the menu.  · Order fish, chicken, and meat broiled, baked, poached, or grilled without salt, butter, or breading.  · Use lemon, pepper, and salt-free herb mixes to add flavor.  · Choose plain steamed rice, boiled noodles, and baked or boiled potatoes. Top potatoes with chives and a little sour cream.     Beware! Salt goes by many other names. Limit foods with these words listed as ingredients: salt, sodium, soy sauce, baking soda, baking powder, MSG, monosodium, Na (the chemical symbol for sodium). Some antacids are also high in salt.   Date Last Reviewed: 6/19/2015 © 2000-2017 [employer]. [address] [place] [address]. All rights reserved. This information is not intended as a substitute for professional medical care. Always follow your healthcare professional's instructions.        Low-Salt Diet  This diet removes foods that are high in salt. It also limits the amount of salt you use when cooking. It is most often used for people with high blood pressure, edema (fluid retention), and kidney, liver, or heart disease.  Table salt contains the mineral sodium. Your body needs sodium to work normally. But too much sodium can make your health problems worse. Your healthcare provider is recommending a low-salt (also called low-sodium) diet for you. Your total daily allowance of salt is 1,500 to 2,300 milligrams (mg). It is less than 1 teaspoon of table salt. This means you can have only about 500 to 700 mg of sodium at each meal. People with certain health problems should limit salt intake to the lower end of the recommended range.    When you cook, dont add much salt. If you can cook without using salt, even better. Dont add salt to your food at the table.  When shopping, read food labels. Salt is often called sodium on the label. Choose foods that are salt-free, low salt, or very low salt. Note that foods with reduced salt may not lower your salt intake  enough.    Beans, potatoes, and pasta  Ok: Dry beans, split peas, lentils, potatoes, rice, macaroni, pasta, spaghetti without added salt  Avoid: Potato chips, tortilla chips, and similar products  Breads and cereals  Ok: Low-sodium breads, rolls, cereals, and cakes; low-salt crackers, matzo crackers  Avoid: Salted crackers, pretzels, popcorn, Bulgarian toast, pancakes, muffins  Dairy  Ok: Milk, chocolate milk, hot chocolate mix, low-salt cheeses, and yogurt  Avoid: Processed cheese and cheese spreads; Roquefort, Camembert, and cottage cheese; buttermilk, instant breakfast drink  Desserts  Ok: Ice cream, frozen yogurt, juice bars, gelatin, cookies and pies, sugar, honey, jelly, hard candy  Avoid: Most pies, cakes and cookies prepared or processed with salt; instant pudding  Drinks  Ok: Tea, coffee, fizzy (carbonated) drinks, juices  Avoid: Flavored coffees, electrolyte replacement drinks, sports drinks  Meats  Ok: All fresh meat, fish, poultry, low-salt tuna, eggs, egg substitute  Avoid: Smoked, pickled, brine-cured, or salted meats and fish. This includes rodriguez, chipped beef, corned beef, hot dogs, deli meats, ham, kosher meats, salt pork, sausage, canned tuna, salted codfish, smoked salmon, herring, sardines, or anchovies.  Seasonings and spices  Ok: Most seasonings are okay. Good substitutes for salt include: fresh herb blends, hot sauce, lemon, garlic, bello, vinegar, dry mustard, parsley, cilantro, horseradish, tomato paste, regular margarine, mayonnaise, unsalted butter, cream cheese, vegetable oil, cream, low-salt salad dressing and gravy.  Avoid: Regular ketchup, relishes, pickles, soy sauce, teriyaki sauce, Worcestershire sauce, BBQ sauce, tartar sauce, meat tenderizer, chili sauce, regular gravy, regular salad dressing, salted butter  Soups  Ok: Low-salt soups and broths made with allowed foods  Avoid: Bouillon cubes, soups with smoked or salted meats, regular soup and broth  Vegetables  Ok: Most vegetables  are okay; also low-salt tomato and vegetable juices  Avoid: Sauerkraut and other brine-soaked vegetables; pickles and other pickled vegetables; tomato juice, olives  Date Last Reviewed: 8/1/2016 © 2000-2017 [employer]. [address] [place] [address]. All rights reserved. This information is not intended as a substitute for professional medical care. Always follow your healthcare professional's instructions.        Low-Salt Choices  Eating salt (sodium) can make your body retain too much water. Excess water makes your heart work harder. Canned, packaged, and frozen foods are easy to prepare, but they are often high in sodium. Here are some ideas for low-salt foods you can easily prepare yourself.    For breakfast  · Fruit or 100% fruit juice  · Whole-wheat bread or an English muffin. Compare sodium content on labels.  · Low-fat milk or yogurt  · Unsalted eggs  · Shredded wheat  · Corn tortillas  · Unsalted steamed rice  · Regular (not instant) hot cereal, made without salt  Stay away from:  · Sausage, rodriguez, and ham  · Flour tortillas  · Packaged muffins, pancakes, and biscuits  · Instant hot cereals  · Cottage cheese  For lunch and dinner  · Fresh fish, chicken, turkey, or meat--baked, broiled, or roasted without salt  · Dry beans, cooked without salt  · Tofu, stir-fried without salt  · Unsalted fresh fruit and vegetables, or frozen or canned fruit and vegetables with no added salt  Stay away from:  · Lunch or deli meat that is cured or smoked  · Cheese  · Tomato juice and catsup  · Canned vegetables, soups, and fish not labeled as no-salt-added or reduced sodium  · Packaged gravies and sauces  · Olives, pickles, and relish  · Bottled salad dressings  For snacks and desserts  · Yogurt  · Unsalted, air popped popcorn  · Unsalted nuts or seeds  Stay away from:  · Pies and cakes  · Packaged dessert mixes  · Pizza  · Canned and packaged puddings  · Pretzels, chips, crackers, and nuts--unless  the label says unsalted  Date Last Reviewed: 6/17/2015  © 0156-1276 [employer]. [address] [place] [address], [place] [address]. All rights reserved. This information is not intended as a substitute for professional medical care. Always follow your healthcare professional's instructions.

## 2020-10-09 NOTE — PROGRESS NOTES
Ector Monson MD, RPVI                                 [facility]                           [facility]                             10/09/2020    HPI:  Eugenia Diaz is a 75 y.o. female with   Patient Active Problem List   Diagnosis    Asthma, well controlled    Anemia    Muscle cramps    Depression    Hyperlipidemia    Osteoporosis, post-menopausal    Avascular necrosis of bone of hip    Hypertension    Closed fracture of right patella    Posterior vitreous detachment of right eye    Pseudophakia of both eyes    Myopia of both eyes    Retinal hole or tear, right    PCO (posterior capsular opacification) - Both Eyes    Gastroesophageal reflux disease without esophagitis    Essential hypertension    Neck pain    Hypothyroidism (acquired)    Closed fracture of sternum    being managed by PCP and specialists who is here today for evaluation of LLE bleeding varicose vein.  Patient states location is L lower leg occurring for 6 mo ago.  Associated signs and symptoms include discoloration.  C/o LLE pain.  Quality is aching and severity is 8/10.  Symptoms began 6 mo ago.  Treating bleeding vein with compression.  Alleviating factors include pressure.  Worsening factors include dependency.  Patient is not wearing compression stockings daily.  No FH of venous disease.  Symptoms do limit patient's functional status and daily activities.  No DVT history.  No venous interventions.  + low sodium diet.  No excessive water intake.    Migraine with aura: yes  PFO/ASD/right to left shunt: no  Pregnant: no  Breastfeeding: no    no MI  no Stroke  Tobacco use: no    11/2019: C/o RLE pain when at rest with legs elevated from foot to hip.  Denies edema.  +compression, elevation at night when sleeping, low Na diet and no excess water.  No significant issues with LLE varicose veins since treatment initiated.    06/05/2020:  Patient states that she has continued to have bleeding events  left lower extremity lateral varicose vein.  She continues to have heaviness, aching and swelling despite compression for greater than 3 months.  She unfortunately has been dealing with the aftermath of the death of her  due to Coronavirus for weeks ago.    10/2020:  S/p L GSV EVLT.  States feels much better and no further symptoms or bleeding.    Past Medical History:   Diagnosis Date    Allergy     Anemia     Asthma     Bronchitis     Depression     Generalized headaches     History of endometriosis     Hyperlipidemia     Hypertension     Hypothyroidism     Osteoporosis, unspecified     PCO (posterior capsular opacification), left     Recurrent upper respiratory infection (URI)     Thyroid disease     hypothyroidism    TMJ dysfunction      Past Surgical History:   Procedure Laterality Date    ADENOIDECTOMY      CATARACT EXTRACTION W/  INTRAOCULAR LENS IMPLANT  06    right eye - Dr Best    CATARACT EXTRACTION W/  INTRAOCULAR LENS IMPLANT  11/15/06    left eye - Dr Best    CHOLECYSTECTOMY      COLONOSCOPY N/A 2016    Procedure: COLONOSCOPY;  Surgeon: Jae Hodges MD;  Location: [facility] ([address] [place] [address]);  Service: Endoscopy;  Laterality: N/A;    KNEE SURGERY  12    OOPHORECTOMY      left ovary removed, secondary endometriosis    right hip replacement      TEMPOROMANDIBULAR JOINT SURGERY      TONSILLECTOMY      yag laser OD       Family History   Problem Relation Age of Onset    Hypertension Mother     Diabetes Mother     Heart failure Mother     Colon cancer Father          age 51    Cancer Father 48        Colon Cancer     Diabetes Brother     Cancer Brother         non hodgkins lymphoma, lung    Alzheimer's disease Brother     Stroke Maternal Grandfather     Breast cancer Cousin         paternal first cousin    Breast cancer Paternal Aunt     Ovarian cancer Paternal Aunt     Melanoma Neg Hx     Amblyopia Neg Hx      Blindness Neg Hx     Cataracts Neg Hx     Glaucoma Neg Hx     Macular degeneration Neg Hx     Retinal detachment Neg Hx     Strabismus Neg Hx     Thyroid disease Neg Hx      Social History     Socioeconomic History    Marital status:      Spouse name: Not on file    Number of children: Not on file    Years of education: Not on file    Highest education level: Not on file   Occupational History    Not on file   Social Needs    Financial resource strain: Not very hard    Food insecurity     Worry: Never true     Inability: Never true    Transportation needs     Medical: Yes     Non-medical: No   Tobacco Use    Smoking status: Never Smoker    Smokeless tobacco: Never Used   Substance and Sexual Activity    Alcohol use: Yes     Alcohol/week: 1.0 standard drinks     Types: 1 Glasses of wine per week     Frequency: Monthly or less     Drinks per session: 1 or 2     Binge frequency: Never     Comment: rarely    Drug use: No    Sexual activity: Not Currently   Lifestyle    Physical activity     Days per week: 0 days     Minutes per session: Not on file    Stress: To some extent   Relationships    Social connections     Talks on phone: Twice a week     Gets together: Once a week     Attends [employer] service: Not on file     Active member of club or organization: Yes     Attends meetings of clubs or organizations: Never     Relationship status:    Other Topics Concern    Are you pregnant or think you may be? 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       Current Outpatient Medications:     albuterol (PROVENTIL) 2.5 mg /3 mL (0.083 %) nebulizer solution, Take 3 mLs (2.5 mg total) by nebulization every 6 (six) hours as needed for Wheezing. Rescue, Disp: 1 Box, Rfl: 0    albuterol (PROVENTIL/VENTOLIN HFA) 90 mcg/actuation inhaler, Inhale  2 puffs into the lungs every 6 (six) hours as needed for Wheezing., Disp: 18 g, Rfl: 11    atorvastatin (LIPITOR) 80 MG tablet, Take 1 tablet (80 mg total) by mouth every evening., Disp: 90 tablet, Rfl: 3    benzonatate (TESSALON PERLES) 100 MG capsule, Take 2 capsules (200 mg total) by mouth 3 (three) times daily as needed for Cough., Disp: 20 capsule, Rfl: 0    calcium-vitamin D 600 mg(1,500mg) -400 unit Tab, Take 1 tablet by mouth Every morning., Disp: , Rfl:     clotrimazole-betamethasone 1-0.05% (LOTRISONE) cream, APPLY TO THE AFFECTED AREA TWICE DAILY, Disp: 15 g, Rfl: 3    fluticasone propionate (FLONASE) 50 mcg/actuation nasal spray, USE AS DIRECTED, Disp: 16 g, Rfl: 11    fluticasone-salmeterol diskus inhaler 500-50 mcg, Inhale 1 puff into the lungs 2 (two) times daily. Controller, Disp: 3 Inhaler, Rfl: 3    ipratropium (ATROVENT) 0.02 % nebulizer solution, Take 2.5 mLs (500 mcg total) by nebulization 4 (four) times daily. Rescue, Disp: 1 Box, Rfl: 11    levothyroxine (SYNTHROID, LEVOTHROID) 175 MCG tablet, Take 1 tablet (175 mcg total) by mouth once daily., Disp: 90 tablet, Rfl: 3    lidocaine (XYLOCAINE) 5 % Oint ointment, Apply topically as needed., Disp: 120 g, Rfl: 3    loratadine (CLARITIN) 10 mg tablet, Take 1 tablet by mouth Every PM., Disp: , Rfl:     montelukast (SINGULAIR) 10 mg tablet, TAKE 1 TABLET BY MOUTH EVERY NIGHT AT BEDTIME, Disp: 30 tablet, Rfl: 12    naproxen (NAPROSYN) 500 MG tablet, TAKE 1 TABLET BY MOUTH TWO TIMES DAILY WITH MEALS, Disp: 60 tablet, Rfl: 1    neomycin-polymyxin-hydrocortisone (CORTISPORIN) 3.5-10,000-1 mg/mL-unit/mL-% otic suspension, instill 3 DROPS into THE right ear FOUR TIMES DAILY, Disp: 10 mL, Rfl: 0    OLIVE LEAF EXTRACT ORAL, Take 150 mg by mouth Every PM., Disp: , Rfl:     pantoprazole (PROTONIX) 40 MG tablet, Take 1 tablet (40 mg total) by mouth once daily., Disp: 90 tablet, Rfl: 3    potassium chloride (KLOR-CON) 10 MEQ TbSR, Take 1 tablet  (10 mEq total) by mouth once daily., Disp: 90 tablet, Rfl: 3    predniSONE (DELTASONE) 20 MG tablet, 3 tabs po q day for 3 days, then 2 tabs po daily for 3 days, then 1 tab daily for 3 days. (Patient taking differently: as needed. 3 tabs po q day for 3 days, then 2 tabs po daily for 3 days, then 1 tab daily for 3 days.), Disp: 18 tablet, Rfl: 0    sertraline (ZOLOFT) 100 MG tablet, Take 2 tablets (200 mg total) by mouth once daily., Disp: 180 tablet, Rfl: 3    triamterene-hydrochlorothiazide 37.5-25 mg (DYAZIDE) 37.5-25 mg per capsule, Take 1 capsule by mouth every morning., Disp: 90 capsule, Rfl: 3    betamethasone dipropionate (DIPROLENE) 0.05 % cream, Apply topically 2 (two) times daily. for 10 days, Disp: 45 g, Rfl: 3    triamcinolone acetonide 0.1% (KENALOG) 0.1 % cream, Apply topically 2 (two) times daily. for 10 days, Disp: 80 g, Rfl: 1    Current Facility-Administered Medications:     lidocaine HCL 10 mg/ml (1%) injection 1 mL, 1 mL, Other, 1 time in Clinic/HOD, Ector Monson MD    lidocaine-EPINEPHrine 1%-1:100,000 30 mL, lidocaine HCL 10 mg/ml (1%) 20 mL, sodium bicarbonate 10 mL in sodium chloride 0.9% 500 mL solution, , MISCELLANEOUS, 1 time in Clinic/HOD, Ector Monson MD    REVIEW OF SYSTEMS:  General: No fevers or chills; ENT: No sore throat; Allergy and Immunology: no persistent infections; Hematological and Lymphatic: No history of bleeding or easy bruising; Endocrine: negative; Respiratory: no cough, shortness of breath, or wheezing; Cardiovascular: no chest pain or dyspnea on exertion; Gastrointestinal: no abdominal pain/back, change in bowel habits, or bloody stools; Genito-Urinary: no dysuria, trouble voiding, or hematuria; Musculoskeletal: pain; Neurological: no TIA or stroke symptoms; Psychiatric: no nervousness, anxiety or depression.    PHYSICAL EXAM:      Pulse: 74         General appearance:  Alert, well-appearing, and in no distress.  Oriented to person, place, and  time                    Neurological: Normal speech, no focal findings noted; CN II - XII grossly intact. RLE with sensation to light touch, LLE with sensation to light touch.            Musculoskeletal: Digits/nail without cyanosis/clubbing.  Strength 5/5 BLE.                    Neck: Supple, no significant adenopathy                  Chest:  No wheezes, symmetric air entry. No use of accessory muscles               Cardiac: Normal rate and regular rhythm            Abdomen: Soft, nontender, nondistended      Extremities:   2+ R DP pulse, 2+ L DP pulse      no+ RLE edema, no+ LLE edema    Skin:  RLE no ulcer; LLE no ulcer      RLE + spider veins, LLE + spider veins      RLE no varicose veins, LLE no varicose veins    CEAP 1/1    LAB RESULTS:  No results found for: CBC  No results found for: LABPROT, INR  Lab Results   Component Value Date     07/06/2020     07/06/2020    K 4.2 07/06/2020    K 4.2 07/06/2020     07/06/2020     07/06/2020    CO2 31 (H) 07/06/2020    CO2 31 (H) 07/06/2020    GLU 91 07/06/2020    GLU 91 07/06/2020    BUN 11 07/06/2020    BUN 11 07/06/2020    CREATININE 0.7 07/06/2020    CREATININE 0.7 07/06/2020    CALCIUM 9.4 07/06/2020    CALCIUM 9.4 07/06/2020    ANIONGAP 7 (L) 07/06/2020    ANIONGAP 7 (L) 07/06/2020    EGFRNONAA >60.0 07/06/2020    EGFRNONAA >60.0 07/06/2020     Lab Results   Component Value Date    WBC 10.64 10/11/2018    RBC 4.57 10/11/2018    HGB 14.1 10/11/2018    HCT 42.0 10/11/2018    MCV 92 10/11/2018    MCH 30.9 10/11/2018    MCHC 33.6 10/11/2018    RDW 12.7 10/11/2018     10/11/2018    MPV 8.6 (L) 10/11/2018    GRAN 7.3 10/11/2018    GRAN 68.9 10/11/2018    LYMPH 1.9 10/11/2018    LYMPH 17.4 (L) 10/11/2018    MONO 1.0 10/11/2018    MONO 9.0 10/11/2018    EOS 0.4 10/11/2018    BASO 0.03 10/11/2018    EOSINOPHIL 3.9 10/11/2018    BASOPHIL 0.3 10/11/2018    DIFFMETHOD Automated 10/11/2018     .  Lab Results   Component Value Date    HGBA1C 6.2  06/18/2010       IMAGING:  All pertinent imaging has been reviewed and interpreted independently.    Venous US 6/19/19 Impression:  No evidence of deep venous thrombosis in either lower extremity.  There is hemodynamically significant reflux noted within the right greater saphenous vein at the knee and left greater saphenous vein at the distal thigh and knee.    IMP/PLAN:  75 y.o. female with   Patient Active Problem List   Diagnosis    Asthma, well controlled    Anemia    Muscle cramps    Depression    Hyperlipidemia    Osteoporosis, post-menopausal    Avascular necrosis of bone of hip    Hypertension    Closed fracture of right patella    Posterior vitreous detachment of right eye    Pseudophakia of both eyes    Myopia of both eyes    Retinal hole or tear, right    PCO (posterior capsular opacification) - Both Eyes    Gastroesophageal reflux disease without esophagitis    Essential hypertension    Neck pain    Hypothyroidism (acquired)    Closed fracture of sternum    being managed by PCP and specialists who is here today for f/u evaluation of LLE bleeding varicose vein and venous insufficiency.    -LLE edema, pain and heaviness with continued bleeding from varicose and spider veins despite compression, elevation and diet changes > 3 months s/p L GSV EVLT recovering well   -recommend continued compression with Rx stockings, elevation, dietary changes associated with water and sodium intake discussed at length with patient  -Exercise   -If LLE varicose veins or spider veins become symptomatic- rec sclerotherapy vs stab phlebectomy    I spent 12 minutes evaluating this patient and greater than 50% of the time was spent counseling, coordinator care and discussing the plan of care.  All questions were answered and patient stated understanding with agreement with the above treatment plan.    Ector Monson MD [facility]  Vascular and Endovascular Surgery

## 2020-11-05 ENCOUNTER — PATIENT MESSAGE (OUTPATIENT)
Dept: OPTOMETRY | Facility: CLINIC | Age: 75
End: 2020-11-05

## 2020-11-05 ENCOUNTER — OFFICE VISIT (OUTPATIENT)
Dept: OPTOMETRY | Facility: CLINIC | Age: 75
End: 2020-11-05
Payer: MEDICARE

## 2020-11-05 DIAGNOSIS — H11.32 CONJUNCTIVAL HEMORRHAGE, LEFT: Primary | ICD-10-CM

## 2020-11-05 PROCEDURE — 99999 PR PBB SHADOW E&M-EST. PATIENT-LVL III: CPT | Mod: PBBFAC,,, | Performed by: OPTOMETRIST

## 2020-11-05 PROCEDURE — 92012 PR EYE EXAM, EST PATIENT,INTERMED: ICD-10-PCS | Mod: S$PBB,,, | Performed by: OPTOMETRIST

## 2020-11-05 PROCEDURE — 92012 INTRM OPH EXAM EST PATIENT: CPT | Mod: S$PBB,,, | Performed by: OPTOMETRIST

## 2020-11-05 PROCEDURE — 99999 PR PBB SHADOW E&M-EST. PATIENT-LVL III: ICD-10-PCS | Mod: PBBFAC,,, | Performed by: OPTOMETRIST

## 2020-11-05 PROCEDURE — 99213 OFFICE O/P EST LOW 20 MIN: CPT | Mod: PBBFAC,PO | Performed by: OPTOMETRIST

## 2020-11-05 NOTE — PROGRESS NOTES
HPI     Pt here today for a urgent care due to left eye pain, redness and mild   discharge. Pt states on yesterday the eye pain started with headache. Pt   states she took meds for relief. Pt states today when she woke her left   eye was red with mild pain.  No floaters, occ flashes   Systane gtts    Last edited by Ashish Kumar, OD on 11/5/2020  3:27 PM. (History)        ROS     Positive for: Eyes (cat surgery/YAG)    Negative for: Constitutional, Gastrointestinal, Neurological, Skin,   Genitourinary, Musculoskeletal, HENT, Endocrine, Cardiovascular,   Respiratory, Psychiatric, Allergic/Imm, Heme/Lymph    Last edited by Ashish Kumar, OD on 11/5/2020  3:27 PM. (History)        Assessment /Plan     For exam results, see Encounter Report.    Conjunctival hemorrhage, left      Subconjunctival Heme OS.  Without foreign body noted.  Pt reassurance given about resolution.  Advised ATs QID     PLAN:    Pt will call if sx inc/VA worsens, otherwise rtc as sched for routine

## 2020-11-12 ENCOUNTER — PATIENT MESSAGE (OUTPATIENT)
Dept: INTERNAL MEDICINE | Facility: CLINIC | Age: 75
End: 2020-11-12

## 2020-12-07 ENCOUNTER — PATIENT MESSAGE (OUTPATIENT)
Dept: INTERNAL MEDICINE | Facility: CLINIC | Age: 75
End: 2020-12-07

## 2021-01-29 ENCOUNTER — IMMUNIZATION (OUTPATIENT)
Dept: PHARMACY | Facility: CLINIC | Age: 76
End: 2021-01-29
Payer: MEDICARE

## 2021-01-29 DIAGNOSIS — Z23 NEED FOR VACCINATION: Primary | ICD-10-CM

## 2021-02-26 ENCOUNTER — IMMUNIZATION (OUTPATIENT)
Dept: PHARMACY | Facility: CLINIC | Age: 76
End: 2021-02-26
Payer: MEDICARE

## 2021-02-26 ENCOUNTER — HOSPITAL ENCOUNTER (OUTPATIENT)
Dept: RADIOLOGY | Facility: HOSPITAL | Age: 76
Discharge: HOME | End: 2021-02-26
Attending: INTERNAL MEDICINE
Payer: MEDICARE

## 2021-02-26 DIAGNOSIS — M81.0 OSTEOPOROSIS, POST-MENOPAUSAL: ICD-10-CM

## 2021-02-26 DIAGNOSIS — Z23 NEED FOR VACCINATION: Primary | ICD-10-CM

## 2021-02-26 PROCEDURE — 77080 DXA BONE DENSITY AXIAL: CPT | Mod: 26,,, | Performed by: RADIOLOGY

## 2021-02-26 PROCEDURE — 77080 DXA BONE DENSITY AXIAL: CPT | Mod: TC

## 2021-02-26 PROCEDURE — 77080 DEXA BONE DENSITY SPINE HIP: ICD-10-PCS | Mod: 26,,, | Performed by: RADIOLOGY

## 2021-02-28 DIAGNOSIS — M81.0 OSTEOPOROSIS, POST-MENOPAUSAL: ICD-10-CM

## 2021-02-28 DIAGNOSIS — E03.9 HYPOTHYROIDISM (ACQUIRED): Primary | ICD-10-CM

## 2021-02-28 DIAGNOSIS — E78.5 HYPERLIPIDEMIA, UNSPECIFIED HYPERLIPIDEMIA TYPE: ICD-10-CM

## 2021-02-28 DIAGNOSIS — E03.4 HYPOTHYROIDISM DUE TO ACQUIRED ATROPHY OF THYROID: ICD-10-CM

## 2021-03-08 ENCOUNTER — PATIENT MESSAGE (OUTPATIENT)
Dept: INTERNAL MEDICINE | Facility: CLINIC | Age: 76
End: 2021-03-08

## 2021-03-25 ENCOUNTER — PES CALL (OUTPATIENT)
Dept: ADMINISTRATIVE | Facility: CLINIC | Age: 76
End: 2021-03-25

## 2021-03-26 ENCOUNTER — PATIENT MESSAGE (OUTPATIENT)
Dept: RESEARCH | Facility: HOSPITAL | Age: 76
End: 2021-03-26

## 2021-04-09 ENCOUNTER — RESEARCH ENCOUNTER (OUTPATIENT)
Dept: RESEARCH | Facility: HOSPITAL | Age: 76
End: 2021-04-09

## 2021-04-13 ENCOUNTER — RESEARCH ENCOUNTER (OUTPATIENT)
Dept: RESEARCH | Facility: HOSPITAL | Age: 76
End: 2021-04-13

## 2021-04-27 ENCOUNTER — OFFICE VISIT (OUTPATIENT)
Dept: DERMATOLOGY | Facility: CLINIC | Age: 76
End: 2021-04-27
Payer: MEDICARE

## 2021-04-27 DIAGNOSIS — L73.8 SEBACEOUS HYPERPLASIA: ICD-10-CM

## 2021-04-27 DIAGNOSIS — L65.9 ALOPECIA: ICD-10-CM

## 2021-04-27 DIAGNOSIS — L71.9 ROSACEA: ICD-10-CM

## 2021-04-27 DIAGNOSIS — L91.8 SKIN TAG: Primary | ICD-10-CM

## 2021-04-27 DIAGNOSIS — Z76.89 ENCOUNTER FOR SKIN CARE: ICD-10-CM

## 2021-04-27 DIAGNOSIS — D18.01 CHERRY ANGIOMA: ICD-10-CM

## 2021-04-27 PROCEDURE — 99214 PR OFFICE/OUTPT VISIT, EST, LEVL IV, 30-39 MIN: ICD-10-PCS | Mod: S$PBB,,, | Performed by: DERMATOLOGY

## 2021-04-27 PROCEDURE — 99214 OFFICE O/P EST MOD 30 MIN: CPT | Mod: S$PBB,,, | Performed by: DERMATOLOGY

## 2021-04-27 PROCEDURE — 99999 PR PBB SHADOW E&M-EST. PATIENT-LVL IV: CPT | Mod: PBBFAC,,, | Performed by: DERMATOLOGY

## 2021-04-27 PROCEDURE — 99214 OFFICE O/P EST MOD 30 MIN: CPT | Mod: PBBFAC,PN | Performed by: DERMATOLOGY

## 2021-04-27 PROCEDURE — 99999 PR PBB SHADOW E&M-EST. PATIENT-LVL IV: ICD-10-PCS | Mod: PBBFAC,,, | Performed by: DERMATOLOGY

## 2021-04-27 RX ORDER — METRONIDAZOLE 7.5 MG/G
CREAM TOPICAL
Qty: 45 G | Refills: 3 | Status: SHIPPED | OUTPATIENT
Start: 2021-04-27 | End: 2023-07-18

## 2021-05-17 RX ORDER — FLUTICASONE PROPIONATE 50 MCG
SPRAY, SUSPENSION (ML) NASAL
Qty: 16 G | Refills: 11 | Status: SHIPPED | OUTPATIENT
Start: 2021-05-17 | End: 2022-07-05

## 2021-05-19 ENCOUNTER — PATIENT MESSAGE (OUTPATIENT)
Dept: INTERNAL MEDICINE | Facility: CLINIC | Age: 76
End: 2021-05-19

## 2021-05-19 RX ORDER — PREDNISONE 20 MG/1
TABLET ORAL
Qty: 18 TABLET | Refills: 0 | Status: SHIPPED | OUTPATIENT
Start: 2021-05-19 | End: 2021-12-10 | Stop reason: SDUPTHER

## 2021-06-03 ENCOUNTER — TELEPHONE (OUTPATIENT)
Dept: OBSTETRICS AND GYNECOLOGY | Facility: CLINIC | Age: 76
End: 2021-06-03

## 2021-06-03 DIAGNOSIS — Z12.31 SCREENING MAMMOGRAM, ENCOUNTER FOR: Primary | ICD-10-CM

## 2021-06-30 ENCOUNTER — HOSPITAL ENCOUNTER (OUTPATIENT)
Dept: RADIOLOGY | Facility: HOSPITAL | Age: 76
Discharge: HOME | End: 2021-06-30
Attending: OBSTETRICS & GYNECOLOGY
Payer: MEDICARE

## 2021-06-30 DIAGNOSIS — Z12.31 SCREENING MAMMOGRAM, ENCOUNTER FOR: ICD-10-CM

## 2021-06-30 PROCEDURE — 77067 MAMMO DIGITAL SCREENING BILAT WITH TOMO: ICD-10-PCS | Mod: 26,,, | Performed by: RADIOLOGY

## 2021-06-30 PROCEDURE — 77063 BREAST TOMOSYNTHESIS BI: CPT | Mod: 26,,, | Performed by: RADIOLOGY

## 2021-06-30 PROCEDURE — 77063 MAMMO DIGITAL SCREENING BILAT WITH TOMO: ICD-10-PCS | Mod: 26,,, | Performed by: RADIOLOGY

## 2021-06-30 PROCEDURE — 77067 SCR MAMMO BI INCL CAD: CPT | Mod: 26,,, | Performed by: RADIOLOGY

## 2021-06-30 PROCEDURE — 77067 SCR MAMMO BI INCL CAD: CPT | Mod: TC

## 2021-07-02 ENCOUNTER — PATIENT MESSAGE (OUTPATIENT)
Dept: INTERNAL MEDICINE | Facility: CLINIC | Age: 76
End: 2021-07-02

## 2021-07-06 ENCOUNTER — LAB VISIT (OUTPATIENT)
Dept: LAB | Facility: HOSPITAL | Age: 76
End: 2021-07-06
Attending: INTERNAL MEDICINE
Payer: MEDICARE

## 2021-07-06 ENCOUNTER — PATIENT MESSAGE (OUTPATIENT)
Dept: INTERNAL MEDICINE | Facility: CLINIC | Age: 76
End: 2021-07-06

## 2021-07-06 DIAGNOSIS — E78.5 HYPERLIPIDEMIA, UNSPECIFIED HYPERLIPIDEMIA TYPE: ICD-10-CM

## 2021-07-06 DIAGNOSIS — E03.4 HYPOTHYROIDISM DUE TO ACQUIRED ATROPHY OF THYROID: ICD-10-CM

## 2021-07-06 DIAGNOSIS — M81.0 OSTEOPOROSIS, POST-MENOPAUSAL: ICD-10-CM

## 2021-07-06 DIAGNOSIS — E03.9 HYPOTHYROIDISM (ACQUIRED): ICD-10-CM

## 2021-07-06 LAB
25(OH)D3+25(OH)D2 SERPL-MCNC: 48 NG/ML (ref 30–96)
ALBUMIN SERPL BCP-MCNC: 3.9 G/DL (ref 3.5–5.2)
ALP SERPL-CCNC: 79 U/L (ref 55–135)
ALT SERPL W/O P-5'-P-CCNC: 13 U/L (ref 10–44)
ANION GAP SERPL CALC-SCNC: 7 MMOL/L (ref 8–16)
AST SERPL-CCNC: 15 U/L (ref 10–40)
BASOPHILS # BLD AUTO: 0.06 K/UL (ref 0–0.2)
BASOPHILS NFR BLD: 0.6 % (ref 0–1.9)
BILIRUB SERPL-MCNC: 0.7 MG/DL (ref 0.1–1)
BUN SERPL-MCNC: 12 MG/DL (ref 8–23)
CALCIUM SERPL-MCNC: 10.1 MG/DL (ref 8.7–10.5)
CHLORIDE SERPL-SCNC: 101 MMOL/L (ref 95–110)
CHOLEST SERPL-MCNC: 168 MG/DL (ref 120–199)
CHOLEST/HDLC SERPL: 2.4 {RATIO} (ref 2–5)
CO2 SERPL-SCNC: 34 MMOL/L (ref 23–29)
CREAT SERPL-MCNC: 0.8 MG/DL (ref 0.5–1.4)
DIFFERENTIAL METHOD: ABNORMAL
EOSINOPHIL # BLD AUTO: 0.2 K/UL (ref 0–0.5)
EOSINOPHIL NFR BLD: 1.9 % (ref 0–8)
ERYTHROCYTE [DISTWIDTH] IN BLOOD BY AUTOMATED COUNT: 12.8 % (ref 11.5–14.5)
EST. GFR  (AFRICAN AMERICAN): >60 ML/MIN/1.73 M^2
EST. GFR  (NON AFRICAN AMERICAN): >60 ML/MIN/1.73 M^2
GLUCOSE SERPL-MCNC: 93 MG/DL (ref 70–110)
HCT VFR BLD AUTO: 42.4 % (ref 37–48.5)
HDLC SERPL-MCNC: 69 MG/DL (ref 40–75)
HDLC SERPL: 41.1 % (ref 20–50)
HGB BLD-MCNC: 14.3 G/DL (ref 12–16)
IMM GRANULOCYTES # BLD AUTO: 0.08 K/UL (ref 0–0.04)
IMM GRANULOCYTES NFR BLD AUTO: 0.8 % (ref 0–0.5)
LDLC SERPL CALC-MCNC: 81.2 MG/DL (ref 63–159)
LYMPHOCYTES # BLD AUTO: 2.2 K/UL (ref 1–4.8)
LYMPHOCYTES NFR BLD: 21.1 % (ref 18–48)
MCH RBC QN AUTO: 31.8 PG (ref 27–31)
MCHC RBC AUTO-ENTMCNC: 33.7 G/DL (ref 32–36)
MCV RBC AUTO: 94 FL (ref 82–98)
MONOCYTES # BLD AUTO: 0.9 K/UL (ref 0.3–1)
MONOCYTES NFR BLD: 8.9 % (ref 4–15)
NEUTROPHILS # BLD AUTO: 6.9 K/UL (ref 1.8–7.7)
NEUTROPHILS NFR BLD: 66.7 % (ref 38–73)
NONHDLC SERPL-MCNC: 99 MG/DL
NRBC BLD-RTO: 0 /100 WBC
PLATELET # BLD AUTO: 303 K/UL (ref 150–450)
PMV BLD AUTO: 9.5 FL (ref 9.2–12.9)
POTASSIUM SERPL-SCNC: 4.3 MMOL/L (ref 3.5–5.1)
PROT SERPL-MCNC: 6.8 G/DL (ref 6–8.4)
RBC # BLD AUTO: 4.49 M/UL (ref 4–5.4)
SODIUM SERPL-SCNC: 142 MMOL/L (ref 136–145)
T4 FREE SERPL-MCNC: 1.19 NG/DL (ref 0.71–1.51)
TRIGL SERPL-MCNC: 89 MG/DL (ref 30–150)
TSH SERPL DL<=0.005 MIU/L-ACNC: 0.12 UIU/ML (ref 0.4–4)
WBC # BLD AUTO: 10.27 K/UL (ref 3.9–12.7)

## 2021-07-06 PROCEDURE — 36415 COLL VENOUS BLD VENIPUNCTURE: CPT | Mod: PO | Performed by: INTERNAL MEDICINE

## 2021-07-06 PROCEDURE — 82306 VITAMIN D 25 HYDROXY: CPT | Performed by: INTERNAL MEDICINE

## 2021-07-06 PROCEDURE — 84443 ASSAY THYROID STIM HORMONE: CPT | Performed by: INTERNAL MEDICINE

## 2021-07-06 PROCEDURE — 85025 COMPLETE CBC W/AUTO DIFF WBC: CPT | Performed by: INTERNAL MEDICINE

## 2021-07-06 PROCEDURE — 84439 ASSAY OF FREE THYROXINE: CPT | Performed by: INTERNAL MEDICINE

## 2021-07-06 PROCEDURE — 80053 COMPREHEN METABOLIC PANEL: CPT | Performed by: INTERNAL MEDICINE

## 2021-07-06 PROCEDURE — 80061 LIPID PANEL: CPT | Performed by: INTERNAL MEDICINE

## 2021-07-07 RX ORDER — LEVOTHYROXINE SODIUM 150 UG/1
150 TABLET ORAL DAILY
Qty: 90 TABLET | Refills: 0 | Status: SHIPPED | OUTPATIENT
Start: 2021-07-07 | End: 2021-10-02

## 2021-07-08 ENCOUNTER — TELEPHONE (OUTPATIENT)
Dept: INTERNAL MEDICINE | Facility: CLINIC | Age: 76
End: 2021-07-08

## 2021-07-14 ENCOUNTER — PATIENT MESSAGE (OUTPATIENT)
Dept: ENDOCRINOLOGY | Facility: CLINIC | Age: 76
End: 2021-07-14

## 2021-07-14 ENCOUNTER — PATIENT MESSAGE (OUTPATIENT)
Dept: INTERNAL MEDICINE | Facility: CLINIC | Age: 76
End: 2021-07-14

## 2021-07-14 RX ORDER — ZOLEDRONIC ACID 5 MG/100ML
5 INJECTION, SOLUTION INTRAVENOUS
Status: CANCELLED | OUTPATIENT
Start: 2021-07-14

## 2021-07-14 RX ORDER — SODIUM CHLORIDE 0.9 % (FLUSH) 0.9 %
10 SYRINGE (ML) INJECTION
Status: CANCELLED | OUTPATIENT
Start: 2021-07-14

## 2021-07-27 ENCOUNTER — PATIENT OUTREACH (OUTPATIENT)
Dept: ADMINISTRATIVE | Facility: HOSPITAL | Age: 76
End: 2021-07-27

## 2021-07-28 ENCOUNTER — PATIENT MESSAGE (OUTPATIENT)
Dept: ENDOCRINOLOGY | Facility: CLINIC | Age: 76
End: 2021-07-28

## 2021-09-21 ENCOUNTER — LAB VISIT (OUTPATIENT)
Dept: LAB | Facility: HOSPITAL | Age: 76
End: 2021-09-21
Attending: INTERNAL MEDICINE
Payer: MEDICARE

## 2021-09-21 DIAGNOSIS — E03.4 HYPOTHYROIDISM DUE TO ACQUIRED ATROPHY OF THYROID: ICD-10-CM

## 2021-09-21 LAB — TSH SERPL DL<=0.005 MIU/L-ACNC: 1.03 UIU/ML (ref 0.4–4)

## 2021-09-21 PROCEDURE — 84443 ASSAY THYROID STIM HORMONE: CPT | Performed by: INTERNAL MEDICINE

## 2021-09-21 PROCEDURE — 36415 COLL VENOUS BLD VENIPUNCTURE: CPT | Mod: PO | Performed by: INTERNAL MEDICINE

## 2021-09-30 ENCOUNTER — INFUSION (OUTPATIENT)
Dept: INFECTIOUS DISEASES | Facility: HOSPITAL | Age: 76
End: 2021-09-30
Attending: INTERNAL MEDICINE
Payer: MEDICARE

## 2021-09-30 VITALS
TEMPERATURE: 98 F | DIASTOLIC BLOOD PRESSURE: 71 MMHG | HEIGHT: 67 IN | WEIGHT: 151.13 LBS | OXYGEN SATURATION: 91 % | RESPIRATION RATE: 16 BRPM | SYSTOLIC BLOOD PRESSURE: 146 MMHG | HEART RATE: 78 BPM | BODY MASS INDEX: 23.72 KG/M2

## 2021-09-30 DIAGNOSIS — M81.0 OSTEOPOROSIS, POST-MENOPAUSAL: Primary | ICD-10-CM

## 2021-09-30 DIAGNOSIS — M87.059 AVASCULAR NECROSIS OF BONE OF HIP, UNSPECIFIED LATERALITY: ICD-10-CM

## 2021-09-30 PROCEDURE — 96365 THER/PROPH/DIAG IV INF INIT: CPT

## 2021-09-30 PROCEDURE — 63600175 PHARM REV CODE 636 W HCPCS: Performed by: INTERNAL MEDICINE

## 2021-09-30 RX ORDER — SODIUM CHLORIDE 0.9 % (FLUSH) 0.9 %
10 SYRINGE (ML) INJECTION
Status: CANCELLED | OUTPATIENT
Start: 2021-09-30

## 2021-09-30 RX ORDER — ZOLEDRONIC ACID 5 MG/100ML
5 INJECTION, SOLUTION INTRAVENOUS
Status: COMPLETED | OUTPATIENT
Start: 2021-09-30 | End: 2021-09-30

## 2021-09-30 RX ORDER — ZOLEDRONIC ACID 5 MG/100ML
5 INJECTION, SOLUTION INTRAVENOUS
Status: CANCELLED | OUTPATIENT
Start: 2021-09-30

## 2021-09-30 RX ADMIN — ZOLEDRONIC ACID 5 MG: 5 INJECTION, SOLUTION INTRAVENOUS at 10:09

## 2021-10-01 DIAGNOSIS — E03.4 HYPOTHYROIDISM DUE TO ACQUIRED ATROPHY OF THYROID: ICD-10-CM

## 2021-10-04 RX ORDER — LEVOTHYROXINE SODIUM 150 UG/1
150 TABLET ORAL DAILY
Qty: 90 TABLET | Refills: 0 | Status: SHIPPED | OUTPATIENT
Start: 2021-10-04 | End: 2021-10-05 | Stop reason: SDUPTHER

## 2021-10-05 ENCOUNTER — IMMUNIZATION (OUTPATIENT)
Dept: INTERNAL MEDICINE | Facility: CLINIC | Age: 76
End: 2021-10-05
Payer: MEDICARE

## 2021-10-05 ENCOUNTER — OFFICE VISIT (OUTPATIENT)
Dept: INTERNAL MEDICINE | Facility: CLINIC | Age: 76
End: 2021-10-05
Payer: MEDICARE

## 2021-10-05 VITALS
DIASTOLIC BLOOD PRESSURE: 76 MMHG | SYSTOLIC BLOOD PRESSURE: 124 MMHG | BODY MASS INDEX: 23.86 KG/M2 | HEART RATE: 71 BPM | WEIGHT: 152 LBS | OXYGEN SATURATION: 97 % | HEIGHT: 67 IN

## 2021-10-05 DIAGNOSIS — Z80.0 FAMILY HISTORY OF COLON CANCER: ICD-10-CM

## 2021-10-05 DIAGNOSIS — M81.0 OSTEOPOROSIS, POST-MENOPAUSAL: ICD-10-CM

## 2021-10-05 DIAGNOSIS — E03.4 HYPOTHYROIDISM DUE TO ACQUIRED ATROPHY OF THYROID: ICD-10-CM

## 2021-10-05 DIAGNOSIS — Z86.010 HISTORY OF ADENOMATOUS POLYP OF COLON: Primary | ICD-10-CM

## 2021-10-05 DIAGNOSIS — L98.9 SKIN LESION: ICD-10-CM

## 2021-10-05 DIAGNOSIS — Z12.11 COLON CANCER SCREENING: ICD-10-CM

## 2021-10-05 DIAGNOSIS — J45.909 ASTHMA, WELL CONTROLLED, UNSPECIFIED ASTHMA SEVERITY, UNSPECIFIED WHETHER PERSISTENT: ICD-10-CM

## 2021-10-05 PROCEDURE — 99397 PER PM REEVAL EST PAT 65+ YR: CPT | Mod: S$PBB,,, | Performed by: INTERNAL MEDICINE

## 2021-10-05 PROCEDURE — 90686 IIV4 VACC NO PRSV 0.5 ML IM: CPT | Mod: PBBFAC

## 2021-10-05 PROCEDURE — 99999 PR PBB SHADOW E&M-EST. PATIENT-LVL III: ICD-10-PCS | Mod: PBBFAC,,, | Performed by: INTERNAL MEDICINE

## 2021-10-05 PROCEDURE — 99999 PR PBB SHADOW E&M-EST. PATIENT-LVL III: CPT | Mod: PBBFAC,,, | Performed by: INTERNAL MEDICINE

## 2021-10-05 PROCEDURE — 99213 OFFICE O/P EST LOW 20 MIN: CPT | Mod: PBBFAC,25 | Performed by: INTERNAL MEDICINE

## 2021-10-05 PROCEDURE — 99397 PR PREVENTIVE VISIT,EST,65 & OVER: ICD-10-PCS | Mod: S$PBB,,, | Performed by: INTERNAL MEDICINE

## 2021-10-05 RX ORDER — FLUTICASONE PROPIONATE AND SALMETEROL 500; 50 UG/1; UG/1
1 POWDER RESPIRATORY (INHALATION) 2 TIMES DAILY
Qty: 3 INHALER | Refills: 3 | Status: SHIPPED | OUTPATIENT
Start: 2021-10-05 | End: 2022-12-19

## 2021-10-05 RX ORDER — LEVOTHYROXINE SODIUM 150 UG/1
150 TABLET ORAL DAILY
Qty: 90 TABLET | Refills: 3 | Status: SHIPPED | OUTPATIENT
Start: 2021-10-05 | End: 2022-10-20

## 2021-10-10 ENCOUNTER — PATIENT MESSAGE (OUTPATIENT)
Dept: INTERNAL MEDICINE | Facility: CLINIC | Age: 76
End: 2021-10-10

## 2021-10-10 DIAGNOSIS — Z12.11 COLON CANCER SCREENING: Primary | ICD-10-CM

## 2021-10-11 ENCOUNTER — DOCUMENTATION ONLY (OUTPATIENT)
Dept: INTERNAL MEDICINE | Facility: CLINIC | Age: 76
End: 2021-10-11

## 2021-10-25 ENCOUNTER — LAB VISIT (OUTPATIENT)
Dept: LAB | Facility: HOSPITAL | Age: 76
End: 2021-10-25
Attending: INTERNAL MEDICINE
Payer: MEDICARE

## 2021-10-25 DIAGNOSIS — Z12.11 COLON CANCER SCREENING: ICD-10-CM

## 2021-10-25 PROCEDURE — 82274 ASSAY TEST FOR BLOOD FECAL: CPT | Performed by: INTERNAL MEDICINE

## 2021-10-27 LAB — HEMOCCULT STL QL IA: NEGATIVE

## 2021-11-04 ENCOUNTER — PATIENT MESSAGE (OUTPATIENT)
Dept: INTERNAL MEDICINE | Facility: CLINIC | Age: 76
End: 2021-11-04
Payer: MEDICARE

## 2021-11-04 ENCOUNTER — PATIENT MESSAGE (OUTPATIENT)
Dept: ENDOSCOPY | Facility: HOSPITAL | Age: 76
End: 2021-11-04
Payer: MEDICARE

## 2021-11-16 ENCOUNTER — OFFICE VISIT (OUTPATIENT)
Dept: ENDOCRINOLOGY | Facility: CLINIC | Age: 76
End: 2021-11-16
Payer: MEDICARE

## 2021-11-16 ENCOUNTER — HOSPITAL ENCOUNTER (OUTPATIENT)
Dept: RADIOLOGY | Facility: HOSPITAL | Age: 76
Discharge: HOME | End: 2021-11-16
Attending: INTERNAL MEDICINE
Payer: MEDICARE

## 2021-11-16 ENCOUNTER — PATIENT MESSAGE (OUTPATIENT)
Dept: ENDOCRINOLOGY | Facility: CLINIC | Age: 76
End: 2021-11-16

## 2021-11-16 VITALS
DIASTOLIC BLOOD PRESSURE: 80 MMHG | OXYGEN SATURATION: 98 % | WEIGHT: 153.69 LBS | HEIGHT: 67 IN | SYSTOLIC BLOOD PRESSURE: 138 MMHG | HEART RATE: 82 BPM | BODY MASS INDEX: 24.12 KG/M2

## 2021-11-16 DIAGNOSIS — E03.9 HYPOTHYROIDISM (ACQUIRED): ICD-10-CM

## 2021-11-16 DIAGNOSIS — M81.0 OSTEOPOROSIS, POST-MENOPAUSAL: ICD-10-CM

## 2021-11-16 DIAGNOSIS — M79.644 FINGER PAIN, RIGHT: ICD-10-CM

## 2021-11-16 DIAGNOSIS — M79.644 FINGER PAIN, RIGHT: Primary | ICD-10-CM

## 2021-11-16 PROCEDURE — 73120 XR HAND 2 VIEW RIGHT: ICD-10-PCS | Mod: 26,RT,, | Performed by: RADIOLOGY

## 2021-11-16 PROCEDURE — 73120 X-RAY EXAM OF HAND: CPT | Mod: TC,FY,RT

## 2021-11-16 PROCEDURE — 99999 PR PBB SHADOW E&M-EST. PATIENT-LVL V: ICD-10-PCS | Mod: PBBFAC,,, | Performed by: INTERNAL MEDICINE

## 2021-11-16 PROCEDURE — 73120 X-RAY EXAM OF HAND: CPT | Mod: 26,RT,, | Performed by: RADIOLOGY

## 2021-11-16 PROCEDURE — 99215 OFFICE O/P EST HI 40 MIN: CPT | Mod: PBBFAC | Performed by: INTERNAL MEDICINE

## 2021-11-16 PROCEDURE — 99999 PR PBB SHADOW E&M-EST. PATIENT-LVL V: CPT | Mod: PBBFAC,,, | Performed by: INTERNAL MEDICINE

## 2021-11-16 PROCEDURE — 99214 OFFICE O/P EST MOD 30 MIN: CPT | Mod: S$PBB,,, | Performed by: INTERNAL MEDICINE

## 2021-11-16 PROCEDURE — 99214 PR OFFICE/OUTPT VISIT, EST, LEVL IV, 30-39 MIN: ICD-10-PCS | Mod: S$PBB,,, | Performed by: INTERNAL MEDICINE

## 2021-12-09 ENCOUNTER — PATIENT MESSAGE (OUTPATIENT)
Dept: INTERNAL MEDICINE | Facility: CLINIC | Age: 76
End: 2021-12-09
Payer: MEDICARE

## 2021-12-09 DIAGNOSIS — J45.901 ACUTE EXACERBATION OF EXTRINSIC ASTHMA: ICD-10-CM

## 2021-12-09 RX ORDER — ALBUTEROL SULFATE 0.83 MG/ML
2.5 SOLUTION RESPIRATORY (INHALATION) EVERY 6 HOURS PRN
Qty: 1 EACH | Refills: 11 | Status: SHIPPED | OUTPATIENT
Start: 2021-12-09

## 2021-12-10 ENCOUNTER — PATIENT MESSAGE (OUTPATIENT)
Dept: INTERNAL MEDICINE | Facility: CLINIC | Age: 76
End: 2021-12-10
Payer: MEDICARE

## 2021-12-10 RX ORDER — PREDNISONE 20 MG/1
TABLET ORAL
Qty: 18 TABLET | Refills: 1 | Status: SHIPPED | OUTPATIENT
Start: 2021-12-10 | End: 2021-12-15

## 2021-12-14 ENCOUNTER — TELEPHONE (OUTPATIENT)
Dept: INTERNAL MEDICINE | Facility: CLINIC | Age: 76
End: 2021-12-14
Payer: MEDICARE

## 2021-12-15 ENCOUNTER — PATIENT MESSAGE (OUTPATIENT)
Dept: INTERNAL MEDICINE | Facility: CLINIC | Age: 76
End: 2021-12-15

## 2021-12-15 ENCOUNTER — HOSPITAL ENCOUNTER (OUTPATIENT)
Dept: RADIOLOGY | Facility: HOSPITAL | Age: 76
Discharge: HOME | End: 2021-12-15
Attending: INTERNAL MEDICINE
Payer: MEDICARE

## 2021-12-15 ENCOUNTER — OFFICE VISIT (OUTPATIENT)
Dept: INTERNAL MEDICINE | Facility: CLINIC | Age: 76
End: 2021-12-15
Payer: MEDICARE

## 2021-12-15 VITALS
BODY MASS INDEX: 24 KG/M2 | SYSTOLIC BLOOD PRESSURE: 132 MMHG | HEART RATE: 91 BPM | TEMPERATURE: 99 F | DIASTOLIC BLOOD PRESSURE: 80 MMHG | WEIGHT: 152.88 LBS | OXYGEN SATURATION: 95 % | HEIGHT: 67 IN

## 2021-12-15 DIAGNOSIS — J45.901 ACUTE EXACERBATION OF EXTRINSIC ASTHMA: ICD-10-CM

## 2021-12-15 DIAGNOSIS — J45.21 INTERMITTENT ASTHMA WITH ACUTE EXACERBATION, UNSPECIFIED ASTHMA SEVERITY: ICD-10-CM

## 2021-12-15 DIAGNOSIS — J45.21 INTERMITTENT ASTHMA WITH ACUTE EXACERBATION, UNSPECIFIED ASTHMA SEVERITY: Primary | ICD-10-CM

## 2021-12-15 PROCEDURE — 99215 OFFICE O/P EST HI 40 MIN: CPT | Mod: PBBFAC,25 | Performed by: INTERNAL MEDICINE

## 2021-12-15 PROCEDURE — 99999 PR PBB SHADOW E&M-EST. PATIENT-LVL V: CPT | Mod: PBBFAC,,, | Performed by: INTERNAL MEDICINE

## 2021-12-15 PROCEDURE — 99214 OFFICE O/P EST MOD 30 MIN: CPT | Mod: S$PBB,,, | Performed by: INTERNAL MEDICINE

## 2021-12-15 PROCEDURE — 71046 XR CHEST PA AND LATERAL: ICD-10-PCS | Mod: 26,,, | Performed by: RADIOLOGY

## 2021-12-15 PROCEDURE — 99214 PR OFFICE/OUTPT VISIT, EST, LEVL IV, 30-39 MIN: ICD-10-PCS | Mod: S$PBB,,, | Performed by: INTERNAL MEDICINE

## 2021-12-15 PROCEDURE — 71046 X-RAY EXAM CHEST 2 VIEWS: CPT | Mod: 26,,, | Performed by: RADIOLOGY

## 2021-12-15 PROCEDURE — 99999 PR PBB SHADOW E&M-EST. PATIENT-LVL V: ICD-10-PCS | Mod: PBBFAC,,, | Performed by: INTERNAL MEDICINE

## 2021-12-15 PROCEDURE — 71046 X-RAY EXAM CHEST 2 VIEWS: CPT | Mod: TC

## 2021-12-15 PROCEDURE — 94640 AIRWAY INHALATION TREATMENT: CPT | Mod: PBBFAC

## 2021-12-15 RX ORDER — IPRATROPIUM BROMIDE 0.5 MG/2.5ML
500 SOLUTION RESPIRATORY (INHALATION) 4 TIMES DAILY
Qty: 90 EACH | Refills: 3 | Status: SHIPPED | OUTPATIENT
Start: 2021-12-15 | End: 2022-12-15

## 2021-12-15 RX ORDER — PREDNISONE 20 MG/1
TABLET ORAL
Qty: 13 TABLET | Refills: 0 | Status: SHIPPED | OUTPATIENT
Start: 2021-12-15 | End: 2022-09-12 | Stop reason: ALTCHOICE

## 2021-12-15 RX ORDER — AMOXICILLIN AND CLAVULANATE POTASSIUM 875; 125 MG/1; MG/1
1 TABLET, FILM COATED ORAL 2 TIMES DAILY
Qty: 20 TABLET | Refills: 0 | Status: SHIPPED | OUTPATIENT
Start: 2021-12-15 | End: 2022-04-12 | Stop reason: SDUPTHER

## 2021-12-15 RX ORDER — IPRATROPIUM BROMIDE AND ALBUTEROL SULFATE 2.5; .5 MG/3ML; MG/3ML
3 SOLUTION RESPIRATORY (INHALATION)
Status: COMPLETED | OUTPATIENT
Start: 2021-12-15 | End: 2021-12-15

## 2021-12-15 RX ADMIN — IPRATROPIUM BROMIDE AND ALBUTEROL SULFATE 3 ML: 2.5; .5 SOLUTION RESPIRATORY (INHALATION) at 05:12

## 2021-12-16 ENCOUNTER — PATIENT MESSAGE (OUTPATIENT)
Dept: INTERNAL MEDICINE | Facility: CLINIC | Age: 76
End: 2021-12-16
Payer: MEDICARE

## 2022-01-14 ENCOUNTER — IMMUNIZATION (OUTPATIENT)
Dept: PHARMACY | Facility: CLINIC | Age: 77
End: 2022-01-14
Payer: MEDICARE

## 2022-01-14 DIAGNOSIS — Z23 NEED FOR VACCINATION: Primary | ICD-10-CM

## 2022-03-19 ENCOUNTER — PATIENT MESSAGE (OUTPATIENT)
Dept: INTERNAL MEDICINE | Facility: CLINIC | Age: 77
End: 2022-03-19
Payer: MEDICARE

## 2022-03-24 ENCOUNTER — OFFICE VISIT (OUTPATIENT)
Dept: INTERNAL MEDICINE | Facility: CLINIC | Age: 77
End: 2022-03-24
Payer: MEDICARE

## 2022-03-24 DIAGNOSIS — E87.6 HYPOKALEMIA: ICD-10-CM

## 2022-03-24 DIAGNOSIS — M81.0 OSTEOPOROSIS, POST-MENOPAUSAL: ICD-10-CM
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
## 2022-03-24 DIAGNOSIS — M53.3 COCCYODYNIA: ICD-10-CM

## 2022-03-24 DIAGNOSIS — E78.5 HYPERLIPIDEMIA, UNSPECIFIED HYPERLIPIDEMIA TYPE: ICD-10-CM

## 2022-03-24 PROCEDURE — 99213 OFFICE O/P EST LOW 20 MIN: CPT | Mod: 95,,, | Performed by: INTERNAL MEDICINE

## 2022-03-24 PROCEDURE — 99213 PR OFFICE/OUTPT VISIT, EST, LEVL III, 20-29 MIN: ICD-10-PCS | Mod: 95,,, | Performed by: INTERNAL MEDICINE

## 2022-03-24 RX ORDER — POTASSIUM CHLORIDE 750 MG/1
TABLET, EXTENDED RELEASE ORAL
Qty: 90 TABLET | Refills: 1 | Status: SHIPPED | OUTPATIENT
Start: 2022-03-24 | End: 2022-10-20

## 2022-03-24 RX ORDER — ATORVASTATIN CALCIUM 80 MG/1
TABLET, FILM COATED ORAL
Qty: 90 TABLET | Refills: 1 | Status: SHIPPED | OUTPATIENT
Start: 2022-03-24 | End: 2022-10-20

## 2022-03-24 RX ORDER — TRAMADOL HYDROCHLORIDE 50 MG/1
TABLET ORAL
Qty: 30 EACH | Refills: 0 | Status: SHIPPED | OUTPATIENT
Start: 2022-03-24 | End: 2023-07-18

## 2022-03-24 NOTE — TELEPHONE ENCOUNTER
Refill Authorization Note   Eugenia Diaz  is requesting a refill authorization.  Brief Assessment and Rationale for Refill:  Approve     Medication Therapy Plan:         Comments:   --->Care Gap information included below if applicable.       Requested Prescriptions   Pending Prescriptions Disp Refills    atorvastatin (LIPITOR) 80 MG tablet [Pharmacy Med Name: atorvastatin 80 mg tablet] 90 tablet 1     Sig: TAKE 1 TABLET BY MOUTH EVERY EVENING       Cardiovascular:  Antilipid - Statins Passed - 3/24/2022  9:28 AM        Passed - Patient is at least 18 years old        Passed - Valid encounter within last 15 months     Recent Visits  Date Type Provider Dept   12/15/21 Office Visit Latia Aguilar MD [facility] Internal Medicine   10/05/21 Office Visit Latia Aguilar MD [facility] Internal Medicine   09/28/20 Office Visit Latia Aguilar MD [facility] Internal Medicine   Showing recent visits within past 720 days and meeting all other requirements  Today's Visits  Date Type Provider Dept   03/24/22 Office Visit Latia Aguilar MD [facility] Internal Medicine   Showing today's visits and meeting all other requirements  Future Appointments  No visits were found meeting these conditions.  Showing future appointments within next 150 days and meeting all other requirements                Passed - Matches previous order       Previous Authorizing Provider: Latia Aguilar MD (atorvastatin (LIPITOR) 80 MG tablet)  Previous Authorizing Provider: Latia Aguilar MD (potassium chloride (KLOR-CON) 10 MEQ TbSR)  Previous Pharmacy: [facility]  [address]  Previous Pharmacy: [facility] [address].            Passed - No ED/Hospital visits since last PCP visit     Last PCP Visit: 3/24/2022 Last Admission: 6/22/2016 Last ED Visit: 2/13/2020          Passed - ALT is 131 or below and within 360 days     ALT   Date Value Ref Range Status   07/06/2021 13 10 -  44 U/L Final   07/06/2020 16 10 - 44 U/L Final   10/11/2018 17 10 - 44 U/L Final              Passed - AST is 119 or below and within 360 days     AST   Date Value Ref Range Status   07/06/2021 15 10 - 40 U/L Final   07/06/2020 17 10 - 40 U/L Final   10/11/2018 14 10 - 40 U/L Final              Passed - Total Cholesterol within 360 days     Lab Results   Component Value Date    CHOL 168 07/06/2021    CHOL 145 07/06/2020    CHOL 144 06/11/2019              Passed - LDL within 360 days     LDL Cholesterol   Date Value Ref Range Status   07/06/2021 81.2 63.0 - 159.0 mg/dL Final     Comment:     The National Cholesterol Education Program (NCEP) has set the  following guidelines (reference values) for LDL Cholesterol:  Optimal.......................<130 mg/dL  Borderline High...............130-159 mg/dL  High..........................160-189 mg/dL  Very High.....................>190 mg/dL              Passed - HDL within 360 days     HDL   Date Value Ref Range Status   07/06/2021 69 40 - 75 mg/dL Final     Comment:     The National Cholesterol Education Program (NCEP) has set the  following guidelines (reference values) for HDL Cholesterol:  Low...............<40 mg/dL  Optimal...........>60 mg/dL              Passed - Triglycerides within 360 days     Lab Results   Component Value Date    TRIG 89 07/06/2021    TRIG 68 07/06/2020    TRIG 69 06/11/2019                potassium chloride (KLOR-CON) 10 MEQ TbSR [Pharmacy Med Name: potassium chloride ER 10 mEq tablet,extended release] 90 tablet 1     Sig: TAKE 1 TABLET BY MOUTH ONCE DAILY       Endocrinology:  Minerals - Potassium Supplementation Passed - 3/24/2022  9:28 AM        Passed - Patient is at least 18 years old        Passed - Valid encounter within last 15 months     Recent Visits  Date Type Provider Dept   12/15/21 Office Visit Latia Aguilar MD [facility] Internal Medicine   10/05/21 Office Visit Latia Aguilar MD [facility] Internal Medicine   09/28/20 Office Visit  Latia Aguilar MD [facility] Internal Medicine   Showing recent visits within past 720 days and meeting all other requirements  Today's Visits  Date Type Provider Dept   03/24/22 Office Visit Latia Aguilar MD [facility] Internal Medicine   Showing today's visits and meeting all other requirements  Future Appointments  No visits were found meeting these conditions.  Showing future appointments within next 150 days and meeting all other requirements                Passed - Matches previous order       Previous Authorizing Provider: Latia Aguilar MD (atorvastatin (LIPITOR) 80 MG tablet)  Previous Authorizing Provider: Latia Aguilar MD (potassium chloride (KLOR-CON) 10 MEQ TbSR)  [place] [facility]   [place]  [facility] [place]            Passed - No ED/Hospital visits since last PCP visit     Last PCP Visit: 3/24/2022 Last Admission: 6/22/2016 Last ED Visit: 2/13/2020          Passed - K is 5.2 or below and within 360 days     Potassium   Date Value Ref Range Status   07/06/2021 4.3 3.5 - 5.1 mmol/L Final   07/06/2020 4.2 3.5 - 5.1 mmol/L Final   07/06/2020 4.2 3.5 - 5.1 mmol/L Final              Passed - Cr is 1.39 or below and within 360 days     Lab Results   Component Value Date    CREATININE 0.8 07/06/2021    CREATININE 0.7 07/06/2020    CREATININE 0.7 07/06/2020    POCCRE 0.7 11/14/2016              Passed - eGFR within 360 days     Lab Results   Component Value Date    EGFRNONAA >60.0 07/06/2021    EGFRNONAA >60.0 07/06/2020    EGFRNONAA >60.0 07/06/2020                    Appointments  past 12m or future 3m with PCP    Date Provider   Last Visit   12/15/2021 Latia Aguilar MD   Next Visit   3/24/2022 Latia Aguilar MD   ED visits in past 90 days: 0     Note composed:10:45 AM 03/24/2022

## 2022-03-24 NOTE — PROGRESS NOTES
Subjective:   The patient location is: home  The chief complaint leading to consultation is: tail bone pain    Visit type: audiovisual    Face to Face time with patient: 14  16 minutes of total time spent on the encounter, which includes face to face time and non-face to face time preparing to see the patient (eg, review of tests), Obtaining and/or reviewing separately obtained history, Documenting clinical information in the electronic or other health record, Independently interpreting results (not separately reported) and communicating results to the patient/family/caregiver, or Care coordination (not separately reported).         Each patient to whom he or she provides medical services by telemedicine is:  (1) informed of the relationship between the physician and patient and the respective role of any other health care provider with respect to management of the patient; and (2) notified that he or she may decline to receive medical services by telemedicine and may withdraw from such care at any time.    Notes:    Patient ID: Eugenia Diaz is a 76 y.o. female.    Chief Complaint: No chief complaint on file.    HPI   Fell 2/25 returning from dinner, leaving car.  Landed on tail bone and L buttocks.  Required help to stand.  Felt sore immediately.  By next day, had difficulty moving.  Now much better.  Using walker and scooter, able to use toilet.    Anorexic initially, now better.    Had help for first 2 weeks with showering, other help; now reduced to daily visits.    She thinks she fracture coccyx, as painful to sit.  Now able to sit in scooter.  Taking tylenol, naprosyn, asa.  She would like something stronger, as pain darcy bad at night and keeps her awake.    She has advance osteoporosis, now being tx with Reclast.      Review of Systems   Constitutional: Negative for fever.   Cardiovascular: Negative for chest pain.   Gastrointestinal: Negative for abdominal pain.   Genitourinary: Positive for pelvic pain.  Negative for bladder incontinence, dysuria and hematuria.   Musculoskeletal: Positive for back pain. Negative for leg pain.   Neurological: Positive for numbness. Negative for weakness and headaches.         Objective:      Physical Exam  Constitutional:       General: She is not in acute distress.     Appearance: Normal appearance. She is obese. She is not ill-appearing, toxic-appearing or diaphoretic.   Neurological:      General: No focal deficit present.      Mental Status: She is alert and oriented to person, place, and time.   Psychiatric:         Mood and Affect: Mood normal.         Behavior: Behavior normal.         Assessment:       Problem List Items Addressed This Visit        INFUSION TREATMENT    Osteoporosis, post-menopausal      Other Visit Diagnoses     Fall, initial encounter    -  Primary    Relevant Orders    X-Ray Sacrum And Coccyx    Coccyodynia        Relevant Medications    traMADoL (ULTRAM) 50 mg tablet          Plan:       Diagnoses and all orders for this visit:    Fall, initial encounter  -     X-Ray Sacrum And Coccyx; Future    Osteoporosis, post-menopausal    Coccyodynia  -     traMADoL (ULTRAM) 50 mg tablet; 1-2 tabs po q 8-12 h prn pain         We discussed home health PT.    Answers for HPI/ROS submitted by the patient on 3/21/2022  genital pain: No

## 2022-03-24 NOTE — TELEPHONE ENCOUNTER
No new care gaps identified.  Powered by [employer]. Reference number: [id number].   3/24/2022 9:28:45 AM CDT

## 2022-03-28 ENCOUNTER — HOSPITAL ENCOUNTER (OUTPATIENT)
Dept: RADIOLOGY | Facility: HOSPITAL | Age: 77
Discharge: HOME | End: 2022-03-28
Attending: INTERNAL MEDICINE
Payer: MEDICARE
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
## 2022-03-28 PROCEDURE — 72220 X-RAY EXAM SACRUM TAILBONE: CPT | Mod: 26,,, | Performed by: RADIOLOGY

## 2022-03-28 PROCEDURE — 72220 X-RAY EXAM SACRUM TAILBONE: CPT | Mod: TC,FY

## 2022-03-28 PROCEDURE — 72220 XR SACRUM AND COCCYX: ICD-10-PCS | Mod: 26,,, | Performed by: RADIOLOGY

## 2022-04-05 ENCOUNTER — PATIENT MESSAGE (OUTPATIENT)
Dept: INTERNAL MEDICINE | Facility: CLINIC | Age: 77
End: 2022-04-05
Payer: MEDICARE

## 2022-04-05 ENCOUNTER — LAB VISIT (OUTPATIENT)
Dept: LAB | Facility: HOSPITAL | Age: 77
End: 2022-04-05
Attending: INTERNAL MEDICINE
Payer: MEDICARE

## 2022-04-05 DIAGNOSIS — N39.0 URINARY TRACT INFECTION WITHOUT HEMATURIA, SITE UNSPECIFIED: ICD-10-CM

## 2022-04-05 DIAGNOSIS — N39.0 URINARY TRACT INFECTION WITHOUT HEMATURIA, SITE UNSPECIFIED: Primary | ICD-10-CM

## 2022-04-05 LAB
BACTERIA #/AREA URNS HPF: ABNORMAL /HPF
BILIRUB UR QL STRIP: NEGATIVE
CLARITY UR: ABNORMAL
COLOR UR: YELLOW
GLUCOSE UR QL STRIP: NEGATIVE
HGB UR QL STRIP: ABNORMAL
KETONES UR QL STRIP: NEGATIVE
LEUKOCYTE ESTERASE UR QL STRIP: ABNORMAL
MICROSCOPIC COMMENT: ABNORMAL
NITRITE UR QL STRIP: NEGATIVE
PH UR STRIP: 7 [PH] (ref 5–8)
PROT UR QL STRIP: ABNORMAL
RBC #/AREA URNS HPF: 88 /HPF (ref 0–4)
SP GR UR STRIP: 1.02 (ref 1–1.03)
SQUAMOUS #/AREA URNS HPF: 2 /HPF
URN SPEC COLLECT METH UR: ABNORMAL
UROBILINOGEN UR STRIP-ACNC: NEGATIVE EU/DL
WBC #/AREA URNS HPF: >100 /HPF (ref 0–5)
WBC CLUMPS URNS QL MICRO: ABNORMAL
YEAST URNS QL MICRO: ABNORMAL

## 2022-04-05 PROCEDURE — 87086 URINE CULTURE/COLONY COUNT: CPT | Performed by: INTERNAL MEDICINE

## 2022-04-05 PROCEDURE — 87186 SC STD MICRODIL/AGAR DIL: CPT | Performed by: INTERNAL MEDICINE

## 2022-04-05 PROCEDURE — 81000 URINALYSIS NONAUTO W/SCOPE: CPT | Performed by: INTERNAL MEDICINE

## 2022-04-05 PROCEDURE — 87077 CULTURE AEROBIC IDENTIFY: CPT | Performed by: INTERNAL MEDICINE

## 2022-04-05 PROCEDURE — 87088 URINE BACTERIA CULTURE: CPT | Performed by: INTERNAL MEDICINE

## 2022-04-05 RX ORDER — CEPHALEXIN 500 MG/1
500 CAPSULE ORAL EVERY 12 HOURS
Qty: 14 CAPSULE | Refills: 0 | Status: SHIPPED | OUTPATIENT
Start: 2022-04-05 | End: 2022-04-12

## 2022-04-07 LAB — BACTERIA UR CULT: ABNORMAL

## 2022-04-12 ENCOUNTER — PATIENT MESSAGE (OUTPATIENT)
Dept: INTERNAL MEDICINE | Facility: CLINIC | Age: 77
End: 2022-04-12
Payer: MEDICARE

## 2022-04-12 RX ORDER — AMOXICILLIN AND CLAVULANATE POTASSIUM 875; 125 MG/1; MG/1
1 TABLET, FILM COATED ORAL 2 TIMES DAILY
Qty: 14 TABLET | Refills: 0 | Status: SHIPPED | OUTPATIENT
Start: 2022-04-12 | End: 2022-04-19

## 2022-05-15 ENCOUNTER — PATIENT MESSAGE (OUTPATIENT)
Dept: INTERNAL MEDICINE | Facility: CLINIC | Age: 77
End: 2022-05-15
Payer: MEDICARE

## 2022-05-16 ENCOUNTER — PATIENT MESSAGE (OUTPATIENT)
Dept: INTERNAL MEDICINE | Facility: CLINIC | Age: 77
End: 2022-05-16
Payer: MEDICARE

## 2022-05-16 DIAGNOSIS — Z12.31 ENCOUNTER FOR SCREENING MAMMOGRAM FOR BREAST CANCER: Primary | ICD-10-CM

## 2022-05-18 ENCOUNTER — IMMUNIZATION (OUTPATIENT)
Dept: PHARMACY | Facility: CLINIC | Age: 77
End: 2022-05-18
Payer: MEDICARE

## 2022-05-18 DIAGNOSIS — Z23 NEED FOR VACCINATION: Primary | ICD-10-CM

## 2022-07-05 ENCOUNTER — TELEPHONE (OUTPATIENT)
Dept: INTERNAL MEDICINE | Facility: CLINIC | Age: 77
End: 2022-07-05
Payer: MEDICARE

## 2022-07-05 NOTE — TELEPHONE ENCOUNTER
----- Message from Hafsa Cash sent at 7/5/2022 11:16 AM CDT -----  Contact: [place] [phone]  Pharmacy is calling to clarify an RX.  RX name:  fluticasone propionate (FLONASE) 50 mcg/actuation nasal spray  What do they need to clarify:  directions  Comments:

## 2022-07-06 ENCOUNTER — HOSPITAL ENCOUNTER (OUTPATIENT)
Dept: RADIOLOGY | Facility: HOSPITAL | Age: 77
Discharge: HOME | End: 2022-07-06
Attending: INTERNAL MEDICINE
Payer: MEDICARE

## 2022-07-06 DIAGNOSIS — Z12.31 ENCOUNTER FOR SCREENING MAMMOGRAM FOR BREAST CANCER: ICD-10-CM

## 2022-07-06 PROCEDURE — 77063 BREAST TOMOSYNTHESIS BI: CPT | Mod: 26,,, | Performed by: RADIOLOGY

## 2022-07-06 PROCEDURE — 77067 SCR MAMMO BI INCL CAD: CPT | Mod: 26,,, | Performed by: RADIOLOGY

## 2022-07-06 PROCEDURE — 77067 MAMMO DIGITAL SCREENING BILAT WITH TOMO: ICD-10-PCS | Mod: 26,,, | Performed by: RADIOLOGY

## 2022-07-06 PROCEDURE — 77063 BREAST TOMOSYNTHESIS BI: CPT | Mod: TC

## 2022-07-06 PROCEDURE — 77067 SCR MAMMO BI INCL CAD: CPT | Mod: TC

## 2022-07-06 PROCEDURE — 77063 MAMMO DIGITAL SCREENING BILAT WITH TOMO: ICD-10-PCS | Mod: 26,,, | Performed by: RADIOLOGY

## 2022-07-06 RX ORDER — FLUTICASONE PROPIONATE 50 MCG
1 SPRAY, SUSPENSION (ML) NASAL 2 TIMES DAILY
Qty: 16 G | Refills: 7 | Status: SHIPPED | OUTPATIENT
Start: 2022-07-06 | End: 2023-08-12

## 2022-07-08 ENCOUNTER — PATIENT MESSAGE (OUTPATIENT)
Dept: INTERNAL MEDICINE | Facility: CLINIC | Age: 77
End: 2022-07-08
Payer: MEDICARE

## 2022-07-11 ENCOUNTER — PATIENT MESSAGE (OUTPATIENT)
Dept: ENDOCRINOLOGY | Facility: CLINIC | Age: 77
End: 2022-07-11
Payer: MEDICARE

## 2022-07-19 ENCOUNTER — PATIENT MESSAGE (OUTPATIENT)
Dept: RESEARCH | Facility: CLINIC | Age: 77
End: 2022-07-19
Payer: MEDICARE

## 2022-07-25 ENCOUNTER — TELEPHONE (OUTPATIENT)
Dept: ENDOCRINOLOGY | Facility: CLINIC | Age: 77
End: 2022-07-25
Payer: MEDICARE

## 2022-07-25 DIAGNOSIS — M81.0 OSTEOPOROSIS, POST-MENOPAUSAL: Primary | ICD-10-CM

## 2022-07-25 RX ORDER — ZOLEDRONIC ACID 5 MG/100ML
5 INJECTION, SOLUTION INTRAVENOUS
Status: CANCELLED | OUTPATIENT
Start: 2022-07-25

## 2022-07-25 RX ORDER — ACETAMINOPHEN 500 MG
500 TABLET ORAL
Status: CANCELLED | OUTPATIENT
Start: 2022-07-25

## 2022-07-25 RX ORDER — HEPARIN 100 UNIT/ML
500 SYRINGE INTRAVENOUS
Status: CANCELLED | OUTPATIENT
Start: 2022-07-25

## 2022-07-25 RX ORDER — SODIUM CHLORIDE 0.9 % (FLUSH) 0.9 %
10 SYRINGE (ML) INJECTION
Status: CANCELLED | OUTPATIENT
Start: 2022-07-25

## 2022-07-29 ENCOUNTER — TELEPHONE (OUTPATIENT)
Dept: INFECTIOUS DISEASES | Facility: HOSPITAL | Age: 77
End: 2022-07-29
Payer: MEDICARE

## 2022-08-01 ENCOUNTER — LAB VISIT (OUTPATIENT)
Dept: LAB | Facility: HOSPITAL | Age: 77
End: 2022-08-01
Attending: INTERNAL MEDICINE
Payer: MEDICARE

## 2022-08-01 ENCOUNTER — PATIENT MESSAGE (OUTPATIENT)
Dept: INTERNAL MEDICINE | Facility: CLINIC | Age: 77
End: 2022-08-01
Payer: MEDICARE

## 2022-08-01 DIAGNOSIS — M81.0 OSTEOPOROSIS, POST-MENOPAUSAL: ICD-10-CM

## 2022-08-01 LAB
25(OH)D3+25(OH)D2 SERPL-MCNC: 44 NG/ML (ref 30–96)
ANION GAP SERPL CALC-SCNC: 12 MMOL/L (ref 8–16)
BUN SERPL-MCNC: 11 MG/DL (ref 8–23)
CALCIUM SERPL-MCNC: 9.8 MG/DL (ref 8.7–10.5)
CHLORIDE SERPL-SCNC: 98 MMOL/L (ref 95–110)
CO2 SERPL-SCNC: 28 MMOL/L (ref 23–29)
CREAT SERPL-MCNC: 0.7 MG/DL (ref 0.5–1.4)
EST. GFR  (NO RACE VARIABLE): >60 ML/MIN/1.73 M^2
GLUCOSE SERPL-MCNC: 59 MG/DL (ref 70–110)
POTASSIUM SERPL-SCNC: 3.5 MMOL/L (ref 3.5–5.1)
SODIUM SERPL-SCNC: 138 MMOL/L (ref 136–145)

## 2022-08-01 PROCEDURE — 36415 COLL VENOUS BLD VENIPUNCTURE: CPT | Mod: PO | Performed by: INTERNAL MEDICINE

## 2022-08-01 PROCEDURE — 80048 BASIC METABOLIC PNL TOTAL CA: CPT | Performed by: INTERNAL MEDICINE

## 2022-08-01 PROCEDURE — 82306 VITAMIN D 25 HYDROXY: CPT | Performed by: INTERNAL MEDICINE

## 2022-08-22 ENCOUNTER — PATIENT MESSAGE (OUTPATIENT)
Dept: INTERNAL MEDICINE | Facility: CLINIC | Age: 77
End: 2022-08-22
Payer: MEDICARE

## 2022-08-25 ENCOUNTER — TELEPHONE (OUTPATIENT)
Dept: INTERNAL MEDICINE | Facility: CLINIC | Age: 77
End: 2022-08-25
Payer: MEDICARE

## 2022-08-25 DIAGNOSIS — E78.5 HYPERLIPIDEMIA, UNSPECIFIED HYPERLIPIDEMIA TYPE: Primary | ICD-10-CM

## 2022-08-25 NOTE — TELEPHONE ENCOUNTER
----- Message from Karol Gamez sent at 8/25/2022 10:34 AM CDT -----  Contact: [phone] Patient  Good Morning  Patient is calling to have her lipid schedule. Could orders be put in system ?     Thank you

## 2022-09-04 NOTE — TELEPHONE ENCOUNTER
Called pt and advised, will come at 3pm  
pls call pt - I can see  for quick appt at 3 - lung exam only - kindra Diaz  
aching

## 2022-09-12 ENCOUNTER — OFFICE VISIT (OUTPATIENT)
Dept: INTERNAL MEDICINE | Facility: CLINIC | Age: 77
End: 2022-09-12
Payer: MEDICARE

## 2022-09-12 ENCOUNTER — LAB VISIT (OUTPATIENT)
Dept: LAB | Facility: HOSPITAL | Age: 77
End: 2022-09-12
Attending: INTERNAL MEDICINE
Payer: MEDICARE

## 2022-09-12 VITALS
HEART RATE: 79 BPM | SYSTOLIC BLOOD PRESSURE: 124 MMHG | OXYGEN SATURATION: 97 % | WEIGHT: 143.31 LBS | HEIGHT: 67 IN | BODY MASS INDEX: 22.49 KG/M2 | DIASTOLIC BLOOD PRESSURE: 72 MMHG

## 2022-09-12 DIAGNOSIS — E03.9 HYPOTHYROIDISM (ACQUIRED): ICD-10-CM

## 2022-09-12 DIAGNOSIS — M81.0 OSTEOPOROSIS, POST-MENOPAUSAL: ICD-10-CM

## 2022-09-12 DIAGNOSIS — E78.5 HYPERLIPIDEMIA, UNSPECIFIED HYPERLIPIDEMIA TYPE: ICD-10-CM

## 2022-09-12 DIAGNOSIS — Z99.89 DEPENDENCE ON OTHER ENABLING MACHINES AND DEVICES: ICD-10-CM

## 2022-09-12 DIAGNOSIS — Z00.00 ENCOUNTER FOR PREVENTIVE HEALTH EXAMINATION: Primary | ICD-10-CM

## 2022-09-12 DIAGNOSIS — F32.5 MAJOR DEPRESSIVE DISORDER WITH SINGLE EPISODE, IN FULL REMISSION: ICD-10-CM

## 2022-09-12 DIAGNOSIS — I10 ESSENTIAL HYPERTENSION: ICD-10-CM

## 2022-09-12 DIAGNOSIS — R26.9 ABNORMALITY OF GAIT AND MOBILITY: ICD-10-CM

## 2022-09-12 DIAGNOSIS — I70.0 AORTIC ATHEROSCLEROSIS: ICD-10-CM

## 2022-09-12 DIAGNOSIS — K21.9 GASTROESOPHAGEAL REFLUX DISEASE WITHOUT ESOPHAGITIS: ICD-10-CM

## 2022-09-12 DIAGNOSIS — M87.059 AVASCULAR NECROSIS OF BONE OF HIP, UNSPECIFIED LATERALITY: ICD-10-CM

## 2022-09-12 DIAGNOSIS — D64.9 ANEMIA, UNSPECIFIED TYPE: ICD-10-CM

## 2022-09-12 LAB
CHOLEST SERPL-MCNC: 168 MG/DL (ref 120–199)
CHOLEST/HDLC SERPL: 2.4 {RATIO} (ref 2–5)
HDLC SERPL-MCNC: 70 MG/DL (ref 40–75)
HDLC SERPL: 41.7 % (ref 20–50)
LDLC SERPL CALC-MCNC: 83 MG/DL (ref 63–159)
NONHDLC SERPL-MCNC: 98 MG/DL
TRIGL SERPL-MCNC: 75 MG/DL (ref 30–150)

## 2022-09-12 PROCEDURE — 99999 PR PBB SHADOW E&M-EST. PATIENT-LVL V: CPT | Mod: PBBFAC,,, | Performed by: NURSE PRACTITIONER

## 2022-09-12 PROCEDURE — 99215 OFFICE O/P EST HI 40 MIN: CPT | Mod: PBBFAC,25 | Performed by: NURSE PRACTITIONER

## 2022-09-12 PROCEDURE — 80061 LIPID PANEL: CPT | Performed by: INTERNAL MEDICINE

## 2022-09-12 PROCEDURE — 99999 PR PBB SHADOW E&M-EST. PATIENT-LVL V: ICD-10-PCS | Mod: PBBFAC,,, | Performed by: NURSE PRACTITIONER

## 2022-09-12 PROCEDURE — G0439 PPPS, SUBSEQ VISIT: HCPCS | Mod: ,,, | Performed by: NURSE PRACTITIONER

## 2022-09-12 PROCEDURE — G0008 ADMIN INFLUENZA VIRUS VAC: HCPCS | Mod: PBBFAC

## 2022-09-12 PROCEDURE — 36415 COLL VENOUS BLD VENIPUNCTURE: CPT | Performed by: INTERNAL MEDICINE

## 2022-09-12 PROCEDURE — G0439 PR MEDICARE ANNUAL WELLNESS SUBSEQUENT VISIT: ICD-10-PCS | Mod: ,,, | Performed by: NURSE PRACTITIONER

## 2022-09-12 RX ORDER — CHOLECALCIFEROL (VITAMIN D3) 25 MCG
1000 TABLET ORAL
COMMUNITY

## 2022-09-12 NOTE — Clinical Note
"Here for HRA. She wanted me to let you know that she thought that she may have a couple of rib fractures in July. No fall (other than the one earlier this year). She states that she "twisted" and her a couple of "pops" and noted tenderness and difficulty taking deep breaths. This was in July and she states that she is much improved, but wanted to let you know. Thanks"

## 2022-09-12 NOTE — PROGRESS NOTES
"  Eugenia Diaz presented for a  Medicare AWV and comprehensive Health Risk Assessment today. The following components were reviewed and updated:    Medical history  Family History  Social history  Allergies and Current Medications  Health Risk Assessment  Health Maintenance  Care Team         ** See Completed Assessments for Annual Wellness Visit within the encounter summary.**         The following assessments were completed:  Living Situation  CAGE  Depression Screening  Timed Get Up and Go  Whisper Test  Cognitive Function Screening      Nutrition Screening  ADL Screening  PAQ Screening  OPIOID Screening: Patient does not have a prescription for narcotics. Patient does not use substance         Vitals:    09/12/22 1021 09/12/22 1023   BP:  124/72   BP Location:  Right arm   Patient Position:  Sitting   Pulse:  79   SpO2:  97%   Weight: 65 kg (143 lb 4.8 oz)    Height: 5' 7" (1.702 m)      Body mass index is 22.44 kg/m².  Physical Exam  Vitals and nursing note reviewed.   Constitutional:       Appearance: She is well-developed.   HENT:      Head: Normocephalic.   Cardiovascular:      Rate and Rhythm: Normal rate and regular rhythm.      Heart sounds: Normal heart sounds. No murmur heard.  Pulmonary:      Effort: Pulmonary effort is normal.      Breath sounds: Normal breath sounds.   Abdominal:      General: Bowel sounds are normal.      Palpations: Abdomen is soft. There is no mass.   Musculoskeletal:      Comments: In scooter    Skin:     General: Skin is warm and dry.   Neurological:      Mental Status: She is alert and oriented to person, place, and time.      Motor: No abnormal muscle tone.   Psychiatric:         Mood and Affect: Mood normal.             Diagnoses and health risks identified today and associated recommendations/orders:    1. Encounter for preventive health examination  Here for Health Risk Assessment/Annual Wellness Visit.  Health maintenance reviewed and updated. Follow up in one year.   Defer " Fit Kit to PCP.    2. Aortic atherosclerosis  Chronic, stable on current medications. Noted CT Chest 11/14/16. Followed by PCP.     3. Essential hypertension  Chronic, stable on current medications. Followed by PCP.     4. Hyperlipidemia, unspecified hyperlipidemia type  Chronic, stable on current medication. Followed by PCP.     5. Major depressive disorder with single episode, in full remission  Chronic, stable on current medication. PHQ-2 score 0. Followed by PCP.     6. Hypothyroidism (acquired)  Chronic, stable on current medication. Followed by PCP.     7. Anemia, unspecified type  Chronic, stable. Followed by PCP.     8. Osteoporosis, post-menopausal  Chronic, stable on current medications/Reclast infusion.  Followed by PCP, Endocrinology     9. Gastroesophageal reflux disease without esophagitis  Chronic, stable on current medication. Followed by PCP.     10. Avascular necrosis of bone of hip, unspecified laterality  Chronic, stable. Followed by PCP.     11. Dependence on other enabling machines and devices  Uses motorized scooter for distances due to avascular necrosis.    12. Abnormality of gait and mobility  Uses motorized scooter for distances due to avascular necrosis.      Provided Eugenia with a 5-10 year written screening schedule and personal prevention plan. Recommendations were developed using the USPSTF age appropriate recommendations. Education, counseling, and referrals were provided as needed. After Visit Summary printed and given to patient which includes a list of additional screenings\tests needed.    Follow up in 7 weeks (on 11/1/2022).with PCP    Aparna Masters NP

## 2022-09-12 NOTE — PROGRESS NOTES
I offered to discuss advanced care planning, including how to pick a person who would make decisions for you if you were unable to make them for yourself, called a health care power of , and what kind of decisions you might make such as use of life sustaining treatments such as ventilators and tube feeding when faced with a life limiting illness recorded on a living will that they will need to know. (How you want to be cared for as you near the end of your natural life)     X  Patient has advanced directives on file, which we reviewed, and they do not wish to make changes.

## 2022-09-12 NOTE — PATIENT INSTRUCTIONS
Counseling and Referral of Other Preventative  (Italic type indicates deductible and co-insurance are waived)    Patient Name: Eugenia Diaz  Today's Date: 9/12/2022    Health Maintenance         Date Due Completion Date    Lipid Panel 07/06/2022 7/6/2021    Influenza Vaccine (1) 09/01/2022 10/5/2021    Colorectal Cancer Screening 10/01/2022 (Originally 10/27/2022) 10/27/2021    DEXA Scan 02/26/2023 2/26/2021    Mammogram 07/06/2023 7/6/2022    TETANUS VACCINE 06/20/2026 6/20/2016 (Done)    Override on 6/20/2016: Done          No orders of the defined types were placed in this encounter.    The following information is provided to all patients.  This information is to help you find resources for any of the problems found today that may be affecting your health:                Living healthy guide: www.[facility].[place]      Understanding Diabetes: www.diabetes.org      Eating healthy: www.cdc.gov/healthyweight      [facility] home safety checklist: www.cdc.gov/steadi/patient.html      Agency on Aging: www.goea.[place]      Alcoholics anonymous (AA): www.aa.org      Physical Activity: www.jesenia.nih.gov/dn3isox      Tobacco use: www.quitwithusla.org

## 2022-10-03 ENCOUNTER — INFUSION (OUTPATIENT)
Dept: INFECTIOUS DISEASES | Facility: HOSPITAL | Age: 77
End: 2022-10-03
Attending: INTERNAL MEDICINE
Payer: MEDICARE

## 2022-10-03 VITALS
SYSTOLIC BLOOD PRESSURE: 133 MMHG | BODY MASS INDEX: 22.87 KG/M2 | HEART RATE: 90 BPM | DIASTOLIC BLOOD PRESSURE: 80 MMHG | OXYGEN SATURATION: 96 % | WEIGHT: 146 LBS | TEMPERATURE: 97 F

## 2022-10-03 DIAGNOSIS — M81.0 OSTEOPOROSIS, POST-MENOPAUSAL: Primary | ICD-10-CM

## 2022-10-03 DIAGNOSIS — M87.059 AVASCULAR NECROSIS OF BONE OF HIP, UNSPECIFIED LATERALITY: ICD-10-CM

## 2022-10-03 PROCEDURE — 63600175 PHARM REV CODE 636 W HCPCS: Performed by: INTERNAL MEDICINE

## 2022-10-03 PROCEDURE — 25000003 PHARM REV CODE 250: Performed by: INTERNAL MEDICINE

## 2022-10-03 PROCEDURE — 96365 THER/PROPH/DIAG IV INF INIT: CPT

## 2022-10-03 RX ORDER — SODIUM CHLORIDE 0.9 % (FLUSH) 0.9 %
10 SYRINGE (ML) INJECTION
Status: CANCELLED | OUTPATIENT
Start: 2022-10-03

## 2022-10-03 RX ORDER — ACETAMINOPHEN 500 MG
500 TABLET ORAL
Status: CANCELLED | OUTPATIENT
Start: 2022-10-03

## 2022-10-03 RX ORDER — HEPARIN 100 UNIT/ML
500 SYRINGE INTRAVENOUS
Status: DISCONTINUED | OUTPATIENT
Start: 2022-10-03 | End: 2022-10-03 | Stop reason: HOSPADM

## 2022-10-03 RX ORDER — SODIUM CHLORIDE 0.9 % (FLUSH) 0.9 %
10 SYRINGE (ML) INJECTION
Status: DISCONTINUED | OUTPATIENT
Start: 2022-10-03 | End: 2022-10-03 | Stop reason: HOSPADM

## 2022-10-03 RX ORDER — ACETAMINOPHEN 500 MG
500 TABLET ORAL
Status: DISCONTINUED | OUTPATIENT
Start: 2022-10-03 | End: 2022-10-03 | Stop reason: HOSPADM

## 2022-10-03 RX ORDER — ZOLEDRONIC ACID 5 MG/100ML
5 INJECTION, SOLUTION INTRAVENOUS
Status: CANCELLED | OUTPATIENT
Start: 2022-10-03

## 2022-10-03 RX ORDER — ZOLEDRONIC ACID 5 MG/100ML
5 INJECTION, SOLUTION INTRAVENOUS
Status: COMPLETED | OUTPATIENT
Start: 2022-10-03 | End: 2022-10-03

## 2022-10-03 RX ORDER — HEPARIN 100 UNIT/ML
500 SYRINGE INTRAVENOUS
Status: CANCELLED | OUTPATIENT
Start: 2022-10-03

## 2022-10-03 RX ADMIN — ZOLEDRONIC ACID 5 MG: 5 INJECTION, SOLUTION INTRAVENOUS at 10:10

## 2022-10-03 RX ADMIN — SODIUM CHLORIDE: 0.9 INJECTION, SOLUTION INTRAVENOUS at 10:10

## 2022-10-03 NOTE — PROGRESS NOTES
Pt here for Reclast infusion. Pt took tylenol 650mg po 30 minutes prior to arrival. Pt tolerated well and left in NAD.

## 2022-10-18 ENCOUNTER — PATIENT MESSAGE (OUTPATIENT)
Dept: INTERNAL MEDICINE | Facility: CLINIC | Age: 77
End: 2022-10-18
Payer: MEDICARE

## 2022-10-21 ENCOUNTER — IMMUNIZATION (OUTPATIENT)
Dept: PHARMACY | Facility: CLINIC | Age: 77
End: 2022-10-21
Payer: MEDICARE

## 2022-10-21 DIAGNOSIS — Z23 NEED FOR VACCINATION: Primary | ICD-10-CM

## 2022-11-01 ENCOUNTER — HOSPITAL ENCOUNTER (OUTPATIENT)
Dept: RADIOLOGY | Facility: HOSPITAL | Age: 77
Discharge: HOME | End: 2022-11-01
Attending: INTERNAL MEDICINE
Payer: MEDICARE

## 2022-11-01 ENCOUNTER — OFFICE VISIT (OUTPATIENT)
Dept: INTERNAL MEDICINE | Facility: CLINIC | Age: 77
End: 2022-11-01
Payer: MEDICARE

## 2022-11-01 ENCOUNTER — DOCUMENTATION ONLY (OUTPATIENT)
Dept: INTERNAL MEDICINE | Facility: CLINIC | Age: 77
End: 2022-11-01

## 2022-11-01 VITALS
BODY MASS INDEX: 23.49 KG/M2 | SYSTOLIC BLOOD PRESSURE: 128 MMHG | WEIGHT: 149.69 LBS | HEIGHT: 67 IN | HEART RATE: 82 BPM | DIASTOLIC BLOOD PRESSURE: 74 MMHG | OXYGEN SATURATION: 96 %

## 2022-11-01 DIAGNOSIS — R07.81 RIB PAIN ON LEFT SIDE: ICD-10-CM

## 2022-11-01 DIAGNOSIS — I10 ESSENTIAL HYPERTENSION: ICD-10-CM

## 2022-11-01 DIAGNOSIS — L98.9 SKIN LESION: Primary | ICD-10-CM

## 2022-11-01 DIAGNOSIS — D64.9 ANEMIA, UNSPECIFIED TYPE: ICD-10-CM

## 2022-11-01 DIAGNOSIS — E03.9 HYPOTHYROIDISM (ACQUIRED): ICD-10-CM

## 2022-11-01 DIAGNOSIS — M81.0 OSTEOPOROSIS, POST-MENOPAUSAL: ICD-10-CM

## 2022-11-01 DIAGNOSIS — J45.909 ASTHMA, WELL CONTROLLED, UNSPECIFIED ASTHMA SEVERITY, UNSPECIFIED WHETHER PERSISTENT: ICD-10-CM

## 2022-11-01 DIAGNOSIS — Z12.11 COLON CANCER SCREENING: ICD-10-CM

## 2022-11-01 DIAGNOSIS — E78.5 HYPERLIPIDEMIA, UNSPECIFIED HYPERLIPIDEMIA TYPE: ICD-10-CM

## 2022-11-01 DIAGNOSIS — F32.5 MAJOR DEPRESSIVE DISORDER WITH SINGLE EPISODE, IN FULL REMISSION: ICD-10-CM

## 2022-11-01 PROCEDURE — 71100 X-RAY EXAM RIBS UNI 2 VIEWS: CPT | Mod: 26,LT,, | Performed by: RADIOLOGY

## 2022-11-01 PROCEDURE — 71100 XR RIBS 2 VIEW LEFT: ICD-10-PCS | Mod: 26,LT,, | Performed by: RADIOLOGY

## 2022-11-01 PROCEDURE — 99999 PR PBB SHADOW E&M-EST. PATIENT-LVL V: CPT | Mod: PBBFAC,,, | Performed by: INTERNAL MEDICINE

## 2022-11-01 PROCEDURE — 71100 X-RAY EXAM RIBS UNI 2 VIEWS: CPT | Mod: TC,LT

## 2022-11-01 PROCEDURE — 99214 PR OFFICE/OUTPT VISIT, EST, LEVL IV, 30-39 MIN: ICD-10-PCS | Mod: S$PBB,,, | Performed by: INTERNAL MEDICINE

## 2022-11-01 PROCEDURE — 99999 PR PBB SHADOW E&M-EST. PATIENT-LVL V: ICD-10-PCS | Mod: PBBFAC,,, | Performed by: INTERNAL MEDICINE

## 2022-11-01 PROCEDURE — 99215 OFFICE O/P EST HI 40 MIN: CPT | Mod: PBBFAC | Performed by: INTERNAL MEDICINE

## 2022-11-01 PROCEDURE — 99214 OFFICE O/P EST MOD 30 MIN: CPT | Mod: S$PBB,,, | Performed by: INTERNAL MEDICINE

## 2022-11-01 NOTE — Clinical Note
Vy - you have seen her in past - she needs replacement scooter asap as hers is stopping unexpectedly.  She has to get strangers to help push it!  She has an appt with you in Jan.  Any chance you, or a colleague, could see sooner?  She would really appreciate it. Latia

## 2022-11-01 NOTE — PROGRESS NOTES
Subjective:       Patient ID: Eugenia Diaz is a 77 y.o. female.    Chief Complaint: Annual Exam    HPI  Osteoporosis - had reclast injection in October.    Asthma well controlled.      Hypothyroidism.    Lesion L breast.  Drains white substance at times.  Never saw derm.  Previously dx as SK, by Dr Coleman, according to pt.    She needs a new scooter.  Last scooter 10 years old.  Scooter dies.       Scooter was prescribed due to hip..  replacement is urgently needed.    MDD controlled with zoloft.    Gerd controlled with protonix.      Felt a crack in L rib when twisting in late July.  She suspects she fractured a rib.  .   Review of Systems   Constitutional:  Negative for activity change and unexpected weight change.   HENT:  Positive for hearing loss. Negative for rhinorrhea and trouble swallowing.    Eyes:  Negative for discharge and visual disturbance.   Respiratory:  Negative for chest tightness and wheezing.    Cardiovascular:  Negative for chest pain and palpitations.   Gastrointestinal:  Negative for blood in stool, constipation, diarrhea and vomiting.   Endocrine: Negative for polydipsia and polyuria.   Genitourinary:  Negative for difficulty urinating, dysuria, hematuria and menstrual problem.   Musculoskeletal:  Negative for arthralgias, joint swelling and neck pain.   Neurological:  Negative for weakness and headaches.   Psychiatric/Behavioral:  Negative for confusion and dysphoric mood.        Objective:      Physical Exam  Constitutional:       General: She is not in acute distress.     Appearance: She is well-developed.   HENT:      Head: Normocephalic and atraumatic.      Right Ear: External ear normal.      Left Ear: External ear normal.      Nose: Nose normal.   Eyes:      General: No scleral icterus.        Right eye: No discharge.         Left eye: No discharge.      Conjunctiva/sclera: Conjunctivae normal.      Pupils: Pupils are equal, round, and reactive to light.   Neck:      Thyroid: No  thyromegaly.      Vascular: No JVD.   Cardiovascular:      Rate and Rhythm: Normal rate and regular rhythm.      Heart sounds: Normal heart sounds. No murmur heard.    No gallop.      Comments: Rib tenderness ant lat,lower rib cage.  Pulmonary:      Effort: Pulmonary effort is normal. No respiratory distress.      Breath sounds: Normal breath sounds. No wheezing or rales.   Chest:   Breasts:     Right: Normal.      Left: Normal.   Abdominal:      General: Bowel sounds are normal. There is no distension.      Palpations: Abdomen is soft. There is no mass.      Tenderness: There is no abdominal tenderness. There is no guarding or rebound.   Musculoskeletal:         General: No tenderness. Normal range of motion.      Cervical back: Normal range of motion and neck supple.   Lymphadenopathy:      Cervical: No cervical adenopathy.   Skin:     General: Skin is warm and dry.      Findings: No rash.      Comments: Sk  appearing lesion L lateral breast   Neurological:      Mental Status: She is alert and oriented to person, place, and time.      Cranial Nerves: No cranial nerve deficit.      Coordination: Coordination normal.   Psychiatric:         Behavior: Behavior normal.         Thought Content: Thought content normal.         Judgment: Judgment normal.       Assessment:       Problem List Items Addressed This Visit          INFUSION TREATMENT    Osteoporosis, post-menopausal       Other    Major depressive disorder with single episode, in full remission    Hypothyroidism (acquired)    Relevant Orders    TSH    Hyperlipidemia    Essential hypertension    Relevant Orders    Comprehensive Metabolic Panel    Asthma, well controlled    Anemia    Relevant Orders    CBC Without Differential     Other Visit Diagnoses       Skin lesion    -  Primary    Relevant Orders    Ambulatory referral/consult to Dermatology    Rib pain on left side        Relevant Orders    X-Ray Ribs 2 View Left    Colon cancer screening        Relevant  Orders    Fecal Immunochemical Test (iFOBT)            Plan:       Eugenia was seen today for annual exam.    Diagnoses and all orders for this visit:    Skin lesion  -     Ambulatory referral/consult to Dermatology; Future    Hypothyroidism (acquired)  -     TSH; Future    Asthma, well controlled, unspecified asthma severity, unspecified whether persistent    Major depressive disorder with single episode, in full remission    Osteoporosis, post-menopausal    Essential hypertension  -     Comprehensive Metabolic Panel; Future    Hyperlipidemia, unspecified hyperlipidemia type    Anemia, unspecified type  -     CBC Without Differential; Future    Rib pain on left side  -     X-Ray Ribs 2 View Left; Future    Colon cancer screening  -     Fecal Immunochemical Test (iFOBT); Future         Ifobt.  Unable to prep for colonosocpy.    Will request sooner PMR appt as needs scooter soon

## 2022-11-02 ENCOUNTER — PATIENT MESSAGE (OUTPATIENT)
Dept: INTERNAL MEDICINE | Facility: CLINIC | Age: 77
End: 2022-11-02
Payer: MEDICARE

## 2022-11-02 ENCOUNTER — TELEPHONE (OUTPATIENT)
Dept: NEUROLOGY | Facility: CLINIC | Age: 77
End: 2022-11-02
Payer: MEDICARE

## 2022-11-02 DIAGNOSIS — E03.9 HYPOTHYROIDISM (ACQUIRED): Primary | ICD-10-CM

## 2022-11-02 RX ORDER — LEVOTHYROXINE SODIUM 137 UG/1
137 TABLET ORAL
Qty: 90 TABLET | Refills: 0 | Status: SHIPPED | OUTPATIENT
Start: 2022-11-02 | End: 2023-01-04 | Stop reason: SDUPTHER

## 2022-11-02 NOTE — TELEPHONE ENCOUNTER
----- Message from Vy Franco MD sent at 11/1/2022  5:36 PM CDT -----  Please schedule the patient for appointment on 11/16 on 11/17 for mobility evaluation.  ----- Message -----  From: Latia Aguilar MD  Sent: 11/1/2022   5:35 PM CDT  To: MD Vy Rodriguez - you have seen her in past - she needs replacement scooter asap as hers is stopping unexpectedly.  She has to get strangers to help push it!  She has an appt with you in Jan.  Any chance you, or a colleague, could see sooner?  She would really appreciate it.  Latia

## 2022-11-09 ENCOUNTER — OFFICE VISIT (OUTPATIENT)
Dept: ENDOCRINOLOGY | Facility: CLINIC | Age: 77
End: 2022-11-09
Payer: MEDICARE

## 2022-11-09 VITALS
DIASTOLIC BLOOD PRESSURE: 70 MMHG | OXYGEN SATURATION: 96 % | BODY MASS INDEX: 23.39 KG/M2 | SYSTOLIC BLOOD PRESSURE: 122 MMHG | HEART RATE: 77 BPM | WEIGHT: 149.06 LBS | HEIGHT: 67 IN

## 2022-11-09 DIAGNOSIS — E03.9 HYPOTHYROIDISM (ACQUIRED): ICD-10-CM

## 2022-11-09 DIAGNOSIS — E05.80 THYROTOXICOSIS, EXOGENOUS IATROGENIC: ICD-10-CM

## 2022-11-09 DIAGNOSIS — M81.0 OSTEOPOROSIS, POST-MENOPAUSAL: ICD-10-CM

## 2022-11-09 PROCEDURE — 99999 PR PBB SHADOW E&M-EST. PATIENT-LVL V: ICD-10-PCS | Mod: PBBFAC,,, | Performed by: INTERNAL MEDICINE

## 2022-11-09 PROCEDURE — 99214 OFFICE O/P EST MOD 30 MIN: CPT | Mod: S$PBB,,, | Performed by: INTERNAL MEDICINE

## 2022-11-09 PROCEDURE — 99215 OFFICE O/P EST HI 40 MIN: CPT | Mod: PBBFAC | Performed by: INTERNAL MEDICINE

## 2022-11-09 PROCEDURE — 99999 PR PBB SHADOW E&M-EST. PATIENT-LVL V: CPT | Mod: PBBFAC,,, | Performed by: INTERNAL MEDICINE

## 2022-11-09 PROCEDURE — 99214 PR OFFICE/OUTPT VISIT, EST, LEVL IV, 30-39 MIN: ICD-10-PCS | Mod: S$PBB,,, | Performed by: INTERNAL MEDICINE

## 2022-11-09 NOTE — ASSESSMENT & PLAN NOTE
Risk factors: postmenopausal status, , fractures, family history of osteoporosis (mother and maternal aunt), history of prednisone use.   Multiple fractures: ankle, feet, knee cap, rib    No fractures since last visit. However has had two falls this year.   Balance is ok, but needs assistance.     Vitamin D is normal  Continue to take calcium in diet and supplements     Continue reclast but will check DXA scan   [place] [facility] [place]

## 2022-11-09 NOTE — PROGRESS NOTES
Subjective:      Patient ID: Eugenia Diaz is a 77 y.o. female.    Chief Complaint:  Osteoporosis      History of Present Illness  Ms. Diaz is a 77 year old woman with medical history significant for IKE with avascular necrosis of hips, asthma requiring steroids intermittently who is here for management of osteoporosis, T score -3.1 at the [facility] and -2.9 at the [facility] in 2/2021.      Fell in 2/2022 going out of a car and fell on unlevel ground. Able to get home, but next day had much pain, difficulty using the bathroom. X ray demonstrates showed a sacrum that is partially obscured by bowel contents without definite fracture. No acute fracture on recent rib xray as well.     Current medicine:  reclast 5 mg IV --> 10/2022, 2021, 2020 and 2019  Takes acetaminophen, vitamin d and drinks plenty of fluids prior to infusion     Doing well, no s/e to reclast.    No falls or fractures.   Balance is good, uses scooter for long distances but at home is able to ambulate  Unable to exercise regularly  Denies any dental issues, sees dentist every six months.     Previous medications:  Prolia for one year (two doses) 2016 -- after second injection she fell and broke her sternum. She also fractured a tooth at the time and needed an extraction on implant. But she did not undergo procedure.   Forteo for two years   Fosamax/actonel for 4 - 5 years   Drug holiday for three years      Has hypertension currently on triamterene - HCTZ  Has hypothyroidism on levothyroxine, dose decreased to 137 mcg daily based on TSH of 0.1   Appetite is good. Weight is stable.    Supplements:   Vitamin D3 1000 IUs daily three times weekly --> recent levels normal   Also taking calcium and magnesium --> one tablet daily   Dark leafy greens, yogurt 2 weekly, cheese 2 weekly     Review of Systems  Denies recent illness     Objective:   Physical Exam  Vitals:    11/09/22 0906   BP: 122/70   BP Location: Left arm   Patient Position: Sitting   BP Method: Medium  "(Manual)   Pulse: 77   SpO2: 96%   Weight: 67.6 kg (149 lb 0.5 oz)   Height: 5' 7" (1.702 m)       BP Readings from Last 3 Encounters:   11/09/22 122/70   11/01/22 128/74   10/03/22 133/80     Wt Readings from Last 1 Encounters:   11/09/22 0906 67.6 kg (149 lb 0.5 oz)         Body mass index is 23.34 kg/m².    Lab Review:   Lab Results   Component Value Date    HGBA1C 6.2 06/18/2010     Lab Results   Component Value Date    CHOL 168 09/12/2022    HDL 70 09/12/2022    LDLCALC 83.0 09/12/2022    TRIG 75 09/12/2022    CHOLHDL 41.7 09/12/2022     Lab Results   Component Value Date     (L) 11/01/2022    K 3.7 11/01/2022    CL 95 11/01/2022    CO2 28 11/01/2022    GLU 79 11/01/2022    BUN 12 11/01/2022    CREATININE 0.7 11/01/2022    CALCIUM 10.4 11/01/2022    PROT 6.9 11/01/2022    ALBUMIN 4.0 11/01/2022    BILITOT 0.6 11/01/2022    ALKPHOS 87 11/01/2022    AST 18 11/01/2022    ALT 18 11/01/2022    ANIONGAP 12 11/01/2022    ESTGFRAFRICA >60.0 07/06/2021    EGFRNONAA >60.0 07/06/2021    TSH 0.139 (L) 11/01/2022      Latest Reference Range & Units 08/01/22 11:27   Vit D, 25-Hydroxy 30 - 96 ng/mL 44         Assessment and Plan     Osteoporosis, post-menopausal  Risk factors: postmenopausal status, , fractures, family history of osteoporosis (mother and maternal aunt), history of prednisone use.   Multiple fractures: ankle, feet, knee cap, rib    No fractures since last visit. However has had two falls this year.   Balance is ok, but needs assistance.     Vitamin D is normal  Continue to take calcium in diet and supplements     Continue reclast but will check DXA scan   [place]A [place] be done [place] [facility] [place] compare with previous studies.     Thyrotoxicosis, exogenous iatrogenic  Reduced LT4 to 137 mcg daily   Reviewed administration    Hypothyroidism (acquired)  Continue replacement            "

## 2022-11-10 ENCOUNTER — PATIENT MESSAGE (OUTPATIENT)
Dept: ENDOCRINOLOGY | Facility: CLINIC | Age: 77
End: 2022-11-10
Payer: MEDICARE

## 2022-11-16 ENCOUNTER — PATIENT MESSAGE (OUTPATIENT)
Dept: PHYSICAL MEDICINE AND REHAB | Facility: CLINIC | Age: 77
End: 2022-11-16

## 2022-11-16 ENCOUNTER — OFFICE VISIT (OUTPATIENT)
Dept: PHYSICAL MEDICINE AND REHAB | Facility: CLINIC | Age: 77
End: 2022-11-16
Payer: MEDICARE

## 2022-11-16 ENCOUNTER — PATIENT MESSAGE (OUTPATIENT)
Dept: INTERNAL MEDICINE | Facility: CLINIC | Age: 77
End: 2022-11-16
Payer: MEDICARE

## 2022-11-16 VITALS — DIASTOLIC BLOOD PRESSURE: 91 MMHG | HEART RATE: 82 BPM | SYSTOLIC BLOOD PRESSURE: 136 MMHG

## 2022-11-16 DIAGNOSIS — M54.50 CHRONIC BILATERAL LOW BACK PAIN WITHOUT SCIATICA: ICD-10-CM

## 2022-11-16 DIAGNOSIS — G89.29 CHRONIC BILATERAL LOW BACK PAIN WITHOUT SCIATICA: ICD-10-CM

## 2022-11-16 DIAGNOSIS — M12.811 RIGHT ROTATOR CUFF TEAR ARTHROPATHY: ICD-10-CM

## 2022-11-16 DIAGNOSIS — R26.9 GAIT DISORDER: Primary | ICD-10-CM

## 2022-11-16 DIAGNOSIS — M87.052 AVASCULAR NECROSIS OF BONE OF LEFT HIP: ICD-10-CM

## 2022-11-16 DIAGNOSIS — R06.09 DOE (DYSPNEA ON EXERTION): ICD-10-CM

## 2022-11-16 DIAGNOSIS — J45.909 ASTHMA, UNSPECIFIED ASTHMA SEVERITY, UNSPECIFIED WHETHER COMPLICATED, UNSPECIFIED WHETHER PERSISTENT: ICD-10-CM

## 2022-11-16 DIAGNOSIS — G89.29 CHRONIC LEFT HIP PAIN: ICD-10-CM

## 2022-11-16 DIAGNOSIS — M75.101 RIGHT ROTATOR CUFF TEAR ARTHROPATHY: ICD-10-CM

## 2022-11-16 DIAGNOSIS — R29.6 FREQUENT FALLS: ICD-10-CM

## 2022-11-16 DIAGNOSIS — M25.552 CHRONIC LEFT HIP PAIN: ICD-10-CM

## 2022-11-16 PROCEDURE — 99204 OFFICE O/P NEW MOD 45 MIN: CPT | Mod: S$PBB,,, | Performed by: PHYSICAL MEDICINE & REHABILITATION

## 2022-11-16 PROCEDURE — 99999 PR PBB SHADOW E&M-EST. PATIENT-LVL II: ICD-10-PCS | Mod: PBBFAC,,, | Performed by: PHYSICAL MEDICINE & REHABILITATION

## 2022-11-16 PROCEDURE — 99204 PR OFFICE/OUTPT VISIT, NEW, LEVL IV, 45-59 MIN: ICD-10-PCS | Mod: S$PBB,,, | Performed by: PHYSICAL MEDICINE & REHABILITATION

## 2022-11-16 PROCEDURE — 99212 OFFICE O/P EST SF 10 MIN: CPT | Mod: PBBFAC | Performed by: PHYSICAL MEDICINE & REHABILITATION

## 2022-11-16 PROCEDURE — 99999 PR PBB SHADOW E&M-EST. PATIENT-LVL II: CPT | Mod: PBBFAC,,, | Performed by: PHYSICAL MEDICINE & REHABILITATION

## 2022-11-16 NOTE — PROGRESS NOTES
Subjective:       Patient ID: Eugenia Diaz is a 77 y.o. female.    Chief Complaint: No chief complaint on file.      HPI    Mrs. Diaz is a 77-year-old white female with multiple medical problems who is presenting to the Physical Medicine Clinic for evaluation for power mobility device to replace her current scooter.  Her past medical history significant for hypertension, hypothyroidism, osteoporosis, AVN both hips, status post right total hip arthroplasty in 2020, right patellar fracture, sternal fracture, chronic low back pain.    The patient is a .  She lives alone in a single-story home that is handicap friendly (since her late  had MS).  She has a .  Her sister-in-law's checks on her regularly.  She is independent with feeding, dressing, toileting and bathing but it takes her a long time.  She ambulates using a rolling walker or straight cane for about 20 ft.  She is restricted by fatigue, lower extremity weakness, left hip pain (up to 6-7/10), and shortness of breath.  She reports history of several falls, with fractures of the right patella and the sternum, and recent bruising of her sacrum.  She can not propel a manual wheelchair due to shortness breath, upper extremity weakness, and right shoulder pain with history of rotator cuff tear (up to 7-8/10).  She has been using scooter for her mobility inside the house.  Her scooter is about 10 years old and has few problems, including loose frame, torn upholstery, the battery not holding a charge well.  She is looking for a replacement.    Past Medical History:   Diagnosis Date    Allergy     Anemia     Asthma     Bronchitis     Depression     Generalized headaches     History of endometriosis     Hyperlipidemia     Hypertension     Hypothyroidism     Osteoporosis, unspecified     PCO (posterior capsular opacification), left     Recurrent upper respiratory infection (URI)     Thyroid disease     hypothyroidism    TMJ dysfunction          Review of patient's allergies indicates:   Allergen Reactions    Sulfa (sulfonamide antibiotics) Swelling     Throat swelling      Clindamycin Hives    Erythromycin base Other (See Comments)    Neomycin      Other reaction(s): Rash    Nitrofurantoin macrocrystalline Other (See Comments)     Macrobid.Throat Swelling    Tetracycline      Other reaction(s): Anaphylaxis    Azithromycin Swelling     Throat Swelling    Meperidine Rash     Demerol.     Oxycodone hcl-oxycodone-asa Rash    Propoxyphene Rash     Darvon.         Review of Systems   Constitutional:  Positive for fatigue.   Eyes:  Negative for visual disturbance.   Respiratory:  Positive for shortness of breath.    Cardiovascular:  Negative for chest pain.   Gastrointestinal:  Negative for nausea and vomiting.   Genitourinary:  Negative for difficulty urinating.   Musculoskeletal:  Positive for arthralgias, back pain and gait problem. Negative for neck pain.   Neurological:  Positive for vertigo and weakness. Negative for dizziness and headaches.   Psychiatric/Behavioral:  Negative for behavioral problems.            Objective:      Physical Exam  Vitals reviewed.   Constitutional:       General: She is not in acute distress.     Appearance: She is well-developed.      Comments: Coming to the clinic in a scooter.     HENT:      Head: Normocephalic and atraumatic.   Eyes:      Extraocular Movements: Extraocular movements intact.   Cardiovascular:      Rate and Rhythm: Normal rate and regular rhythm.      Heart sounds: Normal heart sounds.   Pulmonary:      Effort: Pulmonary effort is normal.      Breath sounds: Normal breath sounds.   Abdominal:      Palpations: Abdomen is soft.   Musculoskeletal:      Cervical back: Normal range of motion. No tenderness.      Comments: BUE:  ROM:   RUE: full.   LUE: full.  Strength:    RUE: 4/5 at shoulder abduction, 4 elbow flexion, 4 elbow extension, 4 hand .   LUE: 4/5 at shoulder abduction, 4 elbow flexion, 4 elbow  extension, 4 hand .  Sensation to pinprick:   RUE: intact.   LUE: intact.  DTR:    RUE: +1 biceps, +1 triceps.   LUE:  +1 biceps, +1 triceps.      BLE:  ROM:   RLE: full.   LLE: full.  Knees:   RLE: +ve crepitus.    LLE: +ve crepitus.  Strength:    RLE: 3+/5 at hip flexion, 4 knee extension, 4 ankle DF, 4 ankle PF.   LLE: 4/5 at hip flexion, 4 knee extension, 4 ankle DF,  4 ankle PF.  Sensation to pinprick:     RLE: intact.      LLE: decreased.   DTR:     RLE: +1 knee, +1 ankle.    LLE: +1 knee, +1 ankle.  SLR (sitting):      RLE: -ve.      LLE: -ve.     -ve tenderness over lumbar spine.     Skin:     General: Skin is warm.   Neurological:      Mental Status: She is alert.   Psychiatric:         Mood and Affect: Mood normal.         Behavior: Behavior normal.       Assessment:       1. Gait disorder    2. Asthma, unspecified asthma severity, unspecified whether complicated, unspecified whether persistent    3. DUMOTN (dyspnea on exertion)    4. Avascular necrosis of bone of left hip    5. Chronic left hip pain    6. Chronic bilateral low back pain without sciatica    7. Right rotator cuff tear arthropathy    8. Frequent falls          Plan:           - The patient was seen today for mobility evaluation for a power mobility device due to significant impairment at home.  - The patient has multifactorial gait impairment.  - The patient is not able to ambulate safely at home.  - The patient is unable to use a walker functional distances due to DUMONT because of asthma, fatigue, bilateral lower extremity weakness due to debility and previous surgeries, chronic low back pain due to DJD.  - The patient is unable to use an optimally-configured manual wheelchair at home due to DUMONT because of asthma, fatigue, bilateral upper extremity weakness due to debility, chronic right shoulder pain due to rotator cuff arthropathy.  - The patient has intact cognition and should be able to use a power mobility device well at home.  - A  prescription for an electric scooter was generated  (to replace her current 1 which is in disrepair).  - The patient has enough upper & lower extremity strength to be able to transfer to and from the power mobility device.  - The patient has enough range of motion & strength in BUE to allow functional operation of the tiller.  - The patient has good trunk balance and should be able to maintain a safe posture while operating a power mobility device.  - This will allow the patient to go safely to the kitchen, dining room or living room for feeding & socialization.  It should also help with energy conservation and reducing the risk of falls and injuries.  - The patient is to return the Physical Medicine/Mobility clinic prn.      This note was partly generated with [employer] voice recognition software. I apologize for any possible typographical errors.

## 2022-12-16 ENCOUNTER — TELEPHONE (OUTPATIENT)
Dept: INTERNAL MEDICINE | Facility: CLINIC | Age: 77
End: 2022-12-16
Payer: MEDICARE

## 2022-12-16 NOTE — TELEPHONE ENCOUNTER
Received phone call from pt at [facility]'s office  Pt states she has arrived to give Dr. Aguilar and Jeri a leeann gift     Picked up leeann gift and delivered to Dr Aguilar staff

## 2023-01-03 ENCOUNTER — PATIENT MESSAGE (OUTPATIENT)
Dept: INTERNAL MEDICINE | Facility: CLINIC | Age: 78
End: 2023-01-03
Payer: MEDICARE

## 2023-01-03 ENCOUNTER — LAB VISIT (OUTPATIENT)
Dept: LAB | Facility: HOSPITAL | Age: 78
End: 2023-01-03
Attending: INTERNAL MEDICINE
Payer: MEDICARE

## 2023-01-03 DIAGNOSIS — E03.9 HYPOTHYROIDISM (ACQUIRED): ICD-10-CM

## 2023-01-03 DIAGNOSIS — E03.9 HYPOTHYROIDISM, UNSPECIFIED TYPE: Primary | ICD-10-CM

## 2023-01-03 LAB
T4 FREE SERPL-MCNC: 1.19 NG/DL (ref 0.71–1.51)
TSH SERPL DL<=0.005 MIU/L-ACNC: 0.27 UIU/ML (ref 0.4–4)

## 2023-01-03 PROCEDURE — 84443 ASSAY THYROID STIM HORMONE: CPT | Performed by: INTERNAL MEDICINE

## 2023-01-03 PROCEDURE — 36415 COLL VENOUS BLD VENIPUNCTURE: CPT | Mod: PO | Performed by: INTERNAL MEDICINE

## 2023-01-03 PROCEDURE — 84439 ASSAY OF FREE THYROXINE: CPT | Performed by: INTERNAL MEDICINE

## 2023-01-04 ENCOUNTER — PATIENT MESSAGE (OUTPATIENT)
Dept: INTERNAL MEDICINE | Facility: CLINIC | Age: 78
End: 2023-01-04
Payer: MEDICARE

## 2023-01-04 RX ORDER — LEVOTHYROXINE SODIUM 125 UG/1
125 TABLET ORAL
Qty: 30 TABLET | Refills: 1 | Status: SHIPPED | OUTPATIENT
Start: 2023-01-04 | End: 2023-02-02

## 2023-02-05 ENCOUNTER — PATIENT MESSAGE (OUTPATIENT)
Dept: INTERNAL MEDICINE | Facility: CLINIC | Age: 78
End: 2023-02-05
Payer: MEDICARE

## 2023-03-01 ENCOUNTER — PATIENT MESSAGE (OUTPATIENT)
Dept: INTERNAL MEDICINE | Facility: CLINIC | Age: 78
End: 2023-03-01
Payer: MEDICARE

## 2023-03-01 ENCOUNTER — HOSPITAL ENCOUNTER (OUTPATIENT)
Dept: RADIOLOGY | Facility: CLINIC | Age: 78
Discharge: HOME | End: 2023-03-01
Attending: INTERNAL MEDICINE
Payer: MEDICARE

## 2023-03-01 DIAGNOSIS — M81.0 OSTEOPOROSIS, POST-MENOPAUSAL: ICD-10-CM

## 2023-03-01 PROCEDURE — 77080 DXA BONE DENSITY AXIAL: CPT | Mod: TC

## 2023-03-01 PROCEDURE — 77080 DEXA BONE DENSITY SPINE HIP: ICD-10-PCS | Mod: 26,,, | Performed by: INTERNAL MEDICINE

## 2023-03-01 PROCEDURE — 77080 DXA BONE DENSITY AXIAL: CPT | Mod: 26,,, | Performed by: INTERNAL MEDICINE

## 2023-03-01 RX ORDER — LEVOTHYROXINE SODIUM 125 UG/1
125 TABLET ORAL EVERY MORNING
Qty: 90 TABLET | Refills: 3 | Status: SHIPPED | OUTPATIENT
Start: 2023-03-01 | End: 2023-10-02

## 2023-05-16 ENCOUNTER — TELEPHONE (OUTPATIENT)
Dept: INTERNAL MEDICINE | Facility: CLINIC | Age: 78
End: 2023-05-16
Payer: MEDICARE

## 2023-05-16 DIAGNOSIS — Z00.00 ENCOUNTER FOR PREVENTIVE HEALTH EXAMINATION: Primary | ICD-10-CM

## 2023-05-16 DIAGNOSIS — Z12.31 ENCOUNTER FOR SCREENING MAMMOGRAM FOR MALIGNANT NEOPLASM OF BREAST: ICD-10-CM

## 2023-05-16 DIAGNOSIS — Z12.39 ENCOUNTER FOR SCREENING FOR MALIGNANT NEOPLASM OF BREAST, UNSPECIFIED SCREENING MODALITY: ICD-10-CM

## 2023-05-17 DIAGNOSIS — E78.5 HYPERLIPIDEMIA, UNSPECIFIED HYPERLIPIDEMIA TYPE: ICD-10-CM

## 2023-05-17 RX ORDER — ATORVASTATIN CALCIUM 80 MG/1
TABLET, FILM COATED ORAL
Qty: 30 TABLET | Refills: 1 | Status: SHIPPED | OUTPATIENT
Start: 2023-05-17 | End: 2023-10-02

## 2023-05-17 NOTE — TELEPHONE ENCOUNTER
Refill Decision Note   Eugenia Diaz  is requesting a refill authorization.  Brief Assessment and Rationale for Refill:  Approve     Medication Therapy Plan:         Comments:     Note composed:3:42 PM 05/17/2023             Appointments     Last Visit   11/1/2022 Latia Aguilar MD   Next Visit   Visit date not found Latia Aguilar MD

## 2023-05-17 NOTE — TELEPHONE ENCOUNTER
No care due was identified.  [facility] Embedded Care Due Messages. Reference number: [id number].   5/17/2023 2:47:54 PM CDT

## 2023-05-31 ENCOUNTER — PATIENT MESSAGE (OUTPATIENT)
Dept: INFECTIOUS DISEASES | Facility: CLINIC | Age: 78
End: 2023-05-31
Payer: MEDICARE

## 2023-06-02 ENCOUNTER — TELEPHONE (OUTPATIENT)
Dept: RESEARCH | Facility: CLINIC | Age: 78
End: 2023-06-02
Payer: MEDICARE

## 2023-06-06 ENCOUNTER — PATIENT MESSAGE (OUTPATIENT)
Dept: INTERNAL MEDICINE | Facility: CLINIC | Age: 78
End: 2023-06-06
Payer: MEDICARE

## 2023-06-13 ENCOUNTER — TELEPHONE (OUTPATIENT)
Dept: RESEARCH | Facility: CLINIC | Age: 78
End: 2023-06-13
Payer: MEDICARE

## 2023-07-06 ENCOUNTER — TELEPHONE (OUTPATIENT)
Dept: FAMILY MEDICINE | Facility: CLINIC | Age: 78
End: 2023-07-06
Payer: MEDICARE

## 2023-07-18 ENCOUNTER — OFFICE VISIT (OUTPATIENT)
Dept: ENDOCRINOLOGY | Facility: CLINIC | Age: 78
End: 2023-07-18
Payer: MEDICARE

## 2023-07-18 ENCOUNTER — IMMUNIZATION (OUTPATIENT)
Dept: PHARMACY | Facility: CLINIC | Age: 78
End: 2023-07-18
Payer: MEDICARE

## 2023-07-18 ENCOUNTER — HOSPITAL ENCOUNTER (OUTPATIENT)
Dept: RADIOLOGY | Facility: HOSPITAL | Age: 78
Discharge: HOME | End: 2023-07-18
Attending: INTERNAL MEDICINE
Payer: MEDICARE

## 2023-07-18 VITALS
DIASTOLIC BLOOD PRESSURE: 78 MMHG | BODY MASS INDEX: 24.52 KG/M2 | SYSTOLIC BLOOD PRESSURE: 120 MMHG | HEART RATE: 80 BPM | HEIGHT: 67 IN | OXYGEN SATURATION: 95 % | WEIGHT: 156.19 LBS

## 2023-07-18 DIAGNOSIS — Z23 NEED FOR VACCINATION: Primary | ICD-10-CM

## 2023-07-18 DIAGNOSIS — Z12.39 ENCOUNTER FOR SCREENING FOR MALIGNANT NEOPLASM OF BREAST, UNSPECIFIED SCREENING MODALITY: ICD-10-CM

## 2023-07-18 DIAGNOSIS — Z12.31 ENCOUNTER FOR SCREENING MAMMOGRAM FOR MALIGNANT NEOPLASM OF BREAST: ICD-10-CM

## 2023-07-18 DIAGNOSIS — M81.0 OSTEOPOROSIS, POST-MENOPAUSAL: ICD-10-CM

## 2023-07-18 DIAGNOSIS — E03.9 HYPOTHYROIDISM (ACQUIRED): ICD-10-CM

## 2023-07-18 PROBLEM — E05.80 THYROTOXICOSIS, EXOGENOUS IATROGENIC: Status: RESOLVED | Noted: 2022-11-09 | Resolved: 2023-07-18

## 2023-07-18 PROCEDURE — 77067 SCR MAMMO BI INCL CAD: CPT | Mod: TC

## 2023-07-18 PROCEDURE — 77063 BREAST TOMOSYNTHESIS BI: CPT | Mod: 26,,, | Performed by: RADIOLOGY

## 2023-07-18 PROCEDURE — 99214 OFFICE O/P EST MOD 30 MIN: CPT | Mod: S$PBB,,, | Performed by: INTERNAL MEDICINE

## 2023-07-18 PROCEDURE — 77067 MAMMO DIGITAL SCREENING BILAT WITH TOMO: ICD-10-PCS | Mod: 26,,, | Performed by: RADIOLOGY

## 2023-07-18 PROCEDURE — 99999 PR PBB SHADOW E&M-EST. PATIENT-LVL IV: CPT | Mod: PBBFAC,,, | Performed by: INTERNAL MEDICINE

## 2023-07-18 PROCEDURE — 77063 MAMMO DIGITAL SCREENING BILAT WITH TOMO: ICD-10-PCS | Mod: 26,,, | Performed by: RADIOLOGY

## 2023-07-18 PROCEDURE — 99999 PR PBB SHADOW E&M-EST. PATIENT-LVL IV: ICD-10-PCS | Mod: PBBFAC,,, | Performed by: INTERNAL MEDICINE

## 2023-07-18 PROCEDURE — 77067 SCR MAMMO BI INCL CAD: CPT | Mod: 26,,, | Performed by: RADIOLOGY

## 2023-07-18 PROCEDURE — 99214 PR OFFICE/OUTPT VISIT, EST, LEVL IV, 30-39 MIN: ICD-10-PCS | Mod: S$PBB,,, | Performed by: INTERNAL MEDICINE

## 2023-07-18 PROCEDURE — 99214 OFFICE O/P EST MOD 30 MIN: CPT | Mod: PBBFAC | Performed by: INTERNAL MEDICINE

## 2023-07-18 NOTE — Clinical Note
Good afternoon, I saw Ms Diaz in clinic today. Do you know or have recommendations for patients with scoliosis for exercise regimen? (Similar to [facility]? Do they see patients with scoliosis?). Thanks for your help, SD

## 2023-07-18 NOTE — PROGRESS NOTES
Subjective:      Patient ID: Eugenia Diaz is a 77 y.o. female.    Chief Complaint:  Osteoporosis and thyrotoxicosis      History of Present Illness  Ms. Diaz is a 77 year old woman with medical history significant for IKE with avascular necrosis of hips, asthma requiring steroids intermittently who is here for management of osteoporosis, T score -3.4 at the TH and -3.6 at the FN in 3/2023. 7.1% decline at the hip. Complicated by scoliosis and four inch height loss     Denies falls or fractures since last year.   Reports it feels like her rib cage is resting on her stomach and deep breaths are difficult.     Current medicine:  reclast 5 mg IV --> 10/2022, 2021, 2020 and 2019  Takes acetaminophen, vitamin d and drinks plenty of fluids prior to infusion      Doing well, no s/e to reclast.    Balance is good, uses scooter for long distances but at home is able to ambulate  Unable to exercise regularly  Denies any dental issues, sees dentist every six months.      Previous medications:  Prolia for one year (two doses) 2016 -- after second injection she fell and broke her sternum. She also fractured a tooth at the time and needed an extraction on implant. But she did not undergo procedure.   Forteo for two years   Fosamax/actonel for 4 - 5 years   Drug holiday for three years      Has hypertension currently on triamterene - HCTZ  Appetite is good. Weight is stable.      For her hypothyroidism   Current dose is LT4 125 mcg daily since 1/2023  10/2022 used  mcg --> palpitations   2019 - 2021 used   Reports tremulousness of her left hand, not right.   Sleep is not consistently good.   She is very regimented    Takes medicine in the morning with coffee   Takes other supplements (C, D, MVI) soon after levothyroxine     Supplements:   Vitamin D3 1000 IUs daily three times weekly --> recent levels normal   Also taking calcium and magnesium --> one tablet daily   Dark leafy greens, yogurt 2 weekly, cheese 2 weekly  "      Review of Systems  No recent illness     Objective:   Physical Exam  Constitutional:       Appearance: Normal appearance.   Cardiovascular:      Rate and Rhythm: Normal rate.      Pulses: Normal pulses.   Pulmonary:      Effort: Pulmonary effort is normal.      Breath sounds: Normal breath sounds.   Musculoskeletal:      Comments: scoliosis   Neurological:      Mental Status: She is alert.     Vitals:    07/18/23 1056   BP: 120/78   BP Location: Right arm   Patient Position: Sitting   BP Method: Medium (Manual)   Pulse: 80   SpO2: 95%   Weight: 70.8 kg (156 lb 3.1 oz)   Height: 5' 7" (1.702 m)       BP Readings from Last 3 Encounters:   07/18/23 120/78   11/16/22 (!) 136/91   11/09/22 122/70     Wt Readings from Last 1 Encounters:   07/18/23 1056 70.8 kg (156 lb 3.1 oz)         Body mass index is 24.46 kg/m².    Lab Review:   Lab Results   Component Value Date    HGBA1C 6.2 06/18/2010     Lab Results   Component Value Date    CHOL 168 09/12/2022    HDL 70 09/12/2022    LDLCALC 83.0 09/12/2022    TRIG 75 09/12/2022    CHOLHDL 41.7 09/12/2022     Lab Results   Component Value Date     (L) 11/01/2022    K 3.7 11/01/2022    CL 95 11/01/2022    CO2 28 11/01/2022    GLU 79 11/01/2022    BUN 12 11/01/2022    CREATININE 0.7 11/01/2022    CALCIUM 10.4 11/01/2022    PROT 6.9 11/01/2022    ALBUMIN 4.0 11/01/2022    BILITOT 0.6 11/01/2022    ALKPHOS 87 11/01/2022    AST 18 11/01/2022    ALT 18 11/01/2022    ANIONGAP 12 11/01/2022    ESTGFRAFRICA >60.0 07/06/2021    EGFRNONAA >60.0 07/06/2021    TSH 1.473 03/01/2023      Latest Reference Range & Units 08/01/22 11:27   Vit D, 25-Hydroxy 30 - 96 ng/mL 44     FINDINGS:  Lumbar spine (L1-L4):               BMD is 1.012 g/cm2, T-score is -0.3, and Z-score is 2.1.   Total hip:                                BMD is 0.524 g/cm2, T-score is -3.4, and Z-score is -1.6.   Femoral neck:                          BMD is 0.451 g/cm2, T-score is -3.6, and Z-score is -1.5.     Distal " 1/3 radius:                      Not applicable     FRAX:     36% risk of a major osteoporotic fracture in the next 10 years.   16% risk of hip fracture in the next 10 years.   Impression:   *Osteoporosis on treatment with reclast  *Fracture risk is very high due to calculated 10 year risk of hip fracture >4.5% (FRAX)  *Compared with previous DXA, BMD at the lumbar spine has remained stable, and BMD at the total hip has declined by 7.1%.       Assessment and Plan     Osteoporosis, post-menopausal  Risk factors: , postmenopausal status, , fractures, family history of osteoporosis (mother and maternal aunt), history of prednisone use.   Multiple fractures: ankle, feet, knee cap, rib    Very little exercise   Stays active but deconditioned and has scoliosis     Switch to evenity based on loss while on reclast and low T score<-3.0  Check vitamin D levels today   Continue replacement     Hypothyroidism (acquired)  Continue replacement  Check TSH today to confirm adequate dosing

## 2023-07-23 ENCOUNTER — TELEPHONE (OUTPATIENT)
Dept: INTERNAL MEDICINE | Facility: CLINIC | Age: 78
End: 2023-07-23
Payer: MEDICARE

## 2023-07-23 DIAGNOSIS — M41.9 SCOLIOSIS, UNSPECIFIED SCOLIOSIS TYPE, UNSPECIFIED SPINAL REGION: Primary | ICD-10-CM

## 2023-07-23 NOTE — TELEPHONE ENCOUNTER
Dr Christianson mentioned she was having back pain.  Would she like referral to [facility] PT program?

## 2023-07-24 NOTE — TELEPHONE ENCOUNTER
Patient states the back pain is from her scoliosis. She would like to know if there is a specialist that specializes in scoliosis first before getting a referral to healthy back.

## 2023-08-02 ENCOUNTER — TELEPHONE (OUTPATIENT)
Dept: PHYSICAL MEDICINE AND REHAB | Facility: CLINIC | Age: 78
End: 2023-08-02
Payer: MEDICARE

## 2023-08-02 DIAGNOSIS — R52 PAIN: Primary | ICD-10-CM

## 2023-08-02 NOTE — TELEPHONE ENCOUNTER
Spoke to Pt on 8/2/23 @ 9:37 am to schedule appointment and x ray with Dr. Pedro on 8/8/23 @ 10:45 am . Pt was scheduled with the wrong physician.

## 2023-08-07 ENCOUNTER — TELEPHONE (OUTPATIENT)
Dept: ORTHOPEDICS | Facility: CLINIC | Age: 78
End: 2023-08-07
Payer: MEDICARE

## 2023-08-08 ENCOUNTER — OFFICE VISIT (OUTPATIENT)
Dept: ORTHOPEDICS | Facility: CLINIC | Age: 78
End: 2023-08-08
Payer: MEDICARE

## 2023-08-08 ENCOUNTER — HOSPITAL ENCOUNTER (OUTPATIENT)
Dept: RADIOLOGY | Facility: HOSPITAL | Age: 78
Discharge: HOME | End: 2023-08-08
Attending: ORTHOPAEDIC SURGERY
Payer: MEDICARE

## 2023-08-08 VITALS — HEIGHT: 64 IN | WEIGHT: 157.44 LBS | BODY MASS INDEX: 26.88 KG/M2

## 2023-08-08 DIAGNOSIS — R52 PAIN: ICD-10-CM

## 2023-08-08 DIAGNOSIS — M47.816 LUMBAR SPONDYLOSIS: ICD-10-CM

## 2023-08-08 DIAGNOSIS — M41.50 DEGENERATIVE SCOLIOSIS: Primary | ICD-10-CM

## 2023-08-08 DIAGNOSIS — M43.10 DEGENERATIVE SPONDYLOLISTHESIS: ICD-10-CM

## 2023-08-08 DIAGNOSIS — M51.36 DDD (DEGENERATIVE DISC DISEASE), LUMBAR: ICD-10-CM

## 2023-08-08 PROCEDURE — 99204 PR OFFICE/OUTPT VISIT, NEW, LEVL IV, 45-59 MIN: ICD-10-PCS | Mod: S$PBB,,, | Performed by: ORTHOPAEDIC SURGERY

## 2023-08-08 PROCEDURE — 72082 X-RAY EXAM ENTIRE SPI 2/3 VW: CPT | Mod: TC

## 2023-08-08 PROCEDURE — 99214 OFFICE O/P EST MOD 30 MIN: CPT | Mod: PBBFAC | Performed by: ORTHOPAEDIC SURGERY

## 2023-08-08 PROCEDURE — 72082 X-RAY EXAM ENTIRE SPI 2/3 VW: CPT | Mod: 26,,, | Performed by: RADIOLOGY

## 2023-08-08 PROCEDURE — 99204 OFFICE O/P NEW MOD 45 MIN: CPT | Mod: S$PBB,,, | Performed by: ORTHOPAEDIC SURGERY

## 2023-08-08 PROCEDURE — 99999 PR PBB SHADOW E&M-EST. PATIENT-LVL IV: CPT | Mod: PBBFAC,,, | Performed by: ORTHOPAEDIC SURGERY

## 2023-08-08 PROCEDURE — 99999 PR PBB SHADOW E&M-EST. PATIENT-LVL IV: ICD-10-PCS | Mod: PBBFAC,,, | Performed by: ORTHOPAEDIC SURGERY

## 2023-08-08 PROCEDURE — 72082 XR SCOLIOSIS COMPLETE: ICD-10-PCS | Mod: 26,,, | Performed by: RADIOLOGY

## 2023-08-08 NOTE — PROGRESS NOTES
DATE: 8/8/2023  PATIENT: Eugenia Diaz    Attending Physician: Ector Pedro M.D.    CHIEF COMPLAINT: L rib cage pain    HISTORY:  Eugenia Diaz is a 77 y.o. female w/ a hx of osteoporosis (was on Forteo, Prolia and Reclast; now trying to be on Evenity), R CHIO for AVN (due to chronic steroid use), and hiatal hernia presents for initial evaluation of mid back pain (Back - 4). The pain has been present for 1 year after falling down. The patient describes the pain as dull but it does not go down legs.  The pain is worse with activity and improved by rest. There is LLE associated numbness and tingling. There is no subjective weakness. Prior treatments have included naproxen, but no PT, PTIA or surgery.    She walks at home but she uses her electric scooter for long distance walking.     The Patient denies myelopathic symptoms such as handwriting changes or difficulty with buttons/coins/keys. Denies perineal paresthesias, bowel/bladder dysfunction.    The patient does not smoke, have DM or endorse IVDU. The patient is not on any blood thinners and does not take chronic narcotics. She is a retired .    PAST MEDICAL/SURGICAL HISTORY:  Past Medical History:   Diagnosis Date    Allergy     Anemia     Asthma     Bronchitis     Depression     Generalized headaches     History of endometriosis     Hyperlipidemia     Hypertension     Hypothyroidism     Osteoporosis, unspecified     PCO (posterior capsular opacification), left     Recurrent upper respiratory infection (URI)     Thyroid disease     hypothyroidism    TMJ dysfunction      Past Surgical History:   Procedure Laterality Date    ADENOIDECTOMY      CATARACT EXTRACTION W/  INTRAOCULAR LENS IMPLANT  09/06/2006    right eye - Dr Best    CATARACT EXTRACTION W/  INTRAOCULAR LENS IMPLANT  11/15/2006    left eye - Dr Best    CHOLECYSTECTOMY  01/01/1994    COLONOSCOPY N/A 06/22/2016    Procedure: COLONOSCOPY;  Surgeon: Jae Hodges MD;  Location:  [facility] (4TH FLR);  Service: Endoscopy;  Laterality: N/A;    KNEE SURGERY  12/21/2012    OOPHORECTOMY      left ovary removed, secondary endometriosis    right hip replacement  01/01/2000    TEMPOROMANDIBULAR JOINT SURGERY  01/01/1988    TONSILLECTOMY      yag laser OD         Current Medications:   Current Outpatient Medications:     albuterol (PROVENTIL) 2.5 mg /3 mL (0.083 %) nebulizer solution, Take 3 mLs (2.5 mg total) by nebulization every 6 (six) hours as needed for Wheezing. Rescue, Disp: 1 each, Rfl: 11    albuterol (PROVENTIL/VENTOLIN HFA) 90 mcg/actuation inhaler, Inhale 2 puffs into the lungs every 6 (six) hours as needed for Wheezing., Disp: 18 g, Rfl: 11    atorvastatin (LIPITOR) 80 MG tablet, TAKE ONE TABLET BY MOUTH EVERY EVENING., Disp: 30 tablet, Rfl: 1    benzonatate (TESSALON PERLES) 100 MG capsule, Take 2 capsules (200 mg total) by mouth 3 (three) times daily as needed for Cough., Disp: 20 capsule, Rfl: 0    calcium-vitamin D 600 mg(1,500mg) -400 unit Tab, Take 1 tablet by mouth Every morning., Disp: , Rfl:     clotrimazole-betamethasone 1-0.05% (LOTRISONE) cream, APPLY TO THE AFFECTED AREA TWICE DAILY, Disp: 15 g, Rfl: 3    fluticasone propionate (FLONASE) 50 mcg/actuation nasal spray, 1 spray (50 mcg total) by Each Nostril route 2 (two) times a day. use as directed, Disp: 16 g, Rfl: 7    fluticasone-salmeterol 500-50 mcg/dose (ADVAIR DISKUS) 500-50 mcg/dose DsDv diskus inhaler, Inhale one puff into the lungs twice daily., Disp: 180 each, Rfl: 3    ipratropium (ATROVENT) 0.02 % nebulizer solution, Take 2.5 mLs (500 mcg total) by nebulization 4 (four) times daily. Rescue, Disp: 90 each, Rfl: 3    levothyroxine (SYNTHROID) 125 MCG tablet, Take 1 tablet (125 mcg total) by mouth every morning., Disp: 90 tablet, Rfl: 3    loratadine (CLARITIN) 10 mg tablet, Take 1 tablet by mouth Every PM., Disp: , Rfl:     montelukast (SINGULAIR) 10 mg tablet, Take 1 tablet (10 mg total) by mouth every evening.,  Disp: 90 tablet, Rfl: 3    neomycin-polymyxin-hydrocortisone (CORTISPORIN) 3.5-10,000-1 mg/mL-unit/mL-% otic suspension, instill 3 DROPS into THE right ear FOUR TIMES DAILY, Disp: 10 mL, Rfl: 0    OLIVE LEAF EXTRACT ORAL, Take 150 mg by mouth Every PM., Disp: , Rfl:     pantoprazole (PROTONIX) 40 MG tablet, Take one tablet by mouth every day., Disp: 90 tablet, Rfl: 3    potassium chloride (KLOR-CON) 10 MEQ TbSR, Take one tablet by mouth once daily., Disp: 90 tablet, Rfl: 1    sertraline (ZOLOFT) 100 MG tablet, Take 2 tablets (200mg) by mouth once daily, Disp: 180 tablet, Rfl: 1    triamterene-hydrochlorothiazide 37.5-25 mg (DYAZIDE) 37.5-25 mg per capsule, TAKE 1 CAPSULE BY MOUTH IN THE MORNING, Disp: 90 capsule, Rfl: 1    vitamin D (VITAMIN D3) 1000 units Tab, Take 1,000 Units by mouth 3 (three) times a week., Disp: , Rfl:     Social History:   Social History     Socioeconomic History    Marital status:    Tobacco Use    Smoking status: Never    Smokeless tobacco: Never   Substance and Sexual Activity    Alcohol use: Yes     Alcohol/week: 1.0 standard drink of alcohol     Types: 1 Glasses of wine per week     Comment: maybe 1-2 monthly    Drug use: No    Sexual activity: Not Currently   Other Topics Concern    Are you pregnant or think you may be? 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     Social Determinants of Health     Financial Resource Strain: Low Risk  (7/11/2023)    Overall Financial Resource Strain (CARDIA)     Difficulty of Paying Living Expenses: Not hard at all   Food Insecurity: No Food Insecurity (7/11/2023)    Hunger Vital Sign     Worried About Running Out of Food in the Last Year: Never true     Ran Out of Food in the Last Year: Never true   Transportation Needs: No Transportation Needs (7/11/2023)    PRAPARE - Transportation     Lack of  "Transportation (Medical): No     Lack of Transportation (Non-Medical): No   Physical Activity: Inactive (7/11/2023)    Exercise Vital Sign     Days of Exercise per Week: 0 days     Minutes of Exercise per Session: 0 min   Stress: Unknown (7/11/2023)    [employer] - Occupational Stress Questionnaire     Feeling of Stress : Patient refused   Social Connections: Socially Isolated (7/11/2023)    Social Connection and Isolation Panel [NHANES]     Frequency of Communication with Friends and Family: Three times a week     Frequency of Social Gatherings with Friends and Family: Once a week     Attends Gnosticist Services: Never     Active Member of Clubs or Organizations: No     Attends Club or Organization Meetings: Never     Marital Status:    Housing Stability: Low Risk  (7/11/2023)    Housing Stability Vital Sign     Unable to Pay for Housing in the Last Year: No     Number of Places Lived in the Last Year: 1     Unstable Housing in the Last Year: No       REVIEW OF SYSTEMS:  Constitution: Negative. Negative for chills, fever and night sweats.   Cardiovascular: Negative for chest pain and syncope.   Respiratory: Negative for cough and shortness of breath.   Gastrointestinal: See HPI. Negative for nausea/vomiting. Negative for abdominal pain.  Genitourinary: See HPI. Negative for discoloration or dysuria.  Skin: Negative for dry skin, itching and rash.   Hematologic/Lymphatic: negative for bleeding/clotting disorders.   Musculoskeletal: Negative for falls and muscle weakness.   Neurological: See HPI. no history of seizures. no history of cranial surgery or shunts.  Endocrine: Negative for polydipsia, polyphagia and polyuria.   Allergic/Immunologic: Negative for hives and persistent infections.    PHYSICAL EXAMINATION:    Ht 5' 3.5" (1.613 m)   Wt 71.4 kg (157 lb 6.5 oz)   LMP  (LMP Unknown)   BMI 27.45 kg/m²     General: The patient is a 77 y.o. female in no apparent distress, the " patient is orientatied to person, place and time.   Psych: Normal mood and affect  HEENT: Vision grossly intact, hearing intact to the spoken word.  Lungs: Respirations unlabored.  Gait: Normal station and gait, no difficulty with toe or heel walk.   Skin: Dorsal lumbar skin negative for rashes, lesions, hairy patches and surgical scars.  Range of motion: Lumbar range of motion is acceptable. There is R rib cage tenderness to palpation.  Spinal Balance: Global saggital and coronal spinal balance acceptable, no significant for scoliosis and kyphosis.  Musculoskeletal: No pain with the range of motion of the bilateral hips. No trochanteric tenderness to palpation.  Vascular: Bilateral lower extremities warm and well perfused, Dorsalis pedis pulses 2+ bilaterally.  Neurological: Normal strength and tone in all major motor groups in the bilateral lower extremities. Normal sensation to light touch in the L2-S1 dermatomes bilaterally.  Deep tendon reflexes symmetric 2+ in the bilateral lower extremities.  Negative Babinski bilaterally.    IMAGING:   Today I independently reviewed the following images and my interpretations are as follows:    AP, Lat and Flex/Ex upright scoliosis films demonstrate R thoracic scoliosis. Spondylosis and DDD. L4-5 anterolisthesis measuring 7mm.    DEXA in 2023 showed lumbar T-score of -0.3, but her hip T-score below -3     Body mass index is 27.45 kg/m².  Hemoglobin A1C   Date Value Ref Range Status   06/18/2010 6.2 4.0 - 6.2 % Final   06/18/2009 6.1 4.0 - 6.2 % Final   03/25/2009 6.0 4.0 - 6.2 % Final         ASSESSMENT/PLAN:    Eugenia was seen today for pain.    Diagnoses and all orders for this visit:    Degenerative scoliosis  -     Ambulatory referral/consult to [facility]; Future    Lumbar spondylosis    Degenerative spondylolisthesis    DDD (degenerative disc disease), lumbar      Follow up if symptoms worsen or fail to improve.    Patient has degenerative scoliosis and  spondylolisthesis. I discussed the natural history of their diagnoses as well as surgical and nonsurgical treatment options. I educated the patient on the importance of core/back strengthening, correct posture, bending/lifting ergonomics, and low-impact aerobic exercises (walking, elliptical, and aquatherapy). Continue medications. I will refer the patient to healthy back program for her back. Patient will follow up PRN. Next step is a thoracic MRI.    Ector Pedro MD  Orthopaedic Spine Surgeon  Department of Orthopaedic Surgery  [phone]

## 2023-08-09 NOTE — TELEPHONE ENCOUNTER
Refill Routing Note   Medication(s) are not appropriate for processing by [facility] for the following reason(s):      No active prescription written by provider    [facility] action(s):  Defer Care Due:  Appointment due  Labs due            Appointments  past 12m or future 3m with PCP    Date Provider   Last Visit   11/1/2022 Latia Aguilar MD   Next Visit   Visit date not found Latia Aguilar MD   ED visits in past 90 days: 0        Note composed:5:00 PM 08/09/2023

## 2023-08-09 NOTE — TELEPHONE ENCOUNTER
Care Due:                  Date            Visit Type   Department     Provider  --------------------------------------------------------------------------------                                MYCHART                              ANNUAL                              CHECKUP/PHY  [facility] INTERNAL  Last Visit: 11-      S            MONET HALEY  Next Visit: None Scheduled  None         None Found                                                            Last  Test          Frequency    Reason                     Performed    Due Date  --------------------------------------------------------------------------------    Office Visit  12 months..  atorvastatin,              11-   10-                             fluticasone-salmeterol,                             levothyroxine,                             sertraline,                             triamterene-hydrochloroth                             iazide...................    CMP.........  12 months..  atorvastatin,              11-   10-                             triamterene-hydrochloroth                             iazide...................    Lipid Panel.  12 months..  atorvastatin.............  09- 09-    [facility] Embedded Care Due Messages. Reference number: [id number].   8/09/2023 4:51:07 PM CDT

## 2023-08-11 ENCOUNTER — PATIENT MESSAGE (OUTPATIENT)
Dept: ORTHOPEDICS | Facility: CLINIC | Age: 78
End: 2023-08-11
Payer: MEDICARE

## 2023-08-11 RX ORDER — TIZANIDINE 2 MG/1
4 TABLET ORAL NIGHTLY PRN
Qty: 60 TABLET | Refills: 0 | Status: SHIPPED | OUTPATIENT
Start: 2023-08-11 | End: 2023-09-10

## 2023-08-12 RX ORDER — FLUTICASONE PROPIONATE 50 MCG
SPRAY, SUSPENSION (ML) NASAL
Qty: 16 G | Refills: 10 | Status: SHIPPED | OUTPATIENT
Start: 2023-08-12

## 2023-08-14 RX ORDER — CYCLOBENZAPRINE HCL 5 MG
5 TABLET ORAL NIGHTLY
Qty: 30 TABLET | Refills: 0 | Status: SHIPPED | OUTPATIENT
Start: 2023-08-14 | End: 2023-09-13

## 2023-08-15 ENCOUNTER — PATIENT MESSAGE (OUTPATIENT)
Dept: ADMINISTRATIVE | Facility: HOSPITAL | Age: 78
End: 2023-08-15
Payer: MEDICARE

## 2023-08-16 ENCOUNTER — PATIENT MESSAGE (OUTPATIENT)
Dept: ENDOCRINOLOGY | Facility: CLINIC | Age: 78
End: 2023-08-16
Payer: MEDICARE

## 2023-08-23 ENCOUNTER — OFFICE VISIT (OUTPATIENT)
Dept: OPTOMETRY | Facility: CLINIC | Age: 78
End: 2023-08-23
Payer: MEDICARE

## 2023-08-23 DIAGNOSIS — H04.123 DRY EYES, BILATERAL: Primary | ICD-10-CM

## 2023-08-23 DIAGNOSIS — H52.4 PRESBYOPIA: ICD-10-CM

## 2023-08-23 DIAGNOSIS — Z13.5 GLAUCOMA SCREENING: ICD-10-CM

## 2023-08-23 PROCEDURE — 92015 DETERMINE REFRACTIVE STATE: CPT | Mod: ,,, | Performed by: OPTOMETRIST

## 2023-08-23 PROCEDURE — 99213 OFFICE O/P EST LOW 20 MIN: CPT | Mod: PBBFAC,PO | Performed by: OPTOMETRIST

## 2023-08-23 PROCEDURE — 92015 PR REFRACTION: ICD-10-PCS | Mod: ,,, | Performed by: OPTOMETRIST

## 2023-08-23 PROCEDURE — 99999 PR PBB SHADOW E&M-EST. PATIENT-LVL III: ICD-10-PCS | Mod: PBBFAC,,, | Performed by: OPTOMETRIST

## 2023-08-23 PROCEDURE — 92014 PR EYE EXAM, EST PATIENT,COMPREHESV: ICD-10-PCS | Mod: S$PBB,,, | Performed by: OPTOMETRIST

## 2023-08-23 PROCEDURE — 99999 PR PBB SHADOW E&M-EST. PATIENT-LVL III: CPT | Mod: PBBFAC,,, | Performed by: OPTOMETRIST

## 2023-08-23 PROCEDURE — 92014 COMPRE OPH EXAM EST PT 1/>: CPT | Mod: S$PBB,,, | Performed by: OPTOMETRIST

## 2023-08-23 NOTE — PROGRESS NOTES
HPI     DFE  ou eye exam ou             Comments: DFE ou   EXOTROPIA   RETINAL (UNSPECIFIED ) DISORDER   V DETACHMENT  OD   SHAPE AND SIZE DISTORTIONS  ? ARMD             Comments    DLS  11/2020    Floaters ou  dryness ou and slightly blurry at times ou   No more distortions  in either eye   EYE GTTS   Art teras  prn ou     PCIOL; OU           Last edited by Tonie Mckeon on 8/23/2023  9:50 AM.            Assessment /Plan     For exam results, see Encounter Report.    Dry eyes, bilateral    Glaucoma screening    Presbyopia      1. 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 Sp pciol/YAG OU--pt wishes new Rx  3. Hx focal laser for periph ret hole OD--stable  4. ELAYNE--advised SYS COMPLETE ATs QID    PLAN:    Rtc 1 yr

## 2023-08-26 ENCOUNTER — PATIENT MESSAGE (OUTPATIENT)
Dept: INTERNAL MEDICINE | Facility: CLINIC | Age: 78
End: 2023-08-26
Payer: MEDICARE

## 2023-08-26 DIAGNOSIS — D64.9 ANEMIA, UNSPECIFIED TYPE: ICD-10-CM

## 2023-08-26 DIAGNOSIS — E78.5 HYPERLIPIDEMIA, UNSPECIFIED HYPERLIPIDEMIA TYPE: Primary | ICD-10-CM

## 2023-08-26 DIAGNOSIS — I10 ESSENTIAL HYPERTENSION: ICD-10-CM

## 2023-08-28 DIAGNOSIS — F43.21 ADJUSTMENT DISORDER WITH DEPRESSED MOOD: ICD-10-CM

## 2023-08-28 DIAGNOSIS — E87.6 HYPOKALEMIA: ICD-10-CM

## 2023-08-28 RX ORDER — SERTRALINE HYDROCHLORIDE 100 MG/1
200 TABLET, FILM COATED ORAL DAILY
Qty: 180 TABLET | Refills: 0 | Status: SHIPPED | OUTPATIENT
Start: 2023-08-28 | End: 2023-11-13 | Stop reason: SDUPTHER

## 2023-08-28 RX ORDER — POTASSIUM CHLORIDE 750 MG/1
10 TABLET, EXTENDED RELEASE ORAL DAILY
Qty: 90 TABLET | Refills: 0 | Status: SHIPPED | OUTPATIENT
Start: 2023-08-28

## 2023-08-28 NOTE — TELEPHONE ENCOUNTER
No care due was identified.  [facility] Embedded Care Due Messages. Reference number: [id number].   8/28/2023 10:06:59 AM CDT

## 2023-08-28 NOTE — TELEPHONE ENCOUNTER
Refill Decision Note   Eugenia Beob  is requesting a refill authorization.  Brief Assessment and Rationale for Refill:  Approve     Medication Therapy Plan:       Medication Reconciliation Completed: No    Comments:     No Care Gaps recommended.     Note composed:6:04 PM 08/28/2023

## 2023-09-15 ENCOUNTER — INFUSION (OUTPATIENT)
Dept: INFECTIOUS DISEASES | Facility: HOSPITAL | Age: 78
End: 2023-09-15
Payer: MEDICARE

## 2023-09-15 VITALS
TEMPERATURE: 99 F | SYSTOLIC BLOOD PRESSURE: 133 MMHG | WEIGHT: 156.88 LBS | HEART RATE: 75 BPM | RESPIRATION RATE: 18 BRPM | BODY MASS INDEX: 27.8 KG/M2 | OXYGEN SATURATION: 96 % | DIASTOLIC BLOOD PRESSURE: 76 MMHG | HEIGHT: 63 IN

## 2023-09-15 DIAGNOSIS — K21.9 GASTROESOPHAGEAL REFLUX DISEASE WITHOUT ESOPHAGITIS: Primary | ICD-10-CM

## 2023-09-15 DIAGNOSIS — M81.0 OSTEOPOROSIS, POST-MENOPAUSAL: ICD-10-CM

## 2023-09-15 PROCEDURE — 96372 THER/PROPH/DIAG INJ SC/IM: CPT

## 2023-09-15 PROCEDURE — 63600175 PHARM REV CODE 636 W HCPCS: Mod: JZ,JG | Performed by: INTERNAL MEDICINE

## 2023-09-15 RX ADMIN — ROMOSOZUMAB-AQQG 210 MG: 105 INJECTION, SOLUTION SUBCUTANEOUS at 11:09

## 2023-09-15 NOTE — PROGRESS NOTES
Patient arrives for first shot of Evenity injection given to bilateral arms subcutaneously - confirms use of calcium and vitamin D supplements and denies dental procedures over past 3 months - administered per guidelines. Observe patient for 15 minutes for post injection. Patient no reaction noted.     Limited head-to-toe assessment due to privacy issues and visit reason though the opportunity was given for patient to express any concerns

## 2023-09-16 ENCOUNTER — PATIENT MESSAGE (OUTPATIENT)
Dept: ENDOCRINOLOGY | Facility: CLINIC | Age: 78
End: 2023-09-16
Payer: MEDICARE

## 2023-09-25 ENCOUNTER — TELEPHONE (OUTPATIENT)
Dept: INTERNAL MEDICINE | Facility: CLINIC | Age: 78
End: 2023-09-25

## 2023-09-25 ENCOUNTER — IMMUNIZATION (OUTPATIENT)
Dept: INTERNAL MEDICINE | Facility: CLINIC | Age: 78
End: 2023-09-25
Payer: MEDICARE

## 2023-09-25 ENCOUNTER — PATIENT MESSAGE (OUTPATIENT)
Dept: INTERNAL MEDICINE | Facility: CLINIC | Age: 78
End: 2023-09-25

## 2023-09-25 PROCEDURE — G0008 ADMIN INFLUENZA VIRUS VAC: HCPCS | Mod: PBBFAC

## 2023-09-25 PROCEDURE — 99999PBSHW FLU VACCINE (QUAD) GREATER THAN OR EQUAL TO 3YO PRESERVATIVE FREE IM: Mod: PBBFAC,,,

## 2023-09-25 PROCEDURE — 99999PBSHW FLU VACCINE (QUAD) GREATER THAN OR EQUAL TO 3YO PRESERVATIVE FREE IM: ICD-10-PCS | Mod: PBBFAC,,,

## 2023-10-02 DIAGNOSIS — E78.5 HYPERLIPIDEMIA, UNSPECIFIED HYPERLIPIDEMIA TYPE: ICD-10-CM

## 2023-10-02 RX ORDER — ATORVASTATIN CALCIUM 80 MG/1
TABLET, FILM COATED ORAL
Qty: 90 TABLET | Refills: 3 | Status: SHIPPED | OUTPATIENT
Start: 2023-10-02

## 2023-10-02 RX ORDER — LEVOTHYROXINE SODIUM 125 UG/1
125 TABLET ORAL EVERY MORNING
Qty: 90 TABLET | Refills: 0 | Status: SHIPPED | OUTPATIENT
Start: 2023-10-02

## 2023-10-02 NOTE — TELEPHONE ENCOUNTER
Refill Routing Note   Medication(s) are not appropriate for processing by [facility] for the following reason(s):      Required labs outdated    [facility] action(s):  Defer  Approve Care Due:  None identified            Appointments  past 12m or future 3m with PCP    Date Provider   Last Visit   11/1/2022 Latia Aguilar MD   Next Visit   11/13/2023 Latia Aguilar MD   ED visits in past 90 days: 0        Note composed:1:45 PM 10/02/2023

## 2023-10-17 ENCOUNTER — INFUSION (OUTPATIENT)
Dept: INFECTIOUS DISEASES | Facility: HOSPITAL | Age: 78
End: 2023-10-17
Attending: INTERNAL MEDICINE
Payer: MEDICARE

## 2023-10-17 VITALS
DIASTOLIC BLOOD PRESSURE: 74 MMHG | RESPIRATION RATE: 16 BRPM | OXYGEN SATURATION: 95 % | WEIGHT: 157.44 LBS | TEMPERATURE: 99 F | HEART RATE: 79 BPM | HEIGHT: 63 IN | BODY MASS INDEX: 27.89 KG/M2 | SYSTOLIC BLOOD PRESSURE: 140 MMHG

## 2023-10-17 DIAGNOSIS — M81.0 OSTEOPOROSIS, POST-MENOPAUSAL: Primary | ICD-10-CM

## 2023-10-17 PROCEDURE — 96372 THER/PROPH/DIAG INJ SC/IM: CPT

## 2023-10-17 PROCEDURE — 63600175 PHARM REV CODE 636 W HCPCS: Mod: JZ,JG | Performed by: INTERNAL MEDICINE

## 2023-10-17 RX ADMIN — ROMOSOZUMAB-AQQG 210 MG: 105 INJECTION, SOLUTION SUBCUTANEOUS at 09:10

## 2023-10-17 NOTE — PROGRESS NOTES
Patient arrives for evenity injections - confirms use of calcium and vitamin D supplements and denies dental procedures over past 3 months - administered per guidelines.    Limited head-to-toe assessment due to privacy issues and visit reason though the opportunity was given for patient to express any concerns.

## 2023-10-26 ENCOUNTER — CLINICAL SUPPORT (OUTPATIENT)
Dept: REHABILITATION | Facility: HOSPITAL | Age: 78
End: 2023-10-26
Payer: MEDICARE

## 2023-10-26 DIAGNOSIS — R29.898 DECREASED STRENGTH OF TRUNK AND BACK: ICD-10-CM

## 2023-10-26 DIAGNOSIS — M41.50 DEGENERATIVE SCOLIOSIS: ICD-10-CM

## 2023-10-26 DIAGNOSIS — R29.3 POOR POSTURE: ICD-10-CM

## 2023-10-26 DIAGNOSIS — M25.69 DECREASED ROM OF TRUNK AND BACK: ICD-10-CM

## 2023-10-26 PROCEDURE — 97530 THERAPEUTIC ACTIVITIES: CPT

## 2023-10-26 PROCEDURE — 97162 PT EVAL MOD COMPLEX 30 MIN: CPT

## 2023-10-27 NOTE — PLAN OF CARE
"[facility]  Physical Therapy Lumbar Evaluation      Name: Eugenia Diaz  Clinic Number: [id number]    Therapy Diagnosis:   Encounter Diagnoses   Name Primary?    Degenerative scoliosis     Decreased ROM of trunk and back     Decreased strength of trunk and back     Poor posture      Physician: Ector Pedro MD    Physician Orders: PT Eval and Treat  Medical Diagnosis from Referral: M41.50 (ICD-10-CM) - Degenerative scoliosis  Evaluation Date: 10/26/2023  Authorization Period Expiration: 8/4/24  Plan of Care Expiration: 01/26/24  Reassessment Due: 11/27/2023  Visit # / Visits authorized: 1/1  use lumbar roll on Med X/HOB elevated  MedX testing visit 1    Time In: 9   Time Out: 1030  Total Billable Time: 90 minutes  INSURANCE and OUTCOMES: Fee for Service with FOTO Outcomes 1/3    Precautions: standard/R THR/osteoporosis/ AVN of Left hip/HTN/SBA/CGA gait    Pattern of pain determined: 1PEN    Subjective     Date of onset: 1 year ago  History of current condition: Eugenia reports she fell last year while getting out of a car.  Pt reports the driveway was uneven and when she fell, she landed directly on her buttocks.  Pt experienced a "jolt" through her body.  Pt was able to get up and walk with assist.  Next morning she experienced intense pain and was unable to sit.  Was using a walker to walk.  About 6 months later still having pain which has not resolved and further decreased her ablity to perform ADL's .  Pt also reports she has Osteoporosis (diagnosed 25 yrs ago) has been on multiple medications . Recent xray reveals scoliosis which pt states she was not aware of. Pt also has a history of AVN in B hips, which led to RTHR.  Pt was told by MD to not perform long distance walking to protect Left hip.  Pt states she uses scooter for long distances since 2002.  LBP is intermittent back dominant. Denies numbness or tingling in LE's.  Pain described as sharp, achy, and dull.   L>R " back pain.  Worse:sitting prolonged/carrying objects/AM/changing positions in bed   Better: alleve/massages/  Medical History:   Past Medical History:   Diagnosis Date    Allergy     Anemia     Asthma     Bronchitis     Depression     Generalized headaches     History of endometriosis     Hyperlipidemia     Hypertension     Hypothyroidism     Osteoporosis, unspecified     PCO (posterior capsular opacification), left     Recurrent upper respiratory infection (URI)     Thyroid disease     hypothyroidism    TMJ dysfunction        Surgical History:   Eugenia Diaz  has a past surgical history that includes Tonsillectomy; Adenoidectomy; Knee surgery (12/21/2012); right hip replacement (01/01/2000); Cholecystectomy (01/01/1994); Oophorectomy; Temporomandibular joint surgery (01/01/1988); yag laser OD; Cataract extraction w/  intraocular lens implant (09/06/2006); Cataract extraction w/  intraocular lens implant (11/15/2006); and Colonoscopy (N/A, 06/22/2016).    Medications:   Eugenia has a current medication list which includes the following prescription(s): albuterol, albuterol, atorvastatin, benzonatate, calcium-vitamin d, clotrimazole-betamethasone 1-0.05%, fluticasone propionate, advair diskus, ipratropium, levothyroxine, loratadine, montelukast, neomycin-polymyxin-hydrocortisone, olive leaf extract, pantoprazole, potassium chloride, sertraline, triamterene-hydrochlorothiazide 37.5-25 mg, and vitamin d.    Allergies:   Review of patient's allergies indicates:   Allergen Reactions    Sulfa (sulfonamide antibiotics) Swelling     Throat swelling      Clindamycin Hives    Erythromycin base Other (See Comments)    Neomycin      Other reaction(s): Rash    Nitrofurantoin macrocrystalline Other (See Comments)     Macrobid.Throat Swelling    Tetracycline      Other reaction(s): Anaphylaxis    Zanaflex [tizanidine] Hallucinations    Azithromycin Swelling     Throat Swelling    Meperidine Rash     Demerol.     Oxycodone  hcl-oxycodone-asa Rash    Propoxyphene Rash     Darvon.         Imaging: X-ray   FINDINGS:  Thoracic dextroscoliosis and lumbar levoscoliosis.  Pronounced kyphotic curvature.  Grade 1 anterolisthesis of L4 on L5.  Generalized osteopenia.  No suspicious lytic or blastic lesions.  Wedging of the mid to lower thoracic vertebra, favored to relate to physiologic height loss and similar when compared to the previous CT.  Advanced multilevel degenerative changes most pronounced at the level of the cervical and lumbar spine.     Large hiatal hernia with bowel contents projecting over the left lower chest and compressing the adjacent lung parenchyma.  Right lung is clear.  There is scattered gas within nondilated loops of bowel.  Scattered vascular calcifications project over the chest and abdomen.  Right total hip arthroplasty with hardware in gross anatomic position.  Left femoroacetabular osteoarthritis.     Impression:     As above.  Prior Therapy: no  Prior Treatment: no  Social History:  lives alone  Occupation: retired  Leisure: going to dinner with friends      Prior Level of Function: I  Current Level of Function: difficulty lifting/prolonged standing or walking  DME owned/used: walker/motorized scooter/adjustable bed.  2002 pt was told to use scooter for long distances due to AVN/cane/roll in shower/ van with motorized ramp      Pain:  Current 4/10, worst 8/10, best 0/10   Location: bilateral back   Description: Aching, Dull, and Sharp  Aggravating Factors: Sitting, Morning, Lifting, and Getting out of bed/chair/change positions  Easing Factors: massage and pain medication  Disturbed Sleep: yes    Pattern of pain questions:  1.  Where is your pain the worst? LB  2.  Is your pain constant or intermittent? intermittent  3.  Does bending forward make your typical pain worse? no  4.  Since the start of your back pain, has there been a change in your bowel or bladder? no  5.  What can't you do now that you use to be  "able to do? Sit prolonged/standing prolonged/walk without AD    Pts goals: "get stronger, have less pain"    Red Flag Screening:   Cough/Sneeze Strain: (--)  Bladder/Bowel: (--)  Falls: (+) last fall 1 year ago  Night pain: (--)  Unexplained weight loss: (--)  General health: fair    Objective      Postural examination/scapula alignment: Rounded shoulder, Head forward, Increased kyphosis, and Decreased lordosis/ L iliac crest elevated R shoulder down  Skin integrity:mild bruising on UE's   Edema: None  Correction of posture: better with lumbar roll  Sitting: poor  Standing: poor    MOVEMENT LOSS - Lumbar   Norms ROM Loss Initial   Flexion Fingers touch toes, sacral angle >/= 70 deg, uniform spinal curvature, posterior weight shift  moderate loss   Extension ASIS surpasses toes, spine of scapulae surpasses heels, uniform spinal curve major loss   Side glide Right  minimal loss   Side glide Left  minimal loss   Rotation Right PT observes contralateral shoulder moderate loss   Rotation Left PT observes contralateral shoulder moderate loss     Lower Extremity Strength  Right LE  Left LE    Hip flexion: 3+/5 Hip flexion: 3-/5   Hip extension:  3/5 Hip extension: 3/5   Hip abduction: 4-/5 Hip abduction: 4-/5   Hip adduction:  4-/5 Hip adduction:  4-/5   Hip External Rotation N/T Hip External Rotation N/T   Hip Internal rotation   N/T Hip Internal rotation N/T   Knee Flexion 4-/5 Knee Flexion 4-/5   Knee Extension 4-/5 Knee Extension 4-/5   Ankle dorsiflexion: 4/5 Ankle dorsiflexion: 4/5   Ankle plantarflexion: 3/5 Ankle plantarflexion: 3/5     GAIT:  Assistive Device used: none  Level of Assistance: SBA/CGA  Patient displays the following gait deviations: decreased step length, decreased base of support, and antalgic gait. Pt arrives via motorized scooter.    Special Tests:   Test Name  Test Result   Prone Instability Test (--)   SI Joint Provocation Test (--)   Straight Leg Raise (--)   Neural Tension Test (--) "   Crossed Straight Leg Raise (--)   Walking on toes Not able   Walking on heels  Not able     NEUROLOGICAL SCREENING:     Sensory deficits: Intact B LE's    Reflexes:    Left Right   Patella Tendon 1+ 1+   Achilles Tendon N./T N/T   Babinski  N/T N/T   Clonus (--) (--)     REPEATED TEST MOVEMENTS:    Baseline symptoms:  Repeated Flexion in Standing pain during motion  looser   Repeated Extension in Standing pain during motion  worse   Repeated Flexion in lying no worse   Repeated Extension in lying  Unable     Transfers: sit to stand c/ CGA                    Supine to sit CGA    STATIC TESTS and other movements:   Prone lie unable   Prone lie on elbows unable   Sitting slouched  worse   Sitting erect better   Standing slouched worse   Standing erect  better   Lying prone in extension  unable   Long sitting   N/T   Sustained flexion no worse   Sustained prone using mat N/T     Lumbar testing Visit 1   Lumbar Isometric Testing on Med X equipment: Testing administered by PT    Test Initial Midpoint Final   Date 10/26/23     ROM 12-33 deg     Max Peak Torque 47      Min Peak Torque 20      Flex/Ext Ratio 2.35:1     % below normative data 71     % gain from initial test Not available visit 1         OUTCOMES SELECTION:   Intake Outcome Measure for FOTO lumbar Survey    Therapist reviewed FOTO scores for Eugenia Diaz on 10/26/2023.   FOTO documents entered into [employer] - see Media section.    Intake Score: 35% functional ability  Goal Score: 51% functional ability          Treatment     Total Treatment time separate from Evaluation: 30 minutes      Written Home Exercises Provided: yes.    HEP AS FOLLOWS:  LTR  SOC  Add PPT  Add bridges  Add SLR    Exercises were reviewed and Eugenia was able to demonstrate them prior to the end of the session. Eugenia demonstrated fair  understanding of the education provided.         MedX Testing:   Patient received neuromuscular education to engage spinal musculature correctly for motor control  and engagement of musculature for 15 minutes including the MedX exercise component and practice and standard testing. MedX dynamic exercise and baseline isometric test performed with instructions to guide the patient safely through the testing procedure. Patient instructed to perform isometric test correctly and safely while building to an optimal force with a pain-free effort. Patient also instructed that they should feel support/pressure from MedX restraints but no pain/discomfort. Patient demonstrated appropriate understanding of information.         10/26/2023     2:03 PM   [facility]   Visit Number 1   VAS Pain Rating 4   Lumbar Extension Seat Pad 2   Femur Restraint 6   Top Dead Center 24   Counterweight 152   Lumbar Flexion 33   Lumbar Extension 12   Lumbar Peak Torque 42 ft. lbs.   Min Torque 20   Test Percent Below Normative Data 71 %   Ice - Sitting 10 mins.         Therapeutic Education/Activity provided for 15 minutes:   - Patient was given an [facility] Visit 1 handout which discusses the following:  - what to expect in therapy  - an overview of the program, including health coaching and wellness  - importance of spinal hygiene, proper posture, lifting mechanics, sleep quality, and nutrition/hydration   - Waldemar roll trialed, recommended, and purchase information was provided.  - Patient received a handout regarding anticipated muscular soreness following the isometric test and strategies for management were reviewed with patient including stretching, using ice and scheduled rest.   - Patient received verbal education on the following:   - Healthy Back program   - purpose of the isometric test  - safe progression of lumbar strengthening, wellness approach, and systemic strengthening.   - safe usage of MedX machine and testing protocols.    Eugenia received cold pack for 10 minutes to sitting in [facility].    Assessment     Eugenia is a 78 y.o. female referred to [facility] with a  medical diagnosis of M41.50 (ICD-10-CM) - Degenerative scoliosis.  Pt presents with decreased endurance, moderate restrictions in  trunk ROM, poor posture c/ scoliosis present, decreased LE strength, decreased trunk strength, gait with deviations, altered balance, 35% FOTO functional ability score and 71% below age related strength norms as per Med x testing.  Pt reports she initially restricted her ambulation secondary to AVN in the Left hip.  Pt requires SBA/CGA for ambulation currently.  Discussed with patient a trial of healthy back to assess tolerance to program. Pt was in agreement.  Also instructed PAR to schedule this pt one on one to maximize safety.  I feel she will benefit greatly from overall peripheral and core strengthening which will facilitate improved transfers, gait ,balance and functional mobility.      Pain Pattern: 1 PEN       Pt prognosis is Fair.     Pt will benefit from skilled outpatient Physical Therapy to address the deficits stated above and in the chart below, to provide pt/family education, and to maximize pt's level of independence. Based on the above history and physical examination an active physical therapy program is recommended.      Plan of care discussed with patient: Yes  Pt's spiritual, cultural and educational needs considered and patient is agreeable to the plan of care and goals as stated below:     Anticipated Barriers for therapy: balance /gait    PT Evaluation Completed? Yes    Medical necessity is demonstrated by the following problem list:    History  Co-morbidities and personal factors that may impact the plan of care [] LOW: no personal factors / co-morbidities  [x] MODERATE: 1-2 personal factors / co-morbidities  [] HIGH: 3+ personal factors / co-morbidities    Moderate / High Support Documentation:   Co-morbidities affecting plan of care: R THR/ L hip AVN, HTN, osteoporosis,asthma,scoliosis    Personal Factors:   lifestyle     Examination  Body Structures and  Functions, activity limitations and participation restrictions that may impact the plan of care [] LOW: addressing 1-2 elements  [x] MODERATE: 3+ elements  [] HIGH: 4+ elements (please support below)    Moderate / High Support Documentation:  Gait with CGA, decreased LE strength, poor endurance, decreased trunk ROM/strength, altered balance   Clinical Presentation [] LOW: stable  [x] MODERATE: Evolving  [] HIGH: Unstable     Decision Making/ Complexity Score: moderate         GOALS: Pt is in agreement with the following goals.    Short term goals:  6 weeks or 10 visits   - Pt will demonstrate increased lumbar MedX ROM by at least 6 degrees from the initial ROM value with improvements noted in functional ROM and ability to perform ADLs. Appropriate and Ongoing  - Pt will demonstrate increased MedX average isometric strength value by 20% from initial test resulting in improved ability to perform bending, lifting, and carrying activities safely, confidently. Appropriate and Ongoing  - Pt will report a reduction in worst pain score by 1-2 points for improved tolerance for sitting. Appropriate and Ongoing  - Pt able to perform HEP correctly with minimal cueing or supervision from therapist to encourage independent management of symptoms. Appropriate and Ongoing    Long term goals: 10 weeks or 20 visits   - Pt will demonstrate increased lumbar MedX ROM by at least 12 degrees from initial ROM value, resulting in improved ability to perform functional forward bending while standing and sitting. Appropriate and Ongoing  - Pt will demonstrate increased MedX average isometric strength value by 40% from initial test resulting in improved ability to perform bending, lifting, and carrying activities safely and confidently. Appropriate and Ongoing  - Pt to demonstrate ability to independently control and reduce their pain through posture positioning and mechanical movements throughout a typical day. Appropriate and Ongoing  - Pt  will demonstrate reduced pain and improved functional outcomes as reported on the FOTO by reaching an intake score of >/= 50% functional ability in order to demonstrate subjective improvement in patient's condition. . Appropriate and Ongoing  - Pt will demonstrate independence with the HEP at discharge. Appropriate and Ongoing  - Pt will be I with all transfers (patient goal) Appropriate and Ongoing   Pt will demonstrate gait without AD for short distances (50-100ft) within home independently  Plan     Outpatient physical therapy 2x week for 10 weeks or 20 visits to include the following:   - Patient education  - Therapeutic exercise  - Manual therapy  - Performance testing   - Neuromuscular Re-education  - Therapeutic activity   - Modalities    Pt may be seen by PTA as part of the rehabilitation team.     Therapist: Leandra Yanez, PT  10/27/2023

## 2023-10-30 ENCOUNTER — CLINICAL SUPPORT (OUTPATIENT)
Dept: REHABILITATION | Facility: HOSPITAL | Age: 78
End: 2023-10-30
Payer: MEDICARE

## 2023-10-30 DIAGNOSIS — R29.898 DECREASED STRENGTH OF TRUNK AND BACK: ICD-10-CM

## 2023-10-30 DIAGNOSIS — M25.69 DECREASED ROM OF TRUNK AND BACK: Primary | ICD-10-CM

## 2023-10-30 DIAGNOSIS — R29.3 POOR POSTURE: ICD-10-CM

## 2023-10-30 PROCEDURE — 97110 THERAPEUTIC EXERCISES: CPT

## 2023-10-30 PROCEDURE — 97112 NEUROMUSCULAR REEDUCATION: CPT

## 2023-10-30 NOTE — PROGRESS NOTES
"  [facility]      Name: Eugenia Diaz  Clinic Number: [id number]    Therapy Diagnosis:   Encounter Diagnoses   Name Primary?    Decreased ROM of trunk and back Yes    Decreased strength of trunk and back     Poor posture      Physician: Ector Pedro MD    Visit Date: 10/30/2023    Physician Orders: PT Eval and Treat  Medical Diagnosis from Referral: M41.50 (ICD-10-CM) - Degenerative scoliosis  Evaluation Date: 10/26/2023  Authorization Period Expiration: 8/4/24  Plan of Care Expiration: 01/26/24  Reassessment Due: 11/27/2023  Visit # / Visits authorized: 2/20  use lumbar roll on Med X/HOB elevated    Time In: 11:20 AM  Time Out: 12:20 PM  Total Billable Time: 50 minutes  INSURANCE and OUTCOMES: Fee for Service with FOTO Outcomes 1/3    Precautions:   standard/R THR/osteoporosis/ AVN of Left hip/HTN/SBA/CGA gait    Pattern of pain determined: 1PEN    Subjective   Eugenia reports low back pain and spasms at start of session.      Patient reports tolerating previous visit without difficulty  Patient reports their pain to be 4/10 on a 0-10 scale with 0 being no pain and 10 being the worst pain imaginable.  Pain Location: low back     Occupation: retired  Leisure: going to dinner with friends      Pt goals: "get stronger, have less pain"    Objective     MOVEMENT LOSS - Lumbar    Norms ROM Loss Initial   Flexion Fingers touch toes, sacral angle >/= 70 deg, uniform spinal curvature, posterior weight shift  moderate loss   Extension ASIS surpasses toes, spine of scapulae surpasses heels, uniform spinal curve major loss   Side glide Right   minimal loss   Side glide Left   minimal loss   Rotation Right PT observes contralateral shoulder moderate loss   Rotation Left PT observes contralateral shoulder moderate loss        Lumbar Isometric Testing on Med X equipment: Testing administered by PT     Test Initial Midpoint Final   Date 10/26/23       ROM 12-33 deg       Max Peak Torque 47      " "  Min Peak Torque 20        Flex/Ext Ratio 2.35:1       % below normative data 71       % gain from initial test Not available visit 1          Outcomes:  Initial score: 35% functional ability  Visit 5 score:  Goal: 51% functional ability      Treatment    Eugenia received the treatments listed below:      Eugenia received neuromuscular education  to isolate and engage spinal stabilization musculature correctly for motor control and coordination to aid in function and posture for 15 minutes on the Medical Medx Machine.  Patient performed MedX dynamic exercise with emphasis on spinal muscular control using pacer throughout  active range of motion. Therapist assisted patient in achieving optimal exertion for neural reeducation and endurance training by using the  Sherry Exertion Rating scale, by instructing the patient to aim for mid range of exertion, performing 15-20 repetitions, slowly, correctly,and safely.         10/30/2023    12:14 PM   [facility] - Short   Visit Number 2   VAS Pain Rating 4   Time 5   Lumbar Stretches - Slouch 10   Flexion in Lying 10   Lumbar Weight 23 lbs   Repetitions 15   Rating of Perceived Exertion 4       therapeutic exercises to develop strength, endurance, flexibility, posture, and core stabilization for 30 minutes including:  - lower trunk rotation x 10  - double knee to chest x 10 with theraball  - posterior pelvic tilt x 10  - piriformis stretch 20" x 2 bilaterally  - BKFO with red band x 10  - hip iso adduction with ball 5" hold x 15  - SOC x 10    Peripheral muscle strengthening which included 1 set of 15-20 repetitions at a slow, controlled 10-13 second per rep pace focused on strengthening supporting musculature for improved body mechanics and functional mobility.  Pt and therapist focused on proper form during treatment to ensure optimal strengthening of each targeted muscle group.  Machines were utilized including torso rotation and leg press this visit.Hip abd and hip add, " leg extension, Leg curl, triceps, biceps, chest and row will be added visit 3-4    manual therapy techniques: Soft tissue Mobilization were applied to the: low back for 0 minutes, including:       cold pack for 5 minutes to low back.    Home Exercises Provided and Patient Education Provided   Home exercises include: lower trunk rotation, slouch over correct,   Cardio program: planned for visit 5  Lifting education date:planned for visit 11  Posture/Lumbar roll: currently in [facility] Magnet Discharge handout (date given):  Equipment at home/gym membership: no      Education provided:   - benefits of exercise performed  - continue with home exercise program    Written Home Exercises Provided: Patient instructed to cont prior HEP.  Exercises were reviewed and Eugenia was able to demonstrate them prior to the end of the session.  Eugenia demonstrated fair  understanding of the education provided.     See EMR under Patient Instructions for exercises provided prior visit.      Assessment     Pt presents to second healthy back visit reporting moderate low back pain and left sided muscular spasms, was able to demo HEP with Mod VC for form. Pt was able to start neuro reeducation training, strengthening, and endurance training on the lumbar MedX at 47% of max peak torque according to the initial visit isometric test. Pt was able to complete 15 reps, with 4 RPE. Pt was also able to complete first two of the peripheral strengthening exercises without increased discomfort and exercises will be added as tolerated complete the complete circuit in the next two-three visits as tolerated.     Patient is making fair progress towards established goals.  Pt will continue to benefit from skilled outpatient physical therapy to address the deficits stated in the impairment chart, provide pt/family education and to maximize pt's level of independence in the home and community environment.     Anticipated Barriers for therapy: balance  /gait  Pt's spiritual, cultural and educational needs considered and pt agreeable to plan of care and goals as stated below:       Goals:   Short term goals:  6 weeks or 10 visits   - Pt will demonstrate increased lumbar MedX ROM by at least 6 degrees from the initial ROM value with improvements noted in functional ROM and ability to perform ADLs. Appropriate and Ongoing  - Pt will demonstrate increased MedX average isometric strength value by 20% from initial test resulting in improved ability to perform bending, lifting, and carrying activities safely, confidently. Appropriate and Ongoing  - Pt will report a reduction in worst pain score by 1-2 points for improved tolerance for sitting. Appropriate and Ongoing  - Pt able to perform HEP correctly with minimal cueing or supervision from therapist to encourage independent management of symptoms. Appropriate and Ongoing     Long term goals: 10 weeks or 20 visits   - Pt will demonstrate increased lumbar MedX ROM by at least 12 degrees from initial ROM value, resulting in improved ability to perform functional forward bending while standing and sitting. Appropriate and Ongoing  - Pt will demonstrate increased MedX average isometric strength value by 40% from initial test resulting in improved ability to perform bending, lifting, and carrying activities safely and confidently. Appropriate and Ongoing  - Pt to demonstrate ability to independently control and reduce their pain through posture positioning and mechanical movements throughout a typical day. Appropriate and Ongoing  - Pt will demonstrate reduced pain and improved functional outcomes as reported on the FOTO by reaching an intake score of >/= 50% functional ability in order to demonstrate subjective improvement in patient's condition. . Appropriate and Ongoing  - Pt will demonstrate independence with the HEP at discharge. Appropriate and Ongoing  - Pt will be I with all transfers (patient goal) Appropriate and  Ongoing   Pt will demonstrate gait without AD for short distances (50-100ft) within home independently      Plan   Continue with established Plan of Care towards established PT goals.       Therapist: Diamond Panchal, PT  10/30/2023

## 2023-11-06 ENCOUNTER — CLINICAL SUPPORT (OUTPATIENT)
Dept: REHABILITATION | Facility: HOSPITAL | Age: 78
End: 2023-11-06
Payer: MEDICARE

## 2023-11-06 DIAGNOSIS — M25.69 DECREASED ROM OF TRUNK AND BACK: Primary | ICD-10-CM

## 2023-11-06 DIAGNOSIS — R29.898 DECREASED STRENGTH OF TRUNK AND BACK: ICD-10-CM

## 2023-11-06 DIAGNOSIS — R29.3 POOR POSTURE: ICD-10-CM

## 2023-11-06 PROCEDURE — 97110 THERAPEUTIC EXERCISES: CPT | Mod: CQ

## 2023-11-06 PROCEDURE — 97112 NEUROMUSCULAR REEDUCATION: CPT | Mod: CQ

## 2023-11-06 RX ORDER — TRIAMTERENE AND HYDROCHLOROTHIAZIDE 37.5; 25 MG/1; MG/1
CAPSULE ORAL
Qty: 90 CAPSULE | Refills: 0 | Status: SHIPPED | OUTPATIENT
Start: 2023-11-06

## 2023-11-06 NOTE — PROGRESS NOTES
"  [facility]      Name: Eugenia Diaz  Clinic Number: [id number]    Therapy Diagnosis:   Encounter Diagnoses   Name Primary?    Degenerative scoliosis      Decreased ROM of trunk and back      Decreased strength of trunk and back      Poor posture      Physician: Ector Pedro MD    Visit Date: 11/6/2023    Physician Orders: PT Eval and Treat  Medical Diagnosis from Referral: M41.50 (ICD-10-CM) - Degenerative scoliosis  Evaluation Date: 10/26/2023  Authorization Period Expiration: 8/4/24  Plan of Care Expiration: 01/26/24  Reassessment Due: 11/27/2023  Visit # / Visits authorized: 3/20  use lumbar roll on Med X/HOB elevated    Time In: 11:00 AM  Time Out: 12:05 AM  Total Billable Time: 60 minutes  INSURANCE and OUTCOMES: Fee for Service with FOTO Outcomes 1/3    Precautions:   standard/R THR/osteoporosis/ AVN of Left hip/HTN/SBA/CGA gait    Pattern of pain determined: 1PEN    Subjective   Eugenia reports chronic lower back discomfort that is mild currently. She reports some generalized muscular soreness that was minimal after her first follow-up visit. States that she had to miss her appt last week due to GI issues.    Patient reports tolerating previous visit; Minimal muscular soreness  Patient reports their pain to be 1-2/10 on a 0-10 scale with 0 being no pain and 10 being the worst pain imaginable.  Pain Location: low back     Occupation: retired  Leisure: going to dinner with friends      Pt goals: "get stronger, have less pain"    Objective     MOVEMENT LOSS - Lumbar    Norms ROM Loss Initial   Flexion Fingers touch toes, sacral angle >/= 70 deg, uniform spinal curvature, posterior weight shift  moderate loss   Extension ASIS surpasses toes, spine of scapulae surpasses heels, uniform spinal curve major loss   Side glide Right   minimal loss   Side glide Left   minimal loss   Rotation Right PT observes contralateral shoulder moderate loss   Rotation Left PT observes contralateral " "shoulder moderate loss        Lumbar Isometric Testing on Med X equipment: Testing administered by PT     Test Initial Midpoint Final   Date 10/26/23       ROM 12-33 deg       Max Peak Torque 47        Min Peak Torque 20        Flex/Ext Ratio 2.35:1       % below normative data 71       % gain from initial test Not available visit 1          Outcomes:  Initial score: 35% functional ability  Visit 5 score:  Goal: 51% functional ability      Treatment    Eugenia received the treatments listed below:      Eugenia received neuromuscular education  to isolate and engage spinal stabilization musculature correctly for motor control and coordination to aid in function and posture for 10 minutes on the Medical Medx Machine.  Patient performed MedX dynamic exercise with emphasis on spinal muscular control using pacer throughout  active range of motion. Therapist assisted patient in achieving optimal exertion for neural reeducation and endurance training by using the  Sherry Exertion Rating scale, by instructing the patient to aim for mid range of exertion, performing 15-20 repetitions, slowly, correctly,and safely.          11/6/2023    11:19 AM   [facility] - Short   Visit Number 3   VAS Pain Rating 3   Time 5   Lumbar Stretches - Slouch 10   Flexion in Lying 10   Lumbar Weight 23 lbs   Repetitions 18   Rating of Perceived Exertion 3.5      therapeutic exercises to develop strength, endurance, flexibility, posture, and core stabilization for  50 minutes including:    - lower trunk rotation x 10  - double knee to chest x 10 with theraball  - posterior pelvic tilt x 10  - piriformis stretch 20" x 2 bilaterally  - BKFO with red band x 10  - hip iso adduction with ball 5" hold x 20  - SOC x 10  + Sit<->stand from Hi-lo Table set at 24" x 5 reps    Peripheral muscle strengthening which included 1 set of 15-20 repetitions at a slow, controlled 10-13 second per rep pace focused on strengthening supporting musculature for improved " body mechanics and functional mobility.  Pt and therapist focused on proper form during treatment to ensure optimal strengthening of each targeted muscle group.  Machines were utilized including torso rotation and leg press this visit.Hip abd and hip add, leg extension, Leg curl, triceps, biceps, chest and row will be added visit 3-4    manual therapy techniques: Soft tissue Mobilization were applied to the: low back for 0 minutes, including:     cold pack for 5 minutes to low back.    Home Exercises Provided and Patient Education Provided   Home exercises include: lower trunk rotation, slouch over correct,   Cardio program: planned for visit 5  Lifting education date:planned for visit 11  Posture/Lumbar roll: currently in scooter  Fridge Magnet Discharge handout (date given):  Equipment at home/gym membership: no    Education provided:   - benefits of exercise performed  - continue with home exercise program  - MedX performance  - Precor ex performance  - HB protocol  - Sherry exertional scale    Written Home Exercises Provided: Patient instructed to cont prior HEP.  Exercises were reviewed and Eugenia was able to demonstrate them prior to the end of the session.  Eugenia demonstrated fair  understanding of the education provided.     See EMR under Patient Instructions for exercises provided prior visit.    Assessment   Eugenia presents to her third second healthy back visit with chronic report of lower back pain that is rated as mild currently. Treatment continued with flexibility, strengthening and neuromuscular reeducation ex's. Added functional sit<->stand strengthening this visit. She was able to perform ex's with min cues and without increased pain. Lumbar MedX resistance was maintained at 23 ft/lbs and she completed 18 reps with a RPE = 3.5/10. Min cues for MedX pacing. She was able to complete the full circuit of peripheral LE strengthening this visit without increased back pain. Will look to progress to full circuit  of peripheral strengthening ex's including UE ex's with modifications prn next visit.     Patient is making progress towards established goals.  Pt will continue to benefit from skilled outpatient physical therapy to address the deficits stated in the impairment chart, provide pt/family education and to maximize pt's level of independence in the home and community environment.     Anticipated Barriers for therapy: balance /gait  Pt's spiritual, cultural and educational needs considered and pt agreeable to plan of care and goals as stated below:   Goals:   Short term goals:  6 weeks or 10 visits   - Pt will demonstrate increased lumbar MedX ROM by at least 6 degrees from the initial ROM value with improvements noted in functional ROM and ability to perform ADLs. Appropriate and Ongoing  - Pt will demonstrate increased MedX average isometric strength value by 20% from initial test resulting in improved ability to perform bending, lifting, and carrying activities safely, confidently. Appropriate and Ongoing  - Pt will report a reduction in worst pain score by 1-2 points for improved tolerance for sitting. Appropriate and Ongoing  - Pt able to perform HEP correctly with minimal cueing or supervision from therapist to encourage independent management of symptoms. Appropriate and Ongoing     Long term goals: 10 weeks or 20 visits   - Pt will demonstrate increased lumbar MedX ROM by at least 12 degrees from initial ROM value, resulting in improved ability to perform functional forward bending while standing and sitting. Appropriate and Ongoing  - Pt will demonstrate increased MedX average isometric strength value by 40% from initial test resulting in improved ability to perform bending, lifting, and carrying activities safely and confidently. Appropriate and Ongoing  - Pt to demonstrate ability to independently control and reduce their pain through posture positioning and mechanical movements throughout a typical day.  Appropriate and Ongoing  - Pt will demonstrate reduced pain and improved functional outcomes as reported on the FOTO by reaching an intake score of >/= 50% functional ability in order to demonstrate subjective improvement in patient's condition. . Appropriate and Ongoing  - Pt will demonstrate independence with the HEP at discharge. Appropriate and Ongoing  - Pt will be I with all transfers (patient goal) Appropriate and Ongoing   Pt will demonstrate gait without AD for short distances (50-100ft) within home independently    Plan   Continue with established Plan of Care towards established PT goals.     Therapist: Edison Smiley, PTA  11/6/2023

## 2023-11-06 NOTE — TELEPHONE ENCOUNTER
Refill Routing Note   Medication(s) are not appropriate for processing by [facility] for the following reason(s):      Required labs outdated  Required vitals abnormal    [facility] action(s):  Defer Care Due:  None identified     Medication Therapy Plan: FLOS      Appointments  past 12m or future 3m with PCP    Date Provider   Last Visit   11/1/2022 Latia Aguilar MD   Next Visit   11/13/2023 Latia Aguilar MD   ED visits in past 90 days: 0        Note composed:12:15 PM 11/06/2023

## 2023-11-06 NOTE — TELEPHONE ENCOUNTER
Care Due:                  Date            Visit Type   Department     Provider  --------------------------------------------------------------------------------                                MYCHART                              ANNUAL                              CHECKUP/PHY  [facility] INTERNAL  Last Visit: 11-      S            MEDICINE       JOB HALEY                              EP -                              PRIMARY      [facility] INTERNAL  Next Visit: 11-      CARE ([facility])   MEDICINE       JOB HALEY                                                            Last  Test          Frequency    Reason                     Performed    Due Date  --------------------------------------------------------------------------------    CMP.........  12 months..  atorvastatin, potassium,   11-   10-                             triamterene-hydrochloroth                             iazide...................    Lipid Panel.  12 months..  atorvastatin.............  09- 09-    [facility] Embedded Care Due Messages. Reference number: [id number].   11/06/2023 12:07:01 PM CST

## 2023-11-08 ENCOUNTER — CLINICAL SUPPORT (OUTPATIENT)
Dept: REHABILITATION | Facility: HOSPITAL | Age: 78
End: 2023-11-08
Payer: MEDICARE

## 2023-11-08 DIAGNOSIS — M25.69 DECREASED ROM OF TRUNK AND BACK: Primary | ICD-10-CM

## 2023-11-08 DIAGNOSIS — R29.3 POOR POSTURE: ICD-10-CM

## 2023-11-08 DIAGNOSIS — R29.898 DECREASED STRENGTH OF TRUNK AND BACK: ICD-10-CM

## 2023-11-08 PROCEDURE — 97110 THERAPEUTIC EXERCISES: CPT

## 2023-11-08 PROCEDURE — 97112 NEUROMUSCULAR REEDUCATION: CPT

## 2023-11-08 NOTE — PROGRESS NOTES
"    [facility]      Name: Eugenia Diaz  Clinic Number: [id number]    Therapy Diagnosis:   Encounter Diagnoses   Name Primary?    Degenerative scoliosis      Decreased ROM of trunk and back      Decreased strength of trunk and back      Poor posture      Physician: Ector Pedro MD    Visit Date: 11/8/2023    Physician Orders: PT Eval and Treat  Medical Diagnosis from Referral: M41.50 (ICD-10-CM) - Degenerative scoliosis  Evaluation Date: 10/26/2023  Authorization Period Expiration: 8/4/24  Plan of Care Expiration: 01/26/24  Reassessment Due: 11/27/2023  Visit # / Visits authorized: 3/20  use lumbar roll on Med X/HOB elevated    Time In: 11:00 AM  Time Out: 12:05 AM  Total Billable Time: 60 minutes  INSURANCE and OUTCOMES: Fee for Service with FOTO Outcomes 1/3    Precautions:   standard/R THR/osteoporosis/ AVN of Left hip/HTN/SBA/CGA gait    Pattern of pain determined: 1PEN    Subjective   Eugenia reports chronic lower back discomfort that is mild currently. She reports some generalized muscular soreness that was minimal after her first follow-up visit. States that she had to miss her appt last week due to GI issues.    Patient reports tolerating previous visit; Minimal muscular soreness  Patient reports their pain to be 1-2/10 on a 0-10 scale with 0 being no pain and 10 being the worst pain imaginable.  Pain Location: low back     Occupation: retired  Leisure: going to dinner with friends      Pt goals: "get stronger, have less pain"    Objective     MOVEMENT LOSS - Lumbar    Norms ROM Loss Initial   Flexion Fingers touch toes, sacral angle >/= 70 deg, uniform spinal curvature, posterior weight shift  moderate loss   Extension ASIS surpasses toes, spine of scapulae surpasses heels, uniform spinal curve major loss   Side glide Right   minimal loss   Side glide Left   minimal loss   Rotation Right PT observes contralateral shoulder moderate loss   Rotation Left PT observes " "contralateral shoulder moderate loss        Lumbar Isometric Testing on Med X equipment: Testing administered by PT     Test Initial Midpoint Final   Date 10/26/23       ROM 12-33 deg       Max Peak Torque 47        Min Peak Torque 20        Flex/Ext Ratio 2.35:1       % below normative data 71       % gain from initial test Not available visit 1          Outcomes:  Initial score: 35% functional ability  Visit 5 score:  Goal: 51% functional ability      Treatment    Eugenia received the treatments listed below:      Eugenia received neuromuscular education  to isolate and engage spinal stabilization musculature correctly for motor control and coordination to aid in function and posture for 10 minutes on the Medical Medx Machine.  Patient performed MedX dynamic exercise with emphasis on spinal muscular control using pacer throughout  active range of motion. Therapist assisted patient in achieving optimal exertion for neural reeducation and endurance training by using the  Sherry Exertion Rating scale, by instructing the patient to aim for mid range of exertion, performing 15-20 repetitions, slowly, correctly,and safely.            11/8/2023    11:09 AM   [facility] - Short   Visit Number 4   VAS Pain Rating 3   Time 5   Lumbar Stretches - Slouch 10   Flexion in Lying 10   Lumbar Weight 23 lbs   Repetitions 20   Rating of Perceived Exertion 3       therapeutic exercises to develop strength, endurance, flexibility, posture, and core stabilization for  50 minutes including:    - lower trunk rotation x 10  - double knee to chest x 10 with theraball  - posterior pelvic tilt x 10  - piriformis stretch 20" x 2 bilaterally  - BKFO with red band x 10  - hip iso adduction with ball 5" hold x 20  - SOC x 10   Sit<->stand from standard chair + 1 Airex pad x 10 reps    Peripheral muscle strengthening which included 1 set of 15-20 repetitions at a slow, controlled 10-13 second per rep pace focused on strengthening supporting " musculature for improved body mechanics and functional mobility.  Pt and therapist focused on proper form during treatment to ensure optimal strengthening of each targeted muscle group.  Machines were utilized including torso rotation and leg press this visit.Hip abd and hip add, leg extension, Leg curl, triceps, biceps, chest and row will be added visit 3-4    manual therapy techniques: Soft tissue Mobilization were applied to the: low back for 0 minutes, including:     cold pack for 5 minutes to low back.    Home Exercises Provided and Patient Education Provided   Home exercises include: lower trunk rotation, slouch over correct,   Cardio program: planned for visit 5  Lifting education date:planned for visit 11  Posture/Lumbar roll: currently in scooter  Fridge Magnet Discharge handout (date given):  Equipment at home/gym membership: no    Education provided:   - benefits of exercise performed  - continue with home exercise program  - MedX performance  - Precor ex performance  - HB protocol  - Sherry exertional scale    Written Home Exercises Provided: Patient instructed to cont prior HEP.  Exercises were reviewed and Eugenia was able to demonstrate them prior to the end of the session.  Eugenia demonstrated fair  understanding of the education provided.     See EMR under Patient Instructions for exercises provided prior visit.    Assessment   Eugenia presents to her fourth healthy back visit with chronic report of lower back pain that is rated as mild currently, 3/10. Treatment continued with flexibility, strengthening and neuromuscular reeducation ex's. Added functional sit<->stand strengthening this visit. She was able to perform ex's with min cues and without increased pain. Lumbar MedX resistance was maintained at 23 ft/lbs and she completed 20 reps with a RPE = 3.5/10.  Increase by 5% next visit. Min cues for MedX pacing. She was able to complete the 80% of circuit of peripheral LE strengthening this visit without  increased back pain. Will look to progress to full circuit of peripheral strengthening ex's including UE ex's with modifications prn next visit.     Patient is making progress towards established goals.  Pt will continue to benefit from skilled outpatient physical therapy to address the deficits stated in the impairment chart, provide pt/family education and to maximize pt's level of independence in the home and community environment.     Anticipated Barriers for therapy: balance /gait  Pt's spiritual, cultural and educational needs considered and pt agreeable to plan of care and goals as stated below:   Goals:   Short term goals:  6 weeks or 10 visits   - Pt will demonstrate increased lumbar MedX ROM by at least 6 degrees from the initial ROM value with improvements noted in functional ROM and ability to perform ADLs. Appropriate and Ongoing  - Pt will demonstrate increased MedX average isometric strength value by 20% from initial test resulting in improved ability to perform bending, lifting, and carrying activities safely, confidently. Appropriate and Ongoing  - Pt will report a reduction in worst pain score by 1-2 points for improved tolerance for sitting. Appropriate and Ongoing  - Pt able to perform HEP correctly with minimal cueing or supervision from therapist to encourage independent management of symptoms. Appropriate and Ongoing     Long term goals: 10 weeks or 20 visits   - Pt will demonstrate increased lumbar MedX ROM by at least 12 degrees from initial ROM value, resulting in improved ability to perform functional forward bending while standing and sitting. Appropriate and Ongoing  - Pt will demonstrate increased MedX average isometric strength value by 40% from initial test resulting in improved ability to perform bending, lifting, and carrying activities safely and confidently. Appropriate and Ongoing  - Pt to demonstrate ability to independently control and reduce their pain through posture  positioning and mechanical movements throughout a typical day. Appropriate and Ongoing  - Pt will demonstrate reduced pain and improved functional outcomes as reported on the FOTO by reaching an intake score of >/= 50% functional ability in order to demonstrate subjective improvement in patient's condition. . Appropriate and Ongoing  - Pt will demonstrate independence with the HEP at discharge. Appropriate and Ongoing  - Pt will be I with all transfers (patient goal) Appropriate and Ongoing   Pt will demonstrate gait without AD for short distances (50-100ft) within home independently    Plan   Continue with established Plan of Care towards established PT goals.     Therapist: Diamond Panchal, PT  11/8/2023

## 2023-11-09 ENCOUNTER — LAB VISIT (OUTPATIENT)
Dept: LAB | Facility: HOSPITAL | Age: 78
End: 2023-11-09
Attending: INTERNAL MEDICINE
Payer: MEDICARE

## 2023-11-09 DIAGNOSIS — D64.9 ANEMIA, UNSPECIFIED TYPE: ICD-10-CM

## 2023-11-09 DIAGNOSIS — E78.5 HYPERLIPIDEMIA, UNSPECIFIED HYPERLIPIDEMIA TYPE: ICD-10-CM

## 2023-11-09 DIAGNOSIS — I10 ESSENTIAL HYPERTENSION: ICD-10-CM

## 2023-11-09 LAB
ALBUMIN SERPL BCP-MCNC: 3.8 G/DL (ref 3.5–5.2)
ALP SERPL-CCNC: 97 U/L (ref 55–135)
ALT SERPL W/O P-5'-P-CCNC: 16 U/L (ref 10–44)
ANION GAP SERPL CALC-SCNC: 13 MMOL/L (ref 8–16)
AST SERPL-CCNC: 19 U/L (ref 10–40)
BILIRUB SERPL-MCNC: 0.7 MG/DL (ref 0.1–1)
BUN SERPL-MCNC: 11 MG/DL (ref 8–23)
CALCIUM SERPL-MCNC: 9.4 MG/DL (ref 8.7–10.5)
CHLORIDE SERPL-SCNC: 99 MMOL/L (ref 95–110)
CHOLEST SERPL-MCNC: 145 MG/DL (ref 120–199)
CHOLEST/HDLC SERPL: 2.3 {RATIO} (ref 2–5)
CO2 SERPL-SCNC: 28 MMOL/L (ref 23–29)
CREAT SERPL-MCNC: 0.7 MG/DL (ref 0.5–1.4)
ERYTHROCYTE [DISTWIDTH] IN BLOOD BY AUTOMATED COUNT: 12.9 % (ref 11.5–14.5)
EST. GFR  (NO RACE VARIABLE): >60 ML/MIN/1.73 M^2
GLUCOSE SERPL-MCNC: 85 MG/DL (ref 70–110)
HCT VFR BLD AUTO: 43.2 % (ref 37–48.5)
HDLC SERPL-MCNC: 63 MG/DL (ref 40–75)
HDLC SERPL: 43.4 % (ref 20–50)
HGB BLD-MCNC: 14.2 G/DL (ref 12–16)
LDLC SERPL CALC-MCNC: 69.6 MG/DL (ref 63–159)
MCH RBC QN AUTO: 30.5 PG (ref 27–31)
MCHC RBC AUTO-ENTMCNC: 32.9 G/DL (ref 32–36)
MCV RBC AUTO: 93 FL (ref 82–98)
NONHDLC SERPL-MCNC: 82 MG/DL
PLATELET # BLD AUTO: 294 K/UL (ref 150–450)
PMV BLD AUTO: 9 FL (ref 9.2–12.9)
POTASSIUM SERPL-SCNC: 3.8 MMOL/L (ref 3.5–5.1)
PROT SERPL-MCNC: 6.7 G/DL (ref 6–8.4)
RBC # BLD AUTO: 4.65 M/UL (ref 4–5.4)
SODIUM SERPL-SCNC: 140 MMOL/L (ref 136–145)
TRIGL SERPL-MCNC: 62 MG/DL (ref 30–150)
WBC # BLD AUTO: 10.84 K/UL (ref 3.9–12.7)

## 2023-11-09 PROCEDURE — 85027 COMPLETE CBC AUTOMATED: CPT | Performed by: INTERNAL MEDICINE

## 2023-11-09 PROCEDURE — 36415 COLL VENOUS BLD VENIPUNCTURE: CPT | Performed by: INTERNAL MEDICINE

## 2023-11-09 PROCEDURE — 80061 LIPID PANEL: CPT | Performed by: INTERNAL MEDICINE

## 2023-11-09 PROCEDURE — 80053 COMPREHEN METABOLIC PANEL: CPT | Performed by: INTERNAL MEDICINE

## 2023-11-10 ENCOUNTER — PATIENT MESSAGE (OUTPATIENT)
Dept: ADMINISTRATIVE | Facility: OTHER | Age: 78
End: 2023-11-10
Payer: MEDICARE

## 2023-11-13 ENCOUNTER — OFFICE VISIT (OUTPATIENT)
Dept: INTERNAL MEDICINE | Facility: CLINIC | Age: 78
End: 2023-11-13
Payer: MEDICARE

## 2023-11-13 ENCOUNTER — PATIENT MESSAGE (OUTPATIENT)
Dept: INTERNAL MEDICINE | Facility: CLINIC | Age: 78
End: 2023-11-13

## 2023-11-13 VITALS
SYSTOLIC BLOOD PRESSURE: 120 MMHG | HEIGHT: 63 IN | BODY MASS INDEX: 28.08 KG/M2 | OXYGEN SATURATION: 96 % | WEIGHT: 158.5 LBS | HEART RATE: 79 BPM | DIASTOLIC BLOOD PRESSURE: 72 MMHG

## 2023-11-13 DIAGNOSIS — J45.909 ASTHMA, WELL CONTROLLED, UNSPECIFIED ASTHMA SEVERITY, UNSPECIFIED WHETHER PERSISTENT: Primary | ICD-10-CM

## 2023-11-13 DIAGNOSIS — M81.0 OSTEOPOROSIS, POST-MENOPAUSAL: ICD-10-CM

## 2023-11-13 DIAGNOSIS — E03.9 HYPOTHYROIDISM (ACQUIRED): ICD-10-CM

## 2023-11-13 DIAGNOSIS — D69.2 SENILE PURPURA: ICD-10-CM

## 2023-11-13 DIAGNOSIS — R29.890 HEIGHT LOSS: ICD-10-CM

## 2023-11-13 DIAGNOSIS — F43.21 ADJUSTMENT DISORDER WITH DEPRESSED MOOD: ICD-10-CM

## 2023-11-13 DIAGNOSIS — I10 ESSENTIAL HYPERTENSION: ICD-10-CM

## 2023-11-13 PROCEDURE — 99999 PR PBB SHADOW E&M-EST. PATIENT-LVL IV: CPT | Mod: PBBFAC,,, | Performed by: INTERNAL MEDICINE

## 2023-11-13 PROCEDURE — 99214 OFFICE O/P EST MOD 30 MIN: CPT | Mod: S$PBB,,, | Performed by: INTERNAL MEDICINE

## 2023-11-13 PROCEDURE — 99999 PR PBB SHADOW E&M-EST. PATIENT-LVL IV: ICD-10-PCS | Mod: PBBFAC,,, | Performed by: INTERNAL MEDICINE

## 2023-11-13 PROCEDURE — 99214 PR OFFICE/OUTPT VISIT, EST, LEVL IV, 30-39 MIN: ICD-10-PCS | Mod: S$PBB,,, | Performed by: INTERNAL MEDICINE

## 2023-11-13 PROCEDURE — 99214 OFFICE O/P EST MOD 30 MIN: CPT | Mod: PBBFAC | Performed by: INTERNAL MEDICINE

## 2023-11-13 RX ORDER — SERTRALINE HYDROCHLORIDE 100 MG/1
200 TABLET, FILM COATED ORAL DAILY
Qty: 180 TABLET | Refills: 3 | Status: SHIPPED | OUTPATIENT
Start: 2023-11-13

## 2023-11-13 RX ORDER — FLUTICASONE PROPIONATE AND SALMETEROL 500; 50 UG/1; UG/1
POWDER RESPIRATORY (INHALATION)
Qty: 180 EACH | Refills: 3 | Status: SHIPPED | OUTPATIENT
Start: 2023-11-13

## 2023-11-13 NOTE — PROGRESS NOTES
Subjective     Patient ID: Eugenia Diaz is a 78 y.o. female.    Chief Complaint: Annual Exam    HPI  She joined a research study and was told she is 4 lbs shorter.     She had an ortho visit  and was told she had scoliosis.  She has had intermittent back pain.  Recent thoracic xray:  Wedging of the mid to lower thoracic vertebra, favored to relate to physiologic height loss and similar when compared to the previous CT.      Dr Pedro prescribed [facility] , which she finds helpful.         She takes Flexeril prn.       She has noted more prominence of abdomen.    Dr Christianson is managing osteoporosis, now being tx with Evenity.    Hypothyroidism adequately treated.    MDD controlled with zoloft.  Depression had recurred when she has attempted to taper.    She has Long Term Care policy.  Home is handicapped equipped.    Htn controlled with dyazide.    Asthma has been well controlled.  Positioning important to optimize breathing.  When flatter, more aware of sternum pain for mprevious fracture.    Bruising more easily.  Lesion of R forearm bled for a bit.  She is not taking iron, nsaids. Currently.     Subconjunctival hemorrhage.  Review of Systems   Constitutional:  Negative for fever and unexpected weight change.   HENT:  Negative for nasal congestion and postnasal drip.    Respiratory:  Negative for chest tightness, shortness of breath and wheezing.    Cardiovascular:  Negative for chest pain and leg swelling.   Gastrointestinal:  Negative for abdominal pain, anal bleeding, constipation, diarrhea, nausea and vomiting.   Genitourinary:  Negative for dysuria and urgency.   Musculoskeletal:  Positive for back pain.   Integumentary:  Negative for rash.   Neurological:  Negative for headaches.   Psychiatric/Behavioral:  Negative for dysphoric mood and sleep disturbance. The patient is not nervous/anxious.           Objective     Physical Exam  Constitutional:       General: She is not in acute distress.     Appearance: She is  well-developed.   HENT:      Head: Normocephalic and atraumatic.      Right Ear: External ear normal.      Left Ear: External ear normal.      Nose: Nose normal.   Eyes:      General: No scleral icterus.        Right eye: No discharge.         Left eye: No discharge.      Conjunctiva/sclera: Conjunctivae normal.      Pupils: Pupils are equal, round, and reactive to light.      Comments: Subconjunctival hemorrhage L medial eye   Neck:      Thyroid: No thyromegaly.      Vascular: No JVD.   Cardiovascular:      Rate and Rhythm: Normal rate and regular rhythm.      Heart sounds: Normal heart sounds. No murmur heard.     No gallop.   Pulmonary:      Effort: Pulmonary effort is normal. No respiratory distress.      Breath sounds: Normal breath sounds. No wheezing or rales.   Chest:   Breasts:     Right: Normal.      Left: Normal.   Abdominal:      General: Bowel sounds are normal. There is no distension.      Palpations: Abdomen is soft. There is no mass.      Tenderness: There is no abdominal tenderness. There is no guarding or rebound.   Musculoskeletal:         General: No tenderness. Normal range of motion.      Cervical back: Normal range of motion and neck supple.      Comments: kyphosis   Lymphadenopathy:      Cervical: No cervical adenopathy.   Skin:     General: Skin is warm and dry.      Findings: No rash.   Neurological:      Mental Status: She is alert.      Cranial Nerves: No cranial nerve deficit.      Coordination: Coordination normal.   Psychiatric:         Behavior: Behavior normal.         Thought Content: Thought content normal.         Judgment: Judgment normal.            Results for orders placed or performed in visit on 11/09/23   Lipid Panel   Result Value Ref Range    Cholesterol 145 120 - 199 mg/dL    Triglycerides 62 30 - 150 mg/dL    HDL 63 40 - 75 mg/dL    LDL Cholesterol 69.6 63.0 - 159.0 mg/dL    HDL/Cholesterol Ratio 43.4 20.0 - 50.0 %    Total Cholesterol/HDL Ratio 2.3 2.0 - 5.0    Non-HDL  Cholesterol 82 mg/dL   Comprehensive Metabolic Panel   Result Value Ref Range    Sodium 140 136 - 145 mmol/L    Potassium 3.8 3.5 - 5.1 mmol/L    Chloride 99 95 - 110 mmol/L    CO2 28 23 - 29 mmol/L    Glucose 85 70 - 110 mg/dL    BUN 11 8 - 23 mg/dL    Creatinine 0.7 0.5 - 1.4 mg/dL    Calcium 9.4 8.7 - 10.5 mg/dL    Total Protein 6.7 6.0 - 8.4 g/dL    Albumin 3.8 3.5 - 5.2 g/dL    Total Bilirubin 0.7 0.1 - 1.0 mg/dL    Alkaline Phosphatase 97 55 - 135 U/L    AST 19 10 - 40 U/L    ALT 16 10 - 44 U/L    eGFR >60.0 >60 mL/min/1.73 m^2    Anion Gap 13 8 - 16 mmol/L   CBC Without Differential   Result Value Ref Range    WBC 10.84 3.90 - 12.70 K/uL    RBC 4.65 4.00 - 5.40 M/uL    Hemoglobin 14.2 12.0 - 16.0 g/dL    Hematocrit 43.2 37.0 - 48.5 %    MCV 93 82 - 98 fL    MCH 30.5 27.0 - 31.0 pg    MCHC 32.9 32.0 - 36.0 g/dL    RDW 12.9 11.5 - 14.5 %    Platelets 294 150 - 450 K/uL    MPV 9.0 (L) 9.2 - 12.9 fL      Assessment and Plan     1. Asthma, well controlled, unspecified asthma severity, unspecified whether persistent    2. Essential hypertension    3. Hypothyroidism (acquired)    4. Osteoporosis, post-menopausal    5. Adjustment disorder with depressed mood  -     sertraline (ZOLOFT) 100 MG tablet; Take 2 tablets (200 mg total) by mouth once daily.  Dispense: 180 tablet; Refill: 3    6. Senile purpura    7. Height loss    Other orders  -     fluticasone-salmeterol diskus inhaler 500-50 mcg; Inhale one puff into the lungs twice daily.  Dispense: 180 each; Refill: 3                 Follow up in about 1 year (around 11/13/2024) for PE.

## 2023-11-16 ENCOUNTER — CLINICAL SUPPORT (OUTPATIENT)
Dept: REHABILITATION | Facility: HOSPITAL | Age: 78
End: 2023-11-16
Payer: MEDICARE

## 2023-11-16 DIAGNOSIS — R29.898 DECREASED STRENGTH OF TRUNK AND BACK: ICD-10-CM

## 2023-11-16 DIAGNOSIS — M25.69 DECREASED ROM OF TRUNK AND BACK: Primary | ICD-10-CM

## 2023-11-16 DIAGNOSIS — R29.3 POOR POSTURE: ICD-10-CM

## 2023-11-16 PROCEDURE — 97110 THERAPEUTIC EXERCISES: CPT | Mod: CQ

## 2023-11-16 PROCEDURE — 97112 NEUROMUSCULAR REEDUCATION: CPT | Mod: CQ

## 2023-11-16 NOTE — PROGRESS NOTES
"    [facility]      Name: Eugenia Diaz  Clinic Number: [id number]    Therapy Diagnosis:   Encounter Diagnoses   Name Primary?    Degenerative scoliosis      Decreased ROM of trunk and back      Decreased strength of trunk and back      Poor posture      Physician: Ector Pedro MD    Visit Date: 11/16/2023    Physician Orders: PT Eval and Treat  Medical Diagnosis from Referral: M41.50 (ICD-10-CM) - Degenerative scoliosis  Evaluation Date: 10/26/2023  Authorization Period Expiration: 8/4/24  Plan of Care Expiration: 01/26/24  Reassessment Due: 11/27/2023  Visit # / Visits authorized: 5/20  use lumbar roll on Med X/HOB elevated    Time In: 3:30 PM   Time Out:  4:35 PM  Total Billable Time: 60 minutes  INSURANCE and OUTCOMES: Fee for Service with FOTO Outcomes 1/3    Precautions:   standard/R THR/osteoporosis/ AVN of Left hip/HTN/SBA/CGA gait    Pattern of pain determined: 1PEN    Subjective   Eugenia reports chronic lower back discomfort that is mild currently.  States that she feels the HB program has been helpful so far.    Patient reports tolerating previous visit; Minimal muscular soreness  Patient reports their pain to be 2/10 on a 0-10 scale with 0 being no pain and 10 being the worst pain imaginable.  Pain Location: low back     Occupation: retired  Leisure: going to dinner with friends      Pt goals: "get stronger, have less pain"    Objective     MOVEMENT LOSS - Lumbar    Norms ROM Loss Initial   Flexion Fingers touch toes, sacral angle >/= 70 deg, uniform spinal curvature, posterior weight shift  moderate loss   Extension ASIS surpasses toes, spine of scapulae surpasses heels, uniform spinal curve major loss   Side glide Right   minimal loss   Side glide Left   minimal loss   Rotation Right PT observes contralateral shoulder moderate loss   Rotation Left PT observes contralateral shoulder moderate loss        Lumbar Isometric Testing on Med X equipment: Testing administered by " "PT     Test Initial Midpoint Final   Date 10/26/23       ROM 12-33 deg       Max Peak Torque 47        Min Peak Torque 20        Flex/Ext Ratio 2.35:1       % below normative data 71       % gain from initial test Not available visit 1          Outcomes:  Initial score: 35% functional ability  Visit 5 score:  Goal: 51% functional ability      Treatment    Eugenia received the treatments listed below:      Eugenia received neuromuscular education  to isolate and engage spinal stabilization musculature correctly for motor control and coordination to aid in function and posture for 10 minutes on the Medical Medx Machine.  Patient performed MedX dynamic exercise with emphasis on spinal muscular control using pacer throughout  active range of motion. Therapist assisted patient in achieving optimal exertion for neural reeducation and endurance training by using the  Sherry Exertion Rating scale, by instructing the patient to aim for mid range of exertion, performing 15-20 repetitions, slowly, correctly,and safely.         11/16/2023     4:20 PM   [facility] - Short   Visit Number 5   VAS Pain Rating 3   Time 5   Lumbar Stretches - Slouch 10   Flexion in Lying 10   Lumbar Weight 26 lbs   Repetitions 15   Rating of Perceived Exertion 4      therapeutic exercises to develop strength, endurance, flexibility, posture, and core stabilization for  50 minutes including:    lower trunk rotation x 10  double knee to chest x 10 with theraball  posterior pelvic tilt x 10  piriformis stretch 20" x 2 bilaterally  BKFO with red band x 10  hip iso adduction with ball 5" hold x 20--NP  SOC x 10  Sit<->stand from standard chair  2 x 5 reps  + Standing ex's w/UE support   Marching x 10   Hip abd x 10   Hip extension x 10     Peripheral muscle strengthening which included 1 set of 15-20 repetitions at a slow, controlled 10-13 second per rep pace focused on strengthening supporting musculature for improved body mechanics and functional " mobility.  Pt and therapist focused on proper form during treatment to ensure optimal strengthening of each targeted muscle group.  Machines were utilized including torso rotation and leg press this visit.Hip abd and hip add, leg extension, Leg curl, triceps, biceps, chest (NP) and row will be added visit 3-4    manual therapy techniques: Soft tissue Mobilization were applied to the: low back for 0 minutes, including:     cold pack for 5 minutes to low back.    Home Exercises Provided and Patient Education Provided   Home exercises include: lower trunk rotation, slouch over correct,   Cardio program: planned for visit 5 11/16/23  Lifting education date:planned for visit 11  Posture/Lumbar roll: currently in scooter  Fridge Magnet Discharge handout (date given):  Equipment at home/gym membership: no    Education provided:   - benefits of exercise performed  - continue with home exercise program  - MedX performance  - Precor ex performance    Written Home Exercises Provided: Patient instructed to cont prior HEP.  Exercises were reviewed and Eugenia was able to demonstrate them prior to the end of the session.  Eugenia demonstrated fair  understanding of the education provided.     See EMR under Patient Instructions for exercises provided prior visit.    Assessment   Eugenia returns with chronic report of lower back pain that is rated as mild currently. Treatment continued with flexibility, strengthening and neuromuscular reeducation ex's.  Added standing hip ex's for functional strengthening. She was able to perform ex's with min cues and without increased pain. She was educated on the benefits of cardiovascular ex to which she states that she is not performing much currently but will try to add walking program within her home. Lumbar MedX resistance was increased to 26 ft/lbs and she completed 17 reps with a RPE = 4/10. Min cues for MedX pacing.  She performed peripheral strengthening ex's to include rows with RTB, bicep  curls with 2#, precor hip abd/add, Precor leg ext, Precor leg curl. Tricep ext with RTB was limited to 10 reps stopping due to sternal discomfort (previous musculoskeltal injury), Leg press and torso rotation not performed due to decreased time. Will look to progress per HB protocol and patient tolerance.     Patient is making progress towards established goals.  Pt will continue to benefit from skilled outpatient physical therapy to address the deficits stated in the impairment chart, provide pt/family education and to maximize pt's level of independence in the home and community environment.     Anticipated Barriers for therapy: balance /gait  Pt's spiritual, cultural and educational needs considered and pt agreeable to plan of care and goals as stated below:   Goals:   Short term goals:  6 weeks or 10 visits   - Pt will demonstrate increased lumbar MedX ROM by at least 6 degrees from the initial ROM value with improvements noted in functional ROM and ability to perform ADLs. Appropriate and Ongoing  - Pt will demonstrate increased MedX average isometric strength value by 20% from initial test resulting in improved ability to perform bending, lifting, and carrying activities safely, confidently. Appropriate and Ongoing  - Pt will report a reduction in worst pain score by 1-2 points for improved tolerance for sitting. Appropriate and Ongoing  - Pt able to perform HEP correctly with minimal cueing or supervision from therapist to encourage independent management of symptoms. Appropriate and Ongoing     Long term goals: 10 weeks or 20 visits   - Pt will demonstrate increased lumbar MedX ROM by at least 12 degrees from initial ROM value, resulting in improved ability to perform functional forward bending while standing and sitting. Appropriate and Ongoing  - Pt will demonstrate increased MedX average isometric strength value by 40% from initial test resulting in improved ability to perform bending, lifting, and  carrying activities safely and confidently. Appropriate and Ongoing  - Pt to demonstrate ability to independently control and reduce their pain through posture positioning and mechanical movements throughout a typical day. Appropriate and Ongoing  - Pt will demonstrate reduced pain and improved functional outcomes as reported on the FOTO by reaching an intake score of >/= 50% functional ability in order to demonstrate subjective improvement in patient's condition. . Appropriate and Ongoing  - Pt will demonstrate independence with the HEP at discharge. Appropriate and Ongoing  - Pt will be I with all transfers (patient goal) Appropriate and Ongoing   Pt will demonstrate gait without AD for short distances (50-100ft) within home independently    Plan   Continue with established Plan of Care towards established PT goals.     Therapist: Edison Smiley, PTA  11/16/2023

## 2023-11-17 ENCOUNTER — INFUSION (OUTPATIENT)
Dept: INFECTIOUS DISEASES | Facility: HOSPITAL | Age: 78
End: 2023-11-17
Payer: MEDICARE

## 2023-11-17 VITALS
DIASTOLIC BLOOD PRESSURE: 79 MMHG | RESPIRATION RATE: 20 BRPM | HEIGHT: 63 IN | BODY MASS INDEX: 27.29 KG/M2 | TEMPERATURE: 99 F | OXYGEN SATURATION: 92 % | HEART RATE: 77 BPM | SYSTOLIC BLOOD PRESSURE: 144 MMHG | WEIGHT: 154 LBS

## 2023-11-17 DIAGNOSIS — M81.0 OSTEOPOROSIS, POST-MENOPAUSAL: Primary | ICD-10-CM

## 2023-11-17 PROCEDURE — 96372 THER/PROPH/DIAG INJ SC/IM: CPT

## 2023-11-17 PROCEDURE — 63600175 PHARM REV CODE 636 W HCPCS: Mod: JZ,JG | Performed by: INTERNAL MEDICINE

## 2023-11-17 RX ADMIN — ROMOSOZUMAB-AQQG 210 MG: 105 INJECTION, SOLUTION SUBCUTANEOUS at 11:11

## 2023-11-17 NOTE — PLAN OF CARE
Patient counseled on fall risk assessment and prevention at home and in public - verbalized understanding.

## 2023-11-20 ENCOUNTER — CLINICAL SUPPORT (OUTPATIENT)
Dept: REHABILITATION | Facility: HOSPITAL | Age: 78
End: 2023-11-20
Payer: MEDICARE

## 2023-11-20 DIAGNOSIS — R29.3 POOR POSTURE: ICD-10-CM

## 2023-11-20 DIAGNOSIS — M25.69 DECREASED ROM OF TRUNK AND BACK: Primary | ICD-10-CM

## 2023-11-20 DIAGNOSIS — R29.898 DECREASED STRENGTH OF TRUNK AND BACK: ICD-10-CM

## 2023-11-20 PROCEDURE — 97112 NEUROMUSCULAR REEDUCATION: CPT | Mod: CQ

## 2023-11-20 PROCEDURE — 97110 THERAPEUTIC EXERCISES: CPT | Mod: CQ

## 2023-11-20 NOTE — PROGRESS NOTES
"    [facility]      Name: Eugenia Diaz  Clinic Number: [id number]    Therapy Diagnosis:   Encounter Diagnoses   Name Primary?    Degenerative scoliosis      Decreased ROM of trunk and back      Decreased strength of trunk and back      Poor posture      Physician: Ector Pedro MD    Visit Date: 11/20/2023    Physician Orders: PT Eval and Treat  Medical Diagnosis from Referral: M41.50 (ICD-10-CM) - Degenerative scoliosis  Evaluation Date: 10/26/2023  Authorization Period Expiration: 8/4/24  Plan of Care Expiration: 01/26/24  Reassessment Due: 11/27/2023  Visit # / Visits authorized: 6/20  use lumbar roll on Med X/HOB elevated    Time In: 3:30 PM   Time Out: 4:30 PM  Total Billable Time: 60 minutes  INSURANCE and OUTCOMES: Fee for Service with FOTO Outcomes 2/3     Precautions:   standard/R THR/osteoporosis/ AVN of Left hip/HTN/SBA/CGA gait    Pattern of pain determined: 1PEN    Subjective   Eugenia reports that her back,arthritis and asthma symptoms are acting up a bit today due to upcoming rainy weather. She also reports some sternal discomfort after last session.     Patient reports tolerating previous visit; muscular soreness  Patient reports their pain to be 2/10 on a 0-10 scale with 0 being no pain and 10 being the worst pain imaginable.  Pain Location: low back     Occupation: retired  Leisure: going to dinner with friends      Pt goals: "get stronger, have less pain"    Objective     MOVEMENT LOSS - Lumbar    Norms ROM Loss Initial   Flexion Fingers touch toes, sacral angle >/= 70 deg, uniform spinal curvature, posterior weight shift  moderate loss   Extension ASIS surpasses toes, spine of scapulae surpasses heels, uniform spinal curve major loss   Side glide Right   minimal loss   Side glide Left   minimal loss   Rotation Right PT observes contralateral shoulder moderate loss   Rotation Left PT observes contralateral shoulder moderate loss        Lumbar Isometric Testing on " "Med X equipment: Testing administered by PT     Test Initial Midpoint Final   Date 10/26/23       ROM 12-33 deg       Max Peak Torque 47        Min Peak Torque 20        Flex/Ext Ratio 2.35:1       % below normative data 71       % gain from initial test Not available visit 1          Outcomes:  Initial score: 35% functional ability  Visit 6 score: 42% function  Goal: 51% functional ability        Treatment    Eugenia received the treatments listed below:      Eugenia received neuromuscular education  to isolate and engage spinal stabilization musculature correctly for motor control and coordination to aid in function and posture for 10 minutes on the Medical Medx Machine.  Patient performed MedX dynamic exercise with emphasis on spinal muscular control using pacer throughout  active range of motion. Therapist assisted patient in achieving optimal exertion for neural reeducation and endurance training by using the  Sherry Exertion Rating scale, by instructing the patient to aim for mid range of exertion, performing 15-20 repetitions, slowly, correctly,and safely.         11/20/2023     4:01 PM   [facility] - Short   Visit Number 6   VAS Pain Rating 3   Time 5   Lumbar Stretches - Slouch 10   Flexion in Lying 10   Lumbar Weight 26 lbs   Repetitions 20   Rating of Perceived Exertion 3.5       therapeutic exercises to develop strength, endurance, flexibility, posture, and core stabilization for  50 minutes including:    lower trunk rotation x 10  double knee to chest x 10 with theraball  posterior pelvic tilt x 10  piriformis stretch 20" x 2 bilaterally  BKFO with red band x 10  hip iso adduction with ball 5" hold x 20--NP  SOC x 10  Sit<->stand from standard chair  2 x 5 reps  Standing ex's w/UE support   Marching x 12   Hip abd x 12   Hip extension x 12     Peripheral muscle strengthening which included 1 set of 15-20 repetitions at a slow, controlled 10-13 second per rep pace focused on strengthening supporting " musculature for improved body mechanics and functional mobility.  Pt and therapist focused on proper form during treatment to ensure optimal strengthening of each targeted muscle group.  Machines were utilized including torso rotation and leg press this visit.Hip abd and hip add, leg extension, Leg curl, triceps, biceps, chest (NP) and row will be added visit 3-4    manual therapy techniques: Soft tissue Mobilization were applied to the: low back for 0 minutes, including:     cold pack for 5 minutes to low back.    Home Exercises Provided and Patient Education Provided   Home exercises include: lower trunk rotation, slouch over correct,   Cardio program: planned for visit 5 11/16/23  Lifting education date:planned for visit 11  Posture/Lumbar roll: currently in scooter  Fridge Magnet Discharge handout (date given):  Equipment at home/gym membership: no    Education provided:   - benefits of exercise performed  - continue with home exercise program  - MedX performance  - Precor ex performance    Written Home Exercises Provided: Patient instructed to cont prior HEP.  Exercises were reviewed and Eugenia was able to demonstrate them prior to the end of the session.  Eugenia demonstrated fair  understanding of the education provided.     See EMR under Patient Instructions for exercises provided prior visit.    Assessment   Eugenia returns with chronic report of lower back pain that is rated as mild currently She does report some generalized achiness and asthma symptoms due to weather change in the area. Treatment continued with flexibility, strengthening and neuromuscular reeducation ex's.   She was progressed with increased reps for standing hip ex's to 12. She was able to perform ex's with min cues and without increased pain  Lumbar MedX resistance was maintained at 26 ft/lbs and she completed 20 reps with a RPE = 3.5/10. Min cues for MedX pacing.  She performed peripheral strengthening ex's to include  torso rotation, leg  press, hip abd/add, leg ext and ham curls. UE peripheral ex's were deferred due to sternal discomfort.(Previous musculoskeletal injury). Will look to progress per HB protocol and patient tolerance. Updated FOTO scoring reflects improvements.     Patient is making progress towards established goals.  Pt will continue to benefit from skilled outpatient physical therapy to address the deficits stated in the impairment chart, provide pt/family education and to maximize pt's level of independence in the home and community environment.     Anticipated Barriers for therapy: balance /gait  Pt's spiritual, cultural and educational needs considered and pt agreeable to plan of care and goals as stated below:   Goals:   Short term goals:  6 weeks or 10 visits   - Pt will demonstrate increased lumbar MedX ROM by at least 6 degrees from the initial ROM value with improvements noted in functional ROM and ability to perform ADLs. Appropriate and Ongoing  - Pt will demonstrate increased MedX average isometric strength value by 20% from initial test resulting in improved ability to perform bending, lifting, and carrying activities safely, confidently. Appropriate and Ongoing  - Pt will report a reduction in worst pain score by 1-2 points for improved tolerance for sitting. Appropriate and Ongoing  - Pt able to perform HEP correctly with minimal cueing or supervision from therapist to encourage independent management of symptoms. Appropriate and Ongoing     Long term goals: 10 weeks or 20 visits   - Pt will demonstrate increased lumbar MedX ROM by at least 12 degrees from initial ROM value, resulting in improved ability to perform functional forward bending while standing and sitting. Appropriate and Ongoing  - Pt will demonstrate increased MedX average isometric strength value by 40% from initial test resulting in improved ability to perform bending, lifting, and carrying activities safely and confidently. Appropriate and Ongoing  -  Pt to demonstrate ability to independently control and reduce their pain through posture positioning and mechanical movements throughout a typical day. Appropriate and Ongoing  - Pt will demonstrate reduced pain and improved functional outcomes as reported on the FOTO by reaching an intake score of >/= 50% functional ability in order to demonstrate subjective improvement in patient's condition. . Appropriate and Ongoing  - Pt will demonstrate independence with the HEP at discharge. Appropriate and Ongoing  - Pt will be I with all transfers (patient goal) Appropriate and Ongoing   Pt will demonstrate gait without AD for short distances (50-100ft) within home independently    Plan   Continue with established Plan of Care towards established PT goals.     Therapist: Edison Smiley, PTA  11/20/2023

## 2023-11-21 ENCOUNTER — PATIENT MESSAGE (OUTPATIENT)
Dept: ORTHOPEDICS | Facility: CLINIC | Age: 78
End: 2023-11-21
Payer: MEDICARE

## 2023-11-28 DIAGNOSIS — M41.9 SCOLIOSIS, UNSPECIFIED SCOLIOSIS TYPE, UNSPECIFIED SPINAL REGION: Primary | ICD-10-CM

## 2023-11-28 NOTE — PROGRESS NOTES
Spoke with pt virtually. Pt was last seen 8/8/23 and continues to have mdi back pain. Pt has tried home exercises and PT for 8 weeks with worsening of pain. Pain is 8/10. I provided the patient with a home exercise program. It is the [facility] spine conditioning program. Exercises include head rolls, kneeling back extension, sitting rotation stretch, modified seated side straddle, knee to chest, bird dog, plank, modified seated plank, hip bridges, abdominal bracing, and abdominal crunch. Pt completed each exercise daily for one hour with worsening of pain. Will obtain MRI to further evaluate and call with results.

## 2023-11-30 ENCOUNTER — HOSPITAL ENCOUNTER (OUTPATIENT)
Dept: RADIOLOGY | Facility: HOSPITAL | Age: 78
Discharge: HOME | End: 2023-11-30
Attending: ORTHOPAEDIC SURGERY
Payer: MEDICARE

## 2023-11-30 DIAGNOSIS — M41.9 SCOLIOSIS, UNSPECIFIED SCOLIOSIS TYPE, UNSPECIFIED SPINAL REGION: ICD-10-CM

## 2023-11-30 PROCEDURE — 72146 MRI CHEST SPINE W/O DYE: CPT | Mod: 26,,, | Performed by: INTERNAL MEDICINE

## 2023-11-30 PROCEDURE — 72146 MRI CHEST SPINE W/O DYE: CPT | Mod: TC

## 2023-11-30 PROCEDURE — 72146 MRI THORACIC SPINE WITHOUT CONTRAST: ICD-10-PCS | Mod: 26,,, | Performed by: INTERNAL MEDICINE

## 2023-12-04 ENCOUNTER — CLINICAL SUPPORT (OUTPATIENT)
Dept: REHABILITATION | Facility: HOSPITAL | Age: 78
End: 2023-12-04
Payer: MEDICARE

## 2023-12-04 DIAGNOSIS — R29.3 POOR POSTURE: ICD-10-CM

## 2023-12-04 DIAGNOSIS — M25.69 DECREASED ROM OF TRUNK AND BACK: Primary | ICD-10-CM

## 2023-12-04 DIAGNOSIS — R29.898 DECREASED STRENGTH OF TRUNK AND BACK: ICD-10-CM

## 2023-12-04 PROCEDURE — 97110 THERAPEUTIC EXERCISES: CPT | Mod: CQ

## 2023-12-04 PROCEDURE — 97112 NEUROMUSCULAR REEDUCATION: CPT | Mod: CQ

## 2023-12-04 RX ORDER — PANTOPRAZOLE SODIUM 40 MG/1
TABLET, DELAYED RELEASE ORAL
Qty: 90 TABLET | Refills: 3 | Status: SHIPPED | OUTPATIENT
Start: 2023-12-04

## 2023-12-04 NOTE — PROGRESS NOTES
"    [facility]      Name: Eugenia Diaz  Clinic Number: [id number]    Therapy Diagnosis:   Encounter Diagnoses   Name Primary?    Degenerative scoliosis      Decreased ROM of trunk and back      Decreased strength of trunk and back      Poor posture      Physician: Ector Pedro MD    Visit Date: 12/4/2023    Physician Orders: PT Eval and Treat  Medical Diagnosis from Referral: M41.50 (ICD-10-CM) - Degenerative scoliosis  Evaluation Date: 10/26/2023  Authorization Period Expiration: 8/4/24  Plan of Care Expiration: 01/26/24  Reassessment Due: 11/27/2023 (A reassessment was performed by Diamond Panchal, PT this visit 12/4/23)  Visit # / Visits authorized: 7/20  use lumbar roll on Med X/HOB elevated    Time In: 2:30 PM   Time Out: 3:40 PM  Total Billable Time:  60 minutes  INSURANCE and OUTCOMES: Fee for Service with FOTO Outcomes 2/3     Precautions:   standard/R THR/osteoporosis/ AVN of Left hip/HTN/SBA/CGA gait    Pattern of pain determined: 1PEN    Subjective   Eugenia reports that her back pain is mild currently. States that she had a MRI recently and is waiting on her results. States that she had to cancel her last 3 visits due to a variety of reasons including death of her sister-in-law, weather issue and asthma exacerbation. She reports improved walking tolerance but has some difficulty getting comfortable in her adjustable bed.     Patient reports tolerating previous visit; muscular soreness  Patient reports their pain to be 2/10 on a 0-10 scale with 0 being no pain and 10 being the worst pain imaginable.  Pain Location: low back     Occupation: retired  Leisure: going to dinner with friends      Pt goals: "get stronger, have less pain"    Objective     MOVEMENT LOSS - Lumbar    Norms ROM Loss Initial Range of motion 12/4/2023   Flexion Fingers touch toes, sacral angle >/= 70 deg, uniform spinal curvature, posterior weight shift  moderate loss Moderate loss   Extension ASIS " "surpasses toes, spine of scapulae surpasses heels, uniform spinal curve major loss Moderate loss   Side glide Right   minimal loss Minimal loss   Side glide Left   minimal loss Minimal loss   Rotation Right PT observes contralateral shoulder moderate loss Minimal loss   Rotation Left PT observes contralateral shoulder moderate loss Moderate loss        Lumbar Isometric Testing on Med X equipment: Testing administered by PT     Test Initial Midpoint Final   Date 10/26/23       ROM 12-33 deg       Max Peak Torque 47        Min Peak Torque 20        Flex/Ext Ratio 2.35:1       % below normative data 71       % gain from initial test Not available visit 1          Outcomes:  Initial score: 35% functional ability  Visit 6 score: 42% function  Goal: 51% functional ability        Treatment    Eugenia received the treatments listed below:      Eugenia received neuromuscular education  to isolate and engage spinal stabilization musculature correctly for motor control and coordination to aid in function and posture for 10 minutes on the [employer] Machine.  Patient performed MedX dynamic exercise with emphasis on spinal muscular control using pacer throughout  active range of motion. Therapist assisted patient in achieving optimal exertion for neural reeducation and endurance training by using the  Sherry Exertion Rating scale, by instructing the patient to aim for mid range of exertion, performing 15-20 repetitions, slowly, correctly,and safely.         12/4/2023     3:12 PM   [facility] - Short   Visit Number 7   VAS Pain Rating 2   Time 5   Lumbar Stretches - Slouch 10   Flexion in Lying 10   Lumbar Weight 26 lbs   Repetitions 20   Rating of Perceived Exertion 4        therapeutic exercises to develop strength, endurance, flexibility, posture, and core stabilization for  50 minutes including:    lower trunk rotation x 10  double knee to chest x 10 with theraball  piriformis stretch 20" x 2 bilaterally  posterior pelvic " "tilt x 10  +PPT + marching x 5 each (min back pain)  BKFO with red band x 10  hip iso adduction with ball 5" hold x 20--NP  SOC x 10  Sit<->stand from standard chair (1 arm rest support) 2 x 5 reps  Standing ex's w/UE support   Marching x 10   Hip abd x 10   Hip extension x 10     Peripheral muscle strengthening which included 1 set of 15-20 repetitions at a slow, controlled 10-13 second per rep pace focused on strengthening supporting musculature for improved body mechanics and functional mobility.  Pt and therapist focused on proper form during treatment to ensure optimal strengthening of each targeted muscle group.  Machines were utilized including torso rotation and leg press this visit.Hip abd and hip add, leg extension, Leg curl, triceps, biceps, chest (NP) and row will be added visit 3-4    manual therapy techniques: Soft tissue Mobilization were applied to the: low back for 0 minutes, including:     cold pack for 5 minutes to low back.    Home Exercises Provided and Patient Education Provided   Home exercises include: lower trunk rotation, slouch over correct,   Cardio program: planned for visit 5 11/16/23  Lifting education date:planned for visit 11  Posture/Lumbar roll: currently in [facility] Magnet Discharge handout (date given):  Equipment at home/gym membership: no    Education provided:   - benefits of exercise performed  - continue with home exercise program  - MedX performance  - Precor ex performance    Written Home Exercises Provided: Patient instructed to cont prior HEP.  Exercises were reviewed and Eugenia was able to demonstrate them prior to the end of the session.  Eugenia demonstrated fair  understanding of the education provided.     See EMR under Patient Instructions for exercises provided prior visit.    Assessment   Eugenia returns after a 2 week absence from therapy with chronic report of lower back pain that is rated as mild currently. Treatment continued with flexibility, strengthening " and neuromuscular reeducation ex's. Added PPT with marching for progressive core strengthening reporting Min lower back pain otherwise, no c/o with ex's. Due to increased time between visits her Lumbar MedX resistance was not increased and was maintained at 26 ft/lbs and she completed 20 reps with a RPE = 4/10. Min cues for MedX pacing. She performed peripheral strengthening ex's to include  torso rotation, leg press, hip abd/add, leg ext and ham curls. UE peripheral ex's were deferred due to sternal discomfort.(Previous musculoskeletal injury). Will look to progress per HB protocol and patient tolerance. A reassessment was performed by Diamond Panchal PT this visit.     Patient is making progress towards established goals.  Pt will continue to benefit from skilled outpatient physical therapy to address the deficits stated in the impairment chart, provide pt/family education and to maximize pt's level of independence in the home and community environment.     Anticipated Barriers for therapy: balance /gait  Pt's spiritual, cultural and educational needs considered and pt agreeable to plan of care and goals as stated below:   Goals:   Short term goals:  6 weeks or 10 visits   - Pt will demonstrate increased lumbar MedX ROM by at least 6 degrees from the initial ROM value with improvements noted in functional ROM and ability to perform ADLs. Appropriate and Ongoing  - Pt will demonstrate increased MedX average isometric strength value by 20% from initial test resulting in improved ability to perform bending, lifting, and carrying activities safely, confidently. Appropriate and Ongoing  - Pt will report a reduction in worst pain score by 1-2 points for improved tolerance for sitting. Appropriate and Ongoing  - Pt able to perform HEP correctly with minimal cueing or supervision from therapist to encourage independent management of symptoms. Appropriate and Ongoing     Long term goals: 10 weeks or 20 visits   - Pt will  demonstrate increased lumbar MedX ROM by at least 12 degrees from initial ROM value, resulting in improved ability to perform functional forward bending while standing and sitting. Appropriate and Ongoing  - Pt will demonstrate increased MedX average isometric strength value by 40% from initial test resulting in improved ability to perform bending, lifting, and carrying activities safely and confidently. Appropriate and Ongoing  - Pt to demonstrate ability to independently control and reduce their pain through posture positioning and mechanical movements throughout a typical day. Appropriate and Ongoing  - Pt will demonstrate reduced pain and improved functional outcomes as reported on the FOTO by reaching an intake score of >/= 50% functional ability in order to demonstrate subjective improvement in patient's condition. . Appropriate and Ongoing  - Pt will demonstrate independence with the HEP at discharge. Appropriate and Ongoing  - Pt will be I with all transfers (patient goal) Appropriate and Ongoing   Pt will demonstrate gait without AD for short distances (50-100ft) within home independently    Plan   Continue with established Plan of Care towards established PT goals.     Therapist: Edison Smiley, PTA  12/4/2023

## 2023-12-04 NOTE — TELEPHONE ENCOUNTER
No care due was identified.  [facility] Embedded Care Due Messages. Reference number: [id number].   12/04/2023 11:17:53 AM CST

## 2023-12-04 NOTE — TELEPHONE ENCOUNTER
Refill Decision Note   Eugenia Joe  is requesting a refill authorization.  Brief Assessment and Rationale for Refill:  Approve     Medication Therapy Plan:         Comments:     Note composed:5:07 PM 12/04/2023

## 2023-12-05 ENCOUNTER — OFFICE VISIT (OUTPATIENT)
Dept: ORTHOPEDICS | Facility: CLINIC | Age: 78
End: 2023-12-05
Payer: MEDICARE

## 2023-12-05 DIAGNOSIS — M41.50 DEGENERATIVE SCOLIOSIS: Primary | ICD-10-CM

## 2023-12-05 DIAGNOSIS — M47.816 LUMBAR SPONDYLOSIS: ICD-10-CM

## 2023-12-05 DIAGNOSIS — M51.36 DDD (DEGENERATIVE DISC DISEASE), LUMBAR: ICD-10-CM

## 2023-12-05 DIAGNOSIS — M43.10 DEGENERATIVE SPONDYLOLISTHESIS: ICD-10-CM

## 2023-12-05 PROCEDURE — 99999 PR PBB SHADOW E&M-EST. PATIENT-LVL III: ICD-10-PCS | Mod: PBBFAC,,, | Performed by: ORTHOPAEDIC SURGERY

## 2023-12-05 PROCEDURE — 99213 OFFICE O/P EST LOW 20 MIN: CPT | Mod: PBBFAC | Performed by: ORTHOPAEDIC SURGERY

## 2023-12-05 PROCEDURE — 99999 PR PBB SHADOW E&M-EST. PATIENT-LVL III: CPT | Mod: PBBFAC,,, | Performed by: ORTHOPAEDIC SURGERY

## 2023-12-05 PROCEDURE — 99214 PR OFFICE/OUTPT VISIT, EST, LEVL IV, 30-39 MIN: ICD-10-PCS | Mod: S$PBB,,, | Performed by: ORTHOPAEDIC SURGERY

## 2023-12-05 PROCEDURE — 99214 OFFICE O/P EST MOD 30 MIN: CPT | Mod: S$PBB,,, | Performed by: ORTHOPAEDIC SURGERY

## 2023-12-05 RX ORDER — CYCLOBENZAPRINE HCL 5 MG
5 TABLET ORAL 3 TIMES DAILY PRN
Qty: 60 TABLET | Refills: 1 | Status: SHIPPED | OUTPATIENT
Start: 2023-12-05

## 2023-12-05 NOTE — PROGRESS NOTES
DATE: 12/5/2023  PATIENT: Eugenia Diaz    Attending Physician: Ector Pedro M.D.    HISTORY:  Eugenia Diaz is a 78 y.o. female w/ a hx of osteoporosis (was on Forteo, Prolia and Reclast; now trying to be on Evenity), R CHIO for AVN (due to chronic steroid use), and hiatal hernia who returns to me today for MRI review. Patient continues to have mid back pain and left rib cage pain. Patient has been doing healthy back program with good relief. She take flexeril occasionally. She would like to continue conservative management.    She walks at home but she uses her electric scooter for long distance walking.     The Patient denies myelopathic symptoms such as handwriting changes or difficulty with buttons/coins/keys. Denies perineal paresthesias, bowel/bladder dysfunction.    The patient does not smoke, have DM or endorse IVDU. The patient is not on any blood thinners and does not take chronic narcotics. She is a retired .    PMH/PSH/FamHx/SocHx:  Unchanged from prior visit    ROS:  Positive for mid back pain  Denies perineal paresthesias, bowel or bladder incontinence    EXAM:  LMP  (LMP Unknown)     My physical examination was notable for the following findings: motor intact BLE; SILT    IMAGING:  Today I independently reviewed the following images and my interpretations are as follows:    Previous L-spine XRs showed R thoracic scoliosis. Spondylosis and DDD. L4-5 anterolisthesis measuring 7mm.    DEXA in 2023 showed lumbar T-score of -0.3, but worst T-score of -3.6 (FN).    Thoracic MRI showed no significant stenosis.    ASSESSMENT/PLAN:  Patient has degenerative scoliosis and spondylolisthesis. I discussed the natural history of their diagnoses as well as surgical and nonsurgical treatment options. I educated the patient on the importance of core/back strengthening, correct posture, bending/lifting ergonomics, and low-impact aerobic exercises (walking, elliptical, and aquatherapy). Continue  medications; I refilled flexeril. Continue healthy back program for her back. Patient will follow up PRN.    Ector Pedro MD  Orthopaedic Spine Surgeon  Department of Orthopaedic Surgery  [phone]

## 2023-12-06 ENCOUNTER — PATIENT MESSAGE (OUTPATIENT)
Dept: INTERNAL MEDICINE | Facility: CLINIC | Age: 78
End: 2023-12-06
Payer: MEDICARE

## 2023-12-08 ENCOUNTER — DOCUMENTATION ONLY (OUTPATIENT)
Dept: REHABILITATION | Facility: HOSPITAL | Age: 78
End: 2023-12-08
Payer: MEDICARE

## 2023-12-08 ENCOUNTER — CLINICAL SUPPORT (OUTPATIENT)
Dept: REHABILITATION | Facility: HOSPITAL | Age: 78
End: 2023-12-08
Payer: MEDICARE

## 2023-12-08 DIAGNOSIS — M25.69 DECREASED ROM OF TRUNK AND BACK: Primary | ICD-10-CM

## 2023-12-08 DIAGNOSIS — R29.3 POOR POSTURE: ICD-10-CM

## 2023-12-08 DIAGNOSIS — R29.898 DECREASED STRENGTH OF TRUNK AND BACK: ICD-10-CM

## 2023-12-08 PROCEDURE — 97110 THERAPEUTIC EXERCISES: CPT | Mod: CQ

## 2023-12-08 PROCEDURE — 97112 NEUROMUSCULAR REEDUCATION: CPT | Mod: CQ

## 2023-12-08 NOTE — PROGRESS NOTES
"    [facility]      Name: Eugenia Diaz  Clinic Number: [id number]    Therapy Diagnosis:   Encounter Diagnoses   Name Primary?    Degenerative scoliosis      Decreased ROM of trunk and back      Decreased strength of trunk and back      Poor posture      Physician: Ector Pedro MD    Visit Date: 12/8/2023    Physician Orders: PT Eval and Treat  Medical Diagnosis from Referral: M41.50 (ICD-10-CM) - Degenerative scoliosis  Evaluation Date: 10/26/2023  Authorization Period Expiration: 8/4/24  Plan of Care Expiration: 01/26/24  Reassessment Due: 01/04/2024   Visit # / Visits authorized: 8/20  use lumbar roll on Med X/HOB elevated    Time In:  10:00 AM    Time Out: 11:00 AM  Total Billable Time:  60 minutes  INSURANCE and OUTCOMES: Fee for Service with FOTO Outcomes 2/3     Precautions:   standard/R THR/osteoporosis/ AVN of Left hip/HTN/SBA/CGA gait    Pattern of pain determined: 1PEN    Subjective   Eugenia reports that her back pain is mild currently. She reports generalized achiness with rainy weather this morning.    Patient reports tolerating previous visit; muscular soreness  Patient reports their pain to be 2/10 on a 0-10 scale with 0 being no pain and 10 being the worst pain imaginable.  Pain Location: low back     Occupation: retired  Leisure: going to dinner with friends      Pt goals: "get stronger, have less pain"    Objective     MOVEMENT LOSS - Lumbar    Norms ROM Loss Initial Range of motion 12/4/2023   Flexion Fingers touch toes, sacral angle >/= 70 deg, uniform spinal curvature, posterior weight shift  moderate loss Moderate loss   Extension ASIS surpasses toes, spine of scapulae surpasses heels, uniform spinal curve major loss Moderate loss   Side glide Right   minimal loss Minimal loss   Side glide Left   minimal loss Minimal loss   Rotation Right PT observes contralateral shoulder moderate loss Minimal loss   Rotation Left PT observes contralateral shoulder moderate " "loss Moderate loss        Lumbar Isometric Testing on Med X equipment: Testing administered by PT     Test Initial Midpoint Final   Date 10/26/23       ROM 12-33 deg       Max Peak Torque 47        Min Peak Torque 20        Flex/Ext Ratio 2.35:1       % below normative data 71       % gain from initial test Not available visit 1          Outcomes:  Initial score: 35% functional ability  Visit 6 score: 42% function  Goal: 51% functional ability        Treatment    Eugenia received the treatments listed below:      Eugenia received neuromuscular education  to isolate and engage spinal stabilization musculature correctly for motor control and coordination to aid in function and posture for 10 minutes on the Medical Medx Machine.  Patient performed MedX dynamic exercise with emphasis on spinal muscular control using pacer throughout  active range of motion. Therapist assisted patient in achieving optimal exertion for neural reeducation and endurance training by using the  Sherry Exertion Rating scale, by instructing the patient to aim for mid range of exertion, performing 15-20 repetitions, slowly, correctly,and safely.         12/8/2023    10:17 AM   [facility]   Visit Number 8   VAS Pain Rating 2   Time 5   Lumbar Stretches - Slouch 10   Flexion in Lying 10   Lumbar Weight 28 lbs   Repetitions 16   Rating of Perceived Exertion 3         therapeutic exercises to develop strength, endurance, flexibility, posture, and core stabilization for  50 minutes including:    lower trunk rotation x 10  double knee to chest x 10 with theraball  piriformis stretch 20" x 2 bilaterally  posterior pelvic tilt x 10  PPT + marching x 8 each  BKFO with red band x 15  hip iso adduction with ball 5" hold x 20--NP  SOC x 10  Sit<->stand from standard chair (1 arm rest support) 2 x 5 reps  Standing ex's w/UE support   Marching x 12   Hip abd x 12   Hip extension x 12     Peripheral muscle strengthening which included 1 set of 15-20 " repetitions at a slow, controlled 10-13 second per rep pace focused on strengthening supporting musculature for improved body mechanics and functional mobility.  Pt and therapist focused on proper form during treatment to ensure optimal strengthening of each targeted muscle group.  Machines were utilized including torso rotation and leg press this visit.Hip abd and hip add, leg extension, Leg curl, triceps, biceps, chest (NP) and row will be added visit 3-4    manual therapy techniques: Soft tissue Mobilization were applied to the: low back for 0 minutes, including:     cold pack for 5 minutes to low back.    Home Exercises Provided and Patient Education Provided   Home exercises include: lower trunk rotation, slouch over correct, piriformis stretch, PPT, PPT w/marching, Sit<->stand, Standing marching, hip ext and hip abd  Cardio program: planned for visit 5 11/16/23  Lifting education date:planned for visit 11  Posture/Lumbar roll: currently in scooter  Fridge Magnet Discharge handout (date given):  Equipment at home/gym membership: no    Education provided:   - benefits of exercise performed  - continue with home exercise program  - MedX performance  - Precor ex performance    Written Home Exercises Provided: Patient instructed to cont prior HEP.  Exercises were reviewed and Eugenia was able to demonstrate them prior to the end of the session.  Eugenia demonstrated fair  understanding of the education provided.     See EMR under Patient Instructions for exercises provided prior visit and today's visit    Assessment   Eugenia returns with chronic report of lower back pain that is rated as mild currently. Treatment continued with flexibility, strengthening and neuromuscular reeducation ex's. She was progressed with increased reps for standing ex's and BKFO . Lumbar MedX resistance was increased to 28 ft/lbs and she completed 16 reps with a RPE = 3/10. (Consider an increase in extension ROM next visit after she was able to  reach 9 degrees extension during performance today).She performed peripheral strengthening ex's to include  torso rotation, leg press, hip abd/add, leg ext and ham curls. UE peripheral ex's were deferred due to sternal discomfort.(Previous musculoskeletal injury). Will look to progress per HB protocol and patient tolerance.     Patient is making progress towards established goals.  Pt will continue to benefit from skilled outpatient physical therapy to address the deficits stated in the impairment chart, provide pt/family education and to maximize pt's level of independence in the home and community environment.     Anticipated Barriers for therapy: balance /gait  Pt's spiritual, cultural and educational needs considered and pt agreeable to plan of care and goals as stated below:   Goals:   Short term goals:  6 weeks or 10 visits   - Pt will demonstrate increased lumbar MedX ROM by at least 6 degrees from the initial ROM value with improvements noted in functional ROM and ability to perform ADLs. Appropriate and Ongoing  - Pt will demonstrate increased MedX average isometric strength value by 20% from initial test resulting in improved ability to perform bending, lifting, and carrying activities safely, confidently. Appropriate and Ongoing  - Pt will report a reduction in worst pain score by 1-2 points for improved tolerance for sitting. Appropriate and Ongoing  - Pt able to perform HEP correctly with minimal cueing or supervision from therapist to encourage independent management of symptoms. Appropriate and Ongoing     Long term goals: 10 weeks or 20 visits   - Pt will demonstrate increased lumbar MedX ROM by at least 12 degrees from initial ROM value, resulting in improved ability to perform functional forward bending while standing and sitting. Appropriate and Ongoing  - Pt will demonstrate increased MedX average isometric strength value by 40% from initial test resulting in improved ability to perform bending,  lifting, and carrying activities safely and confidently. Appropriate and Ongoing  - Pt to demonstrate ability to independently control and reduce their pain through posture positioning and mechanical movements throughout a typical day. Appropriate and Ongoing  - Pt will demonstrate reduced pain and improved functional outcomes as reported on the FOTO by reaching an intake score of >/= 50% functional ability in order to demonstrate subjective improvement in patient's condition. . Appropriate and Ongoing  - Pt will demonstrate independence with the HEP at discharge. Appropriate and Ongoing  - Pt will be I with all transfers (patient goal) Appropriate and Ongoing   Pt will demonstrate gait without AD for short distances (50-100ft) within home independently    Plan   Continue with established Plan of Care towards established PT goals.     Therapist: Edison Smiley, PTA  12/8/2023

## 2023-12-08 NOTE — PROGRESS NOTES
PT/PTA met face to face to discuss pt's treatment plan and progress towards established goals. Pt will be seen by a physical therapist minimally every 6th visit or every 30 days.    Edison Smiley PTA

## 2023-12-13 ENCOUNTER — CLINICAL SUPPORT (OUTPATIENT)
Dept: REHABILITATION | Facility: HOSPITAL | Age: 78
End: 2023-12-13
Payer: MEDICARE

## 2023-12-13 DIAGNOSIS — R29.3 POOR POSTURE: ICD-10-CM

## 2023-12-13 DIAGNOSIS — R29.898 DECREASED STRENGTH OF TRUNK AND BACK: ICD-10-CM

## 2023-12-13 DIAGNOSIS — M25.69 DECREASED ROM OF TRUNK AND BACK: Primary | ICD-10-CM

## 2023-12-13 PROCEDURE — 97112 NEUROMUSCULAR REEDUCATION: CPT

## 2023-12-13 PROCEDURE — 97110 THERAPEUTIC EXERCISES: CPT

## 2023-12-13 NOTE — PROGRESS NOTES
"    [facility]      Name: Eugenia Diaz  Clinic Number: [id number]    Therapy Diagnosis:   Encounter Diagnoses   Name Primary?    Degenerative scoliosis      Decreased ROM of trunk and back      Decreased strength of trunk and back      Poor posture      Physician: Ector Pedro MD    Visit Date: 12/13/2023    Physician Orders: PT Eval and Treat  Medical Diagnosis from Referral: M41.50 (ICD-10-CM) - Degenerative scoliosis  Evaluation Date: 10/26/2023  Authorization Period Expiration: 8/4/24  Plan of Care Expiration: 01/26/24  Reassessment Due: 01/04/2024   Visit # / Visits authorized: 9/20  use lumbar roll on Med X/HOB elevated    Time In:  11  Time Out:12  Total Billable Time:  60 minutes  INSURANCE and OUTCOMES: Fee for Service with FOTO Outcomes 2/3     Precautions:   standard/R THR/osteoporosis/ AVN of Left hip/HTN/SBA/CGA gait    Pattern of pain determined: 1PEN    Subjective   Eugenia reports that her back pain is mild currently. She reports she has been able to sleep better since starting the program    Patient reports tolerating previous visit; muscular soreness  Patient reports their pain to be 2/10 on a 0-10 scale with 0 being no pain and 10 being the worst pain imaginable.  Pain Location: low back     Occupation: retired  Leisure: going to dinner with friends      Pt goals: "get stronger, have less pain"    Objective     MOVEMENT LOSS - Lumbar    Norms ROM Loss Initial Range of motion 12/4/2023   Flexion Fingers touch toes, sacral angle >/= 70 deg, uniform spinal curvature, posterior weight shift  moderate loss Moderate loss   Extension ASIS surpasses toes, spine of scapulae surpasses heels, uniform spinal curve major loss Moderate loss   Side glide Right   minimal loss Minimal loss   Side glide Left   minimal loss Minimal loss   Rotation Right PT observes contralateral shoulder moderate loss Minimal loss   Rotation Left PT observes contralateral shoulder moderate loss " "Moderate loss        Lumbar Isometric Testing on Med X equipment: Testing administered by PT     Test Initial Midpoint Final   Date 10/26/23       ROM 12-33 deg       Max Peak Torque 47        Min Peak Torque 20        Flex/Ext Ratio 2.35:1       % below normative data 71       % gain from initial test Not available visit 1          Outcomes:  Initial score: 35% functional ability  Visit 6 score: 42% function  Goal: 51% functional ability        Treatment    Eugenia received the treatments listed below:      Eugenia received neuromuscular education  to isolate and engage spinal stabilization musculature correctly for motor control and coordination to aid in function and posture for 10 minutes on the Medical Medx Machine.  Patient performed MedX dynamic exercise with emphasis on spinal muscular control using pacer throughout  active range of motion. Therapist assisted patient in achieving optimal exertion for neural reeducation and endurance training by using the  Sherry Exertion Rating scale, by instructing the patient to aim for mid range of exertion, performing 15-20 repetitions, slowly, correctly,and safely.       12/13/2023    11:46 AM   [facility]   Visit Number 9   VAS Pain Rating 2   Time 5   Lumbar Stretches - Slouch Overcorrection 10   Flexion in Lying 10   Lumbar Flexion 36   Lumbar Extension 9   Lumbar Weight 28 lbs   Repetitions 18   Rating of Perceived Exertion 4   Ice - Sitting 5 mins.       therapeutic exercises to develop strength, endurance, flexibility, posture, and core stabilization for  50 minutes including:    lower trunk rotation x 10  double knee to chest x 10 with theraball  piriformis stretch 20" x 2 bilaterally  posterior pelvic tilt x 15  PPT + marching x 8 each  BKFO with red band x 15  Bridge c/ RTB x 10  SOC x 10  Sit<->stand from standard chair (1 arm rest support) 2 x 5 reps  Standing ex's w/UE support   Marching x 12   Hip abd x 12   Hip extension x 12     Peripheral muscle " strengthening which included 1 set of 15-20 repetitions at a slow, controlled 10-13 second per rep pace focused on strengthening supporting musculature for improved body mechanics and functional mobility.  Pt and therapist focused on proper form during treatment to ensure optimal strengthening of each targeted muscle group.  Machines were utilized including torso rotation and leg press this visit.Hip abd and hip add, leg extension, Leg curl, triceps, biceps, chest (NP) and row will be added visit 3-4    manual therapy techniques: Soft tissue Mobilization were applied to the: low back for 0 minutes, including:     cold pack for 5 minutes to low back.    Home Exercises Provided and Patient Education Provided   Home exercises include: lower trunk rotation, slouch over correct, piriformis stretch, PPT, PPT w/marching, Sit<->stand, Standing marching, hip ext and hip abd  Cardio program: planned for visit 5 11/16/23  Lifting education date:planned for visit 11  Posture/Lumbar roll: currently in scooter  Fridge Magnet Discharge handout (date given):  Equipment at home/gym membership: no    Education provided:   - benefits of exercise performed  - continue with home exercise program  - MedX performance  - Precor ex performance    Written Home Exercises Provided: Patient instructed to cont prior HEP.  Exercises were reviewed and Eugenia was able to demonstrate them prior to the end of the session.  Eugenia demonstrated fair  understanding of the education provided.     See EMR under Patient Instructions for exercises provided prior visit and today's visit    Assessment   Eugenia returns with chronic report of lower back pain that is rated as mild currently. Treatment continued with flexibility, strengthening and neuromuscular reeducation ex's. Added Bridges with RTB and increased reps for PPT. Lumbar MedX resistance was  maintained at 28 ft/lbs and she completed 18 reps with a RPE = 4/10.  ROM increased three degrees as well..She  performed peripheral strengthening ex's to include  torso rotation, leg press, hip abd/add, leg ext and ham curls. UE peripheral ex's were deferred due to sternal discomfort.(Previous musculoskeletal injury). Will look to progress per HB protocol and patient tolerance.     Patient is making progress towards established goals.  Pt will continue to benefit from skilled outpatient physical therapy to address the deficits stated in the impairment chart, provide pt/family education and to maximize pt's level of independence in the home and community environment.     Anticipated Barriers for therapy: balance /gait  Pt's spiritual, cultural and educational needs considered and pt agreeable to plan of care and goals as stated below:   Goals:   Short term goals:  6 weeks or 10 visits   - Pt will demonstrate increased lumbar MedX ROM by at least 6 degrees from the initial ROM value with improvements noted in functional ROM and ability to perform ADLs. Appropriate and Ongoing  - Pt will demonstrate increased MedX average isometric strength value by 20% from initial test resulting in improved ability to perform bending, lifting, and carrying activities safely, confidently. Appropriate and Ongoing  - Pt will report a reduction in worst pain score by 1-2 points for improved tolerance for sitting. Appropriate and Ongoing  - Pt able to perform HEP correctly with minimal cueing or supervision from therapist to encourage independent management of symptoms. Appropriate and Ongoing     Long term goals: 10 weeks or 20 visits   - Pt will demonstrate increased lumbar MedX ROM by at least 12 degrees from initial ROM value, resulting in improved ability to perform functional forward bending while standing and sitting. Appropriate and Ongoing  - Pt will demonstrate increased MedX average isometric strength value by 40% from initial test resulting in improved ability to perform bending, lifting, and carrying activities safely and confidently.  Appropriate and Ongoing  - Pt to demonstrate ability to independently control and reduce their pain through posture positioning and mechanical movements throughout a typical day. Appropriate and Ongoing  - Pt will demonstrate reduced pain and improved functional outcomes as reported on the FOTO by reaching an intake score of >/= 50% functional ability in order to demonstrate subjective improvement in patient's condition. . Appropriate and Ongoing  - Pt will demonstrate independence with the HEP at discharge. Appropriate and Ongoing  - Pt will be I with all transfers (patient goal) Appropriate and Ongoing   Pt will demonstrate gait without AD for short distances (50-100ft) within home independently    Plan   Continue with established Plan of Care towards established PT goals.     Therapist: Leandra Yanez, PT  12/13/2023

## 2023-12-18 ENCOUNTER — PATIENT MESSAGE (OUTPATIENT)
Dept: INTERNAL MEDICINE | Facility: CLINIC | Age: 78
End: 2023-12-18
Payer: MEDICARE

## 2023-12-18 ENCOUNTER — INFUSION (OUTPATIENT)
Dept: INFECTIOUS DISEASES | Facility: HOSPITAL | Age: 78
End: 2023-12-18
Attending: INTERNAL MEDICINE
Payer: MEDICARE

## 2023-12-18 VITALS
TEMPERATURE: 99 F | BODY MASS INDEX: 28.23 KG/M2 | HEIGHT: 63 IN | RESPIRATION RATE: 16 BRPM | HEART RATE: 85 BPM | DIASTOLIC BLOOD PRESSURE: 78 MMHG | WEIGHT: 159.31 LBS | OXYGEN SATURATION: 95 % | SYSTOLIC BLOOD PRESSURE: 142 MMHG

## 2023-12-18 DIAGNOSIS — M81.0 OSTEOPOROSIS, POST-MENOPAUSAL: Primary | ICD-10-CM

## 2023-12-18 PROCEDURE — 96372 THER/PROPH/DIAG INJ SC/IM: CPT

## 2023-12-18 PROCEDURE — 63600175 PHARM REV CODE 636 W HCPCS: Mod: JZ,JG | Performed by: INTERNAL MEDICINE

## 2023-12-18 RX ADMIN — ROMOSOZUMAB-AQQG 210 MG: 105 INJECTION, SOLUTION SUBCUTANEOUS at 11:12

## 2023-12-18 NOTE — PROGRESS NOTES
Patient arrives for Evenity injection - confirms use of calcium and vitamin D supplements and denies dental procedures over past 3 months - administered per guidelines.    Given in Bilateral Upper Arm. Tolerated well. Left in NAD. Next appt given to patient.    Limited head-to-toe assessment due to privacy issues and visit reason though the opportunity was given for patient to express any concerns

## 2023-12-27 ENCOUNTER — CLINICAL SUPPORT (OUTPATIENT)
Dept: REHABILITATION | Facility: HOSPITAL | Age: 78
End: 2023-12-27
Payer: MEDICARE

## 2023-12-27 DIAGNOSIS — M25.69 DECREASED ROM OF TRUNK AND BACK: Primary | ICD-10-CM

## 2023-12-27 DIAGNOSIS — R29.898 DECREASED STRENGTH OF TRUNK AND BACK: ICD-10-CM

## 2023-12-27 DIAGNOSIS — R29.3 POOR POSTURE: ICD-10-CM

## 2023-12-27 PROCEDURE — 97110 THERAPEUTIC EXERCISES: CPT

## 2023-12-27 PROCEDURE — 97112 NEUROMUSCULAR REEDUCATION: CPT

## 2023-12-27 NOTE — PROGRESS NOTES
"      [facility]      Name: Eugenia Diaz  Clinic Number: [id number]    Therapy Diagnosis:   Encounter Diagnoses   Name Primary?    Degenerative scoliosis      Decreased ROM of trunk and back      Decreased strength of trunk and back      Poor posture      Physician: Ector Pedro MD    Visit Date: 12/27/2023    Physician Orders: PT Eval and Treat  Medical Diagnosis from Referral: M41.50 (ICD-10-CM) - Degenerative scoliosis  Evaluation Date: 10/26/2023  Authorization Period Expiration: 8/4/24  Plan of Care Expiration: 01/26/24  Reassessment Due: 01/04/2024   Visit # / Visits authorized: 10/20  use lumbar roll on Med X/HOB elevated    Time In:  2:00 PM  Time Out: 3:00 PM  Total Billable Time:  60 minutes  INSURANCE and OUTCOMES: Fee for Service with FOTO Outcomes 2/3     Precautions:   standard/R THR/osteoporosis/ AVN of Left hip/HTN/SBA/CGA gait    Pattern of pain determined: 1PEN    Subjective   Eugenia denies back pain at start of session but reports increase in right knee pain and "swelling" which she attributes to increase in leg press last session.     Patient reports tolerating previous visit; muscular soreness  Patient reports their pain to be 2/10 on a 0-10 scale with 0 being no pain and 10 being the worst pain imaginable.  Pain Location: low back     Occupation: retired  Leisure: going to dinner with friends      Pt goals: "get stronger, have less pain"    Objective     MOVEMENT LOSS - Lumbar    Norms ROM Loss Initial Range of motion 12/4/2023   Flexion Fingers touch toes, sacral angle >/= 70 deg, uniform spinal curvature, posterior weight shift  moderate loss Moderate loss   Extension ASIS surpasses toes, spine of scapulae surpasses heels, uniform spinal curve major loss Moderate loss   Side glide Right   minimal loss Minimal loss   Side glide Left   minimal loss Minimal loss   Rotation Right PT observes contralateral shoulder moderate loss Minimal loss   Rotation Left PT " "observes contralateral shoulder moderate loss Moderate loss        Lumbar Isometric Testing on Med X equipment: Testing administered by PT     Test Initial Midpoint Final   Date 10/26/23       ROM 12-33 deg       Max Peak Torque 47        Min Peak Torque 20        Flex/Ext Ratio 2.35:1       % below normative data 71       % gain from initial test Not available visit 1          Outcomes:  Initial score: 35% functional ability  Visit 6 score: 42% function  Goal: 51% functional ability        Treatment    Eugenia received the treatments listed below:      Eugenia received neuromuscular education  to isolate and engage spinal stabilization musculature correctly for motor control and coordination to aid in function and posture for 10 minutes on the Medical Medx Machine.  Patient performed MedX dynamic exercise with emphasis on spinal muscular control using pacer throughout  active range of motion. Therapist assisted patient in achieving optimal exertion for neural reeducation and endurance training by using the  Sherry Exertion Rating scale, by instructing the patient to aim for mid range of exertion, performing 15-20 repetitions, slowly, correctly,and safely.         12/27/2023     3:15 PM   [facility]   Visit Number 10   VAS Pain Rating 0   Time 5   Lumbar Stretches - Slouch Overcorrection 10   Flexion in Lying 10   Lumbar Flexion 39   Lumbar Extension 3   Lumbar Peak Torque 81 ft. lbs.   Min Torque 37   Test Percent Below Normative Data 27 %   Test Percent Gain in Strength from Initial  155 %   Ice - Sitting 5 mins.         therapeutic exercises to develop strength, endurance, flexibility, posture, and core stabilization for  50 minutes including:    lower trunk rotation x 10  double knee to chest x 10 with theraball  piriformis stretch 20" x 2 bilaterally  posterior pelvic tilt x 15  PPT + marching x 8 each  BKFO with red band x 15  Bridge c/ RTB x 15  SOC x 10  Sit<->stand from standard chair (1 arm rest support) " 2 x 5 reps  Standing ex's w/UE support   Marching x 12   Hip abd x 12   Hip extension x 12     Peripheral muscle strengthening which included 1 set of 15-20 repetitions at a slow, controlled 10-13 second per rep pace focused on strengthening supporting musculature for improved body mechanics and functional mobility.  Pt and therapist focused on proper form during treatment to ensure optimal strengthening of each targeted muscle group.  Machines were utilized including torso rotation and leg press this visit.Hip abd and hip add, leg extension, Leg curl, triceps, biceps, chest (NP) and row will be added visit 3-4    manual therapy techniques: Soft tissue Mobilization were applied to the: low back for 0 minutes, including:     cold pack for 5 minutes to low back.    Home Exercises Provided and Patient Education Provided   Home exercises include: lower trunk rotation, slouch over correct, piriformis stretch, PPT, PPT w/marching, Sit<->stand, Standing marching, hip ext and hip abd  Cardio program: planned for visit 5 11/16/23  Lifting education date:planned for visit 11  Posture/Lumbar roll: currently in scooter  Fridge Magnet Discharge handout (date given):  Equipment at home/gym membership: no    Education provided:   - benefits of exercise performed  - continue with home exercise program  - MedX performance  - Precor ex performance    Written Home Exercises Provided: Patient instructed to cont prior HEP.  Exercises were reviewed and Eugenia was able to demonstrate them prior to the end of the session.  Eugenia demonstrated fair  understanding of the education provided.     See EMR under Patient Instructions for exercises provided prior visit and today's visit    Assessment   Eugenia Duffy has attended 10 visits at [facility] which included MD evaluation, PT evaluation with isometric testing, and physical therapy treatment including HEP instruction, education, aerobic activity, dynamic strengthening on MedX equipment  for the spine, and whole body strengthening on MedX equipment with increasing resistance. Patient demonstrates increased ability to reduce symptoms, improve posture, improve ROM, and improve strength, as stated below:    -Improved posture, is using lumbar roll  -Improved  ROM, initially on MedX test 12-33 and currently 3-39.  -Improved strength at each test point on lumbar MedX isometric test with 155% average improvement noted with reduced pain noted by patient.  -Initial outcome tool score 35% functional ability and current outcome tool score 42% functional ability indicating reduced pain and improved function.      Patient is making progress towards established goals.  Pt will continue to benefit from skilled outpatient physical therapy to address the deficits stated in the impairment chart, provide pt/family education and to maximize pt's level of independence in the home and community environment.     Anticipated Barriers for therapy: balance /gait  Pt's spiritual, cultural and educational needs considered and pt agreeable to plan of care and goals as stated below:   Goals:   Short term goals:  6 weeks or 10 visits   - Pt will demonstrate increased lumbar MedX ROM by at least 6 degrees from the initial ROM value with improvements noted in functional ROM and ability to perform ADLs. MET  - Pt will demonstrate increased MedX average isometric strength value by 20% from initial test resulting in improved ability to perform bending, lifting, and carrying activities safely, confidently. MET  - Pt will report a reduction in worst pain score by 1-2 points for improved tolerance for sitting. MET  - Pt able to perform HEP correctly with minimal cueing or supervision from therapist to encourage independent management of symptoms. Appropriate and Ongoing     Long term goals: 10 weeks or 20 visits   - Pt will demonstrate increased lumbar MedX ROM by at least 12 degrees from initial ROM value, resulting in improved ability  to perform functional forward bending while standing and sitting. Appropriate and Ongoing  - Pt will demonstrate increased MedX average isometric strength value by 40% from initial test resulting in improved ability to perform bending, lifting, and carrying activities safely and confidently. Appropriate and Ongoing  - Pt to demonstrate ability to independently control and reduce their pain through posture positioning and mechanical movements throughout a typical day. Appropriate and Ongoing  - Pt will demonstrate reduced pain and improved functional outcomes as reported on the FOTO by reaching an intake score of >/= 50% functional ability in order to demonstrate subjective improvement in patient's condition. . Appropriate and Ongoing  - Pt will demonstrate independence with the HEP at discharge. Appropriate and Ongoing  - Pt will be I with all transfers (patient goal) Appropriate and Ongoing   Pt will demonstrate gait without AD for short distances (50-100ft) within home independently    Plan   Continue with established Plan of Care towards established PT goals.     Therapist: Diamond Panchal, LAISHA  12/27/2023

## 2024-01-05 ENCOUNTER — CLINICAL SUPPORT (OUTPATIENT)
Dept: REHABILITATION | Facility: HOSPITAL | Age: 79
End: 2024-01-05
Payer: MEDICARE

## 2024-01-05 DIAGNOSIS — R29.898 DECREASED STRENGTH OF TRUNK AND BACK: ICD-10-CM

## 2024-01-05 DIAGNOSIS — R29.3 POOR POSTURE: ICD-10-CM

## 2024-01-05 DIAGNOSIS — M25.69 DECREASED ROM OF TRUNK AND BACK: Primary | ICD-10-CM

## 2024-01-05 PROCEDURE — 97110 THERAPEUTIC EXERCISES: CPT

## 2024-01-05 PROCEDURE — 97112 NEUROMUSCULAR REEDUCATION: CPT

## 2024-01-05 NOTE — PROGRESS NOTES
"[facility]      Name: Eugenia Diaz  Clinic Number: [id number]    Therapy Diagnosis:   Encounter Diagnoses   Name Primary?    Degenerative scoliosis      Decreased ROM of trunk and back      Decreased strength of trunk and back      Poor posture      Physician: Ector Pedro MD    Visit Date: 1/5/2024    Physician Orders: PT Eval and Treat  Medical Diagnosis from Referral: M41.50 (ICD-10-CM) - Degenerative scoliosis  Evaluation Date: 10/26/2023  Authorization Period Expiration: 8/4/24  Plan of Care Expiration: 01/26/24  Reassessment Due: 01/04/2024   Visit # / Visits authorized: 11/20  use lumbar roll on Med X/HOB elevated    Time In:  11:00am   Time Out: 12:00pm  Total Billable Time:  60 minutes  INSURANCE and OUTCOMES: Fee for Service with FOTO Outcomes 2/3     Precautions:   standard/R THR/osteoporosis/ AVN of Left hip/HTN/SBA/CGA gait    Pattern of pain determined: 1PEN    Subjective   Eugenia denies back pain at start of session but reports that she is having ongoing right knee pain and also reports a fall since last visit, which is why she had to cancel earlier this week. Reports no injury due to fall, but some soreness. Would like to skip some of the machines today which effect her knee.     Patient reports tolerating previous visit; muscular soreness  Patient reports their pain to be 2/10 on a 0-10 scale with 0 being no pain and 10 being the worst pain imaginable.  Pain Location: low back     Occupation: retired  Leisure: going to dinner with friends      Pt goals: "get stronger, have less pain"    Objective     MOVEMENT LOSS - Lumbar    Norms ROM Loss Initial Range of motion 12/4/2023   Flexion Fingers touch toes, sacral angle >/= 70 deg, uniform spinal curvature, posterior weight shift  moderate loss Moderate loss   Extension ASIS surpasses toes, spine of scapulae surpasses heels, uniform spinal curve major loss Moderate loss   Side glide Right   minimal loss Minimal loss " "  Side glide Left   minimal loss Minimal loss   Rotation Right PT observes contralateral shoulder moderate loss Minimal loss   Rotation Left PT observes contralateral shoulder moderate loss Moderate loss        Lumbar Isometric Testing on Med X equipment: Testing administered by PT     Test Initial Midpoint Final   Date 10/26/23       ROM 12-33 deg       Max Peak Torque 47        Min Peak Torque 20        Flex/Ext Ratio 2.35:1       % below normative data 71       % gain from initial test Not available visit 1          Outcomes:  Initial score: 35% functional ability  Visit 6 score: 42% function  Goal: 51% functional ability    Treatment    Eugenia received the treatments listed below:      Eugenia received neuromuscular education  to isolate and engage spinal stabilization musculature correctly for motor control and coordination to aid in function and posture for 10 minutes on the Medical Medx Machine.  Patient performed MedX dynamic exercise with emphasis on spinal muscular control using pacer throughout  active range of motion. Therapist assisted patient in achieving optimal exertion for neural reeducation and endurance training by using the  Sherry Exertion Rating scale, by instructing the patient to aim for mid range of exertion, performing 15-20 repetitions, slowly, correctly,and safely.         1/8/2024     8:07 AM   [facility]   Visit Number 11   VAS Pain Rating 0   Time 5   Lumbar Stretches - Slouch Overcorrection 10   Flexion in Lying 10   Lumbar Flexion 39   Lumbar Extension 3   Lumbar Weight 28 lbs   Repetitions 20   Rating of Perceived Exertion 4     therapeutic exercises to develop strength, endurance, flexibility, posture, and core stabilization for  50 minutes including:    lower trunk rotation x 10  posterior pelvic tilt x 16  double knee to chest x 10 with theraball  BKFO with red band x 16  Bridge c/ RTB x 16  PPT + marching x 8 each    piriformis stretch 20" x 2 bilaterally    SOC x " 10  Sit<->stand from standard chair (1 arm rest support) 2 x 5 reps  Standing ex's w/UE support   Marching x 12   Hip abd x 12   Hip extension x 12     Peripheral muscle strengthening which included 1 set of 15-20 repetitions at a slow, controlled 10-13 second per rep pace focused on strengthening supporting musculature for improved body mechanics and functional mobility.  Pt and therapist focused on proper form during treatment to ensure optimal strengthening of each targeted muscle group.  Machines were utilized including torso rotation and leg press this visit.Hip abd and hip add. Did skip leg extension, Leg curl, triceps, biceps, chest (NP) today; however, completed LAQ without weights edge of mat.     manual therapy techniques: Soft tissue Mobilization were applied to the: low back for 0 minutes, including:     cold pack for 5 minutes to low back.    Home Exercises Provided and Patient Education Provided   Home exercises include: lower trunk rotation, slouch over correct, piriformis stretch, PPT, PPT w/marching, Sit<->stand, Standing marching, hip ext and hip abd  Cardio program: planned for visit 5 11/16/23  Lifting education date:planned for visit 11  Posture/Lumbar roll: currently in scooter  Fridge Magnet Discharge handout (date given):  Equipment at home/gym membership: no    Education provided:   - benefits of exercise performed  - continue with home exercise program  - MedX performance  - Precor ex performance    Written Home Exercises Provided: Patient instructed to cont prior HEP.  Exercises were reviewed and Eugenia was able to demonstrate them prior to the end of the session.  Eugenia demonstrated fair  understanding of the education provided.     See EMR under Patient Instructions for exercises provided prior visit and today's visit    Assessment   Eugenia Patient has attended 11 visits at [facility] which included MD evaluation, PT evaluation with isometric testing, and physical therapy treatment  including HEP instruction, education, aerobic activity, dynamic strengthening on MedX equipment for the spine, and whole body strengthening on MedX equipment with increasing resistance. Patient demonstrates increased ability to reduce symptoms, improve posture, improve ROM, and improve strength, as stated below from re-testing at visit 10:    -Improved posture, is using lumbar roll  -Improved  ROM, initially on MedX test 12-33 and currently 3-39.  -Improved strength at each test point on lumbar MedX isometric test with 155% average improvement noted with reduced pain noted by patient.  -Initial outcome tool score 35% functional ability and current outcome tool score 42% functional ability indicating reduced pain and improved function.    Patient is making progress towards established goals; however, pt did experience a fall since last visit due to reports of R knee giving way. No injuries sustained from the fall though does have some ongoing soreness about the R knee. Due to this ongoing modification of peripheral strengthening today.     Pt will continue to benefit from skilled outpatient physical therapy to address the deficits stated in the impairment chart, provide pt/family education and to maximize pt's level of independence in the home and community environment.     Anticipated Barriers for therapy: balance /gait  Pt's spiritual, cultural and educational needs considered and pt agreeable to plan of care and goals as stated below:   Goals:   Short term goals:  6 weeks or 10 visits   - Pt will demonstrate increased lumbar MedX ROM by at least 6 degrees from the initial ROM value with improvements noted in functional ROM and ability to perform ADLs. MET  - Pt will demonstrate increased MedX average isometric strength value by 20% from initial test resulting in improved ability to perform bending, lifting, and carrying activities safely, confidently. MET  - Pt will report a reduction in worst pain score by 1-2  points for improved tolerance for sitting. MET  - Pt able to perform HEP correctly with minimal cueing or supervision from therapist to encourage independent management of symptoms. Appropriate and Ongoing     Long term goals: 10 weeks or 20 visits   - Pt will demonstrate increased lumbar MedX ROM by at least 12 degrees from initial ROM value, resulting in improved ability to perform functional forward bending while standing and sitting. Appropriate and Ongoing  - Pt will demonstrate increased MedX average isometric strength value by 40% from initial test resulting in improved ability to perform bending, lifting, and carrying activities safely and confidently. Appropriate and Ongoing  - Pt to demonstrate ability to independently control and reduce their pain through posture positioning and mechanical movements throughout a typical day. Appropriate and Ongoing  - Pt will demonstrate reduced pain and improved functional outcomes as reported on the FOTO by reaching an intake score of >/= 50% functional ability in order to demonstrate subjective improvement in patient's condition. . Appropriate and Ongoing  - Pt will demonstrate independence with the HEP at discharge. Appropriate and Ongoing  - Pt will be I with all transfers (patient goal) Appropriate and Ongoing   Pt will demonstrate gait without AD for short distances (50-100ft) within home independently    Plan   Continue with established Plan of Care towards established PT goals.     Therapist: Johan Richard, MPT  1/5/2024

## 2024-01-10 ENCOUNTER — CLINICAL SUPPORT (OUTPATIENT)
Dept: REHABILITATION | Facility: HOSPITAL | Age: 79
End: 2024-01-10
Payer: MEDICARE

## 2024-01-10 DIAGNOSIS — R29.898 DECREASED STRENGTH OF TRUNK AND BACK: ICD-10-CM

## 2024-01-10 DIAGNOSIS — M25.69 DECREASED ROM OF TRUNK AND BACK: Primary | ICD-10-CM

## 2024-01-10 DIAGNOSIS — R29.3 POOR POSTURE: ICD-10-CM

## 2024-01-10 PROCEDURE — 97110 THERAPEUTIC EXERCISES: CPT | Mod: CQ

## 2024-01-10 PROCEDURE — 97112 NEUROMUSCULAR REEDUCATION: CPT | Mod: CQ

## 2024-01-10 NOTE — PROGRESS NOTES
"[facility]      Name: Eugenia Diaz  Clinic Number: [id number]    Therapy Diagnosis:   Encounter Diagnoses   Name Primary?    Degenerative scoliosis      Decreased ROM of trunk and back      Decreased strength of trunk and back      Poor posture      Physician: Ector Pedro MD    Visit Date: 1/10/2024    Physician Orders: PT Eval and Treat  Medical Diagnosis from Referral: M41.50 (ICD-10-CM) - Degenerative scoliosis  Evaluation Date: 10/26/2023  Authorization Period Expiration: 8/4/24  Plan of Care Expiration: 01/26/24  Reassessment Due: 01/04/2024 (A reassessment was performed by Diamond Panchal PT this visit 1/10/24)   Visit # / Visits authorized: 12/20  use lumbar roll on Med X/HOB elevated    Time In:  10:55 am   Time Out:  12:00 PM   Total Billable Time:  60 minutes  INSURANCE and OUTCOMES: Fee for Service with FOTO Outcomes 2/3     Precautions:   standard/R THR/osteoporosis/ AVN of Left hip/HTN/SBA/CGA gait    Pattern of pain determined: 1PEN    Subjective   Eugenia reports onset of some lower back spasms earlier this morning. Discomfort level is mild currently.   States that her previous knee pain is mostly resolved.    Patient reports tolerating previous visit; muscular soreness  Patient reports their pain to be 2/10 on a 0-10 scale with 0 being no pain and 10 being the worst pain imaginable.  Pain Location: low back     Occupation: retired  Leisure: going to dinner with friends      Pt goals: "get stronger, have less pain"    Objective     MOVEMENT LOSS - Lumbar    Norms ROM Loss Initial Range of motion 12/4/2023 Range of motion 1/10/2024   Flexion Fingers touch toes, sacral angle >/= 70 deg, uniform spinal curvature, posterior weight shift  moderate loss Moderate loss Minimal loss   Extension ASIS surpasses toes, spine of scapulae surpasses heels, uniform spinal curve major loss Moderate loss Minimal loss   Side glide Right   minimal loss Minimal loss Minimal loss   Side " "glide Left   minimal loss Minimal loss Minimal loss   Rotation Right PT observes contralateral shoulder moderate loss Minimal loss Moderate loss   Rotation Left PT observes contralateral shoulder moderate loss Moderate loss Moderate loss        Lumbar Isometric Testing on Med X equipment: Testing administered by PT     Test Initial Midpoint Final   Date 10/26/23       ROM 12-33 deg       Max Peak Torque 47        Min Peak Torque 20        Flex/Ext Ratio 2.35:1       % below normative data 71       % gain from initial test Not available visit 1          Outcomes:  Initial score: 35% functional ability  Visit 6 score: 42% function  Goal: 51% functional ability    Treatment    Eugenia received the treatments listed below:      Eugenia received neuromuscular education  to isolate and engage spinal stabilization musculature correctly for motor control and coordination to aid in function and posture for 10 minutes on the Medical Medx Machine.  Patient performed MedX dynamic exercise with emphasis on spinal muscular control using pacer throughout  active range of motion. Therapist assisted patient in achieving optimal exertion for neural reeducation and endurance training by using the  Sherry Exertion Rating scale, by instructing the patient to aim for mid range of exertion, performing 15-20 repetitions, slowly, correctly,and safely.         1/10/2024    11:27 AM   [facility] - [facility]   Visit Number 12   VAS Pain Rating 2   Time 5   Lumbar Stretches - Slouch 10   Extension in Standing 10   Flexion in Lying 10   Lumbar Weight 31 lbs   Repetitions 15   Rating of Perceived Exertion 4      therapeutic exercises to develop strength, endurance, flexibility, posture, and core stabilization for  50 minutes including:    lower trunk rotation x 10  posterior pelvic tilt x 10  double knee to chest x 10 with theraball  BKFO with GTB x 10  Bridge c/ GTB x 15  PPT + marching x 10 each  piriformis stretch 20" x 2 bilaterally  SOC x " "10  Sit<->stand from standard chair (1 arm rest support) 2 x 5 reps  + EIS x 10 (performed as part of reassess) no pain.  Standing ex's w/UE support   Marching x 12--NP   Hip abd x 12--NP   Hip extension x 12---NP  + Lifting education   Floor<->waist  (From 6" step)   Golfer's lift        Peripheral muscle strengthening which included 1 set of 15-20 repetitions at a slow, controlled 10-13 second per rep pace focused on strengthening supporting musculature for improved body mechanics and functional mobility.  Pt and therapist focused on proper form during treatment to ensure optimal strengthening of each targeted muscle group.  Machines were utilized including torso rotation and leg press this visit.Hip abd and hip add. Did skip leg extension, Leg curl, triceps, biceps, chest (NP) today; however, completed LAQ without weights edge of mat.     manual therapy techniques: Soft tissue Mobilization were applied to the: low back for 0 minutes, including:     cold pack for 5 minutes to low back.    Home Exercises Provided and Patient Education Provided   Home exercises include: lower trunk rotation, slouch over correct, piriformis stretch, PPT, PPT w/marching, Sit<->stand, Standing marching, hip ext and hip abd  Cardio program: planned for visit 5 11/16/23  Lifting education date:visit 12 1/10/24  Posture/Lumbar roll: currently in scooter  Fridge Magnet Discharge handout (date given):  Equipment at home/gym membership: no    Education provided:   - benefits of exercise performed  - continue with home exercise program  - MedX performance  - Precor ex performance    Written Home Exercises Provided: Patient instructed to cont prior HEP.  Exercises were reviewed and Eugenia was able to demonstrate them prior to the end of the session.  Eugenia demonstrated fair  understanding of the education provided.     See EMR under Patient Instructions for exercises provided prior visit and today's visit    Assessment   Eugenia returns with mild " lower back discomfort related to spasms earlier. Treatment continued with flexibility, strengthening and neuromuscular reeducation ex's. She was progressed to GTB for bridging with band and BKFO ex which she was able to perform without c/o. She was educated on the do's and dont's of lifting performing floor<->waist lift from slightly elevated surface and golfer's lift requiring min cues (states that she has a  to help with lifting). Lumbar MedX resistance was increased to 31 ft/lbs and she completed 14 reps with a RPE = 4/10. .She performed peripheral strengthening ex's to include  torso rotation, leg press, hip abd/add, leg ext (5# DB) and ham curls. And bicep curls w/3# DB. UE peripheral ex's were deferred due to sternal discomfort.(Previous musculoskeletal injury). Will look to progress per HB protocol and patient tolerance. A reassessment was also performed by Diamond Panchal PT this visit.       Patient is making progress towards established goals    Pt will continue to benefit from skilled outpatient physical therapy to address the deficits stated in the impairment chart, provide pt/family education and to maximize pt's level of independence in the home and community environment.     Anticipated Barriers for therapy: balance /gait  Pt's spiritual, cultural and educational needs considered and pt agreeable to plan of care and goals as stated below:   Goals:   Short term goals:  6 weeks or 10 visits   - Pt will demonstrate increased lumbar MedX ROM by at least 6 degrees from the initial ROM value with improvements noted in functional ROM and ability to perform ADLs. MET  - Pt will demonstrate increased MedX average isometric strength value by 20% from initial test resulting in improved ability to perform bending, lifting, and carrying activities safely, confidently. MET  - Pt will report a reduction in worst pain score by 1-2 points for improved tolerance for sitting. MET  - Pt able to perform HEP  correctly with minimal cueing or supervision from therapist to encourage independent management of symptoms. Appropriate and Ongoing     Long term goals: 10 weeks or 20 visits   - Pt will demonstrate increased lumbar MedX ROM by at least 12 degrees from initial ROM value, resulting in improved ability to perform functional forward bending while standing and sitting. Appropriate and Ongoing  - Pt will demonstrate increased MedX average isometric strength value by 40% from initial test resulting in improved ability to perform bending, lifting, and carrying activities safely and confidently. Appropriate and Ongoing  - Pt to demonstrate ability to independently control and reduce their pain through posture positioning and mechanical movements throughout a typical day. Appropriate and Ongoing  - Pt will demonstrate reduced pain and improved functional outcomes as reported on the FOTO by reaching an intake score of >/= 50% functional ability in order to demonstrate subjective improvement in patient's condition. . Appropriate and Ongoing  - Pt will demonstrate independence with the HEP at discharge. Appropriate and Ongoing  - Pt will be I with all transfers (patient goal) Appropriate and Ongoing   Pt will demonstrate gait without AD for short distances (50-100ft) within home independently    Plan   Continue with established Plan of Care towards established PT goals.     Therapist: Edison Smiley, PTA  1/10/2024

## 2024-01-22 ENCOUNTER — CLINICAL SUPPORT (OUTPATIENT)
Dept: REHABILITATION | Facility: HOSPITAL | Age: 79
End: 2024-01-22
Payer: MEDICARE

## 2024-01-22 DIAGNOSIS — M25.69 DECREASED ROM OF TRUNK AND BACK: Primary | ICD-10-CM

## 2024-01-22 DIAGNOSIS — R29.3 POOR POSTURE: ICD-10-CM

## 2024-01-22 DIAGNOSIS — R29.898 DECREASED STRENGTH OF TRUNK AND BACK: ICD-10-CM

## 2024-01-22 PROCEDURE — 97110 THERAPEUTIC EXERCISES: CPT

## 2024-01-22 PROCEDURE — 97112 NEUROMUSCULAR REEDUCATION: CPT

## 2024-01-22 NOTE — ASSESSMENT & PLAN NOTE
Risk factors: , postmenopausal status, , fractures, family history of osteoporosis (mother and maternal aunt), history of prednisone use.   Multiple fractures: ankle, feet, knee cap, rib    Very little exercise   Stays active but deconditioned and has scoliosis     Switch to evenity based on loss while on reclast and low T score<-3.0  Check vitamin D levels today   Continue replacement    Taking Omeprazole as needed for heartburn

## 2024-01-22 NOTE — PROGRESS NOTES
"  [facility]      Name: Eugenia Diaz  Clinic Number: [id number]    Therapy Diagnosis:   Encounter Diagnoses   Name Primary?    Degenerative scoliosis      Decreased ROM of trunk and back      Decreased strength of trunk and back      Poor posture      Physician: Ector Pedro MD    Visit Date: 1/22/2024    Physician Orders: PT Eval and Treat  Medical Diagnosis from Referral: M41.50 (ICD-10-CM) - Degenerative scoliosis  Evaluation Date: 10/26/2023  Authorization Period Expiration: 8/4/24  Plan of Care Expiration: 01/26/24  Reassessment Due: 2/7/2024   Visit # / Visits authorized: 13/20  use lumbar roll on Med X/HOB elevated    Time In:  11:10 am   Time Out:  12:27 PM   Total Billable Time:  45  minutes 1:1  INSURANCE and OUTCOMES: Fee for Service with FOTO Outcomes 2/3     Precautions:   standard/R THR/osteoporosis/ AVN of Left hip/HTN/SBA/CGA gait    Pattern of pain determined: 1PEN    Subjective   Eugenia reports onset of some lower back spasms earlier this morning. Discomfort level is mild currently.   States that her previous knee pain is mostly resolved.    Patient reports tolerating previous visit; muscular soreness  Patient reports their pain to be 2/10 on a 0-10 scale with 0 being no pain and 10 being the worst pain imaginable.  Pain Location: low back     Occupation: retired  Leisure: going to dinner with friends      Pt goals: "get stronger, have less pain"    Objective     MOVEMENT LOSS - Lumbar    Norms ROM Loss Initial Range of motion 12/4/2023 Range of motion 1/10/2024   Flexion Fingers touch toes, sacral angle >/= 70 deg, uniform spinal curvature, posterior weight shift  moderate loss Moderate loss Minimal loss   Extension ASIS surpasses toes, spine of scapulae surpasses heels, uniform spinal curve major loss Moderate loss Minimal loss   Side glide Right   minimal loss Minimal loss Minimal loss   Side glide Left   minimal loss Minimal loss Minimal loss   Rotation Right " "PT observes contralateral shoulder moderate loss Minimal loss Moderate loss   Rotation Left PT observes contralateral shoulder moderate loss Moderate loss Moderate loss        Lumbar Isometric Testing on Med X equipment: Testing administered by PT     Test Initial Midpoint Final   Date 10/26/23  12/27/23     ROM 12-33 deg  3-39 deg     Max Peak Torque 47   81     Min Peak Torque 20   37     Flex/Ext Ratio 2.35:1  2.2:1     % below normative data 71  27%     % gain from initial test Not available visit 1  155%        Outcomes:  Initial score: 35% functional ability  Visit 6 score: 42% function  Goal: 51% functional ability    Treatment    Eugenia received the treatments listed below:      Eugenia received neuromuscular education  to isolate and engage spinal stabilization musculature correctly for motor control and coordination to aid in function and posture for 10 minutes on the Medical Medx Machine.  Patient performed MedX dynamic exercise with emphasis on spinal muscular control using pacer throughout  active range of motion. Therapist assisted patient in achieving optimal exertion for neural reeducation and endurance training by using the  Sherry Exertion Rating scale, by instructing the patient to aim for mid range of exertion, performing 15-20 repetitions, slowly, correctly,and safely.         1/22/2024    12:29 PM   [facility]   Visit Number 14   VAS Pain Rating 3   Time 5   Lumbar Stretches - Slouch Overcorrection 10   Extension in Standing 10   Flexion in Lying 10   Lumbar Weight 31 lbs   Repetitions 18   Rating of Perceived Exertion 4   Ice - Sitting 5 mins.         therapeutic exercises to develop strength, endurance, flexibility, posture, and core stabilization for  50 minutes including:    lower trunk rotation x 10  posterior pelvic tilt x 10  double knee to chest x 10 with theraball  BKFO with GTB x 10  Bridge c/ GTB x 15  PPT + marching x 10 each  piriformis stretch 20" x 2 bilaterally  SOC x " 10  Sit<->stand from standard chair (1 arm rest support) 2 x 5 reps  EIS x 10   Standing ex's w/UE support   Marching x 12--NP   Hip abd x 12--NP   Hip extension x 12---NP          Peripheral muscle strengthening which included 1 set of 15-20 repetitions at a slow, controlled 10-13 second per rep pace focused on strengthening supporting musculature for improved body mechanics and functional mobility.  Pt and therapist focused on proper form during treatment to ensure optimal strengthening of each targeted muscle group.  Machines were utilized including torso rotation and leg press this visit.Hip abd and hip add. Did skip leg extension, Leg curl, triceps, biceps, chest (NP) today; however, completed LAQ without weights edge of mat.     manual therapy techniques: Soft tissue Mobilization were applied to the: low back for 0 minutes, including:     cold pack for 5 minutes to low back.    Home Exercises Provided and Patient Education Provided   Home exercises include: lower trunk rotation, slouch over correct, piriformis stretch, PPT, PPT w/marching, Sit<->stand, Standing marching, hip ext and hip abd  Cardio program: planned for visit 5 11/16/23  Lifting education date:visit 12 1/10/24  Posture/Lumbar roll: currently in scooter  Fridge Magnet Discharge handout (date given):  Equipment at home/gym membership: no    Education provided:   - benefits of exercise performed  - continue with home exercise program  - MedX performance  - Precor ex performance    Written Home Exercises Provided: Patient instructed to cont prior HEP.  Exercises were reviewed and Eugenia was able to demonstrate them prior to the end of the session.  Eugenia demonstrated fair  understanding of the education provided.     See EMR under Patient Instructions for exercises provided prior visit and today's visit    Assessment   Eugenia returns with mild lower back discomfort but does say pain is not more intermittent vs constant. Pdt cancelled visits last week  due to weather and breathing related issues. Treatment continued with flexibility, strengthening and neuromuscular reeducation ex's.  Lumbar MedX resistance was maintained at 31 ft/bs and she completed 18 reps with a RPE = 4/10. .She performed peripheral strengthening ex's to include  torso rotation, leg press, hip abd/add, leg ext (5# DB) and ham curls. And bicep curls w/3# DB. UE peripheral ex's were deferred due to sternal discomfort.(Previous musculoskeletal injury). Will look to progress per HB protocol and patient tolerance.    Patient is making progress towards established goals    Pt will continue to benefit from skilled outpatient physical therapy to address the deficits stated in the impairment chart, provide pt/family education and to maximize pt's level of independence in the home and community environment.     Anticipated Barriers for therapy: balance /gait  Pt's spiritual, cultural and educational needs considered and pt agreeable to plan of care and goals as stated below:   Goals:   Short term goals:  6 weeks or 10 visits   - Pt will demonstrate increased lumbar MedX ROM by at least 6 degrees from the initial ROM value with improvements noted in functional ROM and ability to perform ADLs. MET  - Pt will demonstrate increased MedX average isometric strength value by 20% from initial test resulting in improved ability to perform bending, lifting, and carrying activities safely, confidently. MET  - Pt will report a reduction in worst pain score by 1-2 points for improved tolerance for sitting. MET  - Pt able to perform HEP correctly with minimal cueing or supervision from therapist to encourage independent management of symptoms. Appropriate and Ongoing     Long term goals: 10 weeks or 20 visits   - Pt will demonstrate increased lumbar MedX ROM by at least 12 degrees from initial ROM value, resulting in improved ability to perform functional forward bending while standing and sitting. Appropriate and  Ongoing  - Pt will demonstrate increased MedX average isometric strength value by 40% from initial test resulting in improved ability to perform bending, lifting, and carrying activities safely and confidently. Appropriate and Ongoing  - Pt to demonstrate ability to independently control and reduce their pain through posture positioning and mechanical movements throughout a typical day. Appropriate and Ongoing  - Pt will demonstrate reduced pain and improved functional outcomes as reported on the FOTO by reaching an intake score of >/= 50% functional ability in order to demonstrate subjective improvement in patient's condition. . Appropriate and Ongoing  - Pt will demonstrate independence with the HEP at discharge. Appropriate and Ongoing  - Pt will be I with all transfers (patient goal) Appropriate and Ongoing   Pt will demonstrate gait without AD for short distances (50-100ft) within home independently    Plan   Continue with established Plan of Care towards established PT goals.     Therapist: Diamond Panchal, PTA  1/22/2024

## 2024-01-23 ENCOUNTER — INFUSION (OUTPATIENT)
Dept: INFECTIOUS DISEASES | Facility: HOSPITAL | Age: 79
End: 2024-01-23
Payer: MEDICARE

## 2024-01-23 VITALS
HEIGHT: 63 IN | HEART RATE: 78 BPM | SYSTOLIC BLOOD PRESSURE: 140 MMHG | RESPIRATION RATE: 19 BRPM | DIASTOLIC BLOOD PRESSURE: 78 MMHG | TEMPERATURE: 99 F | BODY MASS INDEX: 26.58 KG/M2 | WEIGHT: 150 LBS

## 2024-01-23 DIAGNOSIS — M81.0 OSTEOPOROSIS, POST-MENOPAUSAL: Primary | ICD-10-CM

## 2024-01-23 PROCEDURE — 96372 THER/PROPH/DIAG INJ SC/IM: CPT

## 2024-01-23 PROCEDURE — 63600175 PHARM REV CODE 636 W HCPCS: Mod: JZ,JG | Performed by: INTERNAL MEDICINE

## 2024-01-23 RX ADMIN — ROMOSOZUMAB-AQQG 210 MG: 105 INJECTION, SOLUTION SUBCUTANEOUS at 03:01

## 2024-01-23 NOTE — PROGRESS NOTES
Pt received Evenity 5/12 in bilateral arms; Pt denies any dental sx in last 3 months, pt taking Vit D/Ca+;  Pt tolerated well;

## 2024-02-09 ENCOUNTER — CLINICAL SUPPORT (OUTPATIENT)
Dept: REHABILITATION | Facility: HOSPITAL | Age: 79
End: 2024-02-09
Payer: MEDICARE

## 2024-02-09 DIAGNOSIS — M25.69 DECREASED ROM OF TRUNK AND BACK: Primary | ICD-10-CM

## 2024-02-09 DIAGNOSIS — R29.898 DECREASED STRENGTH OF TRUNK AND BACK: ICD-10-CM

## 2024-02-09 DIAGNOSIS — R29.3 POOR POSTURE: ICD-10-CM

## 2024-02-09 PROCEDURE — 97112 NEUROMUSCULAR REEDUCATION: CPT

## 2024-02-09 PROCEDURE — 97110 THERAPEUTIC EXERCISES: CPT

## 2024-02-09 NOTE — PROGRESS NOTES
"  [facility]      Name: Eugenia Diaz  Clinic Number: [id number]    Therapy Diagnosis:   Encounter Diagnoses   Name Primary?    Degenerative scoliosis      Decreased ROM of trunk and back      Decreased strength of trunk and back      Poor posture      Physician: Ector Pedro MD    Visit Date: 2/9/2024    Physician Orders: PT Eval and Treat  Medical Diagnosis from Referral: M41.50 (ICD-10-CM) - Degenerative scoliosis  Evaluation Date: 10/26/2023  Authorization Period Expiration: 8/4/2024  Plan of Care Expiration: 3/9/2024  Reassessment Due: 3/9/2024  Visit # / Visits authorized: 15/20  use lumbar roll on Med X/HOB elevated    Time In: 12:30 PM   Time Out: 1:35 PM  Total Billable Time:  60 minutes    INSURANCE and OUTCOMES: Fee for Service with FOTO Outcomes 2/3     Precautions:   standard/R THR/osteoporosis/ AVN of Left hip/HTN/SBA/CGA gait    Pattern of pain determined: 1PEN    Subjective   Eugenia reports minimal low back discomfort upon arrival to session. States her breathing is better today    Patient reports tolerating previous visit; muscular soreness  Patient reports their pain to be 2/10 on a 0-10 scale with 0 being no pain and 10 being the worst pain imaginable.  Pain Location: low back     Occupation: retired  Leisure: going to dinner with friends      Pt goals: "get stronger, have less pain"    Objective     MOVEMENT LOSS - Lumbar    Norms ROM Loss Initial Range of motion 12/4/2023 Range of motion 1/10/2024 ROM 2/9/24   Flexion Fingers touch toes, sacral angle >/= 70 deg, uniform spinal curvature, posterior weight shift  moderate loss Moderate loss Minimal loss Within functional limits    Extension ASIS surpasses toes, spine of scapulae surpasses heels, uniform spinal curve major loss Moderate loss Minimal loss Minimal loss   Side glide Right   minimal loss Minimal loss Minimal loss Within functional limits    Side glide Left   minimal loss Minimal loss Minimal loss Within " functional limits    Rotation Right PT observes contralateral shoulder moderate loss Minimal loss Moderate loss Moderate loss   Rotation Left PT observes contralateral shoulder moderate loss Moderate loss Moderate loss Moderate loss      Lumbar Isometric Testing on Med X equipment: Testing administered by PT     Test Initial Midpoint Final   Date 10/26/23  12/27/23     ROM 12-33 deg  3-39 deg     Max Peak Torque 47   81     Min Peak Torque 20   37     Flex/Ext Ratio 2.35:1  2.2:1     % below normative data 71  27%     % gain from initial test Not available visit 1  155%        Outcomes:  Initial score: 35% functional ability  Visit 6 score: 42% function  Goal: 51% functional ability    Treatment    Eugenia received the treatments listed below:      Eugenia received neuromuscular education  to isolate and engage spinal stabilization musculature correctly for motor control and coordination to aid in function and posture for 10 minutes on the Medical [employer] Machine.  Patient performed MedX dynamic exercise with emphasis on spinal muscular control using pacer throughout  active range of motion. Therapist assisted patient in achieving optimal exertion for neural reeducation and endurance training by using the  Sherry Exertion Rating scale, by instructing the patient to aim for mid range of exertion, performing 15-20 repetitions, slowly, correctly,and safely.         2/9/2024    12:31 PM   [facility]   Visit Number 15   VAS Pain Rating 2   Time 5   Lumbar Stretches - Slouch Overcorrection 10   Extension in Standing 10   Flexion in Lying 10   Lumbar Weight 31 lbs   Repetitions 20   Rating of Perceived Exertion 3   Ice - Sitting 5 mins.     therapeutic exercises to develop strength, endurance, flexibility, posture, and core stabilization for  50 minutes including:    lower trunk rotation x 10  posterior pelvic tilt x 10  double knee to chest x 10 with theraball  BKFO with GTB x15  Bridge c/ GTB x 15  PPT + marching x 10  "each  piriformis stretch 20" x 2 bilaterally  SOC x 10  Sit<->stand from standard chair (1 arm rest support) 2 x 5 reps  EIS x 10   Standing ex's w/UE support   Marching x 12--NP   Hip abd x 12--NP   Hip extension x 12---NP      Peripheral muscle strengthening which included 1 set of 15-20 repetitions at a slow, controlled 10-13 second per rep pace focused on strengthening supporting musculature for improved body mechanics and functional mobility.  Pt and therapist focused on proper form during treatment to ensure optimal strengthening of each targeted muscle group.  Machines were utilized including torso rotation and leg press this visit.Hip abd and hip add. Did skip leg extension, Leg curl, triceps, biceps, chest (NP) today; however, completed LAQ without weights edge of mat.     manual therapy techniques: Soft tissue Mobilization were applied to the: low back for 0 minutes, including:     cold pack for 5 minutes to low back.    Home Exercises Provided and Patient Education Provided   Home exercises include: lower trunk rotation, slouch over correct, piriformis stretch, PPT, PPT w/marching, Sit<->stand, Standing marching, hip ext and hip abd  Cardio program: planned for visit 5 11/16/23  Lifting education date:visit 12 1/10/24  Posture/Lumbar roll: currently in [facility] Magnet Discharge handout (date given):  Equipment at home/gym membership: no    Education provided:   - benefits of exercise performed  - continue with home exercise program  - MedX performance  - Precor ex performance    Written Home Exercises Provided: Patient instructed to cont prior HEP.  Exercises were reviewed and Eugenia was able to demonstrate them prior to the end of the session.  Eugenia demonstrated fair  understanding of the education provided.     See EMR under Patient Instructions for exercises provided prior visit and today's visit    Assessment   Eugenia returns with mild lower back discomfort. Treatment continued with flexibility, " strengthening and neuromuscular reeducation ex's. She was able to complete all ex's without increased symptoms. Lumbar MedX resistance was maintained at 31 ft/bs and she completed 20 reps with a RPE = 3/10. She performed peripheral strengthening ex's to include  torso rotation, leg press, hip abd/add, leg ext (5# DB) and ham curls. And bicep curls w/3# DB. UE peripheral ex's were deferred due to sternal discomfort. (Previous musculoskeletal injury). Will look to progress per HB protocol and patient tolerance.    Patient is making progress towards established goals    Pt will continue to benefit from skilled outpatient physical therapy to address the deficits stated in the impairment chart, provide pt/family education and to maximize pt's level of independence in the home and community environment.     Anticipated Barriers for therapy: balance /gait  Pt's spiritual, cultural and educational needs considered and pt agreeable to plan of care and goals as stated below:   Goals:   Short term goals:  6 weeks or 10 visits   - Pt will demonstrate increased lumbar MedX ROM by at least 6 degrees from the initial ROM value with improvements noted in functional ROM and ability to perform ADLs. MET  - Pt will demonstrate increased MedX average isometric strength value by 20% from initial test resulting in improved ability to perform bending, lifting, and carrying activities safely, confidently. MET  - Pt will report a reduction in worst pain score by 1-2 points for improved tolerance for sitting. MET  - Pt able to perform HEP correctly with minimal cueing or supervision from therapist to encourage independent management of symptoms. Appropriate and Ongoing     Long term goals: 10 weeks or 20 visits   - Pt will demonstrate increased lumbar MedX ROM by at least 12 degrees from initial ROM value, resulting in improved ability to perform functional forward bending while standing and sitting. Appropriate and Ongoing  - Pt will  demonstrate increased MedX average isometric strength value by 40% from initial test resulting in improved ability to perform bending, lifting, and carrying activities safely and confidently. Appropriate and Ongoing  - Pt to demonstrate ability to independently control and reduce their pain through posture positioning and mechanical movements throughout a typical day. Appropriate and Ongoing  - Pt will demonstrate reduced pain and improved functional outcomes as reported on the FOTO by reaching an intake score of >/= 50% functional ability in order to demonstrate subjective improvement in patient's condition. . Appropriate and Ongoing  - Pt will demonstrate independence with the HEP at discharge. Appropriate and Ongoing  - Pt will be I with all transfers (patient goal) Appropriate and Ongoing   Pt will demonstrate gait without AD for short distances (50-100ft) within home independently    Plan   Continue with established Plan of Care towards established PT goals.     Therapist: Tess Bustillo, PTA  2/14/2024

## 2024-02-14 NOTE — PLAN OF CARE
Outpatient Therapy Updated Plan of Care     Visit Date: 2/9/2024    Name: Eugenia Diaz  Clinic Number: [id number]    Therapy Diagnosis:   Encounter Diagnoses   Name Primary?    Decreased ROM of trunk and back Yes    Decreased strength of trunk and back     Poor posture      Physician: Ector Pedro MD    Physician Orders: PT Eval and Treat  Medical Diagnosis from Referral: M41.50 (ICD-10-CM) - Degenerative scoliosis  Evaluation Date: 10/26/2023  Current Certification Period: 10/26/23 to 1/26/2024  Authorization Period Expiration: 8/4/2024  Plan of Care Expiration: 1/26/2024  Visit # / Visits authorized: 15/20    Precautions: R THR/osteoporosis/ AVN of Left hip/HTN/SBA/CGA gait   Functional Level Prior to Evaluation: difficulty with getting out of bed/chair, prolonged sitting, lifting     Subjective     Update: Eugenia reports minimal low back discomfort upon arrival to session. States her breathing is better today     Objective     Update:   MOVEMENT LOSS - Lumbar    Norms ROM Loss Initial Range of motion 12/4/2023 Range of motion 1/10/2024 ROM 2/9/24   Flexion Fingers touch toes, sacral angle >/= 70 deg, uniform spinal curvature, posterior weight shift  moderate loss Moderate loss Minimal loss Within functional limits    Extension ASIS surpasses toes, spine of scapulae surpasses heels, uniform spinal curve major loss Moderate loss Minimal loss Minimal loss   Side glide Right   minimal loss Minimal loss Minimal loss Within functional limits    Side glide Left   minimal loss Minimal loss Minimal loss Within functional limits    Rotation Right PT observes contralateral shoulder moderate loss Minimal loss Moderate loss Moderate loss   Rotation Left PT observes contralateral shoulder moderate loss Moderate loss Moderate loss Moderate loss      Lumbar Isometric Testing on Med X equipment: Testing administered by PT     Test Initial Midpoint Final   Date 10/26/23  12/27/23     ROM 12-33 deg  3-39 deg     Max Peak Torque 47    81     Min Peak Torque 20   37     Flex/Ext Ratio 2.35:1  2.2:1     % below normative data 71  27%     % gain from initial test Not available visit 1  155%        Outcomes:  Initial score: 35% functional ability  Visit 6 score: 42% function  Goal: 51% functional ability    Assessment     Update: Patient has attended 15 visits at [facility] with treatment including HEP instruction, education, aerobic activity, dynamic strengthening on MedX equipment for the spine, and whole body strengthening on MedX equipment with increasing resistance. Patient demonstrates improved lumbar AROM and improved trunk strength. Per last Lumbar MedX test, she remains 27% below average for isometric strength when compared to women her same age/height/weight. Patient will continue to benefit from skilled physical therapy to address remaining impairments and improve her quality of life.       Goals:    Short term goals:  6 weeks or 10 visits   - Pt will demonstrate increased lumbar MedX ROM by at least 6 degrees from the initial ROM value with improvements noted in functional ROM and ability to perform ADLs. MET  - Pt will demonstrate increased MedX average isometric strength value by 20% from initial test resulting in improved ability to perform bending, lifting, and carrying activities safely, confidently. MET  - Pt will report a reduction in worst pain score by 1-2 points for improved tolerance for sitting. MET  - Pt able to perform HEP correctly with minimal cueing or supervision from therapist to encourage independent management of symptoms. Appropriate and Ongoing     Long term goals: 10 weeks or 20 visits   - Pt will demonstrate increased lumbar MedX ROM by at least 12 degrees from initial ROM value, resulting in improved ability to perform functional forward bending while standing and sitting. Appropriate and Ongoing  - Pt will demonstrate increased MedX average isometric strength value by 40% from initial test resulting in  improved ability to perform bending, lifting, and carrying activities safely and confidently. Appropriate and Ongoing  - Pt to demonstrate ability to independently control and reduce their pain through posture positioning and mechanical movements throughout a typical day. Appropriate and Ongoing  - Pt will demonstrate reduced pain and improved functional outcomes as reported on the FOTO by reaching an intake score of >/= 50% functional ability in order to demonstrate subjective improvement in patient's condition. . Appropriate and Ongoing  - Pt will demonstrate independence with the HEP at discharge. Appropriate and Ongoing  - Pt will be I with all transfers (patient goal) Appropriate and Ongoing   Pt will demonstrate gait without AD for short distances (50-100ft) within home independently    Plan   Updated Certification Period: 2/9/2024 to 3/9/2024  Recommended Treatment Plan: 1-2 times per week for 3-4 weeks to include the following:    - Patient education  - Therapeutic exercise  - Manual therapy  - Performance testing   - Neuromuscular Re-education  - Therapeutic activity   - Modalities    Pt may be seen by PTA as part of the rehabilitation team.       Tess Bustillo, PT  2/9/2024      I CERTIFY THE NEED FOR THESE SERVICES FURNISHED UNDER THIS PLAN OF TREATMENT AND WHILE UNDER MY CARE    Physician's comments:      Physician's Signature: ___________________________________________________

## 2024-02-21 ENCOUNTER — CLINICAL SUPPORT (OUTPATIENT)
Dept: REHABILITATION | Facility: HOSPITAL | Age: 79
End: 2024-02-21
Payer: MEDICARE

## 2024-02-21 ENCOUNTER — DOCUMENTATION ONLY (OUTPATIENT)
Dept: REHABILITATION | Facility: HOSPITAL | Age: 79
End: 2024-02-21

## 2024-02-21 DIAGNOSIS — M25.69 DECREASED ROM OF TRUNK AND BACK: Primary | ICD-10-CM

## 2024-02-21 DIAGNOSIS — R29.898 DECREASED STRENGTH OF TRUNK AND BACK: ICD-10-CM

## 2024-02-21 DIAGNOSIS — R29.3 POOR POSTURE: ICD-10-CM

## 2024-02-21 PROCEDURE — 97110 THERAPEUTIC EXERCISES: CPT | Mod: CQ

## 2024-02-21 PROCEDURE — 97112 NEUROMUSCULAR REEDUCATION: CPT | Mod: CQ

## 2024-02-21 NOTE — PROGRESS NOTES
"  [facility]      Name: Eugenia Diaz  Clinic Number: [id number]    Therapy Diagnosis:   Encounter Diagnoses   Name Primary?    Degenerative scoliosis      Decreased ROM of trunk and back      Decreased strength of trunk and back      Poor posture      Physician: Ector Pedro MD    Visit Date: 2/9/2024    Physician Orders: PT Eval and Treat  Medical Diagnosis from Referral: M41.50 (ICD-10-CM) - Degenerative scoliosis  Evaluation Date: 10/26/2023  Authorization Period Expiration: 8/4/2024  Plan of Care Expiration: 3/9/2024  Reassessment Due: 3/9/2024  Visit # / Visits authorized: 16/20  use lumbar roll on Med X/HOB elevated    Time In: 10:55 AM   Time Out: 11:55 AM  Total Billable Time:  55 minutes    INSURANCE and OUTCOMES: Fee for Service with FOTO Outcomes 2/3     Precautions:   standard/R THR/osteoporosis/ AVN of Left hip/HTN/SBA/CGA gait    Pattern of pain determined: 1PEN    Subjective   Eugenia reports that she is feeling OK with only mild lower back discomfort currently.    Patient reports tolerating previous visit; muscular soreness  Patient reports their pain to be 2/10 on a 0-10 scale with 0 being no pain and 10 being the worst pain imaginable.  Pain Location: low back     Occupation: retired  Leisure: going to dinner with friends      Pt goals: "get stronger, have less pain"    Objective     MOVEMENT LOSS - Lumbar    Norms ROM Loss Initial Range of motion 12/4/2023 Range of motion 1/10/2024 ROM 2/9/24   Flexion Fingers touch toes, sacral angle >/= 70 deg, uniform spinal curvature, posterior weight shift  moderate loss Moderate loss Minimal loss Within functional limits    Extension ASIS surpasses toes, spine of scapulae surpasses heels, uniform spinal curve major loss Moderate loss Minimal loss Minimal loss   Side glide Right   minimal loss Minimal loss Minimal loss Within functional limits    Side glide Left   minimal loss Minimal loss Minimal loss Within functional limits " "   Rotation Right PT observes contralateral shoulder moderate loss Minimal loss Moderate loss Moderate loss   Rotation Left PT observes contralateral shoulder moderate loss Moderate loss Moderate loss Moderate loss      Lumbar Isometric Testing on Med X equipment: Testing administered by PT     Test Initial Midpoint Final   Date 10/26/23  12/27/23     ROM 12-33 deg  3-39 deg     Max Peak Torque 47   81     Min Peak Torque 20   37     Flex/Ext Ratio 2.35:1  2.2:1     % below normative data 71  27%     % gain from initial test Not available visit 1  155%        Outcomes:  Initial score: 35% functional ability  Visit 6 score: 42% function  Goal: 51% functional ability    Treatment    Eugenia received the treatments listed below:      Eugenia received neuromuscular education  to isolate and engage spinal stabilization musculature correctly for motor control and coordination to aid in function and posture for 10 minutes on the Medical [employer] Machine.  Patient performed MedX dynamic exercise with emphasis on spinal muscular control using pacer throughout  active range of motion. Therapist assisted patient in achieving optimal exertion for neural reeducation and endurance training by using the  Sherry Exertion Rating scale, by instructing the patient to aim for mid range of exertion, performing 15-20 repetitions, slowly, correctly,and safely.         2/21/2024    11:12 AM   [facility] - [facility]   Visit Number 15   VAS Pain Rating 2   Time 5   Lumbar Stretches - Slouch 10   Extension in Standing 10   Flexion in Lying 10   Lumbar Weight 33 lbs   Repetitions 15   Rating of Perceived Exertion 4      therapeutic exercises to develop strength, endurance, flexibility, posture, and core stabilization for 45 minutes including:    lower trunk rotation x 10  double knee to chest x 10 with theraball  piriformis stretch 20" x 2 bilaterally  posterior pelvic tilt x 10  BKFO with GTB x 15  Bridge c/ GTB x 15  PPT + marching + 1# x 10 " each  SOC x 10  Sit<->stand from standard chair (1 arm rest support) 2 x 5 reps  EIS x 10   Standing ex's w/UE support   Marching + 1# x 10   Hip abd + 1# x 10   Hip extension + 1# x 10      Peripheral muscle strengthening which included 1 set of 15-20 repetitions at a slow, controlled 10-13 second per rep pace focused on strengthening supporting musculature for improved body mechanics and functional mobility.  Pt and therapist focused on proper form during treatment to ensure optimal strengthening of each targeted muscle group.  Machines were utilized including torso rotation and leg press this visit.Hip abd and hip add. Did skip leg extension, Leg curl, triceps, biceps, chest (NP) today; however, completed LAQ without weights edge of mat.     manual therapy techniques: Soft tissue Mobilization were applied to the: low back for 0 minutes, including:     cold pack for 5 minutes to low back.    Home Exercises Provided and Patient Education Provided   Home exercises include: lower trunk rotation, slouch over correct, piriformis stretch, PPT, PPT w/marching, Sit<->stand, Standing marching, hip ext and hip abd  Cardio program: planned for visit 5 11/16/23  Lifting education date:visit 12 1/10/24  Posture/Lumbar roll: currently in scooter  [employer] Magnet Discharge handout (date given):  Equipment at home/gym membership: no    Education provided:   - benefits of exercise performed  - continue with home exercise program  - MedX performance  - Precor ex performance    Written Home Exercises Provided: Patient instructed to cont prior HEP.  Exercises were reviewed and Eugenia was able to demonstrate them prior to the end of the session.  Eugenia demonstrated fair  understanding of the education provided.     See EMR under Patient Instructions for exercises provided prior visit.    Assessment   Eugenia returns with mild lower back discomfort/stiffness. Treatment continued with flexibility, strengthening and neuromuscular reeducation  ex's. She was progressed with light resistance for PPT w/marching in supine along with standing hip strengthening ex's.She was able to complete all ex's without increased symptoms. Lumbar MedX resistance was increased to 33 ft/bs and she completed 15 reps with a RPE = 4/10. She performed peripheral strengthening ex's to include  torso rotation, leg press, hip abd/add, leg ext (6# ankle weight) and ham curls and bicep curls w/3# DB. UE peripheral ex's were deferred due to sternal discomfort. (Previous musculoskeletal injury). Min (L) medial knee discomfort with hamstring curl otherwise, no c/o. Will look to progress per HB protocol and patient tolerance.    Patient is making progress towards established goals    Pt will continue to benefit from skilled outpatient physical therapy to address the deficits stated in the impairment chart, provide pt/family education and to maximize pt's level of independence in the home and community environment.     Anticipated Barriers for therapy: balance /gait  Pt's spiritual, cultural and educational needs considered and pt agreeable to plan of care and goals as stated below:   Goals:   Short term goals:  6 weeks or 10 visits   - Pt will demonstrate increased lumbar MedX ROM by at least 6 degrees from the initial ROM value with improvements noted in functional ROM and ability to perform ADLs. MET  - Pt will demonstrate increased MedX average isometric strength value by 20% from initial test resulting in improved ability to perform bending, lifting, and carrying activities safely, confidently. MET  - Pt will report a reduction in worst pain score by 1-2 points for improved tolerance for sitting. MET  - Pt able to perform HEP correctly with minimal cueing or supervision from therapist to encourage independent management of symptoms. Appropriate and Ongoing     Long term goals: 10 weeks or 20 visits   - Pt will demonstrate increased lumbar MedX ROM by at least 12 degrees from initial  ROM value, resulting in improved ability to perform functional forward bending while standing and sitting. Appropriate and Ongoing  - Pt will demonstrate increased MedX average isometric strength value by 40% from initial test resulting in improved ability to perform bending, lifting, and carrying activities safely and confidently. Appropriate and Ongoing  - Pt to demonstrate ability to independently control and reduce their pain through posture positioning and mechanical movements throughout a typical day. Appropriate and Ongoing  - Pt will demonstrate reduced pain and improved functional outcomes as reported on the FOTO by reaching an intake score of >/= 50% functional ability in order to demonstrate subjective improvement in patient's condition. . Appropriate and Ongoing  - Pt will demonstrate independence with the HEP at discharge. Appropriate and Ongoing  - Pt will be I with all transfers (patient goal) Appropriate and Ongoing   Pt will demonstrate gait without AD for short distances (50-100ft) within home independently    Plan   Continue with established Plan of Care towards established PT goals.     Therapist: Edison Smiley, PTA  2/21/2024

## 2024-02-26 ENCOUNTER — CLINICAL SUPPORT (OUTPATIENT)
Dept: REHABILITATION | Facility: HOSPITAL | Age: 79
End: 2024-02-26
Payer: MEDICARE

## 2024-02-26 DIAGNOSIS — R29.3 POOR POSTURE: ICD-10-CM

## 2024-02-26 DIAGNOSIS — R29.898 DECREASED STRENGTH OF TRUNK AND BACK: ICD-10-CM

## 2024-02-26 DIAGNOSIS — M25.69 DECREASED ROM OF TRUNK AND BACK: Primary | ICD-10-CM

## 2024-02-26 PROCEDURE — 97110 THERAPEUTIC EXERCISES: CPT

## 2024-02-26 PROCEDURE — 97112 NEUROMUSCULAR REEDUCATION: CPT

## 2024-02-26 NOTE — PROGRESS NOTES
"  [facility]      Name: Eugenia Diaz  Clinic Number: [id number]    Therapy Diagnosis:   Encounter Diagnoses   Name Primary?    Degenerative scoliosis      Decreased ROM of trunk and back      Decreased strength of trunk and back      Poor posture      Physician: Ector Pedro MD    Visit Date: 2/9/2024    Physician Orders: PT Eval and Treat  Medical Diagnosis from Referral: M41.50 (ICD-10-CM) - Degenerative scoliosis  Evaluation Date: 10/26/2023  Authorization Period Expiration: 8/4/2024  Plan of Care Expiration: 3/9/2024  Reassessment Due: 3/9/2024  Visit # / Visits authorized: 16/20  use lumbar roll on Med X/HOB elevated    Time In: 11:00 AM   Time Out: 12:00 PM  Total Billable Time:  55 minutes    INSURANCE and OUTCOMES: Fee for Service with FOTO Outcomes 2/3     Precautions:   standard/R THR/osteoporosis/ AVN of Left hip/HTN/SBA/CGA gait    Pattern of pain determined: 1PEN    Subjective   Eugenia reports that she had a UTI last week, but is feeling much better after being on antibiotics. She has some mild pain in her back, but not too bad.     Patient reports tolerating previous visit; muscular soreness  Patient reports their pain to be 2/10 on a 0-10 scale with 0 being no pain and 10 being the worst pain imaginable.  Pain Location: low back     Occupation: retired  Leisure: going to dinner with friends      Pt goals: "get stronger, have less pain"    Objective     MOVEMENT LOSS - Lumbar    Norms ROM Loss Initial Range of motion 12/4/2023 Range of motion 1/10/2024 ROM 2/9/24   Flexion Fingers touch toes, sacral angle >/= 70 deg, uniform spinal curvature, posterior weight shift  moderate loss Moderate loss Minimal loss Within functional limits    Extension ASIS surpasses toes, spine of scapulae surpasses heels, uniform spinal curve major loss Moderate loss Minimal loss Minimal loss   Side glide Right   minimal loss Minimal loss Minimal loss Within functional limits    Side glide " "Left   minimal loss Minimal loss Minimal loss Within functional limits    Rotation Right PT observes contralateral shoulder moderate loss Minimal loss Moderate loss Moderate loss   Rotation Left PT observes contralateral shoulder moderate loss Moderate loss Moderate loss Moderate loss      Lumbar Isometric Testing on Med X equipment: Testing administered by PT     Test Initial Midpoint Final   Date 10/26/23  12/27/23     ROM 12-33 deg  3-39 deg     Max Peak Torque 47   81     Min Peak Torque 20   37     Flex/Ext Ratio 2.35:1  2.2:1     % below normative data 71  27%     % gain from initial test Not available visit 1  155%        Outcomes:  Initial score: 35% functional ability  Visit 6 score: 42% function  Goal: 51% functional ability    Treatment    Eugenia received the treatments listed below:      Eugenia received neuromuscular education  to isolate and engage spinal stabilization musculature correctly for motor control and coordination to aid in function and posture for 10 minutes on the Medical [employer] Machine.  Patient performed MedX dynamic exercise with emphasis on spinal muscular control using pacer throughout  active range of motion. Therapist assisted patient in achieving optimal exertion for neural reeducation and endurance training by using the  Sherry Exertion Rating scale, by instructing the patient to aim for mid range of exertion, performing 15-20 repetitions, slowly, correctly,and safely.         2/26/2024    11:05 AM   [facility]   Visit Number 16   VAS Pain Rating 2   Time 5   Lumbar Stretches - Slouch Overcorrection 10   Extension in Standing 10   Flexion in Lying 10   Lumbar Weight 33 lbs   Repetitions 20   Rating of Perceived Exertion 4   Ice - Sitting 5 mins.       therapeutic exercises to develop strength, endurance, flexibility, posture, and core stabilization for 50 minutes including:    lower trunk rotation x 10  double knee to chest x 10 with theraball  piriformis stretch 20" x 2 " bilaterally  posterior pelvic tilt x 10  BKFO with GTB x 15  Bridge c/ GTB x 15  PPT + marching + 1# x 10 each  SOC x 10  Sit<->stand from standard chair (1 arm rest support) 2 x 5 reps  EIS x 10     Standing ex's w/UE support - NP   Marching + 1# x 10   Hip abd + 1# x 10   Hip extension + 1# x 10      Peripheral muscle strengthening which included 1 set of 15-20 repetitions at a slow, controlled 10-13 second per rep pace focused on strengthening supporting musculature for improved body mechanics and functional mobility.  Pt and therapist focused on proper form during treatment to ensure optimal strengthening of each targeted muscle group.  Machines were utilized including torso rotation and leg press this visit.Hip abd and hip add. Did skip leg extension, Leg curl, triceps, biceps, chest (NP) today; however, completed LAQ without weights edge of mat.     manual therapy techniques: Soft tissue Mobilization were applied to the: low back for 0 minutes, including:     cold pack for 5 minutes to low back.    Home Exercises Provided and Patient Education Provided   Home exercises include: lower trunk rotation, slouch over correct, piriformis stretch, PPT, PPT w/marching, Sit<->stand, Standing marching, hip ext and hip abd  Cardio program: planned for visit 5 11/16/23  Lifting education date:visit 12 1/10/24  Posture/Lumbar roll: currently in scooter  Fridge Magnet Discharge handout (date given):  Equipment at home/gym membership: no    Education provided:   - benefits of exercise performed  - continue with home exercise program  - MedX performance  - Precor ex performance    Written Home Exercises Provided: Patient instructed to cont prior HEP.  Exercises were reviewed and Eugenia was able to demonstrate them prior to the end of the session.  Eugenia demonstrated fair  understanding of the education provided.     See EMR under Patient Instructions for exercises provided prior visit.    Assessment   Eugenia presents to therapy  today with continued mild pain in the low back. She required cueing for neutral pelvis and TrA activation during BKFO, but otherwise tolerated therapeutic exercises well. Maintained resistance on Lumbar MedX at 33 ft/lbs, where she was able to perform 20 repetitions at an exertion rating of 4/10. Leg extensions and bicep curls were not completed today due to time constraints - resume next session. She noted no increased symptoms at the end of session. PT will continue to progress resistance as tolerated for improved strength, ROM, and pain for improvement in ADLs and functional mobility.     Patient is making progress towards established goals    Pt will continue to benefit from skilled outpatient physical therapy to address the deficits stated in the impairment chart, provide pt/family education and to maximize pt's level of independence in the home and community environment.     Anticipated Barriers for therapy: balance /gait  Pt's spiritual, cultural and educational needs considered and pt agreeable to plan of care and goals as stated below:   Goals:   Short term goals:  6 weeks or 10 visits   - Pt will demonstrate increased lumbar MedX ROM by at least 6 degrees from the initial ROM value with improvements noted in functional ROM and ability to perform ADLs. MET  - Pt will demonstrate increased MedX average isometric strength value by 20% from initial test resulting in improved ability to perform bending, lifting, and carrying activities safely, confidently. MET  - Pt will report a reduction in worst pain score by 1-2 points for improved tolerance for sitting. MET  - Pt able to perform HEP correctly with minimal cueing or supervision from therapist to encourage independent management of symptoms. Appropriate and Ongoing     Long term goals: 10 weeks or 20 visits   - Pt will demonstrate increased lumbar MedX ROM by at least 12 degrees from initial ROM value, resulting in improved ability to perform functional  forward bending while standing and sitting. Appropriate and Ongoing  - Pt will demonstrate increased MedX average isometric strength value by 40% from initial test resulting in improved ability to perform bending, lifting, and carrying activities safely and confidently. Appropriate and Ongoing  - Pt to demonstrate ability to independently control and reduce their pain through posture positioning and mechanical movements throughout a typical day. Appropriate and Ongoing  - Pt will demonstrate reduced pain and improved functional outcomes as reported on the FOTO by reaching an intake score of >/= 50% functional ability in order to demonstrate subjective improvement in patient's condition. . Appropriate and Ongoing  - Pt will demonstrate independence with the HEP at discharge. Appropriate and Ongoing  - Pt will be I with all transfers (patient goal) Appropriate and Ongoing   Pt will demonstrate gait without AD for short distances (50-100ft) within home independently    Plan   Continue with established Plan of Care towards established PT goals.     Therapist: Beulah Whitehead, PT  Co-treated with Leandra Yanez PT  2/26/2024

## 2024-02-28 ENCOUNTER — CLINICAL SUPPORT (OUTPATIENT)
Dept: REHABILITATION | Facility: HOSPITAL | Age: 79
End: 2024-02-28
Payer: MEDICARE

## 2024-02-28 ENCOUNTER — INFUSION (OUTPATIENT)
Dept: INFECTIOUS DISEASES | Facility: HOSPITAL | Age: 79
End: 2024-02-28
Payer: MEDICARE

## 2024-02-28 VITALS
SYSTOLIC BLOOD PRESSURE: 163 MMHG | HEIGHT: 63 IN | OXYGEN SATURATION: 94 % | RESPIRATION RATE: 16 BRPM | HEART RATE: 75 BPM | BODY MASS INDEX: 26.58 KG/M2 | WEIGHT: 150 LBS | DIASTOLIC BLOOD PRESSURE: 88 MMHG | TEMPERATURE: 99 F

## 2024-02-28 DIAGNOSIS — M81.0 OSTEOPOROSIS, POST-MENOPAUSAL: Primary | ICD-10-CM

## 2024-02-28 DIAGNOSIS — R29.898 DECREASED STRENGTH OF TRUNK AND BACK: ICD-10-CM

## 2024-02-28 DIAGNOSIS — R29.3 POOR POSTURE: ICD-10-CM

## 2024-02-28 DIAGNOSIS — M25.69 DECREASED ROM OF TRUNK AND BACK: Primary | ICD-10-CM

## 2024-02-28 PROCEDURE — 97112 NEUROMUSCULAR REEDUCATION: CPT

## 2024-02-28 PROCEDURE — 63600175 PHARM REV CODE 636 W HCPCS: Mod: JZ,JG | Performed by: INTERNAL MEDICINE

## 2024-02-28 PROCEDURE — 97110 THERAPEUTIC EXERCISES: CPT

## 2024-02-28 PROCEDURE — 96372 THER/PROPH/DIAG INJ SC/IM: CPT

## 2024-02-28 RX ADMIN — ROMOSOZUMAB-AQQG 210 MG: 105 INJECTION, SOLUTION SUBCUTANEOUS at 01:02

## 2024-02-28 NOTE — PROGRESS NOTES
Pt received Evenity 6/12 in bilateral arms; Pt denies any dental sx in last 3 months, pt taking Vit D/Ca+;  Pt tolerated well;

## 2024-02-28 NOTE — PROGRESS NOTES
"  [facility]      Name: Eugenia Diaz  Clinic Number: [id number]    Therapy Diagnosis:   Encounter Diagnoses   Name Primary?    Degenerative scoliosis      Decreased ROM of trunk and back      Decreased strength of trunk and back      Poor posture      Physician: Ector Pedro MD    Visit Date: 2/9/2024    Physician Orders: PT Eval and Treat  Medical Diagnosis from Referral: M41.50 (ICD-10-CM) - Degenerative scoliosis  Evaluation Date: 10/26/2023  Authorization Period Expiration: 8/4/2024  Plan of Care Expiration: 3/9/2024  Reassessment Due: 3/9/2024  Visit # / Visits authorized: 17/20  use lumbar roll on Med X/HOB elevated    Time In: 11:30 AM   (increase flexion range of motion next visit)  Time Out: 12:30 PM  Total Billable Time:  60 minutes    INSURANCE and OUTCOMES: Fee for Service with FOTO Outcomes 2/3     Precautions:   standard/R THR/osteoporosis/ AVN of Left hip/HTN/SBA/CGA gait    Pattern of pain determined: 1PEN    Subjective   Eugenia reports mild pain at start of session, 2/10, generally feeling good today.    Patient reports tolerating previous visit; muscular soreness  Patient reports their pain to be 2/10 on a 0-10 scale with 0 being no pain and 10 being the worst pain imaginable.  Pain Location: low back     Occupation: retired  Leisure: going to dinner with friends      Pt goals: "get stronger, have less pain"    Objective     MOVEMENT LOSS - Lumbar    Norms ROM Loss Initial Range of motion 12/4/2023 Range of motion 1/10/2024 ROM 2/9/24   Flexion Fingers touch toes, sacral angle >/= 70 deg, uniform spinal curvature, posterior weight shift  moderate loss Moderate loss Minimal loss Within functional limits    Extension ASIS surpasses toes, spine of scapulae surpasses heels, uniform spinal curve major loss Moderate loss Minimal loss Minimal loss   Side glide Right   minimal loss Minimal loss Minimal loss Within functional limits    Side glide Left   minimal loss Minimal " "loss Minimal loss Within functional limits    Rotation Right PT observes contralateral shoulder moderate loss Minimal loss Moderate loss Moderate loss   Rotation Left PT observes contralateral shoulder moderate loss Moderate loss Moderate loss Moderate loss      Lumbar Isometric Testing on Med X equipment: Testing administered by PT     Test Initial Midpoint Final   Date 10/26/23  12/27/23     ROM 12-33 deg  3-39 deg     Max Peak Torque 47   81     Min Peak Torque 20   37     Flex/Ext Ratio 2.35:1  2.2:1     % below normative data 71  27%     % gain from initial test Not available visit 1  155%        Outcomes:  Initial score: 35% functional ability  Visit 6 score: 42% function  Goal: 51% functional ability    Treatment    Eugenia received the treatments listed below:      Eugenia received neuromuscular education  to isolate and engage spinal stabilization musculature correctly for motor control and coordination to aid in function and posture for 10 minutes on the Medical [employer] Machine.  Patient performed MedX dynamic exercise with emphasis on spinal muscular control using pacer throughout  active range of motion. Therapist assisted patient in achieving optimal exertion for neural reeducation and endurance training by using the  Sherry Exertion Rating scale, by instructing the patient to aim for mid range of exertion, performing 15-20 repetitions, slowly, correctly,and safely.         2/28/2024    12:29 PM   [facility]   Visit Number 17   VAS Pain Rating 2   Time 5   Lumbar Stretches - Slouch Overcorrection 10   Extension in Standing 10   Flexion in Lying 10   Lumbar Weight 36 lbs   Repetitions 18   Rating of Perceived Exertion 3   Ice - Sitting 5 mins.         therapeutic exercises to develop strength, endurance, flexibility, posture, and core stabilization for 50 minutes including:    lower trunk rotation x 10  double knee to chest x 10 with theraball  piriformis stretch 20" x 2 bilaterally  posterior pelvic tilt " x 10  BKFO with GTB x 15  Bridge c/ GTB x 15  PPT + marching + 1# x 10 each  SOC x 10  Sit<->stand from standard chair (1 arm rest support) 2 x 5 reps  EIS x 10     Standing ex's w/UE support    Marching + 1# x 10   Hip abd + 1# x 10   Hip extension + 1# x 10      Peripheral muscle strengthening which included 1 set of 15-20 repetitions at a slow, controlled 10-13 second per rep pace focused on strengthening supporting musculature for improved body mechanics and functional mobility.  Pt and therapist focused on proper form during treatment to ensure optimal strengthening of each targeted muscle group.  Machines were utilized including torso rotation and leg press this visit.Hip abd and hip add. Did skip leg extension, Leg curl, triceps, biceps, chest (NP) today; however, completed LAQ without weights edge of mat.     manual therapy techniques: Soft tissue Mobilization were applied to the: low back for 0 minutes, including:     cold pack for 5 minutes to low back.    Home Exercises Provided and Patient Education Provided   Home exercises include: lower trunk rotation, slouch over correct, piriformis stretch, PPT, PPT w/marching, Sit<->stand, Standing marching, hip ext and hip abd  Cardio program: planned for visit 5 11/16/23  Lifting education date:visit 12 1/10/24  Posture/Lumbar roll: currently in scooter  Fridge Magnet Discharge handout (date given):  Equipment at home/gym membership: no    Education provided:   - benefits of exercise performed  - continue with home exercise program  - MedX performance  - Precor ex performance    Written Home Exercises Provided: Patient instructed to cont prior HEP.  Exercises were reviewed and Eugenia was able to demonstrate them prior to the end of the session.  Eugenia demonstrated fair  understanding of the education provided.     See EMR under Patient Instructions for exercises provided prior visit.    Assessment   Eugenia presents to therapy today with continued mild pain in the low  back.  Resistance on Lumbar MedX increased to 36 ft/lbs, where she was able to perform 18 repetitions at an exertion rating of 3/10. She noted no increased symptoms at the end of session. PT will continue to progress resistance as tolerated for improved strength, ROM, and pain for improvement in ADLs and functional mobility. Discussed wellness program for after discharge, patient is interested in continuing.    Patient is making progress towards established goals    Pt will continue to benefit from skilled outpatient physical therapy to address the deficits stated in the impairment chart, provide pt/family education and to maximize pt's level of independence in the home and community environment.     Anticipated Barriers for therapy: balance /gait  Pt's spiritual, cultural and educational needs considered and pt agreeable to plan of care and goals as stated below:   Goals:   Short term goals:  6 weeks or 10 visits   - Pt will demonstrate increased lumbar MedX ROM by at least 6 degrees from the initial ROM value with improvements noted in functional ROM and ability to perform ADLs. MET  - Pt will demonstrate increased MedX average isometric strength value by 20% from initial test resulting in improved ability to perform bending, lifting, and carrying activities safely, confidently. MET  - Pt will report a reduction in worst pain score by 1-2 points for improved tolerance for sitting. MET  - Pt able to perform HEP correctly with minimal cueing or supervision from therapist to encourage independent management of symptoms. Appropriate and Ongoing     Long term goals: 10 weeks or 20 visits   - Pt will demonstrate increased lumbar MedX ROM by at least 12 degrees from initial ROM value, resulting in improved ability to perform functional forward bending while standing and sitting. Appropriate and Ongoing  - Pt will demonstrate increased MedX average isometric strength value by 40% from initial test resulting in improved  ability to perform bending, lifting, and carrying activities safely and confidently. Appropriate and Ongoing  - Pt to demonstrate ability to independently control and reduce their pain through posture positioning and mechanical movements throughout a typical day. Appropriate and Ongoing  - Pt will demonstrate reduced pain and improved functional outcomes as reported on the FOTO by reaching an intake score of >/= 50% functional ability in order to demonstrate subjective improvement in patient's condition. . Appropriate and Ongoing  - Pt will demonstrate independence with the HEP at discharge. Appropriate and Ongoing  - Pt will be I with all transfers (patient goal) Appropriate and Ongoing   Pt will demonstrate gait without AD for short distances (50-100ft) within home independently    Plan   Continue with established Plan of Care towards established PT goals.     Therapist: Diamond Panchal, PT  2/28/2024

## 2024-03-13 ENCOUNTER — CLINICAL SUPPORT (OUTPATIENT)
Dept: REHABILITATION | Facility: HOSPITAL | Age: 79
End: 2024-03-13
Payer: MEDICARE

## 2024-03-13 DIAGNOSIS — R29.3 POOR POSTURE: ICD-10-CM

## 2024-03-13 DIAGNOSIS — R29.898 DECREASED STRENGTH OF TRUNK AND BACK: ICD-10-CM

## 2024-03-13 DIAGNOSIS — M25.69 DECREASED ROM OF TRUNK AND BACK: Primary | ICD-10-CM

## 2024-03-13 PROCEDURE — 97530 THERAPEUTIC ACTIVITIES: CPT

## 2024-03-13 PROCEDURE — 97112 NEUROMUSCULAR REEDUCATION: CPT

## 2024-03-13 PROCEDURE — 97110 THERAPEUTIC EXERCISES: CPT

## 2024-03-13 NOTE — PATIENT INSTRUCTIONS
"HEALTHY BACK TOOLS        KEEP YOUR SPINE FEELING FINE   HEALTHY HABITS   Do your "GO TO" stretches 2/day   Get a good night's REST   Watch your POSTURE in sitting/standing Drink PLENTY of water   Use a lumbar roll Eat LOTS of fruits & vegetables   GET UP often (walk and/or stretch) Manage your STRESS   Make your workplace IDEAL FOR YOU  Don't smoke   Lift correctly EXERCISE   Practice positive self talk-talk to yourself kindly like you would your best friend                         WHAT TO DO WHEN SYMPTOMS FLARE UP  Back and neck pain may occasionally flare up.  If you experience a flare   up, remember your tools. Be encouraged, by remembering that flare-ups will   usually pass.   My Tools:    ~Use your "Go To" Stretches/Positions   ~Keep Moving-pain usually gets better if you move  ~Z lie (with or without ice)  10 min several times a day until symptoms reduce  ~Slowly resume normal activities   ~Practice Deep Breathing and Relaxation techniques                                                 MY EXERCISE PLAN  GO TO STRETCHES  2/day (like brushing your teeth) STRENGTHENING  3 times/week CARDIO PROGRAM  20 min 3/week   Knees up/ hip rotation x 10 Sit to stands x 10 (DAILY)    Knee to chest/opposite shoulder pull 20 sec x 2 Pelvic tilts + march x 10    Standing back bend x 10 Bridges with green band x 15    Sit and slouch x 10 Knee fall out with green band x 10     Standing kicks (side and back) x 10     Standing marching x 10       "

## 2024-03-13 NOTE — PROGRESS NOTES
"  [facility]      Name: Eugenia Diaz  Clinic Number: [id number]    Therapy Diagnosis:   Encounter Diagnoses   Name Primary?    Degenerative scoliosis      Decreased ROM of trunk and back      Decreased strength of trunk and back      Poor posture      Physician: Ector Pedro MD    Visit Date: 2/9/2024    Physician Orders: PT Eval and Treat  Medical Diagnosis from Referral: M41.50 (ICD-10-CM) - Degenerative scoliosis  Evaluation Date: 10/26/2023  Authorization Period Expiration: 8/4/2024  Plan of Care Expiration: 3/9/2024  Reassessment Due: 3/9/2024  Visit # / Visits authorized: 18/20  use lumbar roll on Med X/HOB elevated    Time In: 3:00 PM   (increase flexion range of motion next visit)  Time Out: 4:15 PM  Total Billable Time:  75 minutes    INSURANCE and OUTCOMES: Fee for Service with FOTO Outcomes 2/3     Precautions:   standard/R THR/osteoporosis/ AVN of Left hip/HTN/SBA/CGA gait    Pattern of pain determined: 1PEN    Subjective   Eugenia feeling good today, denies pain at start of visit.     Patient reports tolerating previous visit; muscular soreness  Patient reports their pain to be 2/10 on a 0-10 scale with 0 being no pain and 10 being the worst pain imaginable.  Pain Location: low back     Occupation: retired  Leisure: going to dinner with friends      Pt goals: "get stronger, have less pain"    Objective     MOVEMENT LOSS - Lumbar    Norms ROM Loss Initial Range of motion 12/4/2023 Range of motion 1/10/2024 ROM 2/9/24 Range of motion 3/13/2024   Flexion Fingers touch toes, sacral angle >/= 70 deg, uniform spinal curvature, posterior weight shift  moderate loss Moderate loss Minimal loss Within functional limits  Within functional limits   Extension ASIS surpasses toes, spine of scapulae surpasses heels, uniform spinal curve major loss Moderate loss Minimal loss Minimal loss Min loss   Side glide Right   minimal loss Minimal loss Minimal loss Within functional limits  " "Within functional limits   Side glide Left   minimal loss Minimal loss Minimal loss Within functional limits  Within functional limits   Rotation Right PT observes contralateral shoulder moderate loss Minimal loss Moderate loss Moderate loss Moderate loss   Rotation Left PT observes contralateral shoulder moderate loss Moderate loss Moderate loss Moderate loss Moderate loss      Lumbar Isometric Testing on Med X equipment: Testing administered by PT     Test Initial Midpoint Final   Date 10/26/23  12/27/23     ROM 12-33 deg  3-39 deg     Max Peak Torque 47   81     Min Peak Torque 20   37     Flex/Ext Ratio 2.35:1  2.2:1     % below normative data 71  27%     % gain from initial test Not available visit 1  155%        Outcomes:  Initial score: 35% functional ability  Visit 6 score: 42% function  Goal: 51% functional ability    Treatment    Eugenia received the treatments listed below:      Eugenia received neuromuscular education  to isolate and engage spinal stabilization musculature correctly for motor control and coordination to aid in function and posture for 10 minutes on the Medical Medx Machine.  Patient performed MedX dynamic exercise with emphasis on spinal muscular control using pacer throughout  active range of motion. Therapist assisted patient in achieving optimal exertion for neural reeducation and endurance training by using the  Sherry Exertion Rating scale, by instructing the patient to aim for mid range of exertion, performing 15-20 repetitions, slowly, correctly,and safely.         therapeutic exercises to develop strength, endurance, flexibility, posture, and core stabilization for 50 minutes including:    lower trunk rotation x 10  double knee to chest x 10 with theraball -- NP  piriformis stretch 20" x 2 bilaterally  posterior pelvic tilt x 10  BKFO with GTB x 15  Bridge c/ GTB x 15  PPT + marching + 1# x 10 each  SOC x 10  Sit<->stand from standard chair (1 arm rest support) 2 x 5 reps  EIS x 10 "     Standing ex's w/UE support    Marching + 1# x 10   Hip abd + 1# x 10   Hip extension + 1# x 10    Eugenia participated in dynamic functional therapeutic activities to improve functional performance for 10  minutes, including:    Floor to stand transfer training completed with patient (half kneel to kneel to chair) from mat.      Peripheral muscle strengthening which included 1 set of 15-20 repetitions at a slow, controlled 10-13 second per rep pace focused on strengthening supporting musculature for improved body mechanics and functional mobility.  Pt and therapist focused on proper form during treatment to ensure optimal strengthening of each targeted muscle group.  Machines were utilized including torso rotation and leg press this visit.Hip abd and hip add. Did skip leg extension, Leg curl, triceps, biceps, chest (NP) today; however, completed LAQ without weights edge of mat.     manual therapy techniques: Soft tissue Mobilization were applied to the: low back for 0 minutes, including:     cold pack for 5 minutes to low back.    Home Exercises Provided and Patient Education Provided   Home exercises include: lower trunk rotation, slouch over correct, piriformis stretch, PPT, PPT w/marching, Sit<->stand, Standing marching, hip ext and hip abd  Cardio program: planned for visit 5 11/16/23  Lifting education date:visit 12 1/10/24  Posture/Lumbar roll: currently in scooter  [employer] Discharge handout (date given): 3/13/2024  Equipment at home/gym membership: no    Education provided:   - benefits of exercise performed  - continue with home exercise program  - MedX performance  - Precor ex performance    Written Home Exercises Provided: Patient instructed to cont prior HEP.  Exercises were reviewed and Eugenia was able to demonstrate them prior to the end of the session.  Eugenia demonstrated fair  understanding of the education provided.     See EMR under Patient Instructions for exercises provided prior  visit.    Assessment   Eugenia presents to therapy today without low back pain.  [place] discharge plan reviewed with patient. Floor transfer training completed with patient today, which patient was able to perform with cuing for correct technique. Resistance on Lumbar MedX maintained at 36 ft/lbs, she was able to perform 20 repetitions at an exertion rating of 3/10. She noted no increased symptoms at the end of session.  Will continue to progress resistance as tolerated for improved strength, ROM, and pain for improvement in ADLs and functional mobility.     Patient is making progress towards established goals    Pt will continue to benefit from skilled outpatient physical therapy to address the deficits stated in the impairment chart, provide pt/family education and to maximize pt's level of independence in the home and community environment.     Anticipated Barriers for therapy: balance /gait  Pt's spiritual, cultural and educational needs considered and pt agreeable to plan of care and goals as stated below:   Goals:   Short term goals:  6 weeks or 10 visits   - Pt will demonstrate increased lumbar MedX ROM by at least 6 degrees from the initial ROM value with improvements noted in functional ROM and ability to perform ADLs. MET  - Pt will demonstrate increased MedX average isometric strength value by 20% from initial test resulting in improved ability to perform bending, lifting, and carrying activities safely, confidently. MET  - Pt will report a reduction in worst pain score by 1-2 points for improved tolerance for sitting. MET  - Pt able to perform HEP correctly with minimal cueing or supervision from therapist to encourage independent management of symptoms. Appropriate and Ongoing     Long term goals: 10 weeks or 20 visits   - Pt will demonstrate increased lumbar MedX ROM by at least 12 degrees from initial ROM value, resulting in improved ability to perform functional forward bending while standing and  sitting. Appropriate and Ongoing  - Pt will demonstrate increased MedX average isometric strength value by 40% from initial test resulting in improved ability to perform bending, lifting, and carrying activities safely and confidently. Appropriate and Ongoing  - Pt to demonstrate ability to independently control and reduce their pain through posture positioning and mechanical movements throughout a typical day. Appropriate and Ongoing  - Pt will demonstrate reduced pain and improved functional outcomes as reported on the FOTO by reaching an intake score of >/= 50% functional ability in order to demonstrate subjective improvement in patient's condition. . Appropriate and Ongoing  - Pt will demonstrate independence with the HEP at discharge. Appropriate and Ongoing  - Pt will be I with all transfers (patient goal) Appropriate and Ongoing   Pt will demonstrate gait without AD for short distances (50-100ft) within home independently    Plan   Continue with established Plan of Care towards established PT goals.     Therapist: LAISHA Escobar  3/13/2024

## 2024-03-15 ENCOUNTER — CLINICAL SUPPORT (OUTPATIENT)
Dept: REHABILITATION | Facility: HOSPITAL | Age: 79
End: 2024-03-15
Payer: MEDICARE

## 2024-03-15 DIAGNOSIS — M25.69 DECREASED ROM OF TRUNK AND BACK: Primary | ICD-10-CM

## 2024-03-15 DIAGNOSIS — R29.898 DECREASED STRENGTH OF TRUNK AND BACK: ICD-10-CM

## 2024-03-15 DIAGNOSIS — R29.3 POOR POSTURE: ICD-10-CM

## 2024-03-15 PROCEDURE — 97112 NEUROMUSCULAR REEDUCATION: CPT | Mod: CQ

## 2024-03-15 PROCEDURE — 97110 THERAPEUTIC EXERCISES: CPT | Mod: CQ

## 2024-03-15 NOTE — PLAN OF CARE
[facility]  Physical Therapy Plan of Care Note     Name: Eugenia Diaz  Clinic Number: [id number]    Therapy Diagnosis:   Encounter Diagnoses   Name Primary?    Decreased ROM of trunk and back Yes    Decreased strength of trunk and back     Poor posture      Physician: Ector Pedro MD    Visit Date: 3/13/2024    Physician Orders: Eval and Treat Healthy [facility] program  Medical Diagnosis from Referral: M41.50 (ICD-10-CM) - Degenerative scoliosis  Evaluation Date: 10/26/2023  Authorization Period Expiration: 12/31/2024   Plan of Care Expiration: 3/29/2024  Progress Note Due: 3/29/2024   Visit # / Visits authorized: 18/ 20  FOTO: 3/4    Precautions:  standard/R THR/osteoporosis/ AVN of Left hip/HTN/SBA/CGA gait  Functional Level Prior to Evaluation:  mod I with household ambulation, uses scooter for community ambulation    Subjective     Update: Eugenia reports overall improvement in low back pain and improved mobility since starting therapy.    Objective      Update: MOVEMENT LOSS - Lumbar    Norms ROM Loss Initial Range of motion 12/4/2023 Range of motion 1/10/2024 ROM 2/9/24 Range of motion 3/13/2024   Flexion Fingers touch toes, sacral angle >/= 70 deg, uniform spinal curvature, posterior weight shift  moderate loss Moderate loss Minimal loss Within functional limits  Within functional limits   Extension ASIS surpasses toes, spine of scapulae surpasses heels, uniform spinal curve major loss Moderate loss Minimal loss Minimal loss Min loss   Side glide Right   minimal loss Minimal loss Minimal loss Within functional limits  Within functional limits   Side glide Left   minimal loss Minimal loss Minimal loss Within functional limits  Within functional limits   Rotation Right PT observes contralateral shoulder moderate loss Minimal loss Moderate loss Moderate loss Moderate loss   Rotation Left PT observes contralateral shoulder moderate loss Moderate loss Moderate loss Moderate loss Moderate loss       Lumbar Isometric Testing on Med X equipment: Testing administered by PT     Test Initial Midpoint Final   Date 10/26/23  12/27/23     ROM 12-33 deg  3-39 deg     Max Peak Torque 47   81     Min Peak Torque 20   37     Flex/Ext Ratio 2.35:1  2.2:1     % below normative data 71  27%     % gain from initial test Not available visit 1  155%        Outcomes:  Initial score: 35% functional ability  Visit 6 score: 42% function  Goal: 51% functional ability    Assessment     Update: Eugenia presents to therapy today without low back pain.  [facility] discharge plan reviewed with patient. Floor transfer training completed with patient today, which patient was able to perform with cuing for correct technique. Resistance on Lumbar MedX maintained at 36 ft/lbs, she was able to perform 20 repetitions at an exertion rating of 3/10. She noted no increased symptoms at the end of session.  Will continue to progress resistance as tolerated for improved strength, ROM, and pain for improvement in ADLs and functional mobility.     Previous Short Term Goals Status:   all short term goals met  Long Term Goal Status: continue per initial plan of care.  Reasons for Recertification of Therapy:   expiration of plan of care    GOALS  Short term goals:  6 weeks or 10 visits   - Pt will demonstrate increased lumbar MedX ROM by at least 6 degrees from the initial ROM value with improvements noted in functional ROM and ability to perform ADLs. MET  - Pt will demonstrate increased MedX average isometric strength value by 20% from initial test resulting in improved ability to perform bending, lifting, and carrying activities safely, confidently. MET  - Pt will report a reduction in worst pain score by 1-2 points for improved tolerance for sitting. MET  - Pt able to perform HEP correctly with minimal cueing or supervision from therapist to encourage independent management of symptoms. Appropriate and Ongoing     Long term goals: 10 weeks or 20  visits   - Pt will demonstrate increased lumbar MedX ROM by at least 12 degrees from initial ROM value, resulting in improved ability to perform functional forward bending while standing and sitting. Appropriate and Ongoing  - Pt will demonstrate increased MedX average isometric strength value by 40% from initial test resulting in improved ability to perform bending, lifting, and carrying activities safely and confidently. Appropriate and Ongoing  - Pt to demonstrate ability to independently control and reduce their pain through posture positioning and mechanical movements throughout a typical day. Appropriate and Ongoing  - Pt will demonstrate reduced pain and improved functional outcomes as reported on the FOTO by reaching an intake score of >/= 50% functional ability in order to demonstrate subjective improvement in patient's condition. . Appropriate and Ongoing  - Pt will demonstrate independence with the HEP at discharge. Appropriate and Ongoing  - Pt will be I with all transfers (patient goal) Appropriate and Ongoing   Pt will demonstrate gait without AD for short distances (50-100ft) within home independently    Plan     Updated Certification Period: 3/13/2024 to 3/29/2024   Recommended Treatment Plan: 2 times per week for 2 weeks for 2 physical therapy visits:  Gait Training, Manual Therapy, Moist Heat/ Ice, Neuromuscular Re-ed, Patient Education, Self Care, Therapeutic Activities, and Therapeutic Exercise  Other Recommendations: per HB protocol    Diamond Panchal, PT

## 2024-03-15 NOTE — PROGRESS NOTES
"  [facility]      Name: Eugenia Diaz  Clinic Number: [id number]    Therapy Diagnosis:   Encounter Diagnoses   Name Primary?    Degenerative scoliosis      Decreased ROM of trunk and back      Decreased strength of trunk and back      Poor posture      Physician: Ector Pedro MD    Visit Date: 2/9/2024    Physician Orders: PT Eval and Treat  Medical Diagnosis from Referral: M41.50 (ICD-10-CM) - Degenerative scoliosis  Evaluation Date: 10/26/2023  Authorization Period Expiration: 8/4/2024  Plan of Care Expiration: 3/29/2024   Reassessment Due: 3/29/2024   Visit # / Visits authorized: 19/20  use lumbar roll on Med X/HOB elevated    Time In: 12:30 PM     Time Out: 1:25 PM  Total Billable Time: 55 minutes    INSURANCE and OUTCOMES: Fee for Service with FOTO Outcomes 2/3     Precautions:   standard/R THR/osteoporosis/ AVN of Left hip/HTN/SBA/CGA gait    Pattern of pain determined: 1PEN    Subjective   Eugenia that she is doing well today and is without c/o pain. She reports improved overall gait confidence since the start of care.    Patient reports tolerating previous visit; muscular soreness  Patient reports their pain to be 0/10 on a 0-10 scale with 0 being no pain and 10 being the worst pain imaginable.  Pain Location: low back     Occupation: retired  Leisure: going to dinner with friends      Pt goals: "get stronger, have less pain"    Objective     MOVEMENT LOSS - Lumbar    Norms ROM Loss Initial Range of motion 12/4/2023 Range of motion 1/10/2024 ROM 2/9/24 Range of motion 3/13/2024   Flexion Fingers touch toes, sacral angle >/= 70 deg, uniform spinal curvature, posterior weight shift  moderate loss Moderate loss Minimal loss Within functional limits  Within functional limits   Extension ASIS surpasses toes, spine of scapulae surpasses heels, uniform spinal curve major loss Moderate loss Minimal loss Minimal loss Min loss   Side glide Right   minimal loss Minimal loss Minimal loss " Within functional limits  Within functional limits   Side glide Left   minimal loss Minimal loss Minimal loss Within functional limits  Within functional limits   Rotation Right PT observes contralateral shoulder moderate loss Minimal loss Moderate loss Moderate loss Moderate loss   Rotation Left PT observes contralateral shoulder moderate loss Moderate loss Moderate loss Moderate loss Moderate loss      Lumbar Isometric Testing on Med X equipment: Testing administered by PT     Test Initial Midpoint Final   Date 10/26/23  12/27/23     ROM 12-33 deg  3-39 deg     Max Peak Torque 47   81     Min Peak Torque 20   37     Flex/Ext Ratio 2.35:1  2.2:1     % below normative data 71  27%     % gain from initial test Not available visit 1  155%        Outcomes:  Initial score: 35% functional ability  Visit 6 score: 42% function  Goal: 51% functional ability    Treatment    Eugenia received the treatments listed below:      Eugenia received neuromuscular education  to isolate and engage spinal stabilization musculature correctly for motor control and coordination to aid in function and posture for 10 minutes on the Medical Medx Machine.  Patient performed MedX dynamic exercise with emphasis on spinal muscular control using pacer throughout  active range of motion. Therapist assisted patient in achieving optimal exertion for neural reeducation and endurance training by using the  Sherry Exertion Rating scale, by instructing the patient to aim for mid range of exertion, performing 15-20 repetitions, slowly, correctly,and safely.         3/15/2024    12:40 PM   [facility] - Short   Visit Number 19   VAS Pain Rating 0   Time 5   Lumbar Stretches - Slouch 10   Extension in Standing 10   Flexion in Lying 10   Lumbar Weight 39 lbs   Repetitions 15   Rating of Perceived Exertion 3      therapeutic exercises to develop strength, endurance, flexibility, posture, and core stabilization for 45 minutes including:    lower trunk rotation  "x 10  double knee to chest x 10 with theraball   piriformis stretch 20" x 2 bilaterally  posterior pelvic tilt x 10--NP  BKFO with GTB x 15  Bridge c/ GTB x 15  PPT + marching + 1# x 10 each  SOC x 10  Sit<->stand from standard chair (1 arm rest support) 2 x 5 reps  EIS x 10   Standing ex's w/UE support    Marching + 1# x 12   Hip abd + 1# x 12   Hip extension + 1# x 12    Eugenia participated in dynamic functional therapeutic activities to improve functional performance for 00  minutes, including:  Floor to stand transfer training completed with patient (half kneel to kneel to chair) from mat.      Peripheral muscle strengthening which included 1 set of 15-20 repetitions at a slow, controlled 10-13 second per rep pace focused on strengthening supporting musculature for improved body mechanics and functional mobility.  Pt and therapist focused on proper form during treatment to ensure optimal strengthening of each targeted muscle group.  Machines were utilized including torso rotation and leg press this visit.Hip abd and hip add. Did skip leg extension, Leg curl, triceps, biceps, chest (NP) today; however, completed LAQ without weights edge of mat.     manual therapy techniques: Soft tissue Mobilization were applied to the: low back for 0 minutes, including:     cold pack for 5 minutes to low back.    Home Exercises Provided and Patient Education Provided   Home exercises include: lower trunk rotation, slouch over correct, piriformis stretch, PPT, PPT w/marching, Sit<->stand, Standing marching, hip ext and hip abd  Cardio program: planned for visit 5 11/16/23  Lifting education date:visit 12 1/10/24  Posture/Lumbar roll: currently in [facility] [employer] Discharge handout (date given): 3/13/2024  Equipment at home/gym membership: no    Education provided:   - benefits of exercise performed  - continue with home exercise program  - MedX performance  - Precor ex performance  - 3/15/24 Availability of continuing in our " Wellness program upon d/c.     Written Home Exercises Provided: Patient instructed to cont prior HEP.  Exercises were reviewed and Eugenia was able to demonstrate them prior to the end of the session.  Eugenia demonstrated fair  understanding of the education provided.     See EMR under Patient Instructions for exercises provided prior visit.    Assessment   Eugenia returns without c/o pain today. Treatment continued with flexibility, strengthening and neuromuscular reeducation ex's. She was progressed with increased reps for standing ex's and was able to perform ex's without c/o pain. Lumbar MedX resistance was increased to 39 ft/lbs and she completed 15 reps with a RPE = 3/10. She performed peripheral strengthening ex's to include  torso rotation, leg press, hip abd/add, leg ext (6# ankle weight) ,ham curls and bicep curls w/4# DB. Other UE peripheral ex's were deferred due to sternal discomfort. (Previous musculoskeletal injury). She was educated on the availability and benefits of continuing in our wellness program upon d/c which she voices plans to take advantage of this.    Patient is making progress towards established goals  Pt will continue to benefit from skilled outpatient physical therapy to address the deficits stated in the impairment chart, provide pt/family education and to maximize pt's level of independence in the home and community environment.     Anticipated Barriers for therapy: balance /gait  Pt's spiritual, cultural and educational needs considered and pt agreeable to plan of care and goals as stated below:   Goals:   Short term goals:  6 weeks or 10 visits   - Pt will demonstrate increased lumbar MedX ROM by at least 6 degrees from the initial ROM value with improvements noted in functional ROM and ability to perform ADLs. MET  - Pt will demonstrate increased MedX average isometric strength value by 20% from initial test resulting in improved ability to perform bending, lifting, and carrying  activities safely, confidently. MET  - Pt will report a reduction in worst pain score by 1-2 points for improved tolerance for sitting. MET  - Pt able to perform HEP correctly with minimal cueing or supervision from therapist to encourage independent management of symptoms. Appropriate and Ongoing     Long term goals: 10 weeks or 20 visits   - Pt will demonstrate increased lumbar MedX ROM by at least 12 degrees from initial ROM value, resulting in improved ability to perform functional forward bending while standing and sitting. Appropriate and Ongoing  - Pt will demonstrate increased MedX average isometric strength value by 40% from initial test resulting in improved ability to perform bending, lifting, and carrying activities safely and confidently. Appropriate and Ongoing  - Pt to demonstrate ability to independently control and reduce their pain through posture positioning and mechanical movements throughout a typical day. Appropriate and Ongoing  - Pt will demonstrate reduced pain and improved functional outcomes as reported on the FOTO by reaching an intake score of >/= 50% functional ability in order to demonstrate subjective improvement in patient's condition. . Appropriate and Ongoing  - Pt will demonstrate independence with the HEP at discharge. Appropriate and Ongoing  - Pt will be I with all transfers (patient goal) Appropriate and Ongoing   Pt will demonstrate gait without AD for short distances (50-100ft) within home independently    Plan   Continue with established Plan of Care towards established PT goals.     Therapist: Edison Smiley, PTA  3/15/2024     no

## 2024-03-20 ENCOUNTER — CLINICAL SUPPORT (OUTPATIENT)
Dept: REHABILITATION | Facility: HOSPITAL | Age: 79
End: 2024-03-20
Payer: MEDICARE

## 2024-03-20 DIAGNOSIS — M25.69 DECREASED ROM OF TRUNK AND BACK: Primary | ICD-10-CM

## 2024-03-20 DIAGNOSIS — R29.898 DECREASED STRENGTH OF TRUNK AND BACK: ICD-10-CM

## 2024-03-20 DIAGNOSIS — R29.3 POOR POSTURE: ICD-10-CM

## 2024-03-20 PROCEDURE — 97110 THERAPEUTIC EXERCISES: CPT

## 2024-03-20 PROCEDURE — 97750 PHYSICAL PERFORMANCE TEST: CPT

## 2024-03-20 NOTE — PROGRESS NOTES
"  [facility]      Name: Eugenia Diaz  Clinic Number: [id number]    Therapy Diagnosis:   Encounter Diagnoses   Name Primary?    Degenerative scoliosis      Decreased ROM of trunk and back      Decreased strength of trunk and back      Poor posture      Physician: Ector Pedro MD    Visit Date: 2/9/2024    Physician Orders: PT Eval and Treat  Medical Diagnosis from Referral: M41.50 (ICD-10-CM) - Degenerative scoliosis  Evaluation Date: 10/26/2023  Authorization Period Expiration: 8/4/2024  Plan of Care Expiration: 3/29/2024   Reassessment Due: 3/29/2024   Visit # / Visits authorized: 20/20  use lumbar roll on Med X/HOB elevated    Time In: 220  Time Out: 320  Total Billable Time: 55 minutes    INSURANCE and OUTCOMES: Fee for Service with FOTO Outcomes 3/3     Precautions:   standard/R THR/osteoporosis/ AVN of Left hip/HTN/SBA/CGA gait    Pattern of pain determined: 1PEN    Subjective   Eugenia that she is doing well today and is without c/o pain.  Patient reports tolerating previous visit; muscular soreness  Patient reports their pain to be 0/10 on a 0-10 scale with 0 being no pain and 10 being the worst pain imaginable.  Pain Location: low back     Occupation: retired  Leisure: going to dinner with friends      Pt goals: "get stronger, have less pain"    Objective     MOVEMENT LOSS - Lumbar    Norms ROM Loss Initial Range of motion 12/4/2023 Range of motion 1/10/2024 ROM 2/9/24 Range of motion 3/13/2024   Flexion Fingers touch toes, sacral angle >/= 70 deg, uniform spinal curvature, posterior weight shift  moderate loss Moderate loss Minimal loss Within functional limits  Within functional limits   Extension ASIS surpasses toes, spine of scapulae surpasses heels, uniform spinal curve major loss Moderate loss Minimal loss Minimal loss Min loss   Side glide Right   minimal loss Minimal loss Minimal loss Within functional limits  Within functional limits   Side glide Left   minimal loss " Minimal loss Minimal loss Within functional limits  Within functional limits   Rotation Right PT observes contralateral shoulder moderate loss Minimal loss Moderate loss Moderate loss Moderate loss   Rotation Left PT observes contralateral shoulder moderate loss Moderate loss Moderate loss Moderate loss Moderate loss      Lumbar Isometric Testing on Med X equipment: Testing administered by PT     Test Initial Midpoint Final   Date 10/26/23  12/27/23  3/20/24   ROM 12-33 deg  3-39 deg  3-45   Max Peak Torque 47   81  101   Min Peak Torque 20   37 51   Flex/Ext Ratio 2.35:1  2.2:1  1.9:1   % below normative data 71  27%  12   % gain from initial test Not available visit 1  155%  211      Outcomes:  Initial score: 35% functional ability  Visit 6 score: 42% function   visit 20: 46%  Goal: 51% functional ability    Treatment    Eugenia received the treatments listed below:    MedX testing performed day 20: Patient  received neuromuscular education to engage spinal musculature correctly for motor control and engagement of musculature for 15 minutes including the MedX exercise component and practice and standard testing. MedX dynamic exercise and baseline isometric test performed with instructions to guide the patient safely through the testing procedure. Patient instructed to perform isometric test correctly and safely while building to an optimal force with a pain-free effort. Patient also instructed that they should feel support/pressure from MedX restraints but no pain/discomfort, and encouraged to report any pain to therapist. Patient demonstrated appropriate understanding of information and tolerance of test.  Education regarding purpose of test, safety during test given, and reviewed possible more soreness and strategies.          3/20/2024     2:58 PM   [facility]   Visit Number 20   VAS Pain Rating 0   Time 5   Lumbar Stretches - Slouch Overcorrection 10   Extension in Standing 10   Flexion in Lying 10  "  Lumbar Flexion 45   Lumbar Extension 3   Lumbar Peak Torque 101 ft. lbs.   Min Torque 51   Test Percent Below Normative Data 12 %   Test Percent Gain in Strength from Initial  211 %   Ice - Sitting 5 mins.       therapeutic exercises to develop strength, endurance, flexibility, posture, and core stabilization for 45 minutes including:    lower trunk rotation x 10  double knee to chest x 10 with theraball   piriformis stretch 20" x 2 bilaterally  posterior pelvic tilt x 10--NP  BKFO with GTB x 15  Bridge c/ GTB x 15  PPT + marching + 1# x 10 each  SOC x 10  Sit<->stand from standard chair (1 arm rest support) 2 x 5 reps  EIS x 10   Standing ex's w/UE support    Marching + 1# x 12   Hip abd + 1# x 12   Hip extension + 1# x 12    Eugenia participated in dynamic functional therapeutic activities to improve functional performance for 00  minutes, including:  Floor to stand transfer training completed with patient (half kneel to kneel to chair) from mat.      Peripheral muscle strengthening which included 1 set of 15-20 repetitions at a slow, controlled 10-13 second per rep pace focused on strengthening supporting musculature for improved body mechanics and functional mobility.  Pt and therapist focused on proper form during treatment to ensure optimal strengthening of each targeted muscle group.  Machines were utilized including torso rotation and leg press this visit.Hip abd and hip add. Did skip leg extension, Leg curl, triceps, biceps, chest (NP) today; however, completed LAQ without weights edge of mat.     manual therapy techniques: Soft tissue Mobilization were applied to the: low back for 0 minutes, including:     cold pack for 5 minutes to low back.    Home Exercises Provided and Patient Education Provided   Home exercises include: lower trunk rotation, slouch over correct, piriformis stretch, PPT, PPT w/marching, Sit<->stand, Standing marching, hip ext and hip abd  Cardio program: planned for visit 5 " 11/16/23  Lifting education date:visit 12 1/10/24  Posture/Lumbar roll: currently in scooter  Fridge Magnet Discharge handout (date given): 3/13/2024  Equipment at home/gym membership: no    Education provided:   - benefits of exercise performed  - continue with home exercise program  - MedX performance  - Precor ex performance  - 3/15/24 Availability of continuing in our Wellness program upon d/c.     Written Home Exercises Provided: Patient instructed to cont prior HEP.  Exercises were reviewed and Eugenia was able to demonstrate them prior to the end of the session.  Eugenia demonstrated fair  understanding of the education provided.     See EMR under Patient Instructions for exercises provided prior visit.    Assessment   Patient has attended 20 visits of the [facility] program focusing aerobic training, isometric testing with dynamic strengthening on MedX machine for spine, whole body strengthening on peripheral strengthening equipment, HEP, and patient education. Patient has completed the [facility] Program and is ready to be transitioned into wellness program. Educated on the importance of wellness program and attending weekly in order to maintain strength. Stressed the importance of continuing exercise and maintaining proper body mechanics and ergonomics in order to fully participate in activities of daily living, work, and leisure activities. At this time, patient demonstrates improvement in ability to reduce symptoms, improved posture, improved spinal ROM and improved strength. Discharge handout with HEP given and reviewed all information given. Patient able to demonstrate and verbalize understanding. Patient does plan to attend wellness and is appropriate for transition.     -Improved 21 ROM, initially on MedX test 12-33 and currently 3-45.  -Improved strength at each test point on lumbar med ex IM test with 211% average improvement with reduced pain noted by patient.  -Initial outcome tool score 35 and  current outcome tool score 46 indicating reduced pain and improved function.            Anticipated Barriers for therapy: balance /gait  Pt's spiritual, cultural and educational needs considered and pt agreeable to plan of care and goals as stated below:   Goals:   Short term goals:  6 weeks or 10 visits   - Pt will demonstrate increased lumbar MedX ROM by at least 6 degrees from the initial ROM value with improvements noted in functional ROM and ability to perform ADLs. MET  - Pt will demonstrate increased MedX average isometric strength value by 20% from initial test resulting in improved ability to perform bending, lifting, and carrying activities safely, confidently. MET  - Pt will report a reduction in worst pain score by 1-2 points for improved tolerance for sitting. MET  - Pt able to perform HEP correctly with minimal cueing or supervision from therapist to encourage independent management of symptoms. MET     Long term goals: 10 weeks or 20 visits   - Pt will demonstrate increased lumbar MedX ROM by at least 12 degrees from initial ROM value, resulting in improved ability to perform functional forward bending while standing and sitting. MET  - Pt will demonstrate increased MedX average isometric strength value by 40% from initial test resulting in improved ability to perform bending, lifting, and carrying activities safely and confidently.MET  - Pt to demonstrate ability to independently control and reduce their pain through posture positioning and mechanical movements throughout a typical day. MET  - Pt will demonstrate reduced pain and improved functional outcomes as reported on the FOTO by reaching an intake score of >/= 50% functional ability in order to demonstrate subjective improvement in patient's condition. . Not MET  - Pt will demonstrate independence with the HEP at discharge. MET  - Pt will be I with all transfers (patient goal) MET   Pt will demonstrate gait without AD for short distances  (50-100ft) within home independently MET      Plan:  Continue with wellness program    Therapist: Leandra Yanez, PT  3/20/2024

## 2024-04-02 ENCOUNTER — INFUSION (OUTPATIENT)
Dept: INFECTIOUS DISEASES | Facility: HOSPITAL | Age: 79
End: 2024-04-02
Payer: MEDICARE

## 2024-04-02 VITALS
HEART RATE: 78 BPM | DIASTOLIC BLOOD PRESSURE: 71 MMHG | BODY MASS INDEX: 26.93 KG/M2 | HEIGHT: 63 IN | OXYGEN SATURATION: 95 % | WEIGHT: 152 LBS | RESPIRATION RATE: 18 BRPM | TEMPERATURE: 99 F | SYSTOLIC BLOOD PRESSURE: 150 MMHG

## 2024-04-02 DIAGNOSIS — M81.0 OSTEOPOROSIS, POST-MENOPAUSAL: Primary | ICD-10-CM

## 2024-04-02 PROCEDURE — 63600175 PHARM REV CODE 636 W HCPCS: Mod: JZ,JG | Performed by: INTERNAL MEDICINE

## 2024-04-02 PROCEDURE — 96372 THER/PROPH/DIAG INJ SC/IM: CPT

## 2024-04-02 RX ORDER — ROMOSOZUMAB-AQQG 105 MG/1.17ML
105 INJECTION, SOLUTION SUBCUTANEOUS
COMMUNITY
End: 2024-06-05

## 2024-04-02 RX ADMIN — ROMOSOZUMAB-AQQG 210 MG: 105 INJECTION, SOLUTION SUBCUTANEOUS at 10:04

## 2024-04-08 ENCOUNTER — DOCUMENTATION ONLY (OUTPATIENT)
Dept: REHABILITATION | Facility: HOSPITAL | Age: 79
End: 2024-04-08
Payer: MEDICARE

## 2024-04-08 NOTE — PROGRESS NOTES
Health  Wellness Visit Note    Name: Eugenia Diaz  Clinic Number: [id number]  Physician: Ector Pedro MD  Diagnosis: No diagnosis found.  Past Medical History:   Diagnosis Date    Allergy     Anemia     Asthma     Bronchitis     Depression     Generalized headaches     History of endometriosis     Hyperlipidemia     Hypertension     Hypothyroidism     Osteoporosis, unspecified     PCO (posterior capsular opacification), left     Recurrent upper respiratory infection (URI)     Thyroid disease     hypothyroidism    TMJ dysfunction      Visit Number: 21  Precautions: Gait/Balance, R THR, Fall Risk, Back Spasms      1st PT visit:  10/26/2023  Year of care end date:  October 2024  Mindbody plan: 1F  Patient level: NP    Time In: 11:00 AM  Time Out: 12:04 PM  Total Treatment Time: 64 Minutes    Wellness Vision 2022  Handout on this week's wellness topic Breathing Awareness provided  along with a discussion on what it means, the benefits, and suggestions for practice.  Reviewed last week's topic of n/a (today is the patient's initial Wellness session).    Subjective:   Patient reports she has been feeling great since her physical therapy discharge. She has had little to no low back pain but has had knee and ankle joint pain. Her leg is a top complaint coming into Wellness today. She does her stretches in clusters when she finds the time. Patient does not ice her back at home.     While on the back extension, patient's left side of her back began to spasm. She stopped at rep 14 and took a break to rest before continuing on machines. She reports no low back issues at the end of Wellness.    Objective:   Eugenia completed therapeutic stretches (EIL, BRIAN) and the following MedX exercise machines: core lumbar, torso rotation l/r, leg extension, leg curl, upright row, chest press, biceps curl, triceps extension, leg press    See exercise log in patient folder for rate of exertion and repetitions completed.       Fitness Machine  Education Key:  E=education on equipment initiated and further follow up and education needed  I=independent with  and exercise.  The patient:  Adjusts machines to his/her settings  Uses equipment levers, pins, weights safely  Maintains safe and correct posture while exercising  Moves through exercise with correct pace and control  Gets on and off equipment safely      Lumbar/Cervical Ext.  Torso Rotation  Leg Press    Leg Extension  Seated Leg Curl  Chest Press    Seated Row  Hip ADD  Hip ABD    Triceps Extension  Bicep Curl  Other:      [x] Indicates exercise has been taught for home  Lumbar/Cervical Ext. [] Torso Rotation [] Leg Press []   Leg Extension [] Seated Leg Curl [] Chest Press []   Seated Row [] Hip ADD [] Hip ABD []   Triceps Extension [] Bicep Curl [] Other:        Assessment:   Patient tolerated Patient tolerated MedX Core Lumbar Strength and all other peripheral exercises without an increase in symptoms. Patient warmed up on nustep for 5 minutes, stretched, and iced low back for 5 minutes after the workout.     Plan:  Continue with established plan of care towards wellness goals.     Health  : Melissa Ruvalcaba  4/8/2024

## 2024-04-17 ENCOUNTER — DOCUMENTATION ONLY (OUTPATIENT)
Dept: REHABILITATION | Facility: HOSPITAL | Age: 79
End: 2024-04-17
Payer: MEDICARE

## 2024-04-17 NOTE — PROGRESS NOTES
Health  Wellness Visit Note    Name: Eugenia Diaz  Clinic Number: [id number]  Physician: No ref. provider found  Diagnosis: No diagnosis found.  Past Medical History:   Diagnosis Date    Allergy     Anemia     Asthma     Bronchitis     Depression     Generalized headaches     History of endometriosis     Hyperlipidemia     Hypertension     Hypothyroidism     Osteoporosis, unspecified     PCO (posterior capsular opacification), left     Recurrent upper respiratory infection (URI)     Thyroid disease     hypothyroidism    TMJ dysfunction      Visit Number: 22  Precautions: Gait/Balance, R THR, Fall Risk, Back Spasms      1st PT visit:  10/26/2023  Year of care end date:  October 2024  Mindbody plan: 60-- 6 Months  Patient level: C    Time In: 11:00 AM  Time Out: 12:04 PM  Total Treatment Time: 64 Minutes    Wellness Vision 2022  Handout on this week's wellness topic Air Quality and Oxygen Utilization provided  along with a discussion on what it means, the benefits, and suggestions for practice.  Reviewed last week's topic of Breathing Awareness.    Subjective:   Patient reports a 2/10 low back pain coming into Wellness today. During the night time she has a lot of muscle and joint pain in her legs. The pain is sporadic and can never be prevented. Patient has a bruise covering her right bicep. She can not pin point where the bruise comes from. Patient reports she's had some accidents making it to the restroom over the last two months. The accidents mostly happen when she is carrying groceries inside from the car. HC and patient discussed going to the restroom prior to the store. If the accidents increase, to consult with her PCP about pelvic floor physical therapy.     Objective:   Eugenia completed therapeutic stretches (EIL, BRIAN) and the following MedX exercise machines: core lumbar, torso rotation l/r, leg extension, leg curl, upright row, chest press, biceps curl, triceps extension, leg press    See exercise log in  patient folder for rate of exertion and repetitions completed.       Fitness Machine Education Key:  E=education on equipment initiated and further follow up and education needed  I=independent with  and exercise.  The patient:  Adjusts machines to his/her settings  Uses equipment levers, pins, weights safely  Maintains safe and correct posture while exercising  Moves through exercise with correct pace and control  Gets on and off equipment safely      Lumbar/Cervical Ext.  Torso Rotation  Leg Press    Leg Extension  Seated Leg Curl  Chest Press    Seated Row  Hip ADD  Hip ABD    Triceps Extension  Bicep Curl  Other:      [x] Indicates exercise has been taught for home  Lumbar/Cervical Ext. [] Torso Rotation [] Leg Press []   Leg Extension [] Seated Leg Curl [] Chest Press []   Seated Row [] Hip ADD [] Hip ABD []   Triceps Extension [] Bicep Curl [] Other:        Assessment:   Patient tolerated Patient tolerated MedX Core Lumbar Strength and all other peripheral exercises without an increase in symptoms. Patient warmed up on nustep for 5 minutes, stretched, and iced low back for 5 minutes after the workout.     Plan:  Continue with established plan of care towards wellness goals.     Health  : Melissa Ruvalcaba  4/17/2024

## 2024-04-24 ENCOUNTER — DOCUMENTATION ONLY (OUTPATIENT)
Dept: REHABILITATION | Facility: HOSPITAL | Age: 79
End: 2024-04-24
Payer: MEDICARE

## 2024-04-24 NOTE — PROGRESS NOTES
Health  Wellness Visit Note    Name: Eugenia Diaz  Clinic Number: [id number]  Physician: No ref. provider found  Diagnosis: No diagnosis found.  Past Medical History:   Diagnosis Date    Allergy     Anemia     Asthma     Bronchitis     Depression     Generalized headaches     History of endometriosis     Hyperlipidemia     Hypertension     Hypothyroidism     Osteoporosis, unspecified     PCO (posterior capsular opacification), left     Recurrent upper respiratory infection (URI)     Thyroid disease     hypothyroidism    TMJ dysfunction      Visit Number: 23  Precautions: Gait/Balance, R THR, Fall Risk, Back Spasms      1st PT visit:  10/26/2023  Year of care end date:  October 2024  Mindbody plan: 60-- 6 Months  Patient level: C    Time In: 12:30 PM  Time Out: 1:25 PM  Total Treatment Time: 55 Minutes    Wellness Vision 2022  Handout on this week's wellness topic Breathing Exercises provided  along with a discussion on what it means, the benefits, and suggestions for practice.  Reviewed last week's topic of Air Quality and Oxygen Utilization.     Subjective:   Patient reports she has had some low back pain since her last Wellness session. She is taking a muscle relaxer to help subside her pain. Patient does her stretches every morning before beginning her day. She has a lot of muscle and joint pain in her right leg. The pain is sporadic and can never be prevented. The bruise on her right bicep is improving and slowly fading. She still has a knot and plans to show her nurse soon. Patient does not ice at home.     Patient reports she's had some accidents making it to the restroom over the last two months. The accidents mostly happen when she is carrying groceries inside from the car. HC and patient discussed going to the restroom prior to the store. If the accidents increase, to consult with her PCP about pelvic floor physical therapy.     Objective:   Eugenia completed therapeutic stretches (EIL, BRIAN) and the following  MedX exercise machines: core lumbar, torso rotation l/r, leg extension, leg curl, upright row, chest press, biceps curl, triceps extension, leg press    See exercise log in patient folder for rate of exertion and repetitions completed.       Fitness Machine Education Key:  E=education on equipment initiated and further follow up and education needed  I=independent with  and exercise.  The patient:  Adjusts machines to his/her settings  Uses equipment levers, pins, weights safely  Maintains safe and correct posture while exercising  Moves through exercise with correct pace and control  Gets on and off equipment safely      Lumbar/Cervical Ext.  Torso Rotation  Leg Press    Leg Extension  Seated Leg Curl  Chest Press    Seated Row  Hip ADD  Hip ABD    Triceps Extension  Bicep Curl  Other:      [x] Indicates exercise has been taught for home  Lumbar/Cervical Ext. [] Torso Rotation [] Leg Press []   Leg Extension [] Seated Leg Curl [] Chest Press []   Seated Row [] Hip ADD [] Hip ABD []   Triceps Extension [] Bicep Curl [] Other:        Assessment:   Patient tolerated Patient tolerated MedX Core Lumbar Strength and all other peripheral exercises without an increase in symptoms. Patient warmed up on nustep for 5 minutes, stretched, and iced low back for 5 minutes after the workout.     Plan:  Continue with established plan of care towards wellness goals.     Health  : Melissa Ruvalcaba  4/24/2024

## 2024-05-02 ENCOUNTER — INFUSION (OUTPATIENT)
Dept: INFECTIOUS DISEASES | Facility: HOSPITAL | Age: 79
End: 2024-05-02
Payer: MEDICARE

## 2024-05-02 ENCOUNTER — DOCUMENTATION ONLY (OUTPATIENT)
Dept: REHABILITATION | Facility: HOSPITAL | Age: 79
End: 2024-05-02
Payer: MEDICARE

## 2024-05-02 VITALS
BODY MASS INDEX: 27.44 KG/M2 | TEMPERATURE: 99 F | WEIGHT: 154.88 LBS | OXYGEN SATURATION: 95 % | SYSTOLIC BLOOD PRESSURE: 151 MMHG | RESPIRATION RATE: 20 BRPM | DIASTOLIC BLOOD PRESSURE: 76 MMHG | HEART RATE: 85 BPM | HEIGHT: 63 IN

## 2024-05-02 DIAGNOSIS — M81.0 OSTEOPOROSIS, POST-MENOPAUSAL: Primary | ICD-10-CM

## 2024-05-02 PROCEDURE — 63600175 PHARM REV CODE 636 W HCPCS: Mod: JZ,JG | Performed by: INTERNAL MEDICINE

## 2024-05-02 PROCEDURE — 96372 THER/PROPH/DIAG INJ SC/IM: CPT

## 2024-05-02 RX ADMIN — ROMOSOZUMAB-AQQG 210 MG: 105 INJECTION, SOLUTION SUBCUTANEOUS at 11:05

## 2024-05-02 NOTE — PROGRESS NOTES
Patient arrives for Evenity (2 separate injections) injection - confirms use of calcium and vitamin D supplements and denies dental procedures over past 3 months - administered per guidelines.    Limited head-to-toe assessment due to privacy issues and visit reason though the opportunity was given for patient to express any concerns

## 2024-05-02 NOTE — PROGRESS NOTES
Health  Wellness Visit Note    Name: Eugenia Diaz  Clinic Number: [id number]  Physician: No ref. provider found  Diagnosis: No diagnosis found.  Past Medical History:   Diagnosis Date    Allergy     Anemia     Asthma     Bronchitis     Depression     Generalized headaches     History of endometriosis     Hyperlipidemia     Hypertension     Hypothyroidism     Osteoporosis, unspecified     PCO (posterior capsular opacification), left     Recurrent upper respiratory infection (URI)     Thyroid disease     hypothyroidism    TMJ dysfunction      Visit Number: 24  Precautions: Gait/Balance, R THR, Fall Risk, Back Spasms      1st PT visit:  10/26/2023  Year of care end date:  October 2024  Mindbody plan: 60-- 6 Months  Patient level: C    Time In: 2:30 PM  Time Out: 3:35 PM  Total Treatment Time: 65 Minutes    Wellness Vision 2022  Handout on this week's wellness topic Creative Brain provided  along with a discussion on what it means, the benefits, and suggestions for practice.  Reviewed last week's topic of Breathing Exercises.     Subjective:   Patient reports no complaints of low back pain. Since her last Wellness session, she had a death in the family. She has been busy with her family and making the necessary arrangements. She did some light stretching every morning before beginning her day. The bruise on her bicep is clearing up and reports the knot seems to be loosening up.     For today's session, HC taught straight leg raises. Patient completed two sets of ten each leg.     Patient may not be a good candidate to learn machines independently.     Patient reports she's had some accidents making it to the restroom over the last two months. The accidents mostly happen when she is carrying groceries inside from the car. HC and patient discussed going to the restroom prior to the store. If the accidents increase, to consult with her PCP about pelvic floor physical therapy.     Objective:   Eugenia completed therapeutic  stretches (EIL, BRIAN) and the following MedX exercise machines: core lumbar, torso rotation l/r, leg extension, leg curl, upright row, chest press, biceps curl, triceps extension, leg press    See exercise log in patient folder for rate of exertion and repetitions completed.       Fitness Machine Education Key:  E=education on equipment initiated and further follow up and education needed  I=independent with  and exercise.  The patient:  Adjusts machines to his/her settings  Uses equipment levers, pins, weights safely  Maintains safe and correct posture while exercising  Moves through exercise with correct pace and control  Gets on and off equipment safely      Lumbar/Cervical Ext.  Torso Rotation  Leg Press    Leg Extension  Seated Leg Curl  Chest Press    Seated Row  Hip ADD  Hip ABD    Triceps Extension  Bicep Curl  Other:      [x] Indicates exercise has been taught for home  Lumbar/Cervical Ext. [] Torso Rotation [] Leg Press []   Leg Extension [] Seated Leg Curl [] Chest Press []   Seated Row [] Hip ADD [] Hip ABD []   Triceps Extension [] Bicep Curl [] Other:        Assessment:   Patient tolerated Patient tolerated MedX Core Lumbar Strength and all other peripheral exercises without an increase in symptoms. Patient warmed up on nustep for 5 minutes, stretched, and iced low back for 5 minutes after the workout.     Plan:  Continue with established plan of care towards wellness goals.     Health  : Melissa Ruvalcaba  5/2/2024

## 2024-05-06 ENCOUNTER — PATIENT MESSAGE (OUTPATIENT)
Dept: ENDOCRINOLOGY | Facility: CLINIC | Age: 79
End: 2024-05-06
Payer: MEDICARE

## 2024-05-08 ENCOUNTER — DOCUMENTATION ONLY (OUTPATIENT)
Dept: REHABILITATION | Facility: HOSPITAL | Age: 79
End: 2024-05-08
Payer: MEDICARE

## 2024-05-08 NOTE — PROGRESS NOTES
Health  Wellness Visit Note    Name: Eugenia Diaz  Clinic Number: [id number]  Physician: No ref. provider found  Diagnosis: No diagnosis found.  Past Medical History:   Diagnosis Date    Allergy     Anemia     Asthma     Bronchitis     Depression     Generalized headaches     History of endometriosis     Hyperlipidemia     Hypertension     Hypothyroidism     Osteoporosis, unspecified     PCO (posterior capsular opacification), left     Recurrent upper respiratory infection (URI)     Thyroid disease     hypothyroidism    TMJ dysfunction      Visit Number: 25  Precautions: Gait/Balance, R THR, Fall Risk, Back Spasms      1st PT visit:  10/26/2023  Year of care end date:  October 2024  Mindbody plan: 60-- 6 Months  Patient level: C    Time In: 11:00 AM  Time Out: 12:00 PM  Total Treatment Time: 60 Minutes    Wellness Vision 2022  Handout on this week's wellness topic Learning was provided along with a discussion on what it means, the benefits, and suggestions for practice.  Reviewed last week's topic of Creative Brain.    Subjective:   Patient reports with 3/10 low back pain. She woke up with a gripping in her left lower back. Eased up with the exercises. She recently had a death in the family. She has been busy with her family and making the necessary arrangements. She did some light stretching every morning before beginning her day. The bruise on her bicep is clearing up and reports the knot seems to be loosening up.     For today's session, HC taught seated leg extensions, last session she learned straight leg raises. Patient completed two sets of ten each leg.     Patient may not be a good candidate to learn machines independently.     Patient reports she's had some accidents making it to the restroom over the last two months. The accidents mostly happen when she is carrying groceries inside from the car. HC and patient discussed going to the restroom prior to the store. If the accidents increase, to consult with  her PCP about pelvic floor physical therapy.     Objective:   Eugenia completed therapeutic stretches (EIL, BRIAN) and the following MedX exercise machines: core lumbar, torso rotation l/r, leg extension, leg curl, upright row, chest press, biceps curl, triceps extension, leg press    See exercise log in patient folder for rate of exertion and repetitions completed.       Fitness Machine Education Key:  E=education on equipment initiated and further follow up and education needed  I=independent with  and exercise.  The patient:  Adjusts machines to his/her settings  Uses equipment levers, pins, weights safely  Maintains safe and correct posture while exercising  Moves through exercise with correct pace and control  Gets on and off equipment safely      Lumbar/Cervical Ext.  Torso Rotation  Leg Press    Leg Extension  Seated Leg Curl  Chest Press    Seated Row  Hip ADD  Hip ABD    Triceps Extension  Bicep Curl  Other:      [x] Indicates exercise has been taught for home  Lumbar/Cervical Ext. [] Torso Rotation [] Leg Press []   Leg Extension [] Seated Leg Curl [] Chest Press []   Seated Row [] Hip ADD [] Hip ABD []   Triceps Extension [] Bicep Curl [] Other:        Assessment:   Patient tolerated Patient tolerated MedX Core Lumbar Strength and all other peripheral exercises without an increase in symptoms. Patient warmed up on nustep for 5 minutes, stretched, and iced low back for 5 minutes after the workout.     Plan:  Continue with established plan of care towards wellness goals.     Health  : Glory Hamilton  5/8/2024

## 2024-05-14 ENCOUNTER — DOCUMENTATION ONLY (OUTPATIENT)
Dept: REHABILITATION | Facility: HOSPITAL | Age: 79
End: 2024-05-14
Payer: MEDICARE

## 2024-05-14 DIAGNOSIS — J45.20 ASTHMA, MILD INTERMITTENT, WELL-CONTROLLED: ICD-10-CM

## 2024-05-14 RX ORDER — MONTELUKAST SODIUM 10 MG/1
10 TABLET ORAL NIGHTLY
Qty: 90 TABLET | Refills: 1 | Status: SHIPPED | OUTPATIENT
Start: 2024-05-14

## 2024-05-14 RX ORDER — TRIAMTERENE AND HYDROCHLOROTHIAZIDE 37.5; 25 MG/1; MG/1
CAPSULE ORAL
Qty: 90 CAPSULE | Refills: 1 | Status: SHIPPED | OUTPATIENT
Start: 2024-05-14

## 2024-05-14 NOTE — PROGRESS NOTES
Health  Wellness Visit Note    Name: Eugenia Diaz  Clinic Number: [id number]  Physician: No ref. provider found  Diagnosis: No diagnosis found.  Past Medical History:   Diagnosis Date    Allergy     Anemia     Asthma     Bronchitis     Depression     Generalized headaches     History of endometriosis     Hyperlipidemia     Hypertension     Hypothyroidism     Osteoporosis, unspecified     PCO (posterior capsular opacification), left     Recurrent upper respiratory infection (URI)     Thyroid disease     hypothyroidism    TMJ dysfunction      Visit Number: 26  Precautions: Gait/Balance, R THR, Fall Risk, Back Spasms      1st PT visit:  10/26/2023  Year of care end date:  October 2024  Mindbody plan: 60-- 6 Months  Patient level: C    Time In: 11:00 AM  Time Out: 12:05 PM  Total Treatment Time: 65 Minutes    Wellness Vision 2022  Handout on this week's wellness topic Positive Thinking was provided along with a discussion on what it means, the benefits, and suggestions for practice.  Reviewed last week's topic of Learning.     Subjective:   Patient reports no low back pain but does have some rib cage pain. Since her last Evenity shot she has had a lot of joint pain in her legs. She also is having the TMJ side affect from the shots. Patient plans to see her MD on a virtual call soon. She has been doing light stretching and staying social with family. Patient does not ice at home.     Patient may not be a good candidate to learn machines independently.     Patient reports she's had some accidents making it to the restroom over the last two months. The accidents mostly happen when she is carrying groceries inside from the car. HC and patient discussed going to the restroom prior to the store. If the accidents increase, to consult with her PCP about pelvic floor physical therapy.     Objective:   Eugenia completed therapeutic stretches (EIL, BRIAN) and the following MedX exercise machines: core lumbar, torso rotation l/r, leg  extension, leg curl, upright row, chest press, biceps curl, triceps extension, leg press    See exercise log in patient folder for rate of exertion and repetitions completed.       Fitness Machine Education Key:  E=education on equipment initiated and further follow up and education needed  I=independent with  and exercise.  The patient:  Adjusts machines to his/her settings  Uses equipment levers, pins, weights safely  Maintains safe and correct posture while exercising  Moves through exercise with correct pace and control  Gets on and off equipment safely      Lumbar/Cervical Ext.  Torso Rotation  Leg Press    Leg Extension  Seated Leg Curl  Chest Press    Seated Row  Hip ADD  Hip ABD    Triceps Extension  Bicep Curl  Other:      [x] Indicates exercise has been taught for home  Lumbar/Cervical Ext. [] Torso Rotation [] Leg Press []   Leg Extension [] Seated Leg Curl [] Chest Press []   Seated Row [] Hip ADD [] Hip ABD []   Triceps Extension [] Bicep Curl [] Other:        Assessment:   Patient tolerated Patient tolerated MedX Core Lumbar Strength and all other peripheral exercises without an increase in symptoms. Patient warmed up on nustep for 5 minutes, stretched, and iced low back for 5 minutes after the workout.     Plan:  Continue with established plan of care towards wellness goals.     Health  : Melissa Ruvalcaba  5/14/2024

## 2024-05-14 NOTE — TELEPHONE ENCOUNTER
I have reviewed and agree with the assessment below. Vitals abnormal, montelukast never signed by PCP. Thank you.    Provider staff--please do not re-use encounters that already have a Refill Routing Note.

## 2024-05-14 NOTE — TELEPHONE ENCOUNTER
Refill Routing Note   Medication(s) are not appropriate for processing by [facility] for the following reason(s):        Required vitals abnormal    [facility] action(s):  Defer   Requires labs : Yes             Appointments  past 12m or future 3m with PCP    Date Provider   Last Visit   11/13/2023 Latia Aguilar MD   Next Visit   Visit date not found Latia Aguilar MD   ED visits in past 90 days: 0        Note composed:11:56 AM 05/14/2024

## 2024-05-14 NOTE — TELEPHONE ENCOUNTER
----- Message from Carolina Mai sent at 5/14/2024 12:09 PM CDT -----  Contact: N[facility] with [facility]   Requesting an RX refill or new RX.  Is this a refill or new RX: new  RX name and strength (copy/paste from chart):  montelukast (SINGULAIR) 10 mg tablet  Is this a 30 day or 90 day RX: 90  Pharmacy name and phone # (copy/paste from chart):      [facility] [place] [address] [place] [address]  Phone: [phone] Fax: [phone]

## 2024-05-14 NOTE — TELEPHONE ENCOUNTER
Care Due:                  Date            Visit Type   Department     Provider  --------------------------------------------------------------------------------                                EP -                              PRIMARY      [facility] INTERNAL  Last Visit: 11-      CARE ([facility])   MEDICINE       [facility]  Next Visit: None Scheduled  None         None Found                                                            Last  Test          Frequency    Reason                     Performed    Due Date  --------------------------------------------------------------------------------    TSH.........  12 months..  levothyroxine............  07- 07-    [facility] Embedded Care Due Messages. Reference number: [id number].   5/14/2024 10:30:54 AM CDT

## 2024-05-15 ENCOUNTER — OFFICE VISIT (OUTPATIENT)
Dept: ENDOCRINOLOGY | Facility: CLINIC | Age: 79
End: 2024-05-15
Payer: MEDICARE

## 2024-05-15 DIAGNOSIS — M81.0 OSTEOPOROSIS, POST-MENOPAUSAL: Primary | ICD-10-CM

## 2024-05-15 PROCEDURE — 99214 OFFICE O/P EST MOD 30 MIN: CPT | Mod: 95,,, | Performed by: INTERNAL MEDICINE

## 2024-05-15 PROCEDURE — G2211 COMPLEX E/M VISIT ADD ON: HCPCS | Mod: 95,,, | Performed by: INTERNAL MEDICINE

## 2024-05-15 NOTE — ASSESSMENT & PLAN NOTE
Risk factors: , postmenopausal status, , fractures, family history of osteoporosis (mother and maternal aunt), history of prednisone use.   Multiple fractures: ankle, feet, knee cap, rib    Very little exercise   Stays active but deconditioned and has scoliosis     Received evenity 8 injections, but due to s/e wants to switch to other treatment, options include forteo or prolia  Wants to delay treatment until after June. Consider forteo - would be second course - v prolia.     Vitamin D is normal   Normal kidney function last year, plan to repeat   Check vitamin D levels today   Continue replacement

## 2024-05-15 NOTE — PATIENT INSTRUCTIONS
Labs in the next month    Bone density test in 3/2025    In June or july of 2024 consider restarting forteo.     See dentist in June.       I would recommend starting a medicine called Forteo, which is given as a subcutaneous injection once per day. The medication will help to rebuild your bone density and hopefully prevent further fractures. It's given for a total of two years, followed by an alternative medication to consolidate the gains from the Forteo.   Side effects are usually very mild. The most common thing is light headedness that can occur within an hour of taking the medication. Usually this is mild and can be remedied by taking the medication at night time before bed. In rat studies using much higher doses than what we use clinically, the medication was linked to a rare bone cancer called osteosarcoma. There are observational studies looking for this type of cancer in patients who have used forteo, and to date, none have been reported. This is an ongoing study. But to be cautious we do not use in patients who have other risk factors for bone cancer such as direct radiation to the bone.     Please let me know what questions/concerns you have and if you are in agreement to starting the medication.    [email or web address]      Please don't hesitate to contact me if you have any questions or concerns.

## 2024-05-15 NOTE — PROGRESS NOTES
Subjective:      Patient ID: Eugenia Diaz is a 78 y.o. female.    Chief Complaint:  No chief complaint on file.      History of Present Illness    Ms Diaz is a 78 year old woman who is here for evaluation of side effects following evenity injections. Switched to evenity injections based on loss while on reclast and low T score<-3.0.    Has had severe pain over her ankle joint and knee joint that occurs during the night wakes her up.  Pain subsided after about 1 - 2 hours after a few days. However after the 8th injection this has not subsided.    Has had eight injections this occurred following the 7th and again 8th injection.     Osteoporosis, post-menopausal  Risk factors: , postmenopausal status, , fractures, family history of osteoporosis (mother and maternal aunt), history of prednisone use.   Multiple fractures: ankle, feet, knee cap, rib     Very little exercise   Stays active but deconditioned and has scoliosis      Switched to evenity based on loss while on reclast and low T score<-3.0    Review of Systems    Objective:   Physical Exam  There were no vitals filed for this visit.    BP Readings from Last 3 Encounters:   05/02/24 (!) 151/76   04/02/24 (!) 150/71   02/28/24 (!) 163/88     Wt Readings from Last 1 Encounters:   05/02/24 1141 70.2 kg (154 lb 14 oz)         There is no height or weight on file to calculate BMI.    Lab Review:   Lab Results   Component Value Date    HGBA1C 6.2 06/18/2010     Lab Results   Component Value Date    CHOL 145 11/09/2023    HDL 63 11/09/2023    LDLCALC 69.6 11/09/2023    TRIG 62 11/09/2023    CHOLHDL 43.4 11/09/2023     Lab Results   Component Value Date     11/09/2023    K 3.8 11/09/2023    CL 99 11/09/2023    CO2 28 11/09/2023    GLU 85 11/09/2023    BUN 11 11/09/2023    CREATININE 0.7 11/09/2023    CALCIUM 9.4 11/09/2023    PROT 6.7 11/09/2023    ALBUMIN 3.8 11/09/2023    BILITOT 0.7 11/09/2023    ALKPHOS 97 11/09/2023    AST 19 11/09/2023     ALT 16 11/09/2023    ANIONGAP 13 11/09/2023    ESTGFRAFRICA >60.0 07/06/2021    EGFRNONAA >60.0 07/06/2021    TSH 1.034 07/18/2023      Latest Reference Range & Units 07/18/23 11:52   Vitamin D 30 - 96 ng/mL 55         Assessment and Plan     Osteoporosis, post-menopausal  Risk factors: , postmenopausal status, , fractures, family history of osteoporosis (mother and maternal aunt), history of prednisone use.   Multiple fractures: ankle, feet, knee cap, rib    Very little exercise   Stays active but deconditioned and has scoliosis     Received evenity 8 injections, but due to s/e wants to switch to other treatment, options include forteo or prolia  Wants to delay treatment until after June. Consider forteo - would be second course - v prolia.     Vitamin D is normal   Normal kidney function last year, plan to repeat   Check vitamin D levels today   Continue replacement       The patient location is: home  The chief complaint leading to consultation is:     Visit type: audiovisual    Face to Face time with patient: 23 minutes of total time spent on the encounter, which includes face to face time and non-face to face time preparing to see the patient (eg, review of tests), Obtaining and/or reviewing separately obtained history, Documenting clinical information in the electronic or other health record, Independently interpreting results (not separately reported) and communicating results to the patient/family/caregiver, or Care coordination (not separately reported).         Each patient to whom he or she provides medical services by telemedicine is:  (1) informed of the relationship between the physician and patient and the respective role of any other health care provider with respect to management of the patient; and (2) notified that he or she may decline to receive medical services by telemedicine and may withdraw from such care at any time.    Notes:

## 2024-05-17 ENCOUNTER — LAB VISIT (OUTPATIENT)
Dept: LAB | Facility: HOSPITAL | Age: 79
End: 2024-05-17
Attending: INTERNAL MEDICINE
Payer: MEDICARE

## 2024-05-17 DIAGNOSIS — M81.0 OSTEOPOROSIS, POST-MENOPAUSAL: ICD-10-CM

## 2024-05-17 LAB
ALBUMIN SERPL BCP-MCNC: 3.7 G/DL (ref 3.5–5.2)
ANION GAP SERPL CALC-SCNC: 9 MMOL/L (ref 8–16)
BUN SERPL-MCNC: 11 MG/DL (ref 8–23)
CALCIUM SERPL-MCNC: 9.9 MG/DL (ref 8.7–10.5)
CHLORIDE SERPL-SCNC: 98 MMOL/L (ref 95–110)
CO2 SERPL-SCNC: 30 MMOL/L (ref 23–29)
CREAT SERPL-MCNC: 0.8 MG/DL (ref 0.5–1.4)
EST. GFR  (NO RACE VARIABLE): >60 ML/MIN/1.73 M^2
GLUCOSE SERPL-MCNC: 86 MG/DL (ref 70–110)
PHOSPHATE SERPL-MCNC: 3.2 MG/DL (ref 2.7–4.5)
POTASSIUM SERPL-SCNC: 3.8 MMOL/L (ref 3.5–5.1)
SODIUM SERPL-SCNC: 137 MMOL/L (ref 136–145)

## 2024-05-17 PROCEDURE — 80069 RENAL FUNCTION PANEL: CPT | Performed by: INTERNAL MEDICINE

## 2024-05-17 PROCEDURE — 36415 COLL VENOUS BLD VENIPUNCTURE: CPT | Performed by: INTERNAL MEDICINE

## 2024-05-18 ENCOUNTER — PATIENT MESSAGE (OUTPATIENT)
Dept: INTERNAL MEDICINE | Facility: CLINIC | Age: 79
End: 2024-05-18
Payer: MEDICARE

## 2024-05-18 DIAGNOSIS — Z12.31 ENCOUNTER FOR SCREENING MAMMOGRAM FOR MALIGNANT NEOPLASM OF BREAST: ICD-10-CM

## 2024-05-18 DIAGNOSIS — Z12.39 ENCOUNTER FOR SCREENING FOR MALIGNANT NEOPLASM OF BREAST, UNSPECIFIED SCREENING MODALITY: Primary | ICD-10-CM

## 2024-05-21 ENCOUNTER — DOCUMENTATION ONLY (OUTPATIENT)
Dept: REHABILITATION | Facility: HOSPITAL | Age: 79
End: 2024-05-21
Payer: MEDICARE

## 2024-05-21 NOTE — PROGRESS NOTES
Health  Wellness Visit Note    Name: Eugenia Diaz  Clinic Number: [id number]  Physician: No ref. provider found  Diagnosis: No diagnosis found.  Past Medical History:   Diagnosis Date    Allergy     Anemia     Asthma     Bronchitis     Depression     Generalized headaches     History of endometriosis     Hyperlipidemia     Hypertension     Hypothyroidism     Osteoporosis, unspecified     PCO (posterior capsular opacification), left     Recurrent upper respiratory infection (URI)     Thyroid disease     hypothyroidism    TMJ dysfunction      Visit Number: 27  Precautions: Gait/Balance, R THR, Fall Risk, Back Spasms      1st PT visit:  10/26/2023  Year of care end date:  October 2024  Mindbody plan: 60-- 6 Months  Patient level: C    Time In: 11:00 AM  Time Out: 12:00 PM  Total Treatment Time: 60 Minutes    Wellness Vision 2022  Handout on this week's wellness topic Memory was provided along with a discussion on what it means, the benefits, and suggestions for practice.  Reviewed last week's topic of Positive Thinking    Subjective:   Patient reports she has no complaints of low back pain. The rib cage pain she had last week has gotten better. The pain has not completely subsided but is not as consistent. She spoke with her MD about the Evenity shots' side effects. They have decided to discontinue the remaining shots and change medicine. She has been doing light stretching and staying social with family. Patient does not ice at home.     Patient may not be a good candidate to learn machines independently.     Patient reports she's had some accidents making it to the restroom over the last two months. The accidents mostly happen when she is carrying groceries inside from the car. HC and patient discussed going to the restroom prior to the store. If the accidents increase, to consult with her PCP about pelvic floor physical therapy.     Objective:   Eugenia completed therapeutic stretches (EIL, BRIAN) and the following MedX  exercise machines: core lumbar, torso rotation l/r, leg extension, leg curl, upright row, chest press, biceps curl, triceps extension, leg press    See exercise log in patient folder for rate of exertion and repetitions completed.       Fitness Machine Education Key:  E=education on equipment initiated and further follow up and education needed  I=independent with  and exercise.  The patient:  Adjusts machines to his/her settings  Uses equipment levers, pins, weights safely  Maintains safe and correct posture while exercising  Moves through exercise with correct pace and control  Gets on and off equipment safely      Lumbar/Cervical Ext.  Torso Rotation  Leg Press    Leg Extension  Seated Leg Curl  Chest Press    Seated Row  Hip ADD  Hip ABD    Triceps Extension  Bicep Curl  Other:      [x] Indicates exercise has been taught for home  Lumbar/Cervical Ext. [] Torso Rotation [] Leg Press []   Leg Extension [] Seated Leg Curl [] Chest Press []   Seated Row [] Hip ADD [] Hip ABD []   Triceps Extension [] Bicep Curl [] Other:        Assessment:   Patient tolerated Patient tolerated MedX Core Lumbar Strength and all other peripheral exercises without an increase in symptoms. Patient warmed up on nustep for 5 minutes, stretched, and iced low back for 5 minutes after the workout.     Plan:  Continue with established plan of care towards wellness goals.     Health  : Melissa Ruvalcaba  5/21/2024

## 2024-05-28 ENCOUNTER — DOCUMENTATION ONLY (OUTPATIENT)
Dept: REHABILITATION | Facility: HOSPITAL | Age: 79
End: 2024-05-28
Payer: MEDICARE

## 2024-05-28 NOTE — PROGRESS NOTES
Health  Wellness Visit Note    Name: Eugenia Diaz  Clinic Number: [id number]  Physician: No ref. provider found  Diagnosis: No diagnosis found.  Past Medical History:   Diagnosis Date    Allergy     Anemia     Asthma     Bronchitis     Depression     Generalized headaches     History of endometriosis     Hyperlipidemia     Hypertension     Hypothyroidism     Osteoporosis, unspecified     PCO (posterior capsular opacification), left     Recurrent upper respiratory infection (URI)     Thyroid disease     hypothyroidism    TMJ dysfunction      Visit Number: 28  Precautions: Gait/Balance, R THR, Fall Risk, Back Spasms      1st PT visit:  10/26/2023  Year of care end date:  October 2024  Mindbody plan: 60-- 6 Months  Patient level: C    Time In: 10:55 AM  Time Out: 11:55 AM  Total Treatment Time: 60 Minutes    Wellness Vision 2022  Handout on this week's wellness topic Mindfulness was provided along with a discussion on what it means, the benefits, and suggestions for practice.  Reviewed last week's topic of Memory.     Subjective:   Patient reports she is back pain free coming into Wellness today. Last night she had trouble sleeping. She is still having leg joint pain from the Evenity shots. [place] She has been doing light stretching and staying social with family and her cat. Patient does not ice at home. She will be getting a massage after her Wellness session today.     Patient may not be a good candidate to learn machines independently.     Patient reports she's had some accidents making it to the restroom over the last two months. The accidents mostly happen when she is carrying groceries inside from the car. HC and patient discussed going to the restroom prior to the store. If the accidents increase, to consult with her PCP about pelvic floor physical therapy.     Objective:   Eugenia completed therapeutic stretches (EIL, BRIAN) and the following MedX exercise machines: core  lumbar, torso rotation l/r, leg extension, leg curl, upright row, chest press, biceps curl, triceps extension, leg press    See exercise log in patient folder for rate of exertion and repetitions completed.       Fitness Machine Education Key:  E=education on equipment initiated and further follow up and education needed  I=independent with  and exercise.  The patient:  Adjusts machines to his/her settings  Uses equipment levers, pins, weights safely  Maintains safe and correct posture while exercising  Moves through exercise with correct pace and control  Gets on and off equipment safely      Lumbar/Cervical Ext.  Torso Rotation  Leg Press    Leg Extension  Seated Leg Curl  Chest Press    Seated Row  Hip ADD  Hip ABD    Triceps Extension  Bicep Curl  Other:      [x] Indicates exercise has been taught for home  Lumbar/Cervical Ext. [] Torso Rotation [] Leg Press []   Leg Extension [] Seated Leg Curl [] Chest Press []   Seated Row [] Hip ADD [] Hip ABD []   Triceps Extension [] Bicep Curl [] Other:        Assessment:   Patient tolerated Patient tolerated MedX Core Lumbar Strength and all other peripheral exercises without an increase in symptoms. Patient warmed up on nustep for 5 minutes, stretched, and iced low back for 5 minutes after the workout.     Plan:  Continue with established plan of care towards wellness goals.     Health  : Melissa Ruvalcaba  5/28/2024

## 2024-06-05 ENCOUNTER — PATIENT MESSAGE (OUTPATIENT)
Dept: ENDOCRINOLOGY | Facility: CLINIC | Age: 79
End: 2024-06-05
Payer: MEDICARE

## 2024-06-05 DIAGNOSIS — M81.0 OSTEOPOROSIS, POST-MENOPAUSAL: Primary | ICD-10-CM

## 2024-06-05 RX ORDER — TERIPARATIDE 250 UG/ML
20 INJECTION, SOLUTION SUBCUTANEOUS DAILY
Qty: 2.4 ML | Refills: 11 | Status: ACTIVE | OUTPATIENT
Start: 2024-06-05 | End: 2025-06-05

## 2024-06-10 ENCOUNTER — PATIENT MESSAGE (OUTPATIENT)
Dept: INTERNAL MEDICINE | Facility: CLINIC | Age: 79
End: 2024-06-10
Payer: MEDICARE

## 2024-06-10 ENCOUNTER — DOCUMENTATION ONLY (OUTPATIENT)
Dept: REHABILITATION | Facility: HOSPITAL | Age: 79
End: 2024-06-10
Payer: MEDICARE

## 2024-06-10 NOTE — PROGRESS NOTES
Health  Wellness Visit Note    Name: Eugenia Diaz  Clinic Number: [id number]  Physician: No ref. provider found  Diagnosis: No diagnosis found.  Past Medical History:   Diagnosis Date    Allergy     Anemia     Asthma     Bronchitis     Depression     Generalized headaches     History of endometriosis     Hyperlipidemia     Hypertension     Hypothyroidism     Osteoporosis, unspecified     PCO (posterior capsular opacification), left     Recurrent upper respiratory infection (URI)     Thyroid disease     hypothyroidism    TMJ dysfunction      Visit Number: 29  Precautions: Gait/Balance, R THR, Fall Risk, Back Spasms      1st PT visit:  10/26/2023  Year of care end date:  October 2024  Mindbody plan: 60-- 6 Months  Patient level: C    Time In: 11:00 AM  Time Out: 12:00 PM  Total Treatment Time: 60 Minutes    Wellness [facility] 2022  Handout on this week's wellness topic Anxiety was provided along with a discussion on what it means, the benefits, and suggestions for practice.  Reviewed last week's topic of n/a (patient did not attend Wellness last week).     Subjective:   Patient reports to Wellness after missing a week. Last Monday she was checking her home's safe and had difficulty getting up from the floor. She says it took her three or four tries before getting up. She twisted her knee in the process and she reports her low back is at a 4/10 today. Patient took it easy last week.  She got a massage after her last Wellness session. The massage therapist worked on her legs which gave her a lot of relief. She slept without any issues for three nights. Patient does not ice at home.     Patient may not be a good candidate to learn machines independently.     Patient reports she's had some accidents making it to the restroom over the last two months. The accidents mostly happen when she is carrying groceries inside from the car. HC and patient discussed going to the restroom prior to the store. If the accidents increase, to  consult with her PCP about pelvic floor physical therapy.     Objective:   Eugenia completed therapeutic stretches (EIL, BRIAN) and the following MedX exercise machines: core lumbar, torso rotation l/r, leg extension, leg curl, upright row, chest press, biceps curl, triceps extension, leg press    See exercise log in patient folder for rate of exertion and repetitions completed.       Fitness Machine Education Key:  E=education on equipment initiated and further follow up and education needed  I=independent with  and exercise.  The patient:  Adjusts machines to his/her settings  Uses equipment levers, pins, weights safely  Maintains safe and correct posture while exercising  Moves through exercise with correct pace and control  Gets on and off equipment safely      Lumbar/Cervical Ext.  Torso Rotation  Leg Press    Leg Extension  Seated Leg Curl  Chest Press    Seated Row  Hip ADD  Hip ABD    Triceps Extension  Bicep Curl  Other:      [x] Indicates exercise has been taught for home  Lumbar/Cervical Ext. [] Torso Rotation [] Leg Press []   Leg Extension [] Seated Leg Curl [] Chest Press []   Seated Row [] Hip ADD [] Hip ABD []   Triceps Extension [] Bicep Curl [] Other:        Assessment:   Patient tolerated Patient tolerated MedX Core Lumbar Strength and all other peripheral exercises without an increase in symptoms. Patient warmed up on nustep for 5 minutes, stretched, and iced low back for 5 minutes after the workout.     Plan:  Continue with established plan of care towards wellness goals.     Health  : Melissa Ruvalcaba  6/10/2024

## 2024-06-11 ENCOUNTER — PATIENT MESSAGE (OUTPATIENT)
Dept: INTERNAL MEDICINE | Facility: CLINIC | Age: 79
End: 2024-06-11
Payer: MEDICARE

## 2024-06-18 ENCOUNTER — DOCUMENTATION ONLY (OUTPATIENT)
Dept: REHABILITATION | Facility: HOSPITAL | Age: 79
End: 2024-06-18
Payer: MEDICARE

## 2024-06-18 NOTE — PROGRESS NOTES
Health  Wellness Visit Note    Name: Eugenia Diaz  Clinic Number: [id number]  Physician: No ref. provider found  Diagnosis: No diagnosis found.  Past Medical History:   Diagnosis Date    Allergy     Anemia     Asthma     Bronchitis     Depression     Generalized headaches     History of endometriosis     Hyperlipidemia     Hypertension     Hypothyroidism     Osteoporosis, unspecified     PCO (posterior capsular opacification), left     Recurrent upper respiratory infection (URI)     Thyroid disease     hypothyroidism    TMJ dysfunction      Visit Number: 30  Precautions: Gait/Balance, R THR, Fall Risk, Back Spasms      1st PT visit:  10/26/2023  Year of care end date:  October 2024  Mindbody plan: 60-- 6 Months  Patient level: C    Time In: 11:00 AM  Time Out: 12:00 PM  Total Treatment Time: 60 Minutes    Wellness Vision 2022  Handout on this week's wellness topic Fear-Avoidance was provided along with a discussion on what it means, the benefits, and suggestions for practice.  Reviewed last week's topic of Anxiety.     Subjective:   Patient reports no complaints of low back pain. She does have some ankle joint pain at night while sleeping but it is slowly improving. Last Thursday was her first week taking her Forteo shots in place of the Evenity shots. She has no complaints at this time. After Wellness today she has an hour massage scheduled with [facility]. She plans to ask for focus on her legs and upper back. Patient does not ice at home.    Patient may not be a good candidate to learn machines independently.     Patient reports she's had some accidents making it to the restroom over the last two months. The accidents mostly happen when she is carrying groceries inside from the car. HC and patient discussed going to the restroom prior to the store. If the accidents increase, to consult with her PCP about pelvic floor physical therapy.     Objective:   Eugenia completed therapeutic stretches (EIL, BRIAN) and the  following MedX exercise machines: core lumbar, torso rotation l/r, leg extension, leg curl, upright row, chest press, biceps curl, triceps extension, leg press    See exercise log in patient folder for rate of exertion and repetitions completed.       Fitness Machine Education Key:  E=education on equipment initiated and further follow up and education needed  I=independent with  and exercise.  The patient:  Adjusts machines to his/her settings  Uses equipment levers, pins, weights safely  Maintains safe and correct posture while exercising  Moves through exercise with correct pace and control  Gets on and off equipment safely      Lumbar/Cervical Ext.  Torso Rotation  Leg Press    Leg Extension  Seated Leg Curl  Chest Press    Seated Row  Hip ADD  Hip ABD    Triceps Extension  Bicep Curl  Other:      [x] Indicates exercise has been taught for home  Lumbar/Cervical Ext. [] Torso Rotation [] Leg Press []   Leg Extension [] Seated Leg Curl [] Chest Press []   Seated Row [] Hip ADD [] Hip ABD []   Triceps Extension [] Bicep Curl [] Other:        Assessment:   Patient tolerated Patient tolerated MedX Core Lumbar Strength and all other peripheral exercises without an increase in symptoms. Patient warmed up on nustep for 5 minutes, stretched, and iced low back for 5 minutes after the workout.     Plan:  Continue with established plan of care towards wellness goals.     Health  : Melissa Ruvalcaba  6/18/2024

## 2024-06-25 ENCOUNTER — DOCUMENTATION ONLY (OUTPATIENT)
Dept: REHABILITATION | Facility: HOSPITAL | Age: 79
End: 2024-06-25
Payer: MEDICARE

## 2024-06-25 NOTE — PROGRESS NOTES
Health  Wellness Visit Note    Name: Eugenia Diaz  Clinic Number: [id number]  Physician: No ref. provider found  Diagnosis: No diagnosis found.  Past Medical History:   Diagnosis Date    Allergy     Anemia     Asthma     Bronchitis     Depression     Generalized headaches     History of endometriosis     Hyperlipidemia     Hypertension     Hypothyroidism     Osteoporosis, unspecified     PCO (posterior capsular opacification), left     Recurrent upper respiratory infection (URI)     Thyroid disease     hypothyroidism    TMJ dysfunction      Visit Number: 31  Precautions: Gait/Balance, R THR, Fall Risk, Back Spasms      1st PT visit:  10/26/2023  Year of care end date:  October 2024  Mindbody plan: 60-- 6 Months  Patient level: C    Time In: 11:00 AM  Time Out: 12:00 PM  Total Treatment Time: 60 Minutes    Wellness [facility] 2022  Handout on this week's wellness topic Emotional Expression was provided along with a discussion on what it means, the benefits, and suggestions for practice.  Reviewed last week's topic of Fear-Avoidance.     Subjective:   Patient reports she is back pain free coming into Wellness today. Last week she got a massage done at [facility]. She reports no complaints following her massage. She will be going twice a month. Its been two weeks of her taking her Forteo shots in place of the Evenity Shots. Patient has very little joint pain since the switch. She has not iced outside of Wellness.    Patient may not be a good candidate to learn machines independently.     Patient reports she's had some accidents making it to the restroom over the last two months. The accidents mostly happen when she is carrying groceries inside from the car. HC and patient discussed going to the restroom prior to the store. If the accidents increase, to consult with her PCP about pelvic floor physical therapy.     Objective:   Eugenia completed therapeutic stretches (EIL, BRIAN) and the following MedX exercise machines: core  lumbar, torso rotation l/r, leg extension, leg curl, upright row, chest press, biceps curl, triceps extension, leg press    See exercise log in patient folder for rate of exertion and repetitions completed.       Fitness Machine Education Key:  E=education on equipment initiated and further follow up and education needed  I=independent with  and exercise.  The patient:  Adjusts machines to his/her settings  Uses equipment levers, pins, weights safely  Maintains safe and correct posture while exercising  Moves through exercise with correct pace and control  Gets on and off equipment safely      Lumbar/Cervical Ext.  Torso Rotation  Leg Press    Leg Extension  Seated Leg Curl  Chest Press    Seated Row  Hip ADD  Hip ABD    Triceps Extension  Bicep Curl  Other:      [x] Indicates exercise has been taught for home  Lumbar/Cervical Ext. [] Torso Rotation [] Leg Press []   Leg Extension [] Seated Leg Curl [] Chest Press []   Seated Row [] Hip ADD [] Hip ABD []   Triceps Extension [] Bicep Curl [] Other:        Assessment:   Patient tolerated Patient tolerated MedX Core Lumbar Strength and all other peripheral exercises without an increase in symptoms. Patient warmed up on nustep for 5 minutes, stretched, and iced low back for 5 minutes after the workout.     Plan:  Continue with established plan of care towards wellness goals.     Health  : Melissa Ruvalcaba  6/25/2024

## 2024-07-08 DIAGNOSIS — F43.21 ADJUSTMENT DISORDER WITH DEPRESSED MOOD: ICD-10-CM

## 2024-07-08 NOTE — TELEPHONE ENCOUNTER
No care due was identified.  Health [facility] Embedded Care Due Messages. Reference number: [id number].   7/08/2024 10:16:41 AM CDT

## 2024-07-09 RX ORDER — SERTRALINE HYDROCHLORIDE 100 MG/1
200 TABLET, FILM COATED ORAL
Qty: 180 TABLET | Refills: 1 | Status: SHIPPED | OUTPATIENT
Start: 2024-07-09

## 2024-07-09 NOTE — TELEPHONE ENCOUNTER
Refill Decision Note   Eugenia Joe  is requesting a refill authorization.  Brief Assessment and Rationale for Refill:  Approve     Medication Therapy Plan:         Comments:     Note composed:3:28 AM 07/09/2024

## 2024-07-10 ENCOUNTER — DOCUMENTATION ONLY (OUTPATIENT)
Dept: REHABILITATION | Facility: HOSPITAL | Age: 79
End: 2024-07-10
Payer: MEDICARE

## 2024-07-10 NOTE — PROGRESS NOTES
Health  Wellness Visit Note    Name: Eugenia Diaz  Clinic Number: [id number]  Physician: No ref. provider found  Diagnosis: No diagnosis found.  Past Medical History:   Diagnosis Date    Allergy     Anemia     Asthma     Bronchitis     Depression     Generalized headaches     History of endometriosis     Hyperlipidemia     Hypertension     Hypothyroidism     Osteoporosis, unspecified     PCO (posterior capsular opacification), left     Recurrent upper respiratory infection (URI)     Thyroid disease     hypothyroidism    TMJ dysfunction      Visit Number: 32  Precautions: Gait/Balance, R THR, Fall Risk, Back Spasms      1st PT visit:  10/26/2023  Year of care end date:  October 2024  Mindbody plan: 60-- 6 Months  Patient level: C    Time In: 11:00 AM  Time Out: 12:00 PM  Total Treatment Time: 60 Minutes    Wellness Vision 2022  Handout on this week's wellness topic Play was provided along with a discussion on what it means, the benefits, and suggestions for practice.  Reviewed last week's topic of n/a (patient did not attend Wellness last week).     Subjective:   Patient reports her low back pain is manageable. Since her last Wellness session she has been to the [facility] for a massage once. Her leg pain is improving and she has no complaints with the Forteo shots. She did her stretches every morning before starting her day.  Patient has not iced outside of Wellness.    Patient may not be a good candidate to learn machines independently.     Patient reports she's had some accidents making it to the restroom over the last two months. The accidents mostly happen when she is carrying groceries inside from the car. HC and patient discussed going to the restroom prior to the store. If the accidents increase, to consult with her PCP about pelvic floor physical therapy.     Objective:   Eugenia completed therapeutic stretches (EIL, BRIAN) and the following MedX exercise machines: core lumbar, torso rotation l/r, leg  extension, leg curl, upright row, chest press, biceps curl, triceps extension, leg press    See exercise log in patient folder for rate of exertion and repetitions completed.       Fitness Machine Education Key:  E=education on equipment initiated and further follow up and education needed  I=independent with  and exercise.  The patient:  Adjusts machines to his/her settings  Uses equipment levers, pins, weights safely  Maintains safe and correct posture while exercising  Moves through exercise with correct pace and control  Gets on and off equipment safely      Lumbar/Cervical Ext.  Torso Rotation  Leg Press    Leg Extension  Seated Leg Curl  Chest Press    Seated Row  Hip ADD  Hip ABD    Triceps Extension  Bicep Curl  Other:      [x] Indicates exercise has been taught for home  Lumbar/Cervical Ext. [] Torso Rotation [] Leg Press []   Leg Extension [] Seated Leg Curl [] Chest Press []   Seated Row [] Hip ADD [] Hip ABD []   Triceps Extension [] Bicep Curl [] Other:        Assessment:   Patient tolerated Patient tolerated MedX Core Lumbar Strength and all other peripheral exercises without an increase in symptoms. Patient warmed up on nustep for 5 minutes, stretched, and iced low back for 5 minutes after the workout.     Plan:  Continue with established plan of care towards wellness goals.     Health  : Melissa Ruvalcaba  7/10/2024

## 2024-07-15 ENCOUNTER — DOCUMENTATION ONLY (OUTPATIENT)
Dept: REHABILITATION | Facility: HOSPITAL | Age: 79
End: 2024-07-15
Payer: MEDICARE

## 2024-07-15 NOTE — PROGRESS NOTES
Health  Wellness Visit Note    Name: Eugenia Diaz  Clinic Number: [id number]  Physician: No ref. provider found  Diagnosis: No diagnosis found.  Past Medical History:   Diagnosis Date    Allergy     Anemia     Asthma     Bronchitis     Depression     Generalized headaches     History of endometriosis     Hyperlipidemia     Hypertension     Hypothyroidism     Osteoporosis, unspecified     PCO (posterior capsular opacification), left     Recurrent upper respiratory infection (URI)     Thyroid disease     hypothyroidism    TMJ dysfunction      Visit Number: 33  Precautions: Gait/Balance, R THR, Fall Risk, Back Spasms      1st PT visit:  10/26/2023  Year of care end date:  October 2024  Mindbody plan: 60-- 6 Months  Patient level: C    Time In: 10:00 AM  Time Out: 11:00 AM  Total Treatment Time: 60 Minutes    Wellness Vision 2022  Handout on this week's wellness topic Laughter Therapy was provided along with a discussion on what it means, the benefits, and suggestions for practice.  Reviewed last week's topic of Play.     Subjective:   Patient reports no low back pain. Last night she had ankle/joint pain at night. She plans to talk to her PCP and/or Orthopedic soon on what may be the cause. She did her stretches every morning before starting her day.  Patient does not ice outside of Wellness. She does have some bruising on her left arm.    Patient may not be a good candidate to learn machines independently.     Patient reports she's had some accidents making it to the restroom over the last two months. The accidents mostly happen when she is carrying groceries inside from the car. HC and patient discussed going to the restroom prior to the store. If the accidents increase, to consult with her PCP about pelvic floor physical therapy.     Objective:   Eugenia completed therapeutic stretches (EIL, BRIAN) and the following MedX exercise machines: core lumbar, torso rotation l/r, leg extension, leg curl, upright row, chest  press, biceps curl, triceps extension, leg press    See exercise log in patient folder for rate of exertion and repetitions completed.       Fitness Machine Education Key:  E=education on equipment initiated and further follow up and education needed  I=independent with  and exercise.  The patient:  Adjusts machines to his/her settings  Uses equipment levers, pins, weights safely  Maintains safe and correct posture while exercising  Moves through exercise with correct pace and control  Gets on and off equipment safely      Lumbar/Cervical Ext.  Torso Rotation  Leg Press    Leg Extension  Seated Leg Curl  Chest Press    Seated Row  Hip ADD  Hip ABD    Triceps Extension  Bicep Curl  Other:      [x] Indicates exercise has been taught for home  Lumbar/Cervical Ext. [] Torso Rotation [] Leg Press []   Leg Extension [] Seated Leg Curl [] Chest Press []   Seated Row [] Hip ADD [] Hip ABD []   Triceps Extension [] Bicep Curl [] Other:        Assessment:   Patient tolerated Patient tolerated MedX Core Lumbar Strength and all other peripheral exercises without an increase in symptoms. Patient warmed up on nustep for 5 minutes, stretched, and iced low back for 5 minutes after the workout.     Plan:  Continue with established plan of care towards wellness goals.     Health  : Melissa Ruvalcaba  7/15/2024

## 2024-07-19 ENCOUNTER — HOSPITAL ENCOUNTER (OUTPATIENT)
Dept: RADIOLOGY | Facility: HOSPITAL | Age: 79
Discharge: HOME | End: 2024-07-19
Attending: INTERNAL MEDICINE
Payer: MEDICARE

## 2024-07-19 VITALS — HEIGHT: 63 IN | BODY MASS INDEX: 27.29 KG/M2 | WEIGHT: 154 LBS

## 2024-07-19 DIAGNOSIS — Z12.31 ENCOUNTER FOR SCREENING MAMMOGRAM FOR MALIGNANT NEOPLASM OF BREAST: ICD-10-CM

## 2024-07-19 DIAGNOSIS — Z12.39 ENCOUNTER FOR SCREENING FOR MALIGNANT NEOPLASM OF BREAST, UNSPECIFIED SCREENING MODALITY: ICD-10-CM

## 2024-07-19 PROCEDURE — 77067 SCR MAMMO BI INCL CAD: CPT | Mod: 26,,, | Performed by: RADIOLOGY

## 2024-07-19 PROCEDURE — 77067 SCR MAMMO BI INCL CAD: CPT | Mod: TC

## 2024-07-19 PROCEDURE — 77063 BREAST TOMOSYNTHESIS BI: CPT | Mod: 26,,, | Performed by: RADIOLOGY

## 2024-07-19 PROCEDURE — 77063 BREAST TOMOSYNTHESIS BI: CPT | Mod: TC

## 2024-07-25 ENCOUNTER — DOCUMENTATION ONLY (OUTPATIENT)
Dept: REHABILITATION | Facility: HOSPITAL | Age: 79
End: 2024-07-25
Payer: MEDICARE

## 2024-07-25 NOTE — PROGRESS NOTES
Health  Wellness Visit Note    Name: Eugenia Diaz  Clinic Number: [id number]  Physician: No ref. provider found  Diagnosis: No diagnosis found.  Past Medical History:   Diagnosis Date    Allergy     Anemia     Asthma     Bronchitis     Depression     Generalized headaches     History of endometriosis     Hyperlipidemia     Hypertension     Hypothyroidism     Osteoporosis, unspecified     PCO (posterior capsular opacification), left     Recurrent upper respiratory infection (URI)     Thyroid disease     hypothyroidism    TMJ dysfunction      Visit Number: 34  Precautions: Gait/Balance, R THR, Fall Risk, Back Spasms      1st PT visit:  10/26/2023  Year of care end date:  October 2024  Mindbody plan: 60-- 6 Months  Patient level: C    Time In: 12:30 AM  Time Out: 1:34 AM  Total Treatment Time: 64 Minutes    Wellness Vision 2022  Handout on this week's wellness topic Scheduled Self-Dating was provided along with a discussion on what it means, the benefits, and suggestions for practice.  Reviewed last week's topic of Laughter Therapy.     Subjective:   Patient reports some low back pain but nothing she can not manage on her own. Since her last Wellness session, she has had one massage at [facility] with [facility]. She gets two massages done monthly. She does her stretches daily and gets plenty of steps in weekly with errands and MD appointments.  Patient does not ice outside of Wellness.     Patient may not be a good candidate to learn machines independently.     Patient reports she's had some accidents making it to the restroom.The accidents mostly happen when she is carrying groceries inside from the car. HC and patient discussed going to the restroom prior to the store. If the accidents increase, to consult with her PCP about pelvic floor physical therapy.     Objective:   Eugenia completed therapeutic stretches (EIL, BRIAN) and the following MedX exercise machines: core lumbar, torso rotation l/r, leg extension, leg curl,  upright row, chest press, biceps curl, triceps extension, leg press    See exercise log in patient folder for rate of exertion and repetitions completed.       Fitness Machine Education Key:  E=education on equipment initiated and further follow up and education needed  I=independent with  and exercise.  The patient:  Adjusts machines to his/her settings  Uses equipment levers, pins, weights safely  Maintains safe and correct posture while exercising  Moves through exercise with correct pace and control  Gets on and off equipment safely      Lumbar/Cervical Ext.  Torso Rotation  Leg Press    Leg Extension  Seated Leg Curl  Chest Press    Seated Row  Hip ADD  Hip ABD    Triceps Extension  Bicep Curl  Other:      [x] Indicates exercise has been taught for home  Lumbar/Cervical Ext. [] Torso Rotation [] Leg Press []   Leg Extension [] Seated Leg Curl [] Chest Press []   Seated Row [] Hip ADD [] Hip ABD []   Triceps Extension [] Bicep Curl [] Other:        Assessment:   Patient tolerated Patient tolerated MedX Core Lumbar Strength and all other peripheral exercises without an increase in symptoms. Patient warmed up on nustep for 5 minutes, stretched, and iced low back for 5 minutes after the workout.     Plan:  Continue with established plan of care towards wellness goals.     Health  : Melissa Ruvalcaba  7/25/2024

## 2024-07-31 DIAGNOSIS — J45.20 ASTHMA, WELL CONTROLLED, MILD INTERMITTENT: ICD-10-CM

## 2024-07-31 RX ORDER — ALBUTEROL SULFATE 90 UG/1
2 AEROSOL, METERED RESPIRATORY (INHALATION) EVERY 6 HOURS PRN
Qty: 54 G | Refills: 11 | Status: SHIPPED | OUTPATIENT
Start: 2024-07-31

## 2024-07-31 NOTE — TELEPHONE ENCOUNTER
No care due was identified.  Health [facility] Embedded Care Due Messages. Reference number: [id number].   7/31/2024 10:12:41 AM CDT

## 2024-07-31 NOTE — TELEPHONE ENCOUNTER
Refill Routing Note   Medication(s) are not appropriate for processing by [facility] for the following reason(s):        Due for refill >6 months ago    [facility] action(s):  Defer        Medication Therapy Plan: Last ordered: 4 years ago (4/26/2020) by Latia Aguilar MD      Appointments  past 12m or future 3m with PCP    Date Provider   Last Visit   11/13/2023 Latia Aguilar MD   Next Visit   Visit date not found Latia Aguilar MD   ED visits in past 90 days: 0        Note composed:12:58 PM 07/31/2024

## 2024-08-01 ENCOUNTER — DOCUMENTATION ONLY (OUTPATIENT)
Dept: REHABILITATION | Facility: HOSPITAL | Age: 79
End: 2024-08-01
Payer: MEDICARE

## 2024-08-01 NOTE — PROGRESS NOTES
Health  Wellness Visit Note    Name: Eugenia Diaz  Clinic Number: [id number]  Physician: No ref. provider found  Diagnosis: No diagnosis found.  Past Medical History:   Diagnosis Date    Allergy     Anemia     Asthma     Bronchitis     Depression     Generalized headaches     History of endometriosis     Hyperlipidemia     Hypertension     Hypothyroidism     Osteoporosis, unspecified     PCO (posterior capsular opacification), left     Recurrent upper respiratory infection (URI)     Thyroid disease     hypothyroidism    TMJ dysfunction      Visit Number: 35  Precautions: Gait/Balance, R THR, Fall Risk, Back Spasms      1st PT visit:  10/26/2023  Year of care end date:  October 2024  Mindbody plan: 60-- 6 Months  Patient level: C    Time In: 12:30 PM  Time Out: 1:30 PM  Total Treatment Time: 60 Minutes    Wellness Vision 2022  Handout on this week's wellness topic SMART Goals was provided along with a discussion on what it means, the benefits, and suggestions for practice.  Reviewed last week's topic of Scheduled Self-Dating/.     Subjective:   Patient reports after her last Wellness session, she has had a lot of leg pain at night. HC and patient discussed how many steps patient gets daily and how often she is active. Patient also mentioned staying hydrating daily, she drinks about 3 12 oz Oswaldo Teas a day. HC encouraged more water intake and suggested natural flavoring so water is not bland. Patient does not ice outside of Wellness.     Patient may not be a good candidate to learn machines independently.     Patient reports she's had some accidents making it to the restroom.The accidents mostly happen when she is carrying groceries inside from the car. HC and patient discussed going to the restroom prior to the store. If the accidents increase, to consult with her PCP about pelvic floor physical therapy.     Objective:   Eugenia completed therapeutic stretches (EIL, BRIAN) and the following MedX exercise machines: core  lumbar, torso rotation l/r, leg extension, leg curl, upright row, chest press, biceps curl, triceps extension, leg press    See exercise log in patient folder for rate of exertion and repetitions completed.       Fitness Machine Education Key:  E=education on equipment initiated and further follow up and education needed  I=independent with  and exercise.  The patient:  Adjusts machines to his/her settings  Uses equipment levers, pins, weights safely  Maintains safe and correct posture while exercising  Moves through exercise with correct pace and control  Gets on and off equipment safely      Lumbar/Cervical Ext.  Torso Rotation  Leg Press    Leg Extension  Seated Leg Curl  Chest Press    Seated Row  Hip ADD  Hip ABD    Triceps Extension  Bicep Curl  Other:      [x] Indicates exercise has been taught for home  Lumbar/Cervical Ext. [] Torso Rotation [] Leg Press []   Leg Extension [] Seated Leg Curl [] Chest Press []   Seated Row [] Hip ADD [] Hip ABD []   Triceps Extension [] Bicep Curl [] Other:        Assessment:   Patient tolerated Patient tolerated MedX Core Lumbar Strength and all other peripheral exercises without an increase in symptoms. Patient warmed up on nustep for 5 minutes, stretched, and iced low back for 5 minutes after the workout.     Plan:  Continue with established plan of care towards wellness goals.     Health  : Melissa Ruvalcaba  8/1/2024

## 2024-08-05 ENCOUNTER — DOCUMENTATION ONLY (OUTPATIENT)
Dept: REHABILITATION | Facility: HOSPITAL | Age: 79
End: 2024-08-05
Payer: MEDICARE

## 2024-08-09 ENCOUNTER — IMMUNIZATION (OUTPATIENT)
Dept: INTERNAL MEDICINE | Facility: CLINIC | Age: 79
End: 2024-08-09
Payer: MEDICARE

## 2024-08-09 DIAGNOSIS — Z23 NEED FOR VACCINATION: Primary | ICD-10-CM

## 2024-08-09 PROCEDURE — 91322 SARSCOV2 VAC 50 MCG/0.5ML IM: CPT | Mod: PBBFAC

## 2024-08-09 PROCEDURE — 90480 ADMN SARSCOV2 VAC 1/ONLY CMP: CPT | Mod: PBBFAC

## 2024-08-09 PROCEDURE — 99999PBSHW COVID-19 VAC, MRNA 2023 (MODERNA)(PF) 50 MCG/0.5 ML IM SUSR (12+): Mod: PBBFAC,,,

## 2024-08-12 ENCOUNTER — DOCUMENTATION ONLY (OUTPATIENT)
Dept: REHABILITATION | Facility: HOSPITAL | Age: 79
End: 2024-08-12
Payer: MEDICARE

## 2024-08-12 ENCOUNTER — OFFICE VISIT (OUTPATIENT)
Dept: URGENT CARE | Facility: CLINIC | Age: 79
End: 2024-08-12
Payer: MEDICARE

## 2024-08-12 VITALS
WEIGHT: 154 LBS | SYSTOLIC BLOOD PRESSURE: 137 MMHG | HEIGHT: 63 IN | OXYGEN SATURATION: 96 % | DIASTOLIC BLOOD PRESSURE: 89 MMHG | RESPIRATION RATE: 16 BRPM | HEART RATE: 80 BPM | BODY MASS INDEX: 27.29 KG/M2 | TEMPERATURE: 99 F

## 2024-08-12 DIAGNOSIS — L03.114 CELLULITIS OF LEFT UPPER EXTREMITY: Primary | ICD-10-CM

## 2024-08-12 PROCEDURE — 99213 OFFICE O/P EST LOW 20 MIN: CPT | Mod: S$GLB,,, | Performed by: NURSE PRACTITIONER

## 2024-08-12 RX ORDER — AMOXICILLIN AND CLAVULANATE POTASSIUM 875; 125 MG/1; MG/1
1 TABLET, FILM COATED ORAL 2 TIMES DAILY
Qty: 10 TABLET | Refills: 0 | Status: SHIPPED | OUTPATIENT
Start: 2024-08-12 | End: 2024-08-17

## 2024-08-12 NOTE — PROGRESS NOTES
"Subjective:      Patient ID: Eugenia Diaz is a 78 y.o. female.    Vitals:  height is 5' 3" (1.6 m) and weight is 69.9 kg (154 lb). Her oral temperature is 98.5 °F (36.9 °C). Her blood pressure is 137/89 and her pulse is 80. Her respiration is 16 and oxygen saturation is 96%.     Chief Complaint: Rash    This is a 78 y.o. female who presents today with a chief complaint of  rash on her L upper arm. Pt stated that she got an injection on Friday. The injection was the covid booster. Pt stated that over the week pedro she started to have a lot of pain redness and hot to the touch.     Provider note begins below:    Erythematous clearly demarcated lesion to upper left arm approximately 8-10 cm.  Warm to touch.      Rash  This is a new problem. The current episode started in the past 7 days. The problem is unchanged. The affected locations include the left arm. The rash is characterized by redness, pain and swelling. Associated with: covid booster vaccine. Past treatments include nothing.     Skin:  Positive for rash. Negative for erythema.      Objective:     Physical Exam   Constitutional: She is oriented to person, place, and time. She appears well-developed.   HENT:   Head: Normocephalic and atraumatic. Head is without abrasion, without contusion and without laceration.   Ears:   Right Ear: External ear normal.   Left Ear: External ear normal.   Nose: Nose normal.   Mouth/Throat: Oropharynx is clear and moist and mucous membranes are normal.   Eyes: Conjunctivae, EOM and lids are normal. Pupils are equal, round, and reactive to light.   Neck: Trachea normal and phonation normal. Neck supple.   Cardiovascular: Normal rate.   Pulmonary/Chest: Effort normal.   Musculoskeletal: Normal range of motion.         General: Normal range of motion.   Neurological: She is alert and oriented to person, place, and time.   Skin: Skin is warm, dry, intact, rash and macular. Capillary refill takes less than 2 seconds. No abrasion, No " burn, No bruising, No erythema and No ecchymosis        Psychiatric: Her speech is normal and behavior is normal. Judgment and thought content normal.   Nursing note and vitals reviewed.      Assessment:     1. Cellulitis of left upper extremity        Plan:   Multiple antibiotic allergies but patient has tolerated Augmentin well in the past.  If symptoms are not improving over the next 24 hours patient is aware to either return to this location, her primary care provider or to the emergency department if lesion is rapidly spreading after initiating the antibiotics.    Cellulitis of left upper extremity  -     amoxicillin-clavulanate 875-125mg (AUGMENTIN) 875-125 mg per tablet; Take 1 tablet by mouth 2 (two) times daily. for 5 days  Dispense: 10 tablet; Refill: 0

## 2024-08-12 NOTE — PROGRESS NOTES
Health  Wellness Visit Note    Name: Eugenia Diaz  Clinic Number: [id number]  Physician: No ref. provider found  Diagnosis: No diagnosis found.  Past Medical History:   Diagnosis Date    Allergy     Anemia     Asthma     Bronchitis     Depression     Generalized headaches     History of endometriosis     Hyperlipidemia     Hypertension     Hypothyroidism     Osteoporosis, unspecified     PCO (posterior capsular opacification), left     Recurrent upper respiratory infection (URI)     Thyroid disease     hypothyroidism    TMJ dysfunction      Visit Number: 37  Precautions: Gait/Balance, R THR, Fall Risk, Back Spasms      1st PT visit:  10/26/2023  Year of care end date:  October 2024  Mindbody plan: 60-- 6 Months  Patient level: C    Time In: 10:00 AM  Time Out: 11:07 AM  Total Treatment Time: 67 Minutes    Wellness Vision 2022  Handout on this week's wellness topic Stages of Change was provided along with a discussion on what it means, the benefits, and suggestions for practice.  Reviewed last week's topic of Small Steps.     Subjective:   Patient reports to Wellness with her shoulder slightly red and radiating heat. She has some pain but nothing she can not manage on her own. This started after getting the COVID-19 booster. She plans to see her PCP later today. She took it easy this weekend, watching the Olympics and went to the [employer]. She does not walk around the casino because of the distance. Patient does not ice outside of Wellness.     For today's session, patient did straight leg raises with 3lb ankle weight and adduction ball squeezes.     Patient  mentioned staying hydrating daily, she drinks about 3 12 oz Oswaldo Teas a day. HC encouraged more water intake and suggested natural flavoring so water is not bland.     Patient may not be a good candidate to learn machines independently.     Patient reports she's had some accidents making it to the restroom.The accidents mostly happen when she is carrying  groceries inside from the car. HC and patient discussed going to the restroom prior to the store. If the accidents increase, to consult with her PCP about pelvic floor physical therapy.     Objective:   Eugenia completed therapeutic stretches (EIL, BRIAN) and the following MedX exercise machines: core lumbar, torso rotation l/r, leg extension, leg curl, upright row, chest press, biceps curl, triceps extension, leg press    See exercise log in patient folder for rate of exertion and repetitions completed.       Fitness Machine Education Key:  E=education on equipment initiated and further follow up and education needed  I=independent with  and exercise.  The patient:  Adjusts machines to his/her settings  Uses equipment levers, pins, weights safely  Maintains safe and correct posture while exercising  Moves through exercise with correct pace and control  Gets on and off equipment safely      Lumbar/Cervical Ext.  Torso Rotation  Leg Press    Leg Extension  Seated Leg Curl  Chest Press    Seated Row  Hip ADD  Hip ABD    Triceps Extension  Bicep Curl  Other:      [x] Indicates exercise has been taught for home  Lumbar/Cervical Ext. [] Torso Rotation [] Leg Press []   Leg Extension [] Seated Leg Curl [] Chest Press []   Seated Row [] Hip ADD [] Hip ABD []   Triceps Extension [] Bicep Curl [] Other:        Assessment:   Patient tolerated Patient tolerated MedX Core Lumbar Strength and all other peripheral exercises without an increase in symptoms. Patient warmed up on nustep for 5 minutes, stretched, and iced low back for 5 minutes after the workout.     Plan:  Continue with established plan of care towards wellness goals.     Health  : Melissa Ruvalcaba  8/12/2024

## 2024-08-19 ENCOUNTER — DOCUMENTATION ONLY (OUTPATIENT)
Dept: REHABILITATION | Facility: HOSPITAL | Age: 79
End: 2024-08-19
Payer: MEDICARE

## 2024-08-19 NOTE — PROGRESS NOTES
Health  Wellness Visit Note    Name: Eugenia Diaz  Clinic Number: [id number]  Physician: No ref. provider found  Diagnosis: No diagnosis found.  Past Medical History:   Diagnosis Date    Allergy     Anemia     Asthma     Bronchitis     Depression     Generalized headaches     History of endometriosis     Hyperlipidemia     Hypertension     Hypothyroidism     Osteoporosis, unspecified     PCO (posterior capsular opacification), left     Recurrent upper respiratory infection (URI)     Thyroid disease     hypothyroidism    TMJ dysfunction      Visit Number: 38  Precautions: Gait/Balance, R THR, Fall Risk, Back Spasms      1st PT visit:  10/26/2023  Year of care end date:  October 2024  Mindbody plan: 60-- 6 Months  Patient level: C    Time In: 10:00 AM  Time Out: 11:00 AM  Total Treatment Time: 60 Minutes    Wellness Vision 2022  Handout on this week's wellness topic Journaling was provided along with a discussion on what it means, the benefits, and suggestions for practice.  Reviewed last week's topic of Stages of Change.     Subjective:   Patient reports she is having a much better day than she did over the last week. She went to urgent care following her last session. She was having an allergic reaction to the COVID-19 booster. The NP put her on an antibiotic and she is feeling a lot better. Her foreo pin malfunctioned, so she has not been giving herself her injections. The company plans to ship her a new pin this week. Over the weekend she celebrated her birthday. She went on lunch dates and the casino. Patient does not ice at home.    For today's session, patient did straight leg raises with 3lb ankle weight and adduction ball squeezes.     Patient  mentioned staying hydrating daily, she drinks about 3 12 oz Oswaldo Teas a day. HC encouraged more water intake and suggested natural flavoring so water is not bland.     Patient may not be a good candidate to learn machines independently.     Patient reports she's had  some accidents making it to the restroom.The accidents mostly happen when she is carrying groceries inside from the car. HC and patient discussed going to the restroom prior to the store. If the accidents increase, to consult with her PCP about pelvic floor physical therapy.     Objective:   Eugenia completed therapeutic stretches (EIL, BRIAN) and the following MedX exercise machines: core lumbar, torso rotation l/r, leg extension, leg curl, upright row, chest press, biceps curl, triceps extension, leg press    See exercise log in patient folder for rate of exertion and repetitions completed.       Fitness Machine Education Key:  E=education on equipment initiated and further follow up and education needed  I=independent with  and exercise.  The patient:  Adjusts machines to his/her settings  Uses equipment levers, pins, weights safely  Maintains safe and correct posture while exercising  Moves through exercise with correct pace and control  Gets on and off equipment safely      Lumbar/Cervical Ext.  Torso Rotation  Leg Press    Leg Extension  Seated Leg Curl  Chest Press    Seated Row  Hip ADD  Hip ABD    Triceps Extension  Bicep Curl  Other:      [x] Indicates exercise has been taught for home  Lumbar/Cervical Ext. [] Torso Rotation [] Leg Press []   Leg Extension [] Seated Leg Curl [] Chest Press []   Seated Row [] Hip ADD [] Hip ABD []   Triceps Extension [] Bicep Curl [] Other:        Assessment:   Patient tolerated Patient tolerated MedX Core Lumbar Strength and all other peripheral exercises without an increase in symptoms. Patient warmed up on nustep for 5 minutes, stretched, and iced low back for 5 minutes after the workout.     Plan:  Continue with established plan of care towards wellness goals.     Health  : Melissa Ruvalcaba  8/19/2024

## 2024-08-20 RX ORDER — TRIAMTERENE AND HYDROCHLOROTHIAZIDE 37.5; 25 MG/1; MG/1
CAPSULE ORAL
Qty: 90 CAPSULE | Refills: 0 | Status: SHIPPED | OUTPATIENT
Start: 2024-08-20

## 2024-08-20 NOTE — TELEPHONE ENCOUNTER
Care Due:                  Date            Visit Type   Department     Provider  --------------------------------------------------------------------------------                                EP -                              PRIMARY      [facility] INTERNAL  Last Visit: 11-      CARE ([facility])   MEDICINE       J[facility] HALEY                              [facility]                              ANNUAL                              CHECKUP/PHY  [facility] INTERNAL  Next Visit: 11-      S            MEDICINE       JOB HALEY                                                            Last  Test          Frequency    Reason                     Performed    Due Date  --------------------------------------------------------------------------------    CMP.........  12 months..  atorvastatin.............  11- 11-    Lipid Panel.  12 months..  atorvastatin.............  11- 11-    [employer] Embedded Care Due Messages. Reference number: [id number].   8/20/2024 10:39:15 AM CDT

## 2024-08-21 NOTE — TELEPHONE ENCOUNTER
Provider Staff:  Action required for this patient    Requires labs      Please see care gap opportunities below in Care Due Message.    Thanks!  [facility]     Appointments      Date Provider   Last Visit   11/13/2023 Latia Aguilar MD   Next Visit   11/14/2024 Latia Aguilar MD     Refill Decision Note   Eugenia Diaz  is requesting a refill authorization.  Brief Assessment and Rationale for Refill:  Approve     Medication Therapy Plan:  FOVS      Comments:     Note composed:11:58 PM 08/20/2024

## 2024-08-28 ENCOUNTER — DOCUMENTATION ONLY (OUTPATIENT)
Dept: REHABILITATION | Facility: HOSPITAL | Age: 79
End: 2024-08-28
Payer: MEDICARE

## 2024-08-28 NOTE — PROGRESS NOTES
Health  Wellness Visit Note    Name: Eugenia Diaz  Clinic Number: [id number]  Physician: No ref. provider found  Diagnosis: No diagnosis found.  Past Medical History:   Diagnosis Date    Allergy     Anemia     Asthma     Bronchitis     Depression     Generalized headaches     History of endometriosis     Hyperlipidemia     Hypertension     Hypothyroidism     Osteoporosis, unspecified     PCO (posterior capsular opacification), left     Recurrent upper respiratory infection (URI)     Thyroid disease     hypothyroidism    TMJ dysfunction      Visit Number: 39  Precautions: Gait/Balance, R THR, Fall Risk, Back Spasms      1st PT visit:  10/26/2023  Year of care end date:  October 2024  Mindbody plan: 60-- 6 Months  Patient level: C    Time In: 1:25 PM  Time Out: 2:30 PM  Total Treatment Time: 65 Minutes    Wellness Vision 2022  Handout on this week's wellness topic  Decisional Balance was provided along with a discussion on what it means, the benefits, and suggestions for practice.  Reviewed last week's topic of Journaling.     Subjective:   Patient reports to Wellness after a busy week. She had a loved-one pass away this week and she is preparing for friends to come in town over the weekend. She has been to MD appointments, her storage unit and took care of her cat.  Patient does not ice at home.    For today's session, patient did straight leg raises with 3lb ankle weight and adduction ball squeezes.     Patient  mentioned staying hydrating daily, she drinks about 3 12 oz Oswaldo Teas a day. HC encouraged more water intake and suggested natural flavoring so water is not bland.     Patient may not be a good candidate to learn machines independently.     Patient reports she's had some accidents making it to the restroom.The accidents mostly happen when she is carrying groceries inside from the car. HC and patient discussed going to the restroom prior to the store. If the accidents increase, to consult with her PCP  about pelvic floor physical therapy.     Objective:   Eugenia completed therapeutic stretches (EIL, BRIAN) and the following MedX exercise machines: core lumbar, torso rotation l/r, leg extension, leg curl, upright row, chest press, biceps curl, triceps extension, leg press    See exercise log in patient folder for rate of exertion and repetitions completed.       Fitness Machine Education Key:  E=education on equipment initiated and further follow up and education needed  I=independent with  and exercise.  The patient:  Adjusts machines to his/her settings  Uses equipment levers, pins, weights safely  Maintains safe and correct posture while exercising  Moves through exercise with correct pace and control  Gets on and off equipment safely      Lumbar/Cervical Ext.  Torso Rotation  Leg Press    Leg Extension  Seated Leg Curl  Chest Press    Seated Row  Hip ADD  Hip ABD    Triceps Extension  Bicep Curl  Other:      [x] Indicates exercise has been taught for home  Lumbar/Cervical Ext. [] Torso Rotation [] Leg Press []   Leg Extension [] Seated Leg Curl [] Chest Press []   Seated Row [] Hip ADD [] Hip ABD []   Triceps Extension [] Bicep Curl [] Other:        Assessment:   Patient tolerated Patient tolerated MedX Core Lumbar Strength and all other peripheral exercises without an increase in symptoms. Patient warmed up on nustep for 5 minutes, stretched, and iced low back for 5 minutes after the workout.     Plan:  Continue with established plan of care towards wellness goals.     Health  : Melissa Ruvalcaba  8/28/2024

## 2024-09-09 ENCOUNTER — DOCUMENTATION ONLY (OUTPATIENT)
Dept: REHABILITATION | Facility: HOSPITAL | Age: 79
End: 2024-09-09
Payer: MEDICARE

## 2024-09-09 NOTE — PROGRESS NOTES
Health  Wellness Visit Note    Name: Eugenia Diaz  Clinic Number: [id number]  Physician: No ref. provider found  Diagnosis: No diagnosis found.  Past Medical History:   Diagnosis Date    Allergy     Anemia     Asthma     Bronchitis     Depression     Generalized headaches     History of endometriosis     Hyperlipidemia     Hypertension     Hypothyroidism     Osteoporosis, unspecified     PCO (posterior capsular opacification), left     Recurrent upper respiratory infection (URI)     Thyroid disease     hypothyroidism    TMJ dysfunction      Visit Number: 40  Precautions: Gait/Balance, R THR, Fall Risk, Back Spasms      1st PT visit:  10/26/2023  Year of care end date:  October 2024  Mindbody plan: 60-- 6 Months  Patient level: C    Time In: 10:00 AM  Time Out: 11:00 AM  Total Treatment Time: 60 Minutes    Wellness Vision 2022  Handout on this week's wellness topic Preventing Burnout was provided along with a discussion on what it means, the benefits, and suggestions for practice.  Reviewed last week's topic of n/a (patient did not attend Wellness last week).     Subjective:   Patient reports to Wellness after missing a week. She was unable to come last week due to the weather. She spent time with her friends who were visiting town. Patient does not ice at home.    For today's session, patient did straight leg raises with 3lb ankle weight and adduction ball squeezes.     Patient  mentioned staying hydrating daily, she drinks about 3 12 oz Oswaldo Teas a day. HC encouraged more water intake and suggested natural flavoring so water is not bland.     Patient may not be a good candidate to learn machines independently.     Patient reports she's had some accidents making it to the restroom.The accidents mostly happen when she is carrying groceries inside from the car. HC and patient discussed going to the restroom prior to the store. If the accidents increase, to consult with her PCP about pelvic floor physical therapy.      Objective:   Eugenia completed therapeutic stretches (EIL, BRIAN) and the following MedX exercise machines: core lumbar, torso rotation l/r, leg extension, leg curl, upright row, chest press, biceps curl, triceps extension, leg press    See exercise log in patient folder for rate of exertion and repetitions completed.       Fitness Machine Education Key:  E=education on equipment initiated and further follow up and education needed  I=independent with  and exercise.  The patient:  Adjusts machines to his/her settings  Uses equipment levers, pins, weights safely  Maintains safe and correct posture while exercising  Moves through exercise with correct pace and control  Gets on and off equipment safely      Lumbar/Cervical Ext.  Torso Rotation  Leg Press    Leg Extension  Seated Leg Curl  Chest Press    Seated Row  Hip ADD  Hip ABD    Triceps Extension  Bicep Curl  Other:      [x] Indicates exercise has been taught for home  Lumbar/Cervical Ext. [] Torso Rotation [] Leg Press []   Leg Extension [] Seated Leg Curl [] Chest Press []   Seated Row [] Hip ADD [] Hip ABD []   Triceps Extension [] Bicep Curl [] Other:        Assessment:   Patient tolerated Patient tolerated MedX Core Lumbar Strength and all other peripheral exercises without an increase in symptoms. Patient warmed up on nustep for 5 minutes, stretched, and iced low back for 5 minutes after the workout.     Plan:  Continue with established plan of care towards wellness goals.     Health  : Melissa Ruvalcaba  9/9/2024

## 2024-09-23 ENCOUNTER — DOCUMENTATION ONLY (OUTPATIENT)
Dept: REHABILITATION | Facility: HOSPITAL | Age: 79
End: 2024-09-23
Payer: MEDICARE
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
## 2024-09-24 ENCOUNTER — PATIENT MESSAGE (OUTPATIENT)
Dept: INTERNAL MEDICINE | Facility: CLINIC | Age: 79
End: 2024-09-24
Payer: MEDICARE

## 2024-09-24 DIAGNOSIS — Z23 FLU VACCINE NEED: Primary | ICD-10-CM

## 2024-09-24 RX ORDER — INFLUENZA VIRUS VACCINE 15; 15; 15 UG/.5ML; UG/.5ML; UG/.5ML
0.5 SUSPENSION INTRAMUSCULAR ONCE
Qty: 0.5 ML | Refills: 0 | Status: SHIPPED | OUTPATIENT
Start: 2024-09-24 | End: 2024-09-24

## 2024-09-24 NOTE — TELEPHONE ENCOUNTER
Lov 11/13/23  I spoke with [facility]. They just need a prescription sent in with a notation to administer regular dose vaccine since pt has a reaction to HD in the past. Please sign pended rx.

## 2024-10-02 ENCOUNTER — DOCUMENTATION ONLY (OUTPATIENT)
Dept: REHABILITATION | Facility: HOSPITAL | Age: 79
End: 2024-10-02
Payer: MEDICARE
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
## 2024-10-08 ENCOUNTER — DOCUMENTATION ONLY (OUTPATIENT)
Dept: REHABILITATION | Facility: HOSPITAL | Age: 79
End: 2024-10-08
Payer: MEDICARE
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
## 2024-10-16 ENCOUNTER — DOCUMENTATION ONLY (OUTPATIENT)
Dept: REHABILITATION | Facility: HOSPITAL | Age: 79
End: 2024-10-16
Payer: MEDICARE

## 2024-10-16 NOTE — PROGRESS NOTES
Health  Wellness Visit Note    Name: Eugenia Diaz  Clinic Number: [id number]  Physician: No ref. provider found  Diagnosis: No diagnosis found.  Past Medical History:   Diagnosis Date    Allergy     Anemia     Asthma     Bronchitis     Depression     Generalized headaches     History of endometriosis     Hyperlipidemia     Hypertension     Hypothyroidism     Osteoporosis, unspecified     PCO (posterior capsular opacification), left     Recurrent upper respiratory infection (URI)     Thyroid disease     hypothyroidism    TMJ dysfunction      Visit Number: 44  Precautions: Gait/Balance, R THR, Fall Risk, Back Spasms      1st PT visit:  10/26/2023  Year of care end date:  October 2024  Mindbody plan: 60-- 6 Months  Patient level: C    Time In: 12:25 PM  Time Out: 1:30 PM  Total Treatment Time: 65 Minutes    Wellness Vision 2022  Handout on this week's wellness topic Self-Assessment was provided along with a discussion on what it means, the benefits, and suggestions for practice.  Reviewed last week's topic of Communication.      Subjective:   Patient reports no complaints of back pain today. She did have some back spasms since her last Wellness session. She saw her LMTAnnetta for a massage on Friday. She reports no issues after her massage. Patient is still having some foot pain and rib-cage discomfort and plans to follow-up with MD soon. She has not iced her back at home.    Patient has not decided between [facility] or [employer] for her new gym. She left today with a copy of her final weight card.      Patient  mentioned staying hydrating daily, she drinks about 3 12 oz Oswaldo Teas a day. HC encouraged more water intake and suggested natural flavoring so water is not bland.     Patient may not be a good candidate to learn machines independently.     Patient reports she's had some accidents making it to the restroom.The accidents mostly happen when she is carrying groceries inside from the  car. HC and patient discussed going to the restroom prior to the store. If the accidents increase, to consult with her PCP about pelvic floor physical therapy.     Objective:   Eugenia completed therapeutic stretches (EIL, BRIAN) and the following MedX exercise machines: core lumbar, torso rotation l/r, leg extension, leg curl, upright row, chest press, biceps curl, triceps extension, leg press    See exercise log in patient folder for rate of exertion and repetitions completed.       Fitness Machine Education Key:  E=education on equipment initiated and further follow up and education needed  I=independent with  and exercise.  The patient:  Adjusts machines to his/her settings  Uses equipment levers, pins, weights safely  Maintains safe and correct posture while exercising  Moves through exercise with correct pace and control  Gets on and off equipment safely      Lumbar/Cervical Ext.  Torso Rotation  Leg Press    Leg Extension  Seated Leg Curl  Chest Press    Seated Row  Hip ADD  Hip ABD    Triceps Extension  Bicep Curl  Other:      [x] Indicates exercise has been taught for home  Lumbar/Cervical Ext. [] Torso Rotation [] Leg Press []   Leg Extension [] Seated Leg Curl [] Chest Press []   Seated Row [] Hip ADD [] Hip ABD []   Triceps Extension [] Bicep Curl [] Other:        Assessment:   Patient tolerated Patient tolerated MedX Core Lumbar Strength and all other peripheral exercises without an increase in symptoms. Patient warmed up on nustep for 5 minutes, stretched, and iced low back for 5 minutes after the workout.     Plan:  Discharged Wellness Program.     Health  : Melissa Ruvalcaba  10/16/2024

## 2024-10-17 ENCOUNTER — PATIENT MESSAGE (OUTPATIENT)
Dept: ORTHOPEDICS | Facility: CLINIC | Age: 79
End: 2024-10-17
Payer: MEDICARE

## 2024-10-22 RX ORDER — LEVOTHYROXINE SODIUM 125 UG/1
125 TABLET ORAL EVERY MORNING
Qty: 90 TABLET | Refills: 0 | Status: SHIPPED | OUTPATIENT
Start: 2024-10-22

## 2024-10-22 NOTE — TELEPHONE ENCOUNTER
Care Due:                  Date            Visit Type   Department     Provider  --------------------------------------------------------------------------------                                EP -                              PRIMARY      [facility] INTERNAL  Last Visit: 11-      CARE ([facility])   MEDICINE       JOB HALEY                              [facility]                              ANNUAL                              CHECKUP/PHY  [facility] INTERNAL  Next Visit: 11-      S            MEDICINE       JOB HALEY                                                            Last  Test          Frequency    Reason                     Performed    Due Date  --------------------------------------------------------------------------------    CMP.........  12 months..  atorvastatin.............  11- 11-    Lipid Panel.  12 months..  atorvastatin.............  11- 11-    [employer] Embedded Care Due Messages. Reference number: [id number].   10/22/2024 12:00:07 PM CDT

## 2024-10-22 NOTE — TELEPHONE ENCOUNTER
Refill Routing Note   Medication(s) are not appropriate for processing by [facility] for the following reason(s):        Required labs outdated    [facility] action(s):  Defer   Requires labs : Yes      Medication Therapy Plan: FOVS      Appointments  past 12m or future 3m with PCP    Date Provider   Last Visit   11/13/2023 Latia Aguilar MD   Next Visit   11/14/2024 Latia Aguilar MD   ED visits in past 90 days: 0        Note composed:3:00 PM 10/22/2024

## 2024-11-14 ENCOUNTER — HOSPITAL ENCOUNTER (OUTPATIENT)
Dept: RADIOLOGY | Facility: HOSPITAL | Age: 79
Discharge: HOME | End: 2024-11-14
Attending: INTERNAL MEDICINE
Payer: MEDICARE

## 2024-11-14 ENCOUNTER — OFFICE VISIT (OUTPATIENT)
Dept: INTERNAL MEDICINE | Facility: CLINIC | Age: 79
End: 2024-11-14
Payer: MEDICARE

## 2024-11-14 VITALS
HEART RATE: 85 BPM | SYSTOLIC BLOOD PRESSURE: 128 MMHG | WEIGHT: 154.56 LBS | OXYGEN SATURATION: 96 % | BODY MASS INDEX: 27.39 KG/M2 | HEIGHT: 63 IN | DIASTOLIC BLOOD PRESSURE: 72 MMHG

## 2024-11-14 DIAGNOSIS — R79.9 ABNORMAL FINDING OF BLOOD CHEMISTRY: ICD-10-CM

## 2024-11-14 DIAGNOSIS — R07.81 RIB PAIN ON LEFT SIDE: Primary | ICD-10-CM

## 2024-11-14 DIAGNOSIS — E78.5 HYPERLIPIDEMIA, UNSPECIFIED HYPERLIPIDEMIA TYPE: ICD-10-CM

## 2024-11-14 DIAGNOSIS — R10.12 LEFT UPPER QUADRANT ABDOMINAL PAIN: ICD-10-CM

## 2024-11-14 DIAGNOSIS — R10.12 LEFT UPPER QUADRANT PAIN: ICD-10-CM

## 2024-11-14 DIAGNOSIS — R10.12 LUQ ABDOMINAL PAIN: ICD-10-CM

## 2024-11-14 DIAGNOSIS — R07.81 RIB PAIN ON LEFT SIDE: ICD-10-CM

## 2024-11-14 DIAGNOSIS — M87.052 AVASCULAR NECROSIS OF BONE OF LEFT HIP: ICD-10-CM

## 2024-11-14 DIAGNOSIS — F32.5 MAJOR DEPRESSIVE DISORDER WITH SINGLE EPISODE, IN FULL REMISSION: ICD-10-CM

## 2024-11-14 DIAGNOSIS — E03.9 HYPOTHYROIDISM (ACQUIRED): ICD-10-CM

## 2024-11-14 DIAGNOSIS — D69.2 SENILE PURPURA: ICD-10-CM

## 2024-11-14 DIAGNOSIS — M81.0 OSTEOPOROSIS, POST-MENOPAUSAL: ICD-10-CM

## 2024-11-14 DIAGNOSIS — J45.20 ASTHMA, MILD INTERMITTENT, WELL-CONTROLLED: ICD-10-CM

## 2024-11-14 DIAGNOSIS — I70.0 AORTIC ATHEROSCLEROSIS: ICD-10-CM

## 2024-11-14 DIAGNOSIS — M25.572 LEFT ANKLE PAIN, UNSPECIFIED CHRONICITY: ICD-10-CM

## 2024-11-14 DIAGNOSIS — I10 ESSENTIAL HYPERTENSION: ICD-10-CM

## 2024-11-14 PROCEDURE — 99999 PR PBB SHADOW E&M-EST. PATIENT-LVL IV: CPT | Mod: PBBFAC,,, | Performed by: INTERNAL MEDICINE

## 2024-11-14 PROCEDURE — 71100 X-RAY EXAM RIBS UNI 2 VIEWS: CPT | Mod: 26,LT,, | Performed by: RADIOLOGY

## 2024-11-14 PROCEDURE — 99214 OFFICE O/P EST MOD 30 MIN: CPT | Mod: PBBFAC,25 | Performed by: INTERNAL MEDICINE

## 2024-11-14 PROCEDURE — 71100 X-RAY EXAM RIBS UNI 2 VIEWS: CPT | Mod: TC,LT

## 2024-11-14 RX ORDER — ATORVASTATIN CALCIUM 80 MG/1
80 TABLET, FILM COATED ORAL NIGHTLY
Qty: 90 TABLET | Refills: 3 | Status: SHIPPED | OUTPATIENT
Start: 2024-11-14

## 2024-11-14 RX ORDER — TRIAMTERENE AND HYDROCHLOROTHIAZIDE 37.5; 25 MG/1; MG/1
1 CAPSULE ORAL EVERY MORNING
Qty: 90 CAPSULE | Refills: 3 | Status: SHIPPED | OUTPATIENT
Start: 2024-11-14

## 2024-11-14 RX ORDER — LEVOTHYROXINE SODIUM 125 UG/1
125 TABLET ORAL EVERY MORNING
Qty: 90 TABLET | Refills: 3 | Status: SHIPPED | OUTPATIENT
Start: 2024-11-14

## 2024-11-14 RX ORDER — PANTOPRAZOLE SODIUM 40 MG/1
40 TABLET, DELAYED RELEASE ORAL DAILY
Qty: 90 TABLET | Refills: 3 | Status: SHIPPED | OUTPATIENT
Start: 2024-11-14

## 2024-11-14 RX ORDER — FLUTICASONE PROPIONATE 50 MCG
2 SPRAY, SUSPENSION (ML) NASAL DAILY
Qty: 16 G | Refills: 11 | Status: SHIPPED | OUTPATIENT
Start: 2024-11-14

## 2024-11-14 RX ORDER — MONTELUKAST SODIUM 10 MG/1
10 TABLET ORAL NIGHTLY
Qty: 90 TABLET | Refills: 3 | Status: SHIPPED | OUTPATIENT
Start: 2024-11-14

## 2024-11-14 RX ORDER — FLUTICASONE PROPIONATE AND SALMETEROL 500; 50 UG/1; UG/1
POWDER RESPIRATORY (INHALATION)
Qty: 180 EACH | Refills: 3 | Status: SHIPPED | OUTPATIENT
Start: 2024-11-14

## 2024-11-14 NOTE — PROGRESS NOTES
Subjective     Patient ID: Eugenia Diaz is a 79 y.o. female.    Chief Complaint: Follow-up    Follow-up  Associated symptoms include arthralgias. Pertinent negatives include no chest pain, headaches, joint swelling, neck pain, vomiting or weakness.     She is quite happy with [facility] program, prescribed for scoliosis..    She has enrolled in  [facility].  She is walking around track x2!.    She c/p pain LUQ when palpating rib or bending over.    Began mid October.    Feels that underlying abdomen in source of pain.    Inguinal hernia on l.  Not painful.  Reducible.    Osteoporosis, tx with Evenity.     Caused incr in B P and painful extension of knees, ankles..     Evenity stopped and not taking Forteo.     Ankle pain, with locking,  still occurs, but less often.   Weight bearing helps. BP has normalized.     She is taking dyazide for BP, which is controlled..        Hypothyroidism.       Asthma is quiet.  Cold weather is a trigger.  Review of Systems   Constitutional:  Positive for activity change. Negative for unexpected weight change.   HENT:  Negative for hearing loss, rhinorrhea and trouble swallowing.    Eyes:  Negative for discharge and visual disturbance.   Respiratory:  Negative for chest tightness and wheezing.    Cardiovascular:  Negative for chest pain and palpitations.   Gastrointestinal:  Positive for constipation and diarrhea. Negative for blood in stool and vomiting.   Endocrine: Negative for polydipsia and polyuria.   Genitourinary:  Negative for difficulty urinating, dysuria, hematuria and menstrual problem.   Musculoskeletal:  Positive for arthralgias. Negative for joint swelling and neck pain.   Neurological:  Negative for weakness and headaches.   Psychiatric/Behavioral:  Negative for confusion and dysphoric mood.           Objective     Physical Exam  Constitutional:       General: She is not in acute distress.     Appearance: She is well-developed.   HENT:      Head: Normocephalic  and atraumatic.      Right Ear: External ear normal.      Left Ear: External ear normal.      Nose: Nose normal.   Eyes:      General: No scleral icterus.        Right eye: No discharge.         Left eye: No discharge.      Conjunctiva/sclera: Conjunctivae normal.      Pupils: Pupils are equal, round, and reactive to light.   Neck:      Thyroid: No thyromegaly.      Vascular: No JVD.   Cardiovascular:      Rate and Rhythm: Normal rate and regular rhythm.      Heart sounds: Normal heart sounds. No murmur heard.     No gallop.   Pulmonary:      Effort: Pulmonary effort is normal. No respiratory distress.      Breath sounds: Normal breath sounds. No wheezing or rales.   Chest:   Breasts:     Right: Normal.      Left: Normal.   Abdominal:      General: Bowel sounds are normal. There is no distension.      Palpations: Abdomen is soft. There is no mass.      Tenderness: There is no abdominal tenderness. There is no guarding or rebound.      Hernia: A hernia (L inguinal, reducible.) is present.   Musculoskeletal:         General: Tenderness (underneath L lower rib cage) present. Normal range of motion.      Cervical back: Normal range of motion and neck supple.   Lymphadenopathy:      Cervical: No cervical adenopathy.   Skin:     General: Skin is warm and dry.      Findings: No rash.      Comments: Purpura R forearm   Neurological:      Mental Status: She is alert and oriented to person, place, and time.      Cranial Nerves: No cranial nerve deficit.      Coordination: Coordination normal.   Psychiatric:         Behavior: Behavior normal.         Thought Content: Thought content normal.         Judgment: Judgment normal.       Large reducible L inguinal hernia     Assessment and Plan     1. Rib pain on left side  -     X-Ray Ribs 2 View Left; Future; Expected date: 11/14/2024    2. Left ankle pain, unspecified chronicity    3. Hyperlipidemia, unspecified hyperlipidemia type  -     atorvastatin (LIPITOR) 80 MG tablet; Take  1 tablet (80 mg total) by mouth every evening.  Dispense: 90 tablet; Refill: 3  -     Lipid Panel; Future; Expected date: 11/14/2024    4. Asthma, mild intermittent, well-controlled  -     montelukast (SINGULAIR) 10 mg tablet; Take 1 tablet (10 mg total) by mouth every evening.  Dispense: 90 tablet; Refill: 3    5. Abnormal finding of blood chemistry  -     Hemoglobin A1C; Future; Expected date: 11/14/2024    6. Osteoporosis, post-menopausal  -     Vitamin D; Future; Expected date: 11/14/2024    7. Essential hypertension  -     Magnesium; Future; Expected date: 11/14/2024  -     Comprehensive Metabolic Panel; Future; Expected date: 11/14/2024    8. BMI 27.0-27.9,adult  -     CBC Without Differential; Future; Expected date: 11/14/2024    9. LUQ abdominal pain  -     CT Abdomen Pelvis With IV Contrast Routine Oral Contrast; Future; Expected date: 11/14/2024    10. Left upper quadrant pain  -     Cancel: CT Abdomen With IV Contrast Routine Oral Contrast; Future; Expected date: 11/14/2024    11. Hypothyroidism (acquired)  -     TSH; Future; Expected date: 11/14/2024    12. Left upper quadrant abdominal pain  -     CT Abdomen Pelvis With IV Contrast Routine Oral Contrast; Future; Expected date: 11/14/2024    13. Senile purpura    14. Avascular necrosis of bone of left hip  Assessment & Plan:  Chronic.  Uses scooter.      15. Major depressive disorder with single episode, in full remission  Assessment & Plan:  Depression well controlled with zoloft.      16. Aortic atherosclerosis  Assessment & Plan:  Asymptomatic.  Tx with atorvastatin 80 mg daily.      Other orders  -     fluticasone propionate (FLONASE) 50 mcg/actuation nasal spray; 2 sprays (100 mcg total) by Each Nostril route Daily.  Dispense: 16 g; Refill: 11  -     levothyroxine (SYNTHROID) 125 MCG tablet; Take 1 tablet (125 mcg total) by mouth every morning.  Dispense: 90 tablet; Refill: 3  -     pantoprazole (PROTONIX) 40 MG tablet; Take 1 tablet (40 mg total)  by mouth once daily.  Dispense: 90 tablet; Refill: 3  -     triamterene-hydrochlorothiazide 37.5-25 mg (DYAZIDE) 37.5-25 mg per capsule; Take 1 capsule by mouth every morning.  Dispense: 90 capsule; Refill: 3        Covid V in elio      Follow up in about 1 year (around 11/14/2025) for PE.

## 2024-11-14 NOTE — TELEPHONE ENCOUNTER
Refill Decision Note   Eugenia Joe  is requesting a refill authorization.  Brief Assessment and Rationale for Refill:  Approve     Medication Therapy Plan:        Comments:     Note composed:5:18 PM 11/14/2024

## 2024-11-14 NOTE — TELEPHONE ENCOUNTER
No care due was identified.  [facility] Embedded Care Due Messages. Reference number: [id number].   11/14/2024 4:31:08 PM CST

## 2024-11-20 ENCOUNTER — HOSPITAL ENCOUNTER (OUTPATIENT)
Dept: RADIOLOGY | Facility: HOSPITAL | Age: 79
Discharge: HOME | End: 2024-11-20
Attending: INTERNAL MEDICINE
Payer: MEDICARE

## 2024-11-20 DIAGNOSIS — R10.12 LEFT UPPER QUADRANT ABDOMINAL PAIN: ICD-10-CM

## 2024-11-20 DIAGNOSIS — R10.12 LUQ ABDOMINAL PAIN: ICD-10-CM

## 2024-11-20 PROCEDURE — 74177 CT ABD & PELVIS W/CONTRAST: CPT | Mod: TC

## 2024-11-20 PROCEDURE — 25500020 PHARM REV CODE 255: Performed by: INTERNAL MEDICINE

## 2024-11-20 PROCEDURE — 74177 CT ABD & PELVIS W/CONTRAST: CPT | Mod: 26,,, | Performed by: RADIOLOGY

## 2024-11-20 RX ADMIN — IOHEXOL 30 ML: 350 INJECTION, SOLUTION INTRAVENOUS at 09:11

## 2024-11-20 RX ADMIN — IOHEXOL 75 ML: 350 INJECTION, SOLUTION INTRAVENOUS at 10:11

## 2024-11-21 ENCOUNTER — PATIENT MESSAGE (OUTPATIENT)
Dept: INTERNAL MEDICINE | Facility: CLINIC | Age: 79
End: 2024-11-21
Payer: MEDICARE

## 2024-11-21 DIAGNOSIS — K44.9 HIATAL HERNIA: ICD-10-CM

## 2024-11-21 DIAGNOSIS — R10.9 ABDOMINAL PAIN, UNSPECIFIED ABDOMINAL LOCATION: Primary | ICD-10-CM

## 2024-11-21 DIAGNOSIS — K59.89: ICD-10-CM

## 2024-11-23 ENCOUNTER — PATIENT MESSAGE (OUTPATIENT)
Dept: INTERNAL MEDICINE | Facility: CLINIC | Age: 79
End: 2024-11-23
Payer: MEDICARE

## 2024-11-23 ENCOUNTER — PATIENT MESSAGE (OUTPATIENT)
Dept: ENDOCRINOLOGY | Facility: CLINIC | Age: 79
End: 2024-11-23
Payer: MEDICARE

## 2024-11-24 NOTE — TELEPHONE ENCOUNTER
Pt states that for the past few months she has intermittent constipation and diarrhea that's getting worse; believes it may be due to Forteo that was prescribed by endocrinologist Dr. Christianson    States she will discontinue use to see if symptoms stop, has sent message to endo, and GI providers

## 2024-11-25 ENCOUNTER — OFFICE VISIT (OUTPATIENT)
Dept: GASTROENTEROLOGY | Facility: CLINIC | Age: 79
End: 2024-11-25
Payer: MEDICARE

## 2024-11-25 VITALS
WEIGHT: 153.69 LBS | HEART RATE: 81 BPM | SYSTOLIC BLOOD PRESSURE: 147 MMHG | DIASTOLIC BLOOD PRESSURE: 85 MMHG | BODY MASS INDEX: 27.22 KG/M2

## 2024-11-25 DIAGNOSIS — K44.9 HIATAL HERNIA: ICD-10-CM

## 2024-11-25 DIAGNOSIS — K59.89: ICD-10-CM

## 2024-11-25 DIAGNOSIS — R10.9 ABDOMINAL PAIN, UNSPECIFIED ABDOMINAL LOCATION: ICD-10-CM

## 2024-11-25 PROCEDURE — 99204 OFFICE O/P NEW MOD 45 MIN: CPT | Mod: S$PBB,,, | Performed by: INTERNAL MEDICINE

## 2024-11-25 PROCEDURE — 99214 OFFICE O/P EST MOD 30 MIN: CPT | Mod: PBBFAC | Performed by: INTERNAL MEDICINE

## 2024-11-25 PROCEDURE — 99999 PR PBB SHADOW E&M-EST. PATIENT-LVL IV: CPT | Mod: PBBFAC,,, | Performed by: INTERNAL MEDICINE

## 2024-11-25 NOTE — PROGRESS NOTES
Reason for visit: Abdominal pain    HPI:  is a 79 year old lady with abdominal pain. Has been noticing pain in the LUQ since October 2024. Has alternating diarrhea, constipation and bloating. Hs been on Forteo injections for Osteoporosis since July. CT scan on November 20, 2024 revealed  a large hiatal hernia. There is organoaxial gastric rotation most of which is located above the hemidiaphragm. There is dilatation the duodenum to a maximum diameter of approximately 3.5 cm, which becomes normal in caliber at the ligament of Treitz and subsequently the proximal jejunum is also dilated to a maximum diameter of 3.6 cm. There is a gradual tapering to a normal caliber. There is also some suspected duodenal wall thickening. There is a left inguinal hernia present containing portions of the descending and sigmoid colon. The splenic flexure of the colon extends into the left hemithorax through the large hiatal hernia and re-enters the abdomen at the level of the mid transverse colon.    Dilatation and questionable wall thickening of the duodenum and proximal jejunum suggesting nonspecific enteritis. Has been on Pantoprazole daily. Since getting off Forteo 2 days ago has been feeling about the same. Eats 3 small meals a day. No vomiting. She has never seen a surgeon in the past for the hernia.    Denies any dysphagia, odynophagia, nausea, vomiting, heartburn or acid regurgitation. No blood/ mucus in stool or unintentional weight loss. No melena or maroon stools. No recent changes in diet or medications. No family history of IBD, Celiac disease or GI malignancy. No regular NSAIDs (Naproxen prn), alcohol or tobacco use. No recent antibiotic use, travels or sick contacts. No prior history of C.diff.Colonoscopy in 2016 was normal (performed with difficulty due to significant looping).    Past medical, surgical, social and family history reviewed in epic    Medication allergies reviewed in epic    Review of  systems:    Constitutional:  No fever, no chills, no weight loss, appetite is normal  Eyes:  No visual changes or red eyes  ENT:  No odynophagia or hoarseness of voice  Cardiovascular:  No angina or palpitation  Respiratory:  No shortness breath; has wheezing  Genitourinary:  No dysuria or frequency  Musculoskeletal:  No myalgias; has mid and low back, hip arthralgias  Skin:  No pruritus or eczema  Neurologic:  No headache or seizures  Psychiatric:  No anxiety depression  Gastrointestinal:  See HPI    Physical exam:  Vitals see epic, awake, alert, oriented x3 in no acute distress; Kyphotic and scoliotic back    Neck:  Supple, no carotid bruit, no cervical adenopathy  Abdomen:  Obese, soft, mild tenderness in the LUQ, nondistended, no masses palpable, no hepatosplenomegaly detected, bowel sounds are normal, no ascites clinically detectable  Eyes:  Conjunctivae anicteric, not injected  ENT:  Oral mucosa moist  Cardiovascular:  S1, S2 normal, no murmurs, no gallops, no abdominal bruits heard  Respiratory:  Bilateral air entry equal, no rhonchi, no crackles, normal effort  Skin:  No palmar erythema or spider angiomata  Neurologic:  No asterixis or tremors  Psychiatric:  Affect appropriate, proper judgment, proper insight, oriented to place and time  Lower extremities:  No pedal edema    Recent labs (11/14/24 reviewed), imaging (CT chest from 2016) and endoscopy reports reviewed.      Impression: Abdominal pain with a chronic large hiatus hernia with organoaxial volvulus- not worsening; ? Referred pain from the back; ? Pain secondary to PUD; ? Secondary to the hernia    Recommendations:     Discontinue Naproxen  Continue Pantoprazole daily  Will wait for a week and if no improvement will proceed with EGD to evaluate for PUD (Ms.Joe will call me if symptoms worsen).

## 2024-11-26 ENCOUNTER — PATIENT MESSAGE (OUTPATIENT)
Dept: GASTROENTEROLOGY | Facility: CLINIC | Age: 79
End: 2024-11-26
Payer: MEDICARE

## 2024-12-20 ENCOUNTER — TELEPHONE (OUTPATIENT)
Dept: INFECTIOUS DISEASES | Facility: CLINIC | Age: 79
End: 2024-12-20
Payer: MEDICARE

## 2024-12-20 ENCOUNTER — PATIENT MESSAGE (OUTPATIENT)
Dept: INTERNAL MEDICINE | Facility: CLINIC | Age: 79
End: 2024-12-20
Payer: MEDICARE

## 2024-12-20 NOTE — TELEPHONE ENCOUNTER
----- Message from Kellen sent at 12/20/2024  1:16 PM CST -----  Regarding: Pt called states she wld like to speak with someone in regards to gift she wld like to drop off for the Dr  Contact: [phone]  Name of Who is Calling:SHAUN RUCKER [[id number]]        What is the request in detail:Pt called states she wld like to speak with someone in regards to gift she wld like to drop off for the Dr. Please advise         Can the clinic reply by [facility]:No        What Number to Call Back if not in [facility]: Telephone Information:  Mobile          [phone]

## 2025-02-06 ENCOUNTER — PATIENT MESSAGE (OUTPATIENT)
Dept: INTERNAL MEDICINE | Facility: CLINIC | Age: 80
End: 2025-02-06
Payer: MEDICARE

## 2025-02-07 ENCOUNTER — OFFICE VISIT (OUTPATIENT)
Dept: PRIMARY CARE CLINIC | Facility: CLINIC | Age: 80
End: 2025-02-07
Payer: MEDICARE

## 2025-02-07 VITALS
WEIGHT: 154.56 LBS | HEART RATE: 80 BPM | RESPIRATION RATE: 16 BRPM | OXYGEN SATURATION: 97 % | BODY MASS INDEX: 27.39 KG/M2 | DIASTOLIC BLOOD PRESSURE: 60 MMHG | SYSTOLIC BLOOD PRESSURE: 120 MMHG | HEIGHT: 63 IN
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
## 2025-02-07 PROCEDURE — 99215 OFFICE O/P EST HI 40 MIN: CPT | Mod: PBBFAC | Performed by: NURSE PRACTITIONER

## 2025-02-07 RX ORDER — AMOXICILLIN AND CLAVULANATE POTASSIUM 875; 125 MG/1; MG/1
1 TABLET, FILM COATED ORAL 2 TIMES DAILY
Qty: 14 TABLET | Refills: 0 | Status: SHIPPED | OUTPATIENT
Start: 2025-02-07 | End: 2025-02-14

## 2025-02-07 RX ORDER — MUPIROCIN 20 MG/G
OINTMENT TOPICAL 3 TIMES DAILY
Qty: 22 G | Refills: 0 | Status: SHIPPED | OUTPATIENT
Start: 2025-02-07

## 2025-02-17 NOTE — PROGRESS NOTES
[facility] Note    Chief Complaint      Chief Complaint   Patient presents with    Animal Bite     Cat bite- painful, redness, x 2 days     History of Present Illness      Eugenia Diaz is a 79 y.o. female patient of Dr. Aguilar who presents today for f/u with cat bite and scratch.    History of Present Illness    CHIEF COMPLAINT:  Eugenia presents today for evaluation of a feral cat bite    HISTORY OF PRESENT ILLNESS:  She sustained a cat bite from a feral neighborhood cat less than 48 hours ago on Wednesday afternoon. She reports pain and movement limitations with jolting sensations during hand movements. She initially dressed the wound herself but noticed worsening appearance following a shower.    MEDICAL HISTORY:  She has a history of cellulitis and hip replacement. Her last tetanus vaccine was in 2016.    ALLERGIES:  She has an allergy to penicillin.    MEDICATIONS:  She is currently taking triple antibiotic.      ROS:  Musculoskeletal: +joint pain  Skin: +lesion         Health Maintenance   Topic Date Due    COVID-19 Vaccine (8 - 2024-25 season) 10/04/2024    DEXA Scan  03/01/2025    Mammogram  07/19/2025    Lipid Panel  11/14/2025    TETANUS VACCINE  02/07/2035    Hepatitis C Screening  Completed    Shingles Vaccine  Completed    Influenza Vaccine  Completed    RSV Vaccine (Age 60+ and Pregnant patients)  Completed    Pneumococcal Vaccines (Age 50+)  Completed       Past Medical History:   Diagnosis Date    Allergy     Anemia     Asthma     Bronchitis     Depression     Generalized headaches     History of endometriosis     Hyperlipidemia     Hypertension     Hypothyroidism     Osteoporosis, unspecified     PCO (posterior capsular opacification), left     Recurrent upper respiratory infection (URI)     Thyroid disease     hypothyroidism    TMJ dysfunction        Past Surgical History:   Procedure Laterality Date    ADENOIDECTOMY      CATARACT EXTRACTION W/  INTRAOCULAR LENS IMPLANT  09/06/2006  "   right eye - Dr Best    CATARACT EXTRACTION W/  INTRAOCULAR LENS IMPLANT  11/15/2006    left eye - Dr Best    CHOLECYSTECTOMY  01/01/1994    COLONOSCOPY N/A 06/22/2016    Procedure: COLONOSCOPY;  Surgeon: Jae Hodges MD;  Location: [facility] ([address] [place] [address]);  Service: Endoscopy;  Laterality: N/A;    EYE SURGERY      Cataracts    FRACTURE SURGERY  2012 r    Rt. Knee    JOINT REPLACEMENT  2000    Rt. Hip    KNEE SURGERY  12/21/2012    OOPHORECTOMY      left ovary removed, secondary endometriosis    right hip replacement  01/01/2000    TEMPOROMANDIBULAR JOINT SURGERY  01/01/1988    TONSILLECTOMY      yag laser OD         family history includes Alzheimer's disease in her brother; Breast cancer in her cousin and paternal aunt; Cancer in her brother and brother; Cancer (age of onset: 48) in her father; Colon cancer in her father; Diabetes in her brother and mother; Early death in her brother and father; Hearing loss in her brother, brother, and paternal grandfather; Heart disease in her mother; Heart failure in her mother; Hypertension in her mother; Ovarian cancer in her paternal aunt; Stroke in her maternal grandfather.    Social History[1]    Encounter Medications[2]     Review of patient's allergies indicates:   Allergen Reactions    Sulfa (sulfonamide antibiotics) Swelling     Throat swelling      Clindamycin Hives    Erythromycin base Other (See Comments)    Neomycin      Other reaction(s): Rash    Nitrofurantoin macrocrystalline Other (See Comments)     Macrobid.Throat Swelling    Tetracycline      Other reaction(s): Anaphylaxis    Zanaflex [tizanidine] Hallucinations    Azithromycin Swelling     Throat Swelling    Meperidine Rash     Demerol.     Oxycodone hcl-oxycodone-asa Rash    Propoxyphene Rash     Darvon.        Physical Exam      Vital Signs  Pulse: 80  Resp: 16  SpO2: 97 %  BP: 120/60  BP Location: Right arm  Patient Position: Sitting  Height and Weight  Height: 5' 3" (160 cm)  Weight: 70.1 kg " (154 lb 8.7 oz)  BSA (Calculated - sq m): 1.77 sq meters  BMI (Calculated): 27.4  Weight in (lb) to have BMI = 25: 140.8    Physical Exam  Vitals and nursing note reviewed.   Constitutional:       General: She is not in acute distress.     Appearance: Normal appearance. She is well-developed. She is not ill-appearing.   HENT:      Head: Normocephalic and atraumatic.      Right Ear: External ear normal.      Left Ear: External ear normal.   Eyes:      Conjunctiva/sclera: Conjunctivae normal.      Pupils: Pupils are equal, round, and reactive to light.   Neck:      Thyroid: No thyromegaly.      Vascular: No JVD.      Trachea: No tracheal deviation.   Cardiovascular:      Rate and Rhythm: Normal rate and regular rhythm.      Heart sounds: Normal heart sounds. No murmur heard.  Pulmonary:      Effort: Pulmonary effort is normal.      Breath sounds: Normal breath sounds.   Chest:      Chest wall: No tenderness.   Abdominal:      Palpations: Abdomen is soft.      Tenderness: There is no abdominal tenderness. There is no guarding.   Musculoskeletal:         General: Normal range of motion.      Cervical back: Normal range of motion and neck supple.   Lymphadenopathy:      Cervical: No cervical adenopathy.   Skin:     General: Skin is warm and dry.   Neurological:      General: No focal deficit present.      Mental Status: She is alert and oriented to person, place, and time.   Psychiatric:         Mood and Affect: Mood normal.         Behavior: Behavior normal.         Thought Content: Thought content normal.         Judgment: Judgment normal.          Laboratory:  CBC:  Lab Results   Component Value Date    WBC 11.38 11/14/2024    RBC 4.77 11/14/2024    HGB 14.4 11/14/2024    HCT 44.0 11/14/2024     11/14/2024    MCV 92 11/14/2024    MCH 30.2 11/14/2024    MCHC 32.7 11/14/2024    MCHC 32.9 11/09/2023    MCHC 32.6 11/01/2022     CMP:  Lab Results   Component Value Date    GLU 99 11/14/2024    CALCIUM 10.0 11/14/2024     ALBUMIN 4.1 11/14/2024    PROT 7.1 11/14/2024     11/14/2024    K 3.8 11/14/2024    CO2 32 (H) 11/14/2024    CL 98 11/14/2024    BUN 12 11/14/2024    ALKPHOS 84 11/14/2024    ALT 15 11/14/2024    AST 17 11/14/2024    BILITOT 0.6 11/14/2024    BILITOT 0.7 11/09/2023    BILITOT 0.6 11/01/2022     URINALYSIS:  Lab Results   Component Value Date    COLORU Yellow 04/05/2022    SPECGRAV 1.020 04/05/2022    PHUR 7.0 04/05/2022    PROTEINUA Trace (A) 04/05/2022    BACTERIA Few (A) 04/05/2022    NITRITE Negative 04/05/2022    LEUKOCYTESUR 3+ (A) 04/05/2022    UROBILINOGEN Negative 04/05/2022      LIPIDS:  Lab Results   Component Value Date    TSH 1.656 11/14/2024    TSH 1.034 07/18/2023    TSH 1.473 03/01/2023    HDL 68 11/14/2024    HDL 63 11/09/2023    HDL 70 09/12/2022    CHOL 156 11/14/2024    CHOL 145 11/09/2023    CHOL 168 09/12/2022    TRIG 68 11/14/2024    TRIG 62 11/09/2023    TRIG 75 09/12/2022    LDLCALC 74.4 11/14/2024    LDLCALC 69.6 11/09/2023    LDLCALC 83.0 09/12/2022    CHOLHDL 43.6 11/14/2024    CHOLHDL 43.4 11/09/2023    CHOLHDL 41.7 09/12/2022    NONHDLCHOL 88 11/14/2024    NONHDLCHOL 82 11/09/2023    NONHDLCHOL 98 09/12/2022    TOTALCHOLEST 2.3 11/14/2024    TOTALCHOLEST 2.3 11/09/2023    TOTALCHOLEST 2.4 09/12/2022     TSH:  Lab Results   Component Value Date    TSH 1.656 11/14/2024    TSH 1.034 07/18/2023    TSH 1.473 03/01/2023     A1C:  Lab Results   Component Value Date    HGBA1C 5.6 11/14/2024    HGBA1C 6.2 06/18/2010    HGBA1C 6.1 06/18/2009    HGBA1C 6.0 03/25/2009         Assessment/Plan     Eugenia Diaz is a 79 y.o.female with:    Assessment & Plan    IMPRESSION:  - Assessed cat bite wound, considering patient's history of cellulitis  - Evaluated wound for signs of infection  - Determined need for prophylactic antibiotics due to risk of infection from animal bite  - Considered antibiotic therapy, taking into account patient's penicillin allergy  - Reviewed patient's tetanus  immunization status, last dose in 2016    AVASCULAR NECROSIS OF BONE OF LEFT HIP:  - Noted patient's history of hip replacement.  - Advised to continue taking Augmentin prophylactically before dental procedures due to the implant.    CAT BITE WOUND:  - Examined the cat bite wound on the hand, which occurred less than 48 hours ago.  - Eugenia reported pain and movement restriction due to the feral cat clamping down.  - Assessed the risk of infection and discussed potential complications with the patient.  - Educated the patient about signs of worsening infection to monitor for, including fever, red streaks up the arm, increased swelling, and pus formation.  - Instructed the patient to seek immediate medical attention if condition deteriorates over the weekend or if experiencing these symptoms.  - Emphasized the importance of proper wound care and antibiotic adherence to prevent complications.  - Prescribed Bactroban (mupirocin) ointment for topical application to the wound.  - Prescribed Augmentin (amoxicillin/clavulanic acid) as an oral antibiotic.  - Instructed the patient to apply antibiotic ointment to the wound as directed.  - Reviewed the patient's allergy history and confirmed previous tolerance to Augmentin.  - Despite reported penicillin allergy, prescribed Augmentin based on previous tolerance.  - Advised the patient to monitor for any adverse reactions and report immediately if they occur.    TETANUS VACCINATION:  - Discussed the rationale for tetanus booster due to animal bite and time since last immunization.  - Administered tetanus booster vaccination in office.    MOBILITY ISSUES:  - Noted that the patient uses a scooter for mobility.  - Confirmed that the patient utilizes a medication delivery service due to mobility limitations.  - Assessed the patient's ability to manage daily activities with current mobility aids.         Cat bite, initial encounter  -     mupirocin (BACTROBAN) 2 % ointment; Apply  topically 3 (three) times daily.  Dispense: 22 g; Refill: 0  -     amoxicillin-clavulanate 875-125mg (AUGMENTIN) 875-125 mg per tablet; Take 1 tablet by mouth 2 (two) times daily. for 7 days  Dispense: 14 tablet; Refill: 0  -     Tdap (BOOSTRIX) vaccine injection 0.5 mL          Health Maintenance Due   Topic Date Due    COVID-19 Vaccine (8 - 2024-25 season) 10/04/2024    DEXA Scan  03/01/2025        I spent 34 minutes on the day of this encounter for preparing for, evaluating, treating, and managing this patient.      -Continue current medications and maintain follow up with specialists.  Return to clinic as needed for any concerns No follow-ups on file.     This note was generated with the assistance of ambient listening technology. Verbal consent was obtained by the patient and accompanying visitor(s) for the recording of patient appointment to facilitate this note. I attest to having reviewed and edited the generated note for accuracy, though some syntax or spelling errors may persist. Please contact the author of this note for any clarification.        MALISSA PrietoC  [facility] location                       [1]   Social History  Tobacco Use    Smoking status: Never    Smokeless tobacco: Never   Substance Use Topics    Alcohol use: Yes     Alcohol/week: 1.0 standard drink of alcohol     Types: 1 Cans of beer per week     Comment: maybe 1-2 monthly    Drug use: Never   [2]   Outpatient Encounter Medications as of 2/7/2025   Medication Sig Note Dispense Refill    albuterol (PROVENTIL) 2.5 mg /3 mL (0.083 %) nebulizer solution Take 3 mLs (2.5 mg total) by nebulization every 6 (six) hours as needed for Wheezing. Rescue  1 each 11    albuterol (PROVENTIL/VENTOLIN HFA) 90 mcg/actuation inhaler Inhale 2 puffs into the lungs every 6 (six) hours as needed for Wheezing.  54 g 11    atorvastatin (LIPITOR) 80 MG tablet Take 1 tablet (80 mg total) by mouth every evening.  90 tablet 3    benzonatate  (TESSALON PERLES) 100 MG capsule Take 2 capsules (200 mg total) by mouth 3 (three) times daily as needed for Cough. 2019: As needed 20 capsule 0    calcium-vitamin D 600 mg(1,500mg) -400 unit Tab Take 1 tablet by mouth Every morning.       clotrimazole-betamethasone 1-0.05% (LOTRISONE) cream APPLY TO THE AFFECTED AREA TWICE DAILY  15 g 3    cyclobenzaprine (FLEXERIL) 5 MG tablet Take 1 tablet (5 mg total) by mouth 3 (three) times daily as needed for Muscle spasms.  60 tablet 1    fluticasone propionate (FLONASE) 50 mcg/actuation nasal spray 2 sprays (100 mcg total) by Each Nostril route Daily.  16 g 11    levothyroxine (SYNTHROID) 125 MCG tablet Take 1 tablet (125 mcg total) by mouth every morning.  90 tablet 3    loratadine (CLARITIN) 10 mg tablet Take 1 tablet by mouth Every PM.       montelukast (SINGULAIR) 10 mg tablet Take 1 tablet (10 mg total) by mouth every evening.  90 tablet 3    neomycin-polymyxin-hydrocortisone (CORTISPORIN) 3.5-10,000-1 mg/mL-unit/mL-% otic suspension instill 3 DROPS into THE right ear FOUR TIMES DAILY 2019: As needed 10 mL 0    OLIVE LEAF EXTRACT ORAL Take 150 mg by mouth Every PM.       pantoprazole (PROTONIX) 40 MG tablet Take 1 tablet (40 mg total) by mouth once daily.  90 tablet 3    potassium chloride (KLOR-CON) 10 MEQ TbSR Take 1 tablet (10 mEq total) by mouth once daily.  90 tablet 1    sertraline (ZOLOFT) 100 MG tablet TAKE 2 TABLETS BY MOUTH DAILY  180 tablet 1    teriparatide (FORTEO) 20 mcg/dose (600mcg/2.4mL) PnIj Inject 0.08 mLs (20 mcg total) into the skin Daily.  2.4 mL 11    triamterene-hydrochlorothiazide 37.5-25 mg (DYAZIDE) 37.5-25 mg per capsule Take 1 capsule by mouth every morning.  90 capsule 3    vitamin D (VITAMIN D3) 1000 units Tab Take 1,000 Units by mouth 3 (three) times a week.       WIXELA INHUB 500-50 mcg/dose DsDv diskus inhaler INHALE ONE PUFF INTO THE LUNGS TWICE DAILY.  180 each 3    [] amoxicillin-clavulanate 875-125mg (AUGMENTIN)  875-125 mg per tablet Take 1 tablet by mouth 2 (two) times daily. for 7 days  14 tablet 0    ipratropium (ATROVENT) 0.02 % nebulizer solution Take 2.5 mLs (500 mcg total) by nebulization 4 (four) times daily. Rescue  90 each 3    mupirocin (BACTROBAN) 2 % ointment Apply topically 3 (three) times daily.  22 g 0     Facility-Administered Encounter Medications as of 2/7/2025   Medication Dose Route Frequency Provider Last Rate Last Admin    Tdap (BOOSTRIX) vaccine injection 0.5 mL  0.5 mL Intramuscular 1 time in Clinic/HOD

## 2025-02-21 DIAGNOSIS — Z00.00 ENCOUNTER FOR MEDICARE ANNUAL WELLNESS EXAM: ICD-10-CM

## 2025-03-03 ENCOUNTER — HOSPITAL ENCOUNTER (OUTPATIENT)
Dept: RADIOLOGY | Facility: CLINIC | Age: 80
Discharge: HOME | End: 2025-03-03
Attending: INTERNAL MEDICINE
Payer: MEDICARE

## 2025-03-03 DIAGNOSIS — M81.0 OSTEOPOROSIS, POST-MENOPAUSAL: ICD-10-CM

## 2025-03-03 PROCEDURE — 77080 DXA BONE DENSITY AXIAL: CPT | Mod: TC

## 2025-03-31 ENCOUNTER — PATIENT MESSAGE (OUTPATIENT)
Dept: ENDOCRINOLOGY | Facility: CLINIC | Age: 80
End: 2025-03-31
Payer: MEDICARE

## 2025-04-08 ENCOUNTER — LAB VISIT (OUTPATIENT)
Dept: LAB | Facility: HOSPITAL | Age: 80
End: 2025-04-08
Attending: INTERNAL MEDICINE
Payer: MEDICARE

## 2025-04-08 ENCOUNTER — OFFICE VISIT (OUTPATIENT)
Dept: ENDOCRINOLOGY | Facility: CLINIC | Age: 80
End: 2025-04-08
Payer: MEDICARE

## 2025-04-08 VITALS
HEART RATE: 78 BPM | SYSTOLIC BLOOD PRESSURE: 132 MMHG | WEIGHT: 150 LBS | OXYGEN SATURATION: 95 % | BODY MASS INDEX: 26.58 KG/M2 | HEIGHT: 63 IN | DIASTOLIC BLOOD PRESSURE: 76 MMHG

## 2025-04-08 DIAGNOSIS — M81.0 OSTEOPOROSIS, POST-MENOPAUSAL: Primary | ICD-10-CM

## 2025-04-08 DIAGNOSIS — E03.9 HYPOTHYROIDISM (ACQUIRED): ICD-10-CM

## 2025-04-08 DIAGNOSIS — M81.0 OSTEOPOROSIS, POST-MENOPAUSAL: ICD-10-CM

## 2025-04-08 LAB
ANION GAP (OHS): 8 MMOL/L (ref 8–16)
BUN SERPL-MCNC: 10 MG/DL (ref 8–23)
CALCIUM SERPL-MCNC: 9.8 MG/DL (ref 8.7–10.5)
CHLORIDE SERPL-SCNC: 94 MMOL/L (ref 95–110)
CO2 SERPL-SCNC: 32 MMOL/L (ref 23–29)
CREAT SERPL-MCNC: 0.7 MG/DL (ref 0.5–1.4)
GFR SERPLBLD CREATININE-BSD FMLA CKD-EPI: >60 ML/MIN/1.73/M2
GLUCOSE SERPL-MCNC: 76 MG/DL (ref 70–110)
POTASSIUM SERPL-SCNC: 3.4 MMOL/L (ref 3.5–5.1)
SODIUM SERPL-SCNC: 134 MMOL/L (ref 136–145)

## 2025-04-08 PROCEDURE — 99999 PR PBB SHADOW E&M-EST. PATIENT-LVL III: CPT | Mod: PBBFAC,,, | Performed by: INTERNAL MEDICINE

## 2025-04-08 PROCEDURE — 99214 OFFICE O/P EST MOD 30 MIN: CPT | Mod: S$PBB,,, | Performed by: INTERNAL MEDICINE

## 2025-04-08 PROCEDURE — 36415 COLL VENOUS BLD VENIPUNCTURE: CPT

## 2025-04-08 PROCEDURE — 84295 ASSAY OF SERUM SODIUM: CPT

## 2025-04-08 PROCEDURE — 99213 OFFICE O/P EST LOW 20 MIN: CPT | Mod: PBBFAC | Performed by: INTERNAL MEDICINE

## 2025-04-08 PROCEDURE — G2211 COMPLEX E/M VISIT ADD ON: HCPCS | Mod: S$PBB,,, | Performed by: INTERNAL MEDICINE

## 2025-04-08 RX ORDER — ZOLEDRONIC ACID 5 MG/100ML
5 INJECTION, SOLUTION INTRAVENOUS
OUTPATIENT
Start: 2025-04-08

## 2025-04-08 RX ORDER — SODIUM CHLORIDE 0.9 % (FLUSH) 0.9 %
10 SYRINGE (ML) INJECTION
OUTPATIENT
Start: 2025-04-08

## 2025-04-08 RX ORDER — HEPARIN 100 UNIT/ML
500 SYRINGE INTRAVENOUS
OUTPATIENT
Start: 2025-04-08

## 2025-04-08 NOTE — PATIENT INSTRUCTIONS
I have ordered reclast for treatment of osteoporosis.   We reviewed and signed the consent form today. You should hear directly from the infusion suite to discuss and arrange scheduling.    There are a few things I ask my patients to do before receiving reclast infusion.   1) The day before and day of the infusion please drink plenty of fluids.   2) The day of the infusion take over the counter tylenol 500 mg one tablet every eight hours for one day. This minimizes the joint pains that can occur with reclast.  3) Take an extra dose of over the counter vitamin D the day before the infusion.

## 2025-04-08 NOTE — ASSESSMENT & PLAN NOTE
Risk factors: , postmenopausal status, , fractures, family history of osteoporosis (mother and maternal aunt), history of prednisone use.   Multiple fractures: ankle, feet, knee cap, rib    Very little exercise   Stays active but deconditioned and has scoliosis     Reviewed options  Reclast - discussed treatment, would like to try   Does not want prolia  Would not recommend raloxifene     Vitamin D is normal   Normal kidney function last year, plan to repeat   Continue calcium and vitamin D

## 2025-04-08 NOTE — PROGRESS NOTES
Subjective:      Patient ID: Eugenia Diaz is a 79 y.o. female.    Chief Complaint:  Follow-up (  )      History of Present Illness    Ms Diaz is a 79 year old woman who is here for evaluation of osteoporosis.   Multiple s/e to different medications.  Osteoporosis, post-menopausal  Risk factors: , postmenopausal status, , fractures, family history of osteoporosis (mother and maternal aunt), history of prednisone use.     Fracture history:   Ankle (2003)   Foot (1990s)  CT of 2016 documents sternal fracture and multiple rib fractures  AVN hip replacement 20+ years ago     Supplements:   Calcium and vitamin D daily     Previously on:   Reclast - 2022/2021, 2020 and 2019   Forteo - retried but developed multiple side effects: constipation, pain  Prolia for one year (two doses) 2016 -- after second injection she fell and broke her sternum. She also fractured a tooth at the time and needed an extraction on implant. But she did not undergo procedure.   Forteo for two years   Fosamax/actonel for 4 - 5 years   Drug holiday for three years      Very little exercise   Stays active but deconditioned and has scoliosis      Does not use prednisone   Flonase  Pantoprazole 40 mg daily     Review of Systems  Denies recent illness     Objective:   Physical Exam  There were no vitals filed for this visit.    BP Readings from Last 3 Encounters:   02/07/25 120/60   11/25/24 (!) 147/85   11/14/24 128/72     Wt Readings from Last 1 Encounters:   02/07/25 1128 70.1 kg (154 lb 8.7 oz)         There is no height or weight on file to calculate BMI.    Lab Review:   Lab Results   Component Value Date    HGBA1C 5.6 11/14/2024     Lab Results   Component Value Date    CHOL 156 11/14/2024    HDL 68 11/14/2024    LDLCALC 74.4 11/14/2024    TRIG 68 11/14/2024    CHOLHDL 43.6 11/14/2024     Lab Results   Component Value Date     11/14/2024    K 3.8 11/14/2024    CL 98 11/14/2024    CO2 32 (H) 11/14/2024    GLU 99 11/14/2024     BUN 12 11/14/2024    CREATININE 0.8 11/14/2024    CALCIUM 10.0 11/14/2024    PROT 7.1 11/14/2024    ALBUMIN 4.1 11/14/2024    BILITOT 0.6 11/14/2024    ALKPHOS 84 11/14/2024    AST 17 11/14/2024    ALT 15 11/14/2024    ANIONGAP 8 11/14/2024    ESTGFRAFRICA >60.0 07/06/2021    EGFRNONAA >60.0 07/06/2021    TSH 1.656 11/14/2024     Lab Results   Component Value Date    [id number] 39 11/14/2024    [id number] 55 07/18/2023     3/3/2025      Assessment and Plan     Osteoporosis, post-menopausal  Risk factors: , postmenopausal status, , fractures, family history of osteoporosis (mother and maternal aunt), history of prednisone use.   Multiple fractures: ankle, feet, knee cap, rib    Very little exercise   Stays active but deconditioned and has scoliosis     Reviewed options  Reclast - discussed treatment, would like to try   Does not want prolia  Would not recommend raloxifene     Vitamin D is normal   Normal kidney function last year, plan to repeat   Continue calcium and vitamin D       Hypothyroidism (acquired)  Continue replacement  Check TSH today to confirm adequate dosing   Lab Results   Component Value Date    [id number] 39 11/14/2024    [id number] 55 07/18/2023

## 2025-04-14 ENCOUNTER — RESULTS FOLLOW-UP (OUTPATIENT)
Dept: ENDOCRINOLOGY | Facility: CLINIC | Age: 80
End: 2025-04-14

## 2025-04-14 DIAGNOSIS — M81.0 OSTEOPOROSIS, POST-MENOPAUSAL: Primary | ICD-10-CM

## 2025-04-14 NOTE — PROGRESS NOTES
Repeat labs   May be related to diuretics   Message sent    Results for orders placed or performed in visit on 04/08/25   Basic Metabolic Panel    Collection Time: 04/08/25  2:05 PM   Result Value Ref Range    Sodium 134 (L) 136 - 145 mmol/L    Potassium 3.4 (L) 3.5 - 5.1 mmol/L    Chloride 94 (L) 95 - 110 mmol/L    CO2 32 (H) 23 - 29 mmol/L    Glucose 76 70 - 110 mg/dL    BUN 10 8 - 23 mg/dL    Creatinine 0.7 0.5 - 1.4 mg/dL    Calcium 9.8 8.7 - 10.5 mg/dL    Anion Gap 8 8 - 16 mmol/L    eGFR >60 >60 mL/min/1.73/m2     *Note: Due to a large number of results and/or encounters for the requested time period, some results have not been displayed. A complete set of results can be found in Results Review.

## 2025-04-21 ENCOUNTER — PATIENT OUTREACH (OUTPATIENT)
Dept: ADMINISTRATIVE | Facility: HOSPITAL | Age: 80
End: 2025-04-21
Payer: MEDICARE

## 2025-04-21 ENCOUNTER — PATIENT MESSAGE (OUTPATIENT)
Dept: ADMINISTRATIVE | Facility: HOSPITAL | Age: 80
End: 2025-04-21
Payer: MEDICARE

## 2025-04-21 DIAGNOSIS — Z12.31 VISIT FOR SCREENING MAMMOGRAM: Primary | ICD-10-CM

## 2025-04-21 NOTE — PROGRESS NOTES
Chart reviewed and updated. Reconciled immunizations, health maintenance and care everywhere.  Placed MMG order and sent portal msg for scheduling.      Sheridan Flores [facility]  Panel Care Coordinator  [facility]  [phone]  For Latia Aguilar MD

## 2025-04-28 ENCOUNTER — LAB VISIT (OUTPATIENT)
Dept: LAB | Facility: HOSPITAL | Age: 80
End: 2025-04-28
Attending: INTERNAL MEDICINE
Payer: MEDICARE

## 2025-04-28 ENCOUNTER — RESULTS FOLLOW-UP (OUTPATIENT)
Dept: ENDOCRINOLOGY | Facility: CLINIC | Age: 80
End: 2025-04-28

## 2025-04-28 DIAGNOSIS — M81.0 OSTEOPOROSIS, POST-MENOPAUSAL: ICD-10-CM

## 2025-04-28 LAB
ANION GAP (OHS): 8 MMOL/L (ref 8–16)
BUN SERPL-MCNC: 11 MG/DL (ref 8–23)
CALCIUM SERPL-MCNC: 9.2 MG/DL (ref 8.7–10.5)
CHLORIDE SERPL-SCNC: 98 MMOL/L (ref 95–110)
CO2 SERPL-SCNC: 30 MMOL/L (ref 23–29)
CREAT SERPL-MCNC: 0.7 MG/DL (ref 0.5–1.4)
GFR SERPLBLD CREATININE-BSD FMLA CKD-EPI: >60 ML/MIN/1.73/M2
GLUCOSE SERPL-MCNC: 80 MG/DL (ref 70–110)
POTASSIUM SERPL-SCNC: 3.5 MMOL/L (ref 3.5–5.1)
SODIUM SERPL-SCNC: 136 MMOL/L (ref 136–145)

## 2025-04-28 PROCEDURE — 36415 COLL VENOUS BLD VENIPUNCTURE: CPT

## 2025-04-28 PROCEDURE — 80048 BASIC METABOLIC PNL TOTAL CA: CPT

## 2025-04-30 ENCOUNTER — INFUSION (OUTPATIENT)
Dept: INFECTIOUS DISEASES | Facility: HOSPITAL | Age: 80
End: 2025-04-30
Payer: MEDICARE

## 2025-04-30 VITALS
SYSTOLIC BLOOD PRESSURE: 129 MMHG | TEMPERATURE: 99 F | RESPIRATION RATE: 19 BRPM | DIASTOLIC BLOOD PRESSURE: 69 MMHG | HEIGHT: 63 IN | HEART RATE: 81 BPM | WEIGHT: 149 LBS | OXYGEN SATURATION: 95 % | BODY MASS INDEX: 26.4 KG/M2

## 2025-04-30 DIAGNOSIS — M87.059 AVASCULAR NECROSIS OF BONE OF HIP, UNSPECIFIED LATERALITY: Primary | ICD-10-CM

## 2025-04-30 DIAGNOSIS — M81.0 OSTEOPOROSIS, POST-MENOPAUSAL: ICD-10-CM

## 2025-04-30 PROCEDURE — 63600175 PHARM REV CODE 636 W HCPCS: Performed by: INTERNAL MEDICINE

## 2025-04-30 PROCEDURE — 25000003 PHARM REV CODE 250: Performed by: INTERNAL MEDICINE

## 2025-04-30 PROCEDURE — 96365 THER/PROPH/DIAG IV INF INIT: CPT

## 2025-04-30 RX ORDER — SODIUM CHLORIDE 0.9 % (FLUSH) 0.9 %
10 SYRINGE (ML) INJECTION
OUTPATIENT
Start: 2025-04-30

## 2025-04-30 RX ORDER — ZOLEDRONIC ACID 5 MG/100ML
5 INJECTION, SOLUTION INTRAVENOUS
Status: COMPLETED | OUTPATIENT
Start: 2025-04-30 | End: 2025-04-30

## 2025-04-30 RX ORDER — ZOLEDRONIC ACID 5 MG/100ML
5 INJECTION, SOLUTION INTRAVENOUS
OUTPATIENT
Start: 2025-04-30

## 2025-04-30 RX ORDER — SODIUM CHLORIDE 0.9 % (FLUSH) 0.9 %
10 SYRINGE (ML) INJECTION
Status: DISCONTINUED | OUTPATIENT
Start: 2025-04-30 | End: 2025-04-30 | Stop reason: HOSPADM

## 2025-04-30 RX ORDER — HEPARIN 100 UNIT/ML
500 SYRINGE INTRAVENOUS
OUTPATIENT
Start: 2025-04-30

## 2025-04-30 RX ADMIN — SODIUM CHLORIDE: 9 INJECTION, SOLUTION INTRAVENOUS at 03:04

## 2025-04-30 RX ADMIN — ZOLEDRONIC ACID 5 MG: 5 INJECTION, SOLUTION INTRAVENOUS at 03:04

## 2025-04-30 NOTE — PROGRESS NOTES
Patient received Reclast infusion over 15 minutes as ordered today. Patient confirms use of calcium and vitamin D supplements and denies dental procedures over past 3 months - administered per guidelines.     Patient tolerated infusion well without difficulty.     Limited head-to-toe assessment due to privacy issues and visit reason though the opportunity was given for patient to express any concerns.

## 2025-05-20 DIAGNOSIS — F43.21 ADJUSTMENT DISORDER WITH DEPRESSED MOOD: ICD-10-CM

## 2025-05-20 DIAGNOSIS — E87.6 HYPOKALEMIA: ICD-10-CM

## 2025-05-20 RX ORDER — SERTRALINE HYDROCHLORIDE 100 MG/1
200 TABLET, FILM COATED ORAL
Qty: 180 TABLET | Refills: 1 | Status: SHIPPED | OUTPATIENT
Start: 2025-05-20

## 2025-05-20 RX ORDER — POTASSIUM CHLORIDE 750 MG/1
10 TABLET, EXTENDED RELEASE ORAL
Qty: 90 TABLET | Refills: 1 | Status: SHIPPED | OUTPATIENT
Start: 2025-05-20

## 2025-05-20 NOTE — TELEPHONE ENCOUNTER
No care due was identified.  [facility] Embedded Care Due Messages. Reference number: [id number].   5/20/2025 10:28:28 AM CDT

## 2025-05-20 NOTE — TELEPHONE ENCOUNTER
Refill Decision Note   Eugenia Joe  is requesting a refill authorization.  Brief Assessment and Rationale for Refill:  Approve     Medication Therapy Plan:         Comments:     Note composed:12:50 PM 05/20/2025

## 2025-07-22 ENCOUNTER — HOSPITAL ENCOUNTER (OUTPATIENT)
Dept: RADIOLOGY | Facility: HOSPITAL | Age: 80
Discharge: HOME | End: 2025-07-22
Attending: INTERNAL MEDICINE
Payer: MEDICARE

## 2025-07-22 VITALS — WEIGHT: 149 LBS | BODY MASS INDEX: 26.4 KG/M2 | HEIGHT: 63 IN

## 2025-07-22 DIAGNOSIS — Z12.31 VISIT FOR SCREENING MAMMOGRAM: ICD-10-CM

## 2025-07-22 PROCEDURE — 77067 SCR MAMMO BI INCL CAD: CPT | Mod: TC
